# Patient Record
Sex: FEMALE | Race: OTHER | HISPANIC OR LATINO
[De-identification: names, ages, dates, MRNs, and addresses within clinical notes are randomized per-mention and may not be internally consistent; named-entity substitution may affect disease eponyms.]

---

## 2019-01-14 ENCOUNTER — APPOINTMENT (OUTPATIENT)
Dept: NEUROSURGERY | Facility: CLINIC | Age: 34
End: 2019-01-14
Payer: MEDICAID

## 2019-01-14 VITALS
WEIGHT: 148 LBS | DIASTOLIC BLOOD PRESSURE: 67 MMHG | RESPIRATION RATE: 16 BRPM | HEIGHT: 63 IN | HEART RATE: 55 BPM | SYSTOLIC BLOOD PRESSURE: 101 MMHG | TEMPERATURE: 99.3 F | OXYGEN SATURATION: 97 % | BODY MASS INDEX: 26.22 KG/M2

## 2019-01-14 DIAGNOSIS — Z78.9 OTHER SPECIFIED HEALTH STATUS: ICD-10-CM

## 2019-01-14 PROCEDURE — 99215 OFFICE O/P EST HI 40 MIN: CPT

## 2019-01-15 PROBLEM — Z78.9 NON-SMOKER: Status: ACTIVE | Noted: 2019-01-15

## 2019-01-21 NOTE — PHYSICAL EXAM
[General Appearance - Alert] : alert [General Appearance - In No Acute Distress] : in no acute distress [Oriented To Time, Place, And Person] : oriented to person, place, and time [Sclera] : the sclera and conjunctiva were normal [Outer Ear] : the ears and nose were normal in appearance [Neck Appearance] : the appearance of the neck was normal [] : no respiratory distress [Respiration, Rhythm And Depth] : normal respiratory rhythm and effort [Heart Rate And Rhythm] : heart rate was normal and rhythm regular [Skin Color & Pigmentation] : normal skin color and pigmentation [FreeTextEntry6] : RIGHT lower extremity weakness 3/5

## 2019-01-21 NOTE — PLAN
[FreeTextEntry1] : Discussed risks, benefits and alternatives to surgery at length with patient and patient's family. \par Compared MRI lumbar/thoracic spine done in April 2017 to new MRI lumbar/thoracic spine in October 2018 - residual tumor stable and remains unchanged.\par Recommend close follow up with serial imaging to assess tumor stability and assess for tethered spinal cord - 3 month MRI thoracic and lumbar spine with and without contrast.\par After MRI complete, schedule appointment with Dr. Kraft for nerve conduction studies.\par MRI to be obtained here at [facility].\par \par Plan:\par \par 1. MRI thoracic and lumbar spine with and without contrast\par 2. Refer to neurology for nerve conduction studies - Dr. Yasmany Kraft\par 3. Return for follow up after MRI complete\par \par Patient verbalizes agreement and understanding with plan.

## 2019-01-21 NOTE — ADDENDUM
[FreeTextEntry1] : We had a long discussion today regarding the risks, benefits,alternatives to surgery for her residual conus region dermoid/epidermoid tumor. The tumor has actually demonstrated no growth on serial imaging last performed in Oct 2018. Patient seems very eager to have surgery but I explained that surgery is unlikely to improve her back pain and furthermore there is no guarantee that any of her neurological symptoms will improve. Given the association of residual tumor with her conus it is in fact probable that she will have more deficits with aggressive surgery. The possibility that a secondary tethered cord is contributing to her symptoms has not been ruled out. Will need a new MRI T/L spine as well as nerve conduction studies to make a definitive plan. Followup after these studies have been completed.\par \par Patrick Bernabe M.D.

## 2019-01-21 NOTE — HISTORY OF PRESENT ILLNESS
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 Bernabe in neurosurgical consultation for spinal mass at [facility]. She comes to the appointment today with her mother.\par \par Since her initial surgery, the patient reports severe back pain radiating to RIGHT leg with associated numbness/tingling. She reports difficulty walking and uses cane to ambulate for assistance. Reports imbalance. She states that these symptoms have become progressively worse over the years. Also reports bowel/bladder incontinence and chronic constipation since initial surgery 6 years ago. \par \par She has been doing physical therapy with minimal relief of symptoms. \par \par \par \par

## 2019-02-23 ENCOUNTER — APPOINTMENT (OUTPATIENT)
Dept: MRI IMAGING | Facility: HOSPITAL | Age: 34
End: 2019-02-23
Payer: MEDICAID

## 2019-02-23 ENCOUNTER — OUTPATIENT (OUTPATIENT)
Dept: OUTPATIENT SERVICES | Facility: HOSPITAL | Age: 34
LOS: 1 days | End: 2019-02-23
Payer: MEDICAID

## 2019-02-23 PROCEDURE — 72158 MRI LUMBAR SPINE W/O & W/DYE: CPT | Mod: 26

## 2019-02-23 PROCEDURE — 72157 MRI CHEST SPINE W/O & W/DYE: CPT | Mod: 26

## 2019-02-23 PROCEDURE — 72157 MRI CHEST SPINE W/O & W/DYE: CPT

## 2019-02-23 PROCEDURE — A9585: CPT

## 2019-02-23 PROCEDURE — 72158 MRI LUMBAR SPINE W/O & W/DYE: CPT

## 2019-04-19 ENCOUNTER — APPOINTMENT (OUTPATIENT)
Dept: NEUROLOGY | Facility: CLINIC | Age: 34
End: 2019-04-19

## 2019-05-28 ENCOUNTER — OUTPATIENT (OUTPATIENT)
Dept: OUTPATIENT SERVICES | Facility: HOSPITAL | Age: 34
LOS: 1 days | End: 2019-05-28
Payer: MEDICAID

## 2019-05-28 ENCOUNTER — APPOINTMENT (OUTPATIENT)
Dept: NEUROSURGERY | Facility: CLINIC | Age: 34
End: 2019-05-28
Payer: MEDICAID

## 2019-05-28 VITALS
BODY MASS INDEX: 26.22 KG/M2 | OXYGEN SATURATION: 98 % | HEIGHT: 63 IN | HEART RATE: 96 BPM | WEIGHT: 148 LBS | SYSTOLIC BLOOD PRESSURE: 94 MMHG | DIASTOLIC BLOOD PRESSURE: 61 MMHG | RESPIRATION RATE: 18 BRPM

## 2019-05-28 PROCEDURE — 72110 X-RAY EXAM L-2 SPINE 4/>VWS: CPT

## 2019-05-28 PROCEDURE — 72080 X-RAY EXAM THORACOLMB 2/> VW: CPT

## 2019-05-28 PROCEDURE — 99215 OFFICE O/P EST HI 40 MIN: CPT

## 2019-05-28 PROCEDURE — 72084 X-RAY EXAM ENTIRE SPI 6/> VW: CPT | Mod: 26

## 2019-06-01 NOTE — REASON FOR VISIT
[Follow-Up: _____] : a [unfilled] follow-up visit [FreeTextEntry1] : Follows up after MRI lumbar and thoracic spine done on 2/23/19.\par \par She reports worsening low back pain radiating to bilateral lower extremities with associated lower extremity weakness (RIGHT greater than LEFT). She states these symptoms are exacerbated with ambulation. \par \par She was recently brought to ED for severe low back and leg pain and subsequently discharged. She is currently taking oxycodone and gabapentin for pain with minimal relief of symptoms.\par She reports continued bladder incontinence. She has difficulty walking and imbalance. She uses a walker to ambulate. She is currently seeking a HHA to assist her with ADLs as she is unable to perform ADLs independently.

## 2019-06-01 NOTE — ADDENDUM
[FreeTextEntry1] : 	May 28, 2019 \par  \par  \par  \par Re:	Radha Rowan  \par :	83 \par MRN:  [id number] \par  \par Ms. Rowan is a 33-year-old woman who presents for followup consultation regarding her recurrent conus medullaris intramedullary tumor.  Ms. Rowan was last evaluated in January of this year, and her followup MRI at this point indicated a stable size of her residual conus dermoid tumor.  At that point, given that her symptoms were relatively stable and the radiographic images showed no significant change, we decided to follow conservatively.  This conus dermoid tumor was initially diagnosed in  at which point she underwent subtotal resection at [facility] by a surgeon unknown to me. This initial surgery performed in  was very complex and left her with severe neurological deficits.  She in fact developed a wound infection, CSF leak, pseudo-meningocele requiring spinal drain, and multiple flap closures. Since that surgery, she has had a number of neurological issues which have been progressive including chronic low back pain, difficulty with ambulation, weakness, chronic bowel and bladder incontinence.   \par  \par Ms. Rowan indicates that her neurological symptoms have significantly worsened over the last 3-4 months.  She recounts several episodes of gait imbalance and falls which have left her with intermittent transient paralysis.  She also has worsening low back pain and worsening weakness in the lower extremities.  She has been ambulating only with a rolling walker.  She notes that she cannot walk more than a block without needing to rest.  Her incontinence is also somewhat worsened.   \par  \par She come in with a new thoracic and lumbar MRI which indicates a 2.4 x 1.9 cm L1 and L2 intramedullary conus tumor.  This tumor as compared to her last MRI shows some interval progression in size.  The intramedullary tumor has patchy contract enhancement superficially and heterogeneous signal intensity throughout.  There is clearly bulky tumor disease which is compressing the conus as well as the cauda equina nerve roots significantly.  There additionally is evidence of significant degenerative disease including disc desiccation at T12-L1 and L1-2 with a moderately broad disc bulge at L1-2.  Her x-rays show previous laminectomy at L1 and T12 as well as kyphosis at the thoracolumbar junction.   \par  \par Her neurological exam includes an antalgic gait with a walker.  Her lower extremity strength is 4- to 4 out of 5 in all lower extremity muscle groups.  She is unable to heel or toe walk.  She has decreased sensation and numbness bilaterally.  Reflexes in the patella are diminished.  The patient has paresthesias in the lower extremities when she attempts to ambulate.   \par  \par Given her progressive neurological findings as well as indication of progression of her tumor on radiographic imaging, I have indicated to her that it is certainly reasonable to re-explore her recurrent tumor and attempt a radical decompression. Given the pathology being dermoid I indicated that a complte resection is unlikely. I have indicated that she would certainly be at high risk for complication with this surgery, given that she has a fair amount of scar tissue in the region and she has has 3 or 4 surgeries in the region previously.  I have indicated to her that she is at high risk for progressive weakness.  Certainly, I do not expect any reversal of the advanced neurological symptoms that she has.  However, surgery would be done in an attempt to stabilizer her function.   \par  \par The risks of surgery including, but not limited to paralysis, infection, spinal destabilization, the need for reoperation, non-improvement in symptoms, and death were all discussed.  Ms. Rwoan understands these risks and would like to proceed.  I have indicated to her that I would like to obtain a CT of the thoracic and lumbar spine in order for me to have a better appreciation of the bony structure of her spine.  Certainly, if there is any indication of instability, I indicated that a fusion may be necessary. \par  \par We will plan for reoperation of T12 and L1 laminectomies; additional laminectomies may be necessary and certainly I would be willing to extend both rostrally and caudally should it be necessary to expose the extent of her conus tumor.   \par  \par  \par  \par Patrick Bernabe M.D. \par  of Neurosurgery \par [facility] \[facility] Department of Neurosurgery \par [facility] \par [address] \par [facility], Third Floor \[place] [address] \[facility] Tel: [phone] \[facility] Email:  [email or web address] \par  \par  \par KATRIN/jason [facility] #[id number] \[facility]

## 2019-06-01 NOTE — HISTORY OF PRESENT ILLNESS
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 Bernabe in neurosurgical consultation for spinal mass at [facility] [place]. She comes to the appointment today with her mother.\par \par Since her initial surgery, the patient reports severe back pain radiating to RIGHT leg with associated numbness/tingling. She reports difficulty walking and uses cane to ambulate for assistance. Reports imbalance. She states that these symptoms have become progressively worse over the years. Also reports bowel/bladder incontinence and chronic constipation since initial surgery 6 years ago. \par \par She has been doing physical therapy with minimal relief of symptoms. \par \par \par \par

## 2019-06-01 NOTE — DATA REVIEWED
[de-identified] : \par EXAM: MR SPINE LUMBAR WAW IC \par \par EXAM: MR SPINE THORACIC WAW IC \par \par PROCEDURE DATE: 02/23/2019 \par \par \par \par INTERPRETATION: THORACIC SPINE MR WITH CONTRAST \par LUMBAR SPINE WITH CONTRAST \par \par INDICATION: \par History of prior epidermoid resection at thoracolumbar junction. \par \par TECHNIQUE: \par \par THORACIC SPINE MR: \par Sagittal T1, sagittal T2, sagittal inversion recovery, axial T1, axial T2, \par and post-contrast sagittal T1 with fat suppression and axial T1 imaging \par without fat suppression was obtained of the thoracic spine. There are no \par prior studies. If outside studies are brought to the radiology department at [facility], a comparison will be made to those studies and an \par addendum will be added to this report. 7.5 cc of Gadavist was given, and \par none was discarded. \par \par LUMBAR SPINE MR: \par Sagittal T1, sagittal T2, sagittal STIR, axial T1, axial T2, and \par postcontrast sagittal T1 with fat suppression and axial T1 without fat \par suppression imaging was obtained of the lumbar spine.There are no prior \par studies. If outside studies are brought to the radiology department at [facility], a comparison will be made to those studies and an addendum \par will be added to this report. 7.5 cc of Gadavist was given, and none was \par discarded. \par \par FINDINGS: \par \par THORACIC SPINE MR: \par \par There is a laminectomy defect noted at the T12 and L1 levels. There is a \par mass identified at the thoracolumbar junction within the cauda equina mainly \par at the L1 level, which demonstrates a rounded configuration and \par heterogeneity of signal with areas of T1 shortening which may represent \par calcification and/or subacute methemoglobin (hemorrhage) and areas of marked \par T2 shortening and T2 prolongation. There is linear, peripheral enhancement \par noted and a more focus of nodular enhancement along the anterosuperior \par margin. Given history of epidermoid, diffusion-weighted imaging of the \par thoracic and lumbar spine is recommended as this is a more sensitive study \par for the detection of recurrent epidermoid. Lesion is approximately 4.3 cm in \par craniocaudal dimension x 2.0 cm AP dimension (series 11, image #8). \par \par There is kyphosis noted in the lower thoracic spine. There is no abnormal T2 \par prolongation within the intramedullary intradural thoracic spinal cord to \par the T12 level. There is loss of normal disc space signal on long TR scans \par consistent with desiccation and degenerative disc disease. Minor endplate \par marginal osteophytes are noted in the mid and lower thoracic spine. Marrow \par signal intensity is unremarkable. \par \par On axial imaging, there is a tiny central disc protrusion T12-L1 level with \par no significant central canal stenosis or foraminal stenosis. \par \par After contrast administration, \par \par LUMBAR SPINE MR: \par \par For description of the mass lesion at the L1 level, reference should be made \par to the thoracic spine MR report above. Patient demonstrates laminectomy at \par T12 and L1, which relates to prior resection of epidermoid by clinical \par history. \par \par Alignment is anatomic in the lumbar region. No spondylolisthesis is seen. \par Marrow signal intensity is normal.. No acute edema pattern is seen within \par lumbar vertebral bodies. \par \par There is loss of normal disc space signal on the long TR scans consistent \par with desiccation multilevel degenerative disc disease. No significant \par degenerative changes are seen. Conus medullaris is not well delineated due \par to the presence of the mass lesion centered at the L1 level, and this mass \par displays portions of the proximal cauda equina. \par \par At the L1-L2 level, small right paracentral disc protrusion L1-L2 level. No \par significant central canal stenosis is present or foraminal stenosis. \par \par At the L2-L3 level, no disc protrusion, lateral recess stenosis, foraminal \par narrowing, or central canal stenosis is present. \par \par At the L3-L4 level, no disc protrusion, lateral recess stenosis, foraminal \par narrowing, or central canal stenosis is present. \par \par At the L4-L5 level, diffuse disc bulge. There is no lateral recess stenosis, \par foraminal narrowing, or central canal stenosis. \par \par At the L5-S1 level, diffuse disc bulge. There is no lateral recess stenosis, \par foraminal narrowing, or central canal stenosis. \par \par IMPRESSION: \par \par THORACIC SPINE MR: \par 1. Prior laminectomy T12 and L1 for epidermoid resection. Concern for \par recurrent epidermoid with mass lesion identified at the thoracolumbar \par junction within the cauda equina mainly at L1 level demonstrating \par heterogeneity of signal characteristics with areas of T1 shortening and \par marked T2 shortening which may represent calcification and/or subacute \par methemoglobin (hemorrhage). Measurements above. Diffusion-weighted imaging \par of the thoracic and lumbar spine is recommended as this is a more sensitive \par MR sequence for the detection of recurrent and/or residual epidermoid. \par 2. Tiny central disc protrusion at T12-L1 level. \par 3. No central canal stenosis or foraminal stenosis. \par 4, Kyphosis in lower thoracic region. \par \par LUMBAR SPINE MR: \par 1. Prior laminectomy at T12 and L1 with recurrent epidermoid suspected. \par Refer to thoracic spine MR impression. \par 2. Small right paracentral disc protrusion L1-L2 level with no significant \par foraminal stenosis or central canal stenosis. \par 3. Minor diffuse disc bulges L4-L5 and L5-S1 levels. \par \par \par \par \par \par "Thank you for the opportunity to participate in the care of this patient." \par \par \par \par HOLGER CROWLEY M.D., ATTENDING RADIOLOGIST \par This document has been electronically signed. Feb 24 2019 10:26AM \par

## 2019-06-01 NOTE — PHYSICAL EXAM
[General Appearance - In No Acute Distress] : in no acute distress [General Appearance - Alert] : alert [Sclera] : the sclera and conjunctiva were normal [Antalgic] : antalgic [Neck Appearance] : the appearance of the neck was normal [Outer Ear] : the ears and nose were normal in appearance [] : no respiratory distress [Respiration, Rhythm And Depth] : normal respiratory rhythm and effort [Heart Rate And Rhythm] : heart rate was normal and rhythm regular [Abnormal Walk] : normal gait [Skin Color & Pigmentation] : normal skin color and pigmentation [FreeTextEntry8] : uses walker to ambulate [FreeTextEntry6] : RLE strength 4/5

## 2019-06-01 NOTE — ASSESSMENT
[FreeTextEntry1] : Imaging reviewed in detail with patient and patient’s family, which demonstrates slight increase in recurrent epidermoid tumor  at T12-L1.\par Surgery recommended, T12-L 1 laminectomy, epidermoid tumor resection.\par Discussed goal of surgery is to HALT progression of symptoms.\par Risks, benefits and alternatives to surgery discussed. \par Multiple questions answered to patient’s satisfaction. \par Risks include but is not limited to infection, no resolution of symptoms, bleeding, paralysis, CSF leak. Her risk of these complications may be slightly higher than average due to previous spinal surgery. She understands this.\par Education provided regarding pre-operative clearance requirements\par PST packet reviewed with and given to patient\par Education provided regarding use of chlorhexidine wipes pre-op\par Nasal Swab completed for MRSA\par She will need CT thoracic and lumbar spine preoperatively as well as flex/ext x-rays.\par \par Patient and patient’s family understand and wish to proceed with planned surgery. \par \par

## 2019-06-03 ENCOUNTER — OUTPATIENT (OUTPATIENT)
Dept: OUTPATIENT SERVICES | Facility: HOSPITAL | Age: 34
LOS: 1 days | End: 2019-06-03
Payer: COMMERCIAL

## 2019-06-03 DIAGNOSIS — M54.5 LOW BACK PAIN: ICD-10-CM

## 2019-06-03 DIAGNOSIS — Z22.321 CARRIER OR SUSPECTED CARRIER OF METHICILLIN SUSCEPTIBLE STAPHYLOCOCCUS AUREUS: ICD-10-CM

## 2019-06-03 PROCEDURE — 87641 MR-STAPH DNA AMP PROBE: CPT

## 2019-06-04 ENCOUNTER — APPOINTMENT (OUTPATIENT)
Dept: VASCULAR SURGERY | Facility: CLINIC | Age: 34
End: 2019-06-04
Payer: MEDICAID

## 2019-06-04 LAB
MRSA PCR RESULT.: NEGATIVE — SIGNIFICANT CHANGE UP
S AUREUS DNA NOSE QL NAA+PROBE: NEGATIVE — SIGNIFICANT CHANGE UP

## 2019-06-04 PROCEDURE — 93970 EXTREMITY STUDY: CPT

## 2019-06-08 ENCOUNTER — APPOINTMENT (OUTPATIENT)
Dept: CT IMAGING | Facility: HOSPITAL | Age: 34
End: 2019-06-08
Payer: MEDICAID

## 2019-06-08 ENCOUNTER — INPATIENT (INPATIENT)
Facility: HOSPITAL | Age: 34
LOS: 1 days | Discharge: HOME | DRG: 55 | End: 2019-06-10
Attending: NEUROLOGICAL SURGERY | Admitting: NEUROLOGICAL SURGERY
Payer: MEDICAID

## 2019-06-08 ENCOUNTER — OUTPATIENT (OUTPATIENT)
Dept: OUTPATIENT SERVICES | Facility: HOSPITAL | Age: 34
LOS: 1 days | End: 2019-06-08
Payer: MEDICAID

## 2019-06-08 VITALS
WEIGHT: 130.07 LBS | OXYGEN SATURATION: 98 % | HEART RATE: 100 BPM | SYSTOLIC BLOOD PRESSURE: 123 MMHG | HEIGHT: 63 IN | DIASTOLIC BLOOD PRESSURE: 76 MMHG | TEMPERATURE: 98 F | RESPIRATION RATE: 18 BRPM

## 2019-06-08 DIAGNOSIS — D49.7 NEOPLASM OF UNSPECIFIED BEHAVIOR OF ENDOCRINE GLANDS AND OTHER PARTS OF NERVOUS SYSTEM: Chronic | ICD-10-CM

## 2019-06-08 DIAGNOSIS — M79.673 PAIN IN UNSPECIFIED FOOT: ICD-10-CM

## 2019-06-08 DIAGNOSIS — D49.9 NEOPLASM OF UNSPECIFIED BEHAVIOR OF UNSPECIFIED SITE: ICD-10-CM

## 2019-06-08 LAB
ALBUMIN SERPL ELPH-MCNC: 4.3 G/DL — SIGNIFICANT CHANGE UP (ref 3.3–5)
ALP SERPL-CCNC: 62 U/L — SIGNIFICANT CHANGE UP (ref 40–120)
ALT FLD-CCNC: 25 U/L — SIGNIFICANT CHANGE UP (ref 10–45)
ANION GAP SERPL CALC-SCNC: 15 MMOL/L — SIGNIFICANT CHANGE UP (ref 5–17)
APPEARANCE UR: CLEAR — SIGNIFICANT CHANGE UP
APTT BLD: 28.2 SEC — SIGNIFICANT CHANGE UP (ref 27.5–36.3)
AST SERPL-CCNC: 32 U/L — SIGNIFICANT CHANGE UP (ref 10–40)
BACTERIA # UR AUTO: PRESENT /HPF
BASOPHILS # BLD AUTO: 0.02 K/UL — SIGNIFICANT CHANGE UP (ref 0–0.2)
BASOPHILS NFR BLD AUTO: 0.2 % — SIGNIFICANT CHANGE UP (ref 0–2)
BILIRUB SERPL-MCNC: 0.6 MG/DL — SIGNIFICANT CHANGE UP (ref 0.2–1.2)
BILIRUB UR-MCNC: NEGATIVE — SIGNIFICANT CHANGE UP
BLD GP AB SCN SERPL QL: NEGATIVE — SIGNIFICANT CHANGE UP
BUN SERPL-MCNC: 6 MG/DL — LOW (ref 7–23)
CALCIUM SERPL-MCNC: 9.6 MG/DL — SIGNIFICANT CHANGE UP (ref 8.4–10.5)
CHLORIDE SERPL-SCNC: 111 MMOL/L — HIGH (ref 96–108)
CO2 SERPL-SCNC: 19 MMOL/L — LOW (ref 22–31)
COLOR SPEC: YELLOW — SIGNIFICANT CHANGE UP
COMMENT - URINE: SIGNIFICANT CHANGE UP
CREAT SERPL-MCNC: 0.7 MG/DL — SIGNIFICANT CHANGE UP (ref 0.5–1.3)
CRP SERPL-MCNC: 0.2 MG/DL — SIGNIFICANT CHANGE UP (ref 0–0.4)
DIFF PNL FLD: ABNORMAL
EOSINOPHIL # BLD AUTO: 0.06 K/UL — SIGNIFICANT CHANGE UP (ref 0–0.5)
EOSINOPHIL NFR BLD AUTO: 0.5 % — SIGNIFICANT CHANGE UP (ref 0–6)
EPI CELLS # UR: SIGNIFICANT CHANGE UP /HPF (ref 0–5)
ERYTHROCYTE [SEDIMENTATION RATE] IN BLOOD: 39 MM/HR — HIGH
GLUCOSE SERPL-MCNC: 109 MG/DL — HIGH (ref 70–99)
GLUCOSE UR QL: NEGATIVE — SIGNIFICANT CHANGE UP
HBA1C BLD-MCNC: 4.8 % — SIGNIFICANT CHANGE UP (ref 4–5.6)
HCT VFR BLD CALC: 40.3 % — SIGNIFICANT CHANGE UP (ref 34.5–45)
HGB BLD-MCNC: 13.1 G/DL — SIGNIFICANT CHANGE UP (ref 11.5–15.5)
HYALINE CASTS # UR AUTO: ABNORMAL /LPF (ref 0–2)
IMM GRANULOCYTES NFR BLD AUTO: 0.2 % — SIGNIFICANT CHANGE UP (ref 0–1.5)
INR BLD: 0.99 — SIGNIFICANT CHANGE UP (ref 0.88–1.16)
KETONES UR-MCNC: 15 MG/DL
LEUKOCYTE ESTERASE UR-ACNC: NEGATIVE — SIGNIFICANT CHANGE UP
LYMPHOCYTES # BLD AUTO: 1.65 K/UL — SIGNIFICANT CHANGE UP (ref 1–3.3)
LYMPHOCYTES # BLD AUTO: 13.4 % — SIGNIFICANT CHANGE UP (ref 13–44)
MCHC RBC-ENTMCNC: 32.5 GM/DL — SIGNIFICANT CHANGE UP (ref 32–36)
MCHC RBC-ENTMCNC: 33.8 PG — SIGNIFICANT CHANGE UP (ref 27–34)
MCV RBC AUTO: 103.9 FL — HIGH (ref 80–100)
MONOCYTES # BLD AUTO: 0.59 K/UL — SIGNIFICANT CHANGE UP (ref 0–0.9)
MONOCYTES NFR BLD AUTO: 4.8 % — SIGNIFICANT CHANGE UP (ref 2–14)
NEUTROPHILS # BLD AUTO: 9.98 K/UL — HIGH (ref 1.8–7.4)
NEUTROPHILS NFR BLD AUTO: 80.9 % — HIGH (ref 43–77)
NITRITE UR-MCNC: NEGATIVE — SIGNIFICANT CHANGE UP
NRBC # BLD: 0 /100 WBCS — SIGNIFICANT CHANGE UP (ref 0–0)
PH UR: 6 — SIGNIFICANT CHANGE UP (ref 5–8)
PLATELET # BLD AUTO: 366 K/UL — SIGNIFICANT CHANGE UP (ref 150–400)
POTASSIUM SERPL-MCNC: 4.7 MMOL/L — SIGNIFICANT CHANGE UP (ref 3.5–5.3)
POTASSIUM SERPL-MCNC: 5.7 MMOL/L — HIGH (ref 3.5–5.3)
POTASSIUM SERPL-SCNC: 4.7 MMOL/L — SIGNIFICANT CHANGE UP (ref 3.5–5.3)
POTASSIUM SERPL-SCNC: 5.7 MMOL/L — HIGH (ref 3.5–5.3)
PROT SERPL-MCNC: 7.6 G/DL — SIGNIFICANT CHANGE UP (ref 6–8.3)
PROT UR-MCNC: NEGATIVE MG/DL — SIGNIFICANT CHANGE UP
PROTHROM AB SERPL-ACNC: 11.2 SEC — SIGNIFICANT CHANGE UP (ref 10–12.9)
RBC # BLD: 3.88 M/UL — SIGNIFICANT CHANGE UP (ref 3.8–5.2)
RBC # FLD: 14.7 % — HIGH (ref 10.3–14.5)
RBC CASTS # UR COMP ASSIST: < 5 /HPF — SIGNIFICANT CHANGE UP
RH IG SCN BLD-IMP: POSITIVE — SIGNIFICANT CHANGE UP
SODIUM SERPL-SCNC: 145 MMOL/L — SIGNIFICANT CHANGE UP (ref 135–145)
SP GR SPEC: 1.02 — SIGNIFICANT CHANGE UP (ref 1–1.03)
URATE SERPL-MCNC: 4 MG/DL — SIGNIFICANT CHANGE UP (ref 2.5–7)
UROBILINOGEN FLD QL: 0.2 E.U./DL — SIGNIFICANT CHANGE UP
WBC # BLD: 12.33 K/UL — HIGH (ref 3.8–10.5)
WBC # FLD AUTO: 12.33 K/UL — HIGH (ref 3.8–10.5)
WBC UR QL: ABNORMAL /HPF

## 2019-06-08 PROCEDURE — 99223 1ST HOSP IP/OBS HIGH 75: CPT | Mod: GC

## 2019-06-08 PROCEDURE — 93010 ELECTROCARDIOGRAM REPORT: CPT

## 2019-06-08 PROCEDURE — 99222 1ST HOSP IP/OBS MODERATE 55: CPT

## 2019-06-08 PROCEDURE — 73630 X-RAY EXAM OF FOOT: CPT | Mod: 26,50

## 2019-06-08 PROCEDURE — 72130 CT CHEST SPINE W/O & W/DYE: CPT | Mod: 26

## 2019-06-08 PROCEDURE — 93971 EXTREMITY STUDY: CPT | Mod: 26,RT

## 2019-06-08 PROCEDURE — 72133 CT LUMBAR SPINE W/O & W/DYE: CPT | Mod: 26

## 2019-06-08 PROCEDURE — 99285 EMERGENCY DEPT VISIT HI MDM: CPT | Mod: 25

## 2019-06-08 PROCEDURE — 72133 CT LUMBAR SPINE W/O & W/DYE: CPT

## 2019-06-08 PROCEDURE — 73610 X-RAY EXAM OF ANKLE: CPT | Mod: 26,50

## 2019-06-08 PROCEDURE — 72130 CT CHEST SPINE W/O & W/DYE: CPT

## 2019-06-08 RX ORDER — SODIUM CHLORIDE 9 MG/ML
1000 INJECTION INTRAMUSCULAR; INTRAVENOUS; SUBCUTANEOUS ONCE
Refills: 0 | Status: COMPLETED | OUTPATIENT
Start: 2019-06-08 | End: 2019-06-08

## 2019-06-08 RX ORDER — ACETAMINOPHEN 500 MG
650 TABLET ORAL EVERY 6 HOURS
Refills: 0 | Status: DISCONTINUED | OUTPATIENT
Start: 2019-06-08 | End: 2019-06-10

## 2019-06-08 RX ORDER — SENNA PLUS 8.6 MG/1
2 TABLET ORAL AT BEDTIME
Refills: 0 | Status: DISCONTINUED | OUTPATIENT
Start: 2019-06-08 | End: 2019-06-10

## 2019-06-08 RX ORDER — OXYCODONE AND ACETAMINOPHEN 5; 325 MG/1; MG/1
2 TABLET ORAL EVERY 6 HOURS
Refills: 0 | Status: DISCONTINUED | OUTPATIENT
Start: 2019-06-08 | End: 2019-06-10

## 2019-06-08 RX ORDER — ONDANSETRON 8 MG/1
4 TABLET, FILM COATED ORAL EVERY 8 HOURS
Refills: 0 | Status: DISCONTINUED | OUTPATIENT
Start: 2019-06-08 | End: 2019-06-10

## 2019-06-08 RX ORDER — OXYCODONE AND ACETAMINOPHEN 5; 325 MG/1; MG/1
1 TABLET ORAL EVERY 4 HOURS
Refills: 0 | Status: DISCONTINUED | OUTPATIENT
Start: 2019-06-08 | End: 2019-06-10

## 2019-06-08 RX ORDER — KETOROLAC TROMETHAMINE 30 MG/ML
30 SYRINGE (ML) INJECTION ONCE
Refills: 0 | Status: DISCONTINUED | OUTPATIENT
Start: 2019-06-08 | End: 2019-06-08

## 2019-06-08 RX ORDER — DOCUSATE SODIUM 100 MG
100 CAPSULE ORAL THREE TIMES A DAY
Refills: 0 | Status: DISCONTINUED | OUTPATIENT
Start: 2019-06-08 | End: 2019-06-10

## 2019-06-08 RX ORDER — SODIUM CHLORIDE 9 MG/ML
1000 INJECTION INTRAMUSCULAR; INTRAVENOUS; SUBCUTANEOUS
Refills: 0 | Status: DISCONTINUED | OUTPATIENT
Start: 2019-06-08 | End: 2019-06-09

## 2019-06-08 RX ORDER — DIAZEPAM 5 MG
5 TABLET ORAL ONCE
Refills: 0 | Status: DISCONTINUED | OUTPATIENT
Start: 2019-06-08 | End: 2019-06-08

## 2019-06-08 RX ORDER — ENOXAPARIN SODIUM 100 MG/ML
40 INJECTION SUBCUTANEOUS AT BEDTIME
Refills: 0 | Status: DISCONTINUED | OUTPATIENT
Start: 2019-06-08 | End: 2019-06-10

## 2019-06-08 RX ADMIN — Medication 5 MILLIGRAM(S): at 16:57

## 2019-06-08 RX ADMIN — Medication 100 MILLIGRAM(S): at 22:11

## 2019-06-08 RX ADMIN — Medication 30 MILLIGRAM(S): at 14:04

## 2019-06-08 RX ADMIN — SODIUM CHLORIDE 50 MILLILITER(S): 9 INJECTION INTRAMUSCULAR; INTRAVENOUS; SUBCUTANEOUS at 19:19

## 2019-06-08 RX ADMIN — ENOXAPARIN SODIUM 40 MILLIGRAM(S): 100 INJECTION SUBCUTANEOUS at 22:11

## 2019-06-08 RX ADMIN — OXYCODONE AND ACETAMINOPHEN 1 TABLET(S): 5; 325 TABLET ORAL at 22:11

## 2019-06-08 RX ADMIN — SODIUM CHLORIDE 2000 MILLILITER(S): 9 INJECTION INTRAMUSCULAR; INTRAVENOUS; SUBCUTANEOUS at 14:33

## 2019-06-08 RX ADMIN — Medication 30 MILLIGRAM(S): at 18:38

## 2019-06-08 RX ADMIN — OXYCODONE AND ACETAMINOPHEN 1 TABLET(S): 5; 325 TABLET ORAL at 23:00

## 2019-06-08 NOTE — ED PROVIDER NOTE - ATTENDING CONTRIBUTION TO CARE
34 yo f w hx of epidermoid cyst of L spine presents to ED with concern for right foot pain and swelling x 1 week.  She notes intermittent swelling over the past few months, however states this is worse.  No trauma, no fever or chills.  + Difficulty ambulating 2/2 pain.  On my face to face ED eval, patient is non toxic.  HRRR, lungs clear.  Abd soft.  + swelling to right foot and ankle, no skin breakdown.  Extremity is warm with mild hyperemia.  Will check x ray, US and labs.  Will consult w podiatry and dispo accordingly.

## 2019-06-08 NOTE — ED ADULT NURSE NOTE - INTERVENTIONS DEFINITIONS
Physically safe environment: no spills, clutter or unnecessary equipment/Stretcher in lowest position, wheels locked, appropriate side rails in place/Provide visual cue, wrist band, yellow gown, etc./Instruct patient to call for assistance/Monitor gait and stability

## 2019-06-08 NOTE — ED PROVIDER NOTE - MUSCULOSKELETAL, MLM
Spine appears normal, no bruising or redness. no midline spinal tenderness. + swelling and mild redness to entire right foot. pulses intact. sensation intact. diffuse tenderness. toes contracted. cap refill < 2secs. calf and tib/fib non tender.

## 2019-06-08 NOTE — ED ADULT NURSE NOTE - OBJECTIVE STATEMENT
Patient is a 32yo female reporting right foot pain and swelling x1 week. Patient has hx of spinal tumor and was supposed to have a CT spine today but was unable to ambulate to appointment. Right foot swollen and tender to palpation. Pedal pulse 1+ on right foot. Denies fevers, injury. Unable to bear weight on right foot.

## 2019-06-08 NOTE — H&P ADULT - ATTENDING COMMENTS
Patient seen and examined by me. She presents with a swollen and spastic right foot as well as hyperkalemia. Lower extremity dopplers negative for DVT. Right foot swelling now resolving and potassium level normal. 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 Right leg spasticity likely from recurrent conus tumor. CT T-L-spine obtained. Plan for tumor resection next week. Patient medically cleared for surgery. Will discharge home and she will return for surgery next week.    Patrick Bernabe M.D.

## 2019-06-08 NOTE — H&P ADULT - NSHPREVIEWOFSYSTEMS_GEN_ALL_CORE
34 yo f w hx of epidermoid cyst of L spine presents to ED with concern for right foot pain and swelling x 1 week.  She notes intermittent swelling over the past few months, however states this is worse.  No trauma, no fever or chills.  + Difficulty ambulating 2/2 pain.  On my face to face ED eval, patient is non toxic.    Patient is being followed by Dr. Bernabe, she came today for outpatient CT scan. Due to R.foot pain and edema, she was sent to ED.

## 2019-06-08 NOTE — H&P ADULT - NSHPLABSRESULTS_GEN_ALL_CORE
13.1   12.33 )-----------( 366      ( 08 Jun 2019 14:01 )             40.3   06-08    x   |  x   |  x   ----------------------------<  x   4.7   |  x   |  x     Ca    9.6      08 Jun 2019 14:01    TPro  7.6  /  Alb  4.3  /  TBili  0.6  /  DBili  x   /  AST  32  /  ALT  25  /  AlkPhos  62  06-08  PT/INR - ( 08 Jun 2019 14:01 )   PT: 11.2 sec;   INR: 0.99          PTT - ( 08 Jun 2019 14:01 )  PTT:28.2 sec  LLE doppler:  Thigh veins: The right common femoral, femoral, popliteal, proximal   greater saphenous, and proximal deep femoral veins are patent and free of   thrombus. The veins are normally compressible and have normal phasic flow   and augmentation response.    Calf veins: Limited evaluation of the paired posterior tibial calf veins   demonstrate compressibility with no visible thrombus. Peroneal veins are   not clearly demonstrated. There is right lower extremity soft tissue edema    Contralateral CFV: The contralateral (left) common femoral vein is patent   and free of thrombus.      IMPRESSION:  No deep vein thrombosis seen within the visualized vessels in the right   lower extremity.. Right lower extremity soft tissue edema is noted.

## 2019-06-08 NOTE — ED PROVIDER NOTE - OBJECTIVE STATEMENT
32 y/o female with hx of epidermoid cyst lumbar spine c/o right foot pain x 1wk. pt  states foot swollen and painful for the past one week. pt reports difficulty walking due to pain. pt notes swelling occurred in past but never lasts this long. no numbness or tingling. pt reports low back pain unchanged. + catheter use for yrs. no fever or chills. no cp or sob. pt notes negative doppler as outpt a couple of weeks ago. no further complaints.

## 2019-06-08 NOTE — CONSULT NOTE ADULT - ASSESSMENT
A/P 34yo F h/o epidermoid cyst of L spine presents to the emergency room with chief complaint of right foot pain and swelling    - Low suspicion for cellulitis. Likely neurological etiology as source of deformity and symptoms  - Recommended neurosurgery consult  - C/w pain management on outpatient basis  - Stable from a podiatry standpoint. Bracing would be challenging due to rigid deformity  Pls page podiatry with any questions or concerns

## 2019-06-08 NOTE — H&P ADULT - HISTORY OF PRESENT ILLNESS
32 yo f w hx of epidermoid cyst of L spine presents to ED with concern for right foot pain and swelling x 1 week.  She notes intermittent swelling over the past few months, however states this is worse.  No trauma, no fever or chills.  + Difficulty ambulating 2/2 pain.  On my face to face ED eval, patient is non toxic.    Patient is being followed by Dr. Bernabe, she came today for outpatient CT scan. Due to R.foot pain and edema, she was sent to ED.  Of note, patient at home self-catheterizes for intermittent urinary retention

## 2019-06-08 NOTE — H&P ADULT - NSHPPHYSICALEXAM_GEN_ALL_CORE
Gen: well developed, well groomed in moderate distress.  A&) x 3, PERRL, EOMI  CN II to XII are grossly intact.  Lung: clear lung sound  Heart: S1S2 regular  Abd: Soft non tender, +BS  Ext: RLE: R. foot severe edema and tenderness to palpation, severe contracture of digits. limited ROM due to pain.              decreased ROM of R. Knee due pain but has good strength.               5/5 proximal       LLE: 5/5, no pain no edema  2+ distal pulses bilateral.  No sensory deficit

## 2019-06-08 NOTE — ED PROVIDER NOTE - CLINICAL SUMMARY MEDICAL DECISION MAKING FREE TEXT BOX
right foot pain  with hx of epidermoid lumbar mass. foot swollen and contracted toes.  labs noted and x-rays done. podiatry consulted and suspect related to lumbar mass. doppler negative for dvt. pt evaluated in ED by neurosurgey and recommend admission. pt reports improvement in pain with toradol and valium right foot pain  with hx of epidermoid lumbar mass. foot swollen and contracted toes.  labs noted and x-rays done. podiatry consulted and suspect related to lumbar mass. doppler negative for dvt. pt evaluated in ED by neurosurgery, ct thoracic and lumbar ordered and recommend admission. pt reports improvement in pain with toradol and valium

## 2019-06-09 PROCEDURE — 71045 X-RAY EXAM CHEST 1 VIEW: CPT | Mod: 26

## 2019-06-09 RX ORDER — METOCLOPRAMIDE HCL 10 MG
10 TABLET ORAL ONCE
Refills: 0 | Status: COMPLETED | OUTPATIENT
Start: 2019-06-09 | End: 2019-06-09

## 2019-06-09 RX ADMIN — ENOXAPARIN SODIUM 40 MILLIGRAM(S): 100 INJECTION SUBCUTANEOUS at 21:04

## 2019-06-09 RX ADMIN — Medication 100 MILLIGRAM(S): at 13:36

## 2019-06-09 RX ADMIN — Medication 100 MILLIGRAM(S): at 21:04

## 2019-06-09 RX ADMIN — Medication 100 MILLIGRAM(S): at 05:07

## 2019-06-09 RX ADMIN — OXYCODONE AND ACETAMINOPHEN 2 TABLET(S): 5; 325 TABLET ORAL at 06:59

## 2019-06-09 RX ADMIN — ONDANSETRON 4 MILLIGRAM(S): 8 TABLET, FILM COATED ORAL at 06:00

## 2019-06-09 RX ADMIN — OXYCODONE AND ACETAMINOPHEN 2 TABLET(S): 5; 325 TABLET ORAL at 07:42

## 2019-06-09 RX ADMIN — Medication 10 MILLIGRAM(S): at 10:39

## 2019-06-09 NOTE — PROGRESS NOTE ADULT - SUBJECTIVE AND OBJECTIVE BOX
HPI:  34 yo f w hx of epidermoid cyst of L spine presents to ED with concern for right foot pain and swelling x 1 week.  She notes intermittent swelling over the past few months, however states this is worse.  No trauma, no fever or chills.  + Difficulty ambulating 2/2 pain.  On my face to face ED eval, patient is non toxic.    Patient is being followed by Dr. Bernabe, she came today for outpatient CT scan. Due to R.foot pain and edema, she was sent to ED.  Of note, patient at home self-catheterizes for intermittent urinary retention (2019 18:22)      Hospital course:   JO overnight. Plan for discharge home today. Plan to return for elective surgery .      Vital Signs Last 24 Hrs  T(C): 36.3 (2019 09:30), Max: 36.8 (2019 16:01)  T(F): 97.4 (2019 09:30), Max: 98.3 (2019 16:01)  HR: 70 (2019 09:30) (70 - 100)  BP: 118/81 (2019 09:30) (88/47 - 123/76)  BP(mean): --  RR: 16 (2019 09:30) (15 - 18)  SpO2: 98% (2019 09:30) (97% - 100%)    I&O's Summary    2019 07:01  -  2019 10:59  --------------------------------------------------------  IN: 0 mL / OUT: 60 mL / NET: -60 mL        PHYSICAL EXAM:  Neurological: AOx3, NAD, FC, speech coherent   CN II-XII grossly intact   Motor: MAEx4, RLE distal movement limited due to pain, +right leg/foot swelling, o/w 5/5 strength throughout  SILT throughout       TUBES/LINES:  [] Michel  [] A-line  [] Lumbar Drain  [] Wound Drains  [] NGT   [] EVD   [] CVC  [] Other      DIET:  [] NPO  [x] Mechanical  [] Tube feeds    LABS:                        13.1   12.33 )-----------( 366      ( 2019 14:01 )             40.3     06-08    x   |  x   |  x   ----------------------------<  x   4.7   |  x   |  x     Ca    9.6      2019 14:01    TPro  7.6  /  Alb  4.3  /  TBili  0.6  /  DBili  x   /  AST  32  /  ALT  25  /  AlkPhos  62  06-08    PT/INR - ( 2019 14:01 )   PT: 11.2 sec;   INR: 0.99          PTT - ( 2019 14:01 )  PTT:28.2 sec  Urinalysis Basic - ( 2019 15:32 )    Color: Yellow / Appearance: Clear / S.025 / pH: x  Gluc: x / Ketone: 15 mg/dL  / Bili: Negative / Urobili: 0.2 E.U./dL   Blood: x / Protein: NEGATIVE mg/dL / Nitrite: NEGATIVE   Leuk Esterase: NEGATIVE / RBC: < 5 /HPF / WBC 5-10 /HPF   Sq Epi: x / Non Sq Epi: 0-5 /HPF / Bacteria: Present /HPF          CAPILLARY BLOOD GLUCOSE          Drug Levels: [] N/A    CSF Analysis: [] N/A      Allergies    No Known Drug Allergies  shellfish (Rash)    Intolerances        Home Medications:  oxyCODONE 5 mg oral tablet: 1 tab(s) orally once a day, As Needed (2019 18:34)      MEDICATIONS:  MEDICATIONS  (STANDING):  docusate sodium 100 milliGRAM(s) Oral three times a day  enoxaparin Injectable 40 milliGRAM(s) SubCutaneous at bedtime    MEDICATIONS  (PRN):  acetaminophen   Tablet .. 650 milliGRAM(s) Oral every 6 hours PRN Temp greater or equal to 38C (100.4F), Mild Pain (1 - 3)  ondansetron Injectable 4 milliGRAM(s) IV Push every 8 hours PRN Nausea and/or Vomiting  oxyCODONE    5 mG/acetaminophen 325 mG 1 Tablet(s) Oral every 4 hours PRN Moderate Pain (4 - 6)  oxyCODONE    5 mG/acetaminophen 325 mG 2 Tablet(s) Oral every 6 hours PRN Severe Pain (7 - 10)  senna 2 Tablet(s) Oral at bedtime PRN Constipation      CULTURES:  Culture Results:   No growth at 12 hours (06-08 @ 17:22)  Culture Results:   No growth at 12 hours (06-08 @ 17:21)      RADIOLOGY & ADDITIONAL TESTS:      ASSESSMENT:  33y with RLE pain/swelling, admitted for r/o DVT. Patient has history of T12-T3 laminectomy and resection of dermoid cyst. CT of spine performed as outpatient.     PLAN:  -neuro/spine checks  -pain control  -LE dopplers negative  -podiatry consulted for foot swelling- likely neuro in etiology   -pending medical clearance  -plan to discharge home today  -patient to return for elective surgery on   -above discussed with Dr. Bernabe       Assessment: present when checked     [] GCS   E   V   M     Heart Failure: [] Acute, [] acute on chronic, [] chronic   Heart Failure: [] Diastolic (HFpEF), [] Systolic (HRrEF), [] Combined (HFpEF and HFrEF), [] RHF, [] Pulm HTN, [] Other     [] LEIGH, [] ATN, [] AIN, [] other   [] CKD1, [] CKD2, [] CKD3, [] CKD4, [] CKD5, [] ESRD     Encephalopathy: [] Metabolic, [] Hepatic, [] Toxic, [] Neurological, [] Other     Abnormal Nutritional Status: [] malnutrition (see nutrition note), []underweight: BMI <19, [] morbid obesity: BMI >40, [] Cachexia     [] Sepsis   [] Hypovolemic shock, [] Cardiogenic shock, [] Hemorrhagic shock, [] Neurogenic shock   [] Acute respiratory failure   [] Cerebral edema, [] Brain compression / herniation   [] Functional quadriplegia   [] Acute blood loss anemia

## 2019-06-10 ENCOUNTER — TRANSCRIPTION ENCOUNTER (OUTPATIENT)
Age: 34
End: 2019-06-10

## 2019-06-10 VITALS
TEMPERATURE: 98 F | SYSTOLIC BLOOD PRESSURE: 97 MMHG | OXYGEN SATURATION: 97 % | RESPIRATION RATE: 16 BRPM | DIASTOLIC BLOOD PRESSURE: 67 MMHG | HEART RATE: 67 BPM

## 2019-06-10 DIAGNOSIS — K21.9 GASTRO-ESOPHAGEAL REFLUX DISEASE WITHOUT ESOPHAGITIS: ICD-10-CM

## 2019-06-10 DIAGNOSIS — Z01.818 ENCOUNTER FOR OTHER PREPROCEDURAL EXAMINATION: ICD-10-CM

## 2019-06-10 DIAGNOSIS — D49.2 NEOPLASM OF UNSPECIFIED BEHAVIOR OF BONE, SOFT TISSUE, AND SKIN: ICD-10-CM

## 2019-06-10 DIAGNOSIS — G83.4 CAUDA EQUINA SYNDROME: ICD-10-CM

## 2019-06-10 PROCEDURE — 99222 1ST HOSP IP/OBS MODERATE 55: CPT

## 2019-06-10 PROCEDURE — 99238 HOSP IP/OBS DSCHRG MGMT 30/<: CPT

## 2019-06-10 PROCEDURE — 93306 TTE W/DOPPLER COMPLETE: CPT | Mod: 26

## 2019-06-10 RX ORDER — DOCUSATE SODIUM 100 MG
1 CAPSULE ORAL
Qty: 0 | Refills: 0 | DISCHARGE
Start: 2019-06-10

## 2019-06-10 RX ORDER — OXYCODONE HYDROCHLORIDE 5 MG/1
1 TABLET ORAL
Qty: 0 | Refills: 0 | DISCHARGE

## 2019-06-10 RX ORDER — SENNA PLUS 8.6 MG/1
2 TABLET ORAL
Qty: 0 | Refills: 0 | DISCHARGE
Start: 2019-06-10

## 2019-06-10 RX ORDER — ACETAMINOPHEN 500 MG
2 TABLET ORAL
Qty: 0 | Refills: 0 | DISCHARGE
Start: 2019-06-10

## 2019-06-10 RX ADMIN — OXYCODONE AND ACETAMINOPHEN 2 TABLET(S): 5; 325 TABLET ORAL at 02:01

## 2019-06-10 RX ADMIN — OXYCODONE AND ACETAMINOPHEN 2 TABLET(S): 5; 325 TABLET ORAL at 10:27

## 2019-06-10 RX ADMIN — OXYCODONE AND ACETAMINOPHEN 2 TABLET(S): 5; 325 TABLET ORAL at 09:28

## 2019-06-10 RX ADMIN — OXYCODONE AND ACETAMINOPHEN 2 TABLET(S): 5; 325 TABLET ORAL at 03:00

## 2019-06-10 NOTE — CONSULT NOTE ADULT - PROBLEM SELECTOR RECOMMENDATION 3
she is followed by neurosurgery and she is scheduled for resection.  She use to self catheterize herself for urination since age 1

## 2019-06-10 NOTE — CONSULT NOTE ADULT - SUBJECTIVE AND OBJECTIVE BOX
Attending: Bhargav    Patient is a 33y old  Female who presents with a chief complaint of right foot pain    HPI: 32yo F h/o epidermoid cyst of L spine presents to the emergency room with chief complaint of right foot pain and swelling since Thursday 6/6/19. The swelling and pain comes and goes. She has experienced similar episodes about 1-2 months ago, however this is the longest the symptoms lasted. Patient had spine surgery twice ~6 years ago with surgical complication of infection, requiring hospitalization in the ICU. Pt reports that when she places weight on her lower extremity it may change color to a bluish- purplish hue, which happened today. The pain is localized to the top of her digits and right forefoot. She has difficulty ambulating, using a cane and wheelchair as needed. Pt gets chills and shakes profusely due to the pain. Denies nausea, emesis, fever, SOB, dysuria. Pt goes to pain management and is scheduled for another spine surgery on 6/20/19. Pt denies trauma. Denies any open lesions. Medications include Oxycodone 5mg, Buproprion 300mg, Docusate 100mg, gabapentin 300mg, Trazodone 150mg.       Review of systems negative except per HPI    PAST MEDICAL & SURGICAL HISTORY:  Tumor: spinal    Allergies  No Known Drug Allergies  shellfish (Rash)    LABS                        13.1   12.33 )-----------( 366      ( 08 Jun 2019 14:01 )             40.3     06-08    145  |  111<H>  |  6<L>  ----------------------------<  109<H>  5.7<H>   |  19<L>  |  0.70    Ca    9.6      08 Jun 2019 14:01    TPro  7.6  /  Alb  4.3  /  TBili  0.6  /  DBili  x   /  AST  32  /  ALT  25  /  AlkPhos  62  06-08  PT/INR - ( 08 Jun 2019 14:01 )   PT: 11.2 sec;   INR: 0.99     PTT - ( 08 Jun 2019 14:01 )  PTT:28.2 sec  ESR: 39  CRP: --  06-08 @ 14:01    Vital Signs Last 24 Hrs  T(C): 36.8 (08 Jun 2019 16:01), Max: 36.8 (08 Jun 2019 16:01)  T(F): 98.3 (08 Jun 2019 16:01), Max: 98.3 (08 Jun 2019 16:01)  HR: 78 (08 Jun 2019 16:01) (78 - 100)  BP: 111/71 (08 Jun 2019 16:01) (111/71 - 123/76)  BP(mean): --  RR: 18 (08 Jun 2019 16:01) (18 - 18)  SpO2: 97% (08 Jun 2019 16:01) (97% - 98%)    PHYSICAL EXAM  General: NAD, AA0x3, Nontoxic appearing young female     Lower Extremity Focused:  Vasc: Palpable 2/4 DP/PT pulses B/L, CFT brisk x 10, TG wnl and equal B/L  Derm: Pitting edema of right dorsal midfoot and ankle with blanching erythema. Normal hue and temperature. No open lesions. No clinical signs of infection.   Neuro: Tinel's sign negative at tarsal canal and fibular head. Sharp/dull and protective sensation intact.   MSK: 4/5 MMT anterior, posterior, medial and lateral compartments     RADIOLOGY  Right foot severe cavus deformity with adductovarus. R foot hammered hallux and lesser digit deformities. No soft tissue gas. No acute pathology.
Patient is a 33y old  Female who presents with a chief complaint of neurogenic R. foot pain and contraction. (10 Irwin 2019 06:13)      HPI:  32 yo f w hx of epidermoid cyst of L spine presents to ED with concern for right foot pain and swelling x 1 week.  She notes intermittent swelling over the past few months, however states this is worse.  No trauma, no fever or chills.  + Difficulty ambulating 2/2 pain.  On my face to face ED eval, patient is non toxic.    Patient is being followed by Dr. Bernabe, she came today for outpatient CT scan. Due to R.foot pain and edema, she was sent to ED.  Of note, patient at home self-catheterizes for intermittent urinary retention (2019 18:22)      PAST MEDICAL & SURGICAL HISTORY:  Tumor: spinal  Spinal cord tumor      FAMILY HISTORY:      SOCIAL HISTORY:  Smoking Status: [ ] Current, [ ] Former, [ x] Never  Pack Years:    MEDICATIONS:  Pulmonary:    Antimicrobials:    Anticoagulants:  enoxaparin Injectable 40 milliGRAM(s) SubCutaneous at bedtime    Onc:    GI/:  docusate sodium 100 milliGRAM(s) Oral three times a day  senna 2 Tablet(s) Oral at bedtime PRN    Endocrine:    Cardiac:    Other Medications:  acetaminophen   Tablet .. 650 milliGRAM(s) Oral every 6 hours PRN  ondansetron Injectable 4 milliGRAM(s) IV Push every 8 hours PRN  oxyCODONE    5 mG/acetaminophen 325 mG 1 Tablet(s) Oral every 4 hours PRN  oxyCODONE    5 mG/acetaminophen 325 mG 2 Tablet(s) Oral every 6 hours PRN      Allergies    No Known Drug Allergies  shellfish (Rash)    Intolerances        Vital Signs Last 24 Hrs  T(C): 36.8 (10 Irwin 2019 05:30), Max: 36.8 (2019 16:56)  T(F): 98.2 (10 Irwin 2019 05:30), Max: 98.3 (2019 20:57)  HR: 59 (10 Irwin 2019 05:30) (59 - 74)  BP: 110/75 (10 Irwin 2019 05:30) (92/62 - 118/81)  BP(mean): --  RR: 17 (10 Irwin 2019 05:30) (14 - 17)  SpO2: 99% (10 Irwin 2019 05:30) (96% - 99%)    06-09 @ 07:01  -  06-10 @ 07:00  --------------------------------------------------------  IN: 1300 mL / OUT: 1310 mL / NET: -10 mL          LABS:      CBC Full  -  ( 2019 14:01 )  WBC Count : 12.33 K/uL  RBC Count : 3.88 M/uL  Hemoglobin : 13.1 g/dL  Hematocrit : 40.3 %  Platelet Count - Automated : 366 K/uL  Mean Cell Volume : 103.9 fl  Mean Cell Hemoglobin : 33.8 pg  Mean Cell Hemoglobin Concentration : 32.5 gm/dL  Auto Neutrophil # : 9.98 K/uL  Auto Lymphocyte # : 1.65 K/uL  Auto Monocyte # : 0.59 K/uL  Auto Eosinophil # : 0.06 K/uL  Auto Basophil # : 0.02 K/uL  Auto Neutrophil % : 80.9 %  Auto Lymphocyte % : 13.4 %  Auto Monocyte % : 4.8 %  Auto Eosinophil % : 0.5 %  Auto Basophil % : 0.2 %    08    x   |  x   |  x   ----------------------------<  x   4.7   |  x   |  x     Ca    9.6      2019 14:01    TPro  7.6  /  Alb  4.3  /  TBili  0.6  /  DBili  x   /  AST  32  /  ALT  25  /  AlkPhos  62  06-08    PT/INR - ( 2019 14:01 )   PT: 11.2 sec;   INR: 0.99          PTT - ( 2019 14:01 )  PTT:28.2 sec      Urinalysis Basic - ( 2019 15:32 )    Color: Yellow / Appearance: Clear / S.025 / pH: x  Gluc: x / Ketone: 15 mg/dL  / Bili: Negative / Urobili: 0.2 E.U./dL   Blood: x / Protein: NEGATIVE mg/dL / Nitrite: NEGATIVE   Leuk Esterase: NEGATIVE / RBC: < 5 /HPF / WBC 5-10 /HPF   Sq Epi: x / Non Sq Epi: 0-5 /HPF / Bacteria: Present /HPF      < from: US Duplex Venous Lower Ext Ltd, Right (19 @ 17:22) >    EXAM:  US DPLX LWR EXT VEINS LTD RT                          PROCEDURE DATE:  2019          INTERPRETATION:      VENOUS DUPLEX DOPPLER OF THE RIGHT LOWER EXTREMITY dated 2019 5:22 PM    INDICATION: RLE pain and swelling, rule out deep venous thrombosis    TECHNIQUE: Duplex Doppler evaluation including gray-scale ultrasound   imaging, color flow Doppler imaging, and Doppler spectral analysis of the   veins of the right lower extremity was performed.     COMPARISON: None    FINDINGS:    Thigh veins: The right common femoral, femoral, popliteal, proximal   greater saphenous, and proximal deep femoral veins are patent and free of   thrombus. The veins are normally compressible and have normal phasic flow   and augmentation response.    Calf veins: Limited evaluation of the paired posterior tibial calf veins   demonstrate compressibility with no visible thrombus. Peroneal veins are   not clearly demonstrated. There is right lower extremity soft tissue edema    Contralateral CFV: The contralateral (left) common femoral vein is patent   and free of thrombus.      IMPRESSION:  No deep vein thrombosis seen within the visualized vessels in the right   lower extremity.. Right lower extremity soft tissue edema is noted.    < end of copied text >CXR clear  EKG RSRnormal            < from: MR Thoracic Spine w/wo IV Cont (19 @ 14:33) >    EXAM:  MR SPINE LUMBAR WAW IC                          EXAM:  MR SPINE THORACIC WAW IC                          PROCEDURE DATE:  2019          INTERPRETATION:  THORACIC SPINE MR WITH CONTRAST  LUMBAR SPINE WITH CONTRAST    INDICATION:  History of prior epidermoid resection at thoracolumbar junction.    TECHNIQUE:    THORACIC SPINE MR:  Sagittal T1, sagittal T2, sagittal inversion recovery, axial T1, axial   T2, and post-contrast sagittal T1 with fat suppression and axial T1   imaging without fat suppression was obtained of the thoracic spine. There   are no prior studies. If outside studies are brought to the radiology   department at [facility], a comparison will be made to those   studies and an addendum will be added to this report. 7.5 cc of Gadavist   was given, and none was discarded.    LUMBAR SPINE MR:  Sagittal T1, sagittal T2, sagittal STIR, axial T1, axial T2, and   postcontrast sagittal T1 with fat suppression and axial T1 without fat   suppression imaging wasobtained of the lumbar spine.There are no prior   studies. If outside studies are brought to the radiology department at   [facility], a comparison will be made to those studies and an   addendum will be added to this report. 7.5 cc of Gadavist was given, and   none was discarded.    FINDINGS:     THORACIC SPINE MR:    There is a laminectomy defect noted at the T12 and L1 levels. There is a   mass identified at the thoracolumbar junction within the cauda equina   mainly at the L1 level, which demonstrates a rounded configuration and   heterogeneity of signal with areas of T1 shortening which may represent   calcification and/or subacute methemoglobin (hemorrhage) and areas of   marked T2 shortening and T2 prolongation. There is linear, peripheral   enhancement noted and a more focus of nodular enhancement along the   anterosuperior margin. Given history of epidermoid, diffusion-weighted    imaging of the thoracic and lumbar spine is recommended as this is a more   sensitive studyfor the detection of recurrent epidermoid. Lesion is   approximately 4.3 cm in craniocaudal dimension x 2.0 cm AP dimension   (series 11, image #8).     There is kyphosis noted in the lower thoracic spine. There is no abnormal   T2 prolongation within the intramedullary intradural thoracic spinal cord   to the T12 level. There is loss of normal disc space signal on long TR   scans consistent with desiccation and degenerative disc disease. Minor   endplate marginal osteophytes are noted in the midand lower thoracic   spine. Marrow signal intensity is unremarkable.    On axial imaging, there is a tiny central disc protrusion T12-L1 level   with no significant central canal stenosis or foraminal stenosis.    After contrast administration,    LUMBAR SPINE MR:    For description of the mass lesion at the L1 level, reference should be   made to the thoracic spine MR report above. Patient demonstrates   laminectomy at T12 and L1, which relates to prior resection of epidermoid   by clinical history.    Alignment is anatomic in the lumbar region. No spondylolisthesis is seen.   Marrow signal intensity is normal.. No acute edema pattern is seen within   lumbar vertebral bodies.    There is loss of normal disc space signal on the long TR scans consistent   with desiccation multilevel degenerative disc disease. No significant   degenerative changes are seen. Conus medullaris is not well delineated   due to the presence of the mass lesion centered at the L1 level, and this   mass displays portions of the proximal cauda equina.    At the L1-L2 level, small right paracentral disc protrusion L1-L2 level.   No significant central canal stenosis is present or foraminal stenosis.    At the L2-L3 level, no disc protrusion, lateral recess stenosis,   foraminal narrowing, or central canal stenosis is present.    At the L3-L4 level, no disc protrusion, lateral recess stenosis,   foraminal narrowing, or central canal stenosis is present.    At the L4-L5 level, diffuse disc bulge. There is no lateral recess   stenosis, foraminal narrowing, or central canal stenosis.    At the L5-S1 level, diffuse disc bulge. There is no lateral recess   stenosis, foraminal narrowing, or central canal stenosis.    IMPRESSION:    THORACIC SPINE MR:  1. Prior laminectomy T12 and L1 for epidermoid resection. Concern for   recurrent epidermoid with mass lesion identified at the thoracolumbar   junction within the cauda equina mainly at L1 level demonstrating   heterogeneity of signal characteristics with areasof T1 shortening and   marked T2 shortening which may represent calcification and/or subacute   methemoglobin (hemorrhage). Measurements above. Diffusion-weighted   imaging of the thoracic and lumbar spine is recommended as this is a more   sensitiveMR sequence for the detection of recurrent and/or residual   epidermoid.  2. Tiny central disc protrusion at T12-L1 level.  3. No central canal stenosis or foraminal stenosis.  4, Kyphosis in lower thoracic region.    LUMBAR SPINE MR:  1. Prior laminectomy at T12 and L1 with recurrent epidermoid suspected.   Refer to thoracic spine MR impression.  2. Small right paracentral disc protrusion L1-L2 level with no   significant foraminal stenosis or central canal stenosis.  3. Minor diffuse disc bulges L4-L5 and L5-S1 levels.    < end of copied text >    RADIOLOGY & ADDITIONAL STUDIES (The following images were personally reviewed):

## 2019-06-10 NOTE — PROGRESS NOTE ADULT - SUBJECTIVE AND OBJECTIVE BOX
HPI:  34 yo f w hx of epidermoid cyst of L spine presents to ED with concern for right foot pain and swelling x 1 week.  She notes intermittent swelling over the past few months, however states this is worse.  No trauma, no fever or chills.  + Difficulty ambulating 2/2 pain.  On my face to face ED eval, patient is non toxic.    Patient is being followed by Dr. Bernabe, she came today for outpatient CT scan. Due to R.foot pain and edema, she was sent to ED.  Of note, patient at home self-catheterizes for intermittent urinary retention (2019 18:22)    OVERNIGHT EVENTS: JO overnight.     JO overnight. Plan for discharge home today after medical clearance. Plan to return for elective surgery .       Vital Signs Last 24 Hrs  T(C): 36.8 (10 Irwin 2019 05:30), Max: 36.8 (2019 16:56)  T(F): 98.2 (10 Irwin 2019 05:30), Max: 98.3 (2019 20:57)  HR: 59 (10 Irwin 2019 05:30) (59 - 74)  BP: 110/75 (10 Irwin 2019 05:30) (92/62 - 118/81)  BP(mean): --  RR: 17 (10 Irwin 2019 05:30) (14 - 17)  SpO2: 99% (10 Irwin 2019 05:30) (96% - 99%)    I&O's Summary    2019 07:01  -  10 Irwin 2019 06:13  --------------------------------------------------------  IN: 1300 mL / OUT: 1310 mL / NET: -10 mL        PHYSICAL EXAM:  Neurological: AOx3, NAD, FC, speech coherent   CN II-XII grossly intact   Motor: MAEx4, RLE distal movement limited due to pain, +right leg/foot swelling, o/w 5/5 strength throughout  SILT throughout     TUBES/LINES:  [] Michel  [] Lumbar Drain  [] Wound Drains  [] Others      DIET:  [] NPO  [] Mechanical  [] Tube feeds    LABS:                        13.1   12.33 )-----------( 366      ( 2019 14:01 )             40.3     -    x   |  x   |  x   ----------------------------<  x   4.7   |  x   |  x     Ca    9.6      2019 14:01    TPro  7.6  /  Alb  4.3  /  TBili  0.6  /  DBili  x   /  AST  32  /  ALT  25  /  AlkPhos  62  08    PT/INR - ( 2019 14:01 )   PT: 11.2 sec;   INR: 0.99          PTT - ( 2019 14:01 )  PTT:28.2 sec  Urinalysis Basic - ( 2019 15:32 )    Color: Yellow / Appearance: Clear / S.025 / pH: x  Gluc: x / Ketone: 15 mg/dL  / Bili: Negative / Urobili: 0.2 E.U./dL   Blood: x / Protein: NEGATIVE mg/dL / Nitrite: NEGATIVE   Leuk Esterase: NEGATIVE / RBC: < 5 /HPF / WBC 5-10 /HPF   Sq Epi: x / Non Sq Epi: 0-5 /HPF / Bacteria: Present /HPF          CAPILLARY BLOOD GLUCOSE          Drug Levels: [] N/A    CSF Analysis: [] N/A      Allergies    No Known Drug Allergies  shellfish (Rash)    Intolerances      MEDICATIONS:  Antibiotics:    Neuro:  acetaminophen   Tablet .. 650 milliGRAM(s) Oral every 6 hours PRN  ondansetron Injectable 4 milliGRAM(s) IV Push every 8 hours PRN  oxyCODONE    5 mG/acetaminophen 325 mG 1 Tablet(s) Oral every 4 hours PRN  oxyCODONE    5 mG/acetaminophen 325 mG 2 Tablet(s) Oral every 6 hours PRN    Anticoagulation:  enoxaparin Injectable 40 milliGRAM(s) SubCutaneous at bedtime    OTHER:  docusate sodium 100 milliGRAM(s) Oral three times a day  senna 2 Tablet(s) Oral at bedtime PRN    IVF:    CULTURES:  Culture Results:   No growth at 1 day. ( @ 17:22)  Culture Results:   No growth at 1 day. ( @ 17:21)    RADIOLOGY & ADDITIONAL TESTS:      ASSESSMENT:  33y with RLE pain/swelling, admitted for r/o DVT. Patient has history of T12-T3 laminectomy and resection of dermoid cyst. CT of spine performed as outpatient.     PLAN:  -neuro/spine checks  -pain control  -LE dopplers negative  -podiatry consulted for foot swelling- likely neuro in etiology   -pending medical clearance  -plan to discharge home today  -patient to return for elective surgery on   -above discussed with Dr. Bernabe       Assessment: present when checked     [] GCS   E   V   M     Heart Failure: [] Acute, [] acute on chronic, [] chronic   Heart Failure: [] Diastolic (HFpEF), [] Systolic (HRrEF), [] Combined (HFpEF and HFrEF), [] RHF, [] Pulm HTN, [] Other     [] LEIGH, [] ATN, [] AIN, [] other   [] CKD1, [] CKD2, [] CKD3, [] CKD4, [] CKD5, [] ESRD     Encephalopathy: [] Metabolic, [] Hepatic, [] Toxic, [] Neurological, [] Other     Abnormal Nutritional Status: [] malnutrition (see nutrition note), []underweight: BMI <19, [] morbid obesity: BMI >40, [] Cachexia     [] Sepsis   [] Hypovolemic shock, [] Cardiogenic shock, [] Hemorrhagic shock, [] Neurogenic shock   [] Acute respiratory failure   [] Cerebral edema, [] Brain compression / herniation   [] Functional quadriplegia   [] Acute blood loss anemia HPI:  32 yo f w hx of epidermoid cyst of L spine presents to ED with concern for right foot pain and swelling x 1 week.  She notes intermittent swelling over the past few months, however states this is worse.  No trauma, no fever or chills.  + Difficulty ambulating 2/2 pain.  On my face to face ED eval, patient is non toxic.    Patient is being followed by Dr. Bernabe, she came today for outpatient CT scan. Due to R.foot pain and edema, she was sent to ED.  Of note, patient at home self-catheterizes for intermittent urinary retention (2019 18:22)    OVERNIGHT EVENTS: JO overnight.     JO overnight. Plan for discharge home today after medical clearance. Plan to return for elective surgery .       Vital Signs Last 24 Hrs  T(C): 36.8 (10 Irwin 2019 05:30), Max: 36.8 (2019 16:56)  T(F): 98.2 (10 Irwin 2019 05:30), Max: 98.3 (2019 20:57)  HR: 59 (10 Irwin 2019 05:30) (59 - 74)  BP: 110/75 (10 Irwin 2019 05:30) (92/62 - 118/81)  BP(mean): --  RR: 17 (10 Irwin 2019 05:30) (14 - 17)  SpO2: 99% (10 Irwin 2019 05:30) (96% - 99%)    I&O's Summary    2019 07:01  -  10 Irwin 2019 06:13  --------------------------------------------------------  IN: 1300 mL / OUT: 1310 mL / NET: -10 mL        PHYSICAL EXAM:  Neurological: AOx3, NAD, FC, speech coherent   CN II-XII grossly intact   Motor: MAEx4, RLE distal movement limited due to pain, +right leg/foot swelling, o/w 5/5 strength throughout  SILT throughout     TUBES/LINES:  [] Michel  [] Lumbar Drain  [] Wound Drains  [] Others      DIET:  [] NPO  [] Mechanical  [] Tube feeds    LABS:                        13.1   12.33 )-----------( 366      ( 2019 14:01 )             40.3     -    x   |  x   |  x   ----------------------------<  x   4.7   |  x   |  x     Ca    9.6      2019 14:01    TPro  7.6  /  Alb  4.3  /  TBili  0.6  /  DBili  x   /  AST  32  /  ALT  25  /  AlkPhos  62  08    PT/INR - ( 2019 14:01 )   PT: 11.2 sec;   INR: 0.99          PTT - ( 2019 14:01 )  PTT:28.2 sec  Urinalysis Basic - ( 2019 15:32 )    Color: Yellow / Appearance: Clear / S.025 / pH: x  Gluc: x / Ketone: 15 mg/dL  / Bili: Negative / Urobili: 0.2 E.U./dL   Blood: x / Protein: NEGATIVE mg/dL / Nitrite: NEGATIVE   Leuk Esterase: NEGATIVE / RBC: < 5 /HPF / WBC 5-10 /HPF   Sq Epi: x / Non Sq Epi: 0-5 /HPF / Bacteria: Present /HPF          CAPILLARY BLOOD GLUCOSE          Drug Levels: [] N/A    CSF Analysis: [] N/A      Allergies    No Known Drug Allergies  shellfish (Rash)    Intolerances      MEDICATIONS:  Antibiotics:    Neuro:  acetaminophen   Tablet .. 650 milliGRAM(s) Oral every 6 hours PRN  ondansetron Injectable 4 milliGRAM(s) IV Push every 8 hours PRN  oxyCODONE    5 mG/acetaminophen 325 mG 1 Tablet(s) Oral every 4 hours PRN  oxyCODONE    5 mG/acetaminophen 325 mG 2 Tablet(s) Oral every 6 hours PRN    Anticoagulation:  enoxaparin Injectable 40 milliGRAM(s) SubCutaneous at bedtime    OTHER:  docusate sodium 100 milliGRAM(s) Oral three times a day  senna 2 Tablet(s) Oral at bedtime PRN    IVF:    CULTURES:  Culture Results:   No growth at 1 day. ( @ 17:22)  Culture Results:   No growth at 1 day. ( @ 17:21)    RADIOLOGY & ADDITIONAL TESTS:      ASSESSMENT:  33y with RLE pain/swelling, admitted for r/o DVT. Patient has history of T12-T3 laminectomy and resection of dermoid cyst. CT of spine performed as outpatient.     PLAN:  -neuro/spine checks  -pain control  -LE dopplers negative  -podiatry consulted for foot swelling- likely neuro in etiology   -pending medical clearance, will obtain today   -plan to discharge home today  F? U echo results.  -patient to return for elective surgery on   -above discussed with Dr. Bernabe       Assessment: present when checked     [] GCS   E   V   M     Heart Failure: [] Acute, [] acute on chronic, [] chronic   Heart Failure: [] Diastolic (HFpEF), [] Systolic (HRrEF), [] Combined (HFpEF and HFrEF), [] RHF, [] Pulm HTN, [] Other     [] LEIGH, [] ATN, [] AIN, [] other   [] CKD1, [] CKD2, [] CKD3, [] CKD4, [] CKD5, [] ESRD     Encephalopathy: [] Metabolic, [] Hepatic, [] Toxic, [] Neurological, [] Other     Abnormal Nutritional Status: [] malnutrition (see nutrition note), []underweight: BMI <19, [] morbid obesity: BMI >40, [] Cachexia     [] Sepsis   [] Hypovolemic shock, [] Cardiogenic shock, [] Hemorrhagic shock, [] Neurogenic shock   [] Acute respiratory failure   [] Cerebral edema, [] Brain compression / herniation   [] Functional quadriplegia   [] Acute blood loss anemia

## 2019-06-10 NOTE — DISCHARGE NOTE PROVIDER - HOSPITAL COURSE
History of Present Illness:    Reason for Admission: neurogenic R. foot pain and contraction.    History of Present Illness:     32 yo f w hx of epidermoid cyst of L spine presents to ED with concern for right foot pain and swelling x 1 week.  She notes intermittent swelling over the past few months, however states this is worse.  No trauma, no fever or chills.  + Difficulty ambulating 2/2 pain.  On my face to face ED eval, patient is non toxic.      Patient is being followed by Dr. Bernabe, she came today for outpatient CT scan. Due to R.foot pain and edema, she was sent to ED.    Of note, patient at home self-catheterizes for intermittent urinary retention          pt discharge and return for surgical intervention a6/20/2019        Patient History:     Past Medical, Past Surgical, and Family History:    PAST MEDICAL HISTORY:    Tumor spinal.         PAST SURGICAL HISTORY:    Spinal cord tumor.

## 2019-06-10 NOTE — DISCHARGE NOTE PROVIDER - NSDCCPCAREPLAN_GEN_ALL_CORE_FT
PRINCIPAL DISCHARGE DIAGNOSIS  Diagnosis: Foot pain  Assessment and Plan of Treatment: Go home ad maintain youar independt activity  Surgicla procedure 6/20/2019  You will recieve  phone call 6/19/2019 in regard to procedure for 6/20/2019  Do not eat anyathing after Midnight on 6/19  if you need to take medications the morning of surgery you can do so with a sip of water

## 2019-06-10 NOTE — DISCHARGE NOTE PROVIDER - NSDCACTIVITY_GEN_ALL_CORE
Walking - Indoors allowed/No restrictions/Showering allowed/No heavy lifting/straining/Stairs allowed/Walking - Outdoors allowed

## 2019-06-10 NOTE — DISCHARGE NOTE NURSING/CASE MANAGEMENT/SOCIAL WORK - NSDCDPATPORTLINK_GEN_ALL_CORE
You can access the [facility] Portal, offered by [facility], by registering with the following website: [email or web address]

## 2019-06-10 NOTE — DISCHARGE NOTE PROVIDER - CARE PROVIDER_API CALL
Patrick Bernabe)  Neurosurgery  [address], [place] [facility]  Phone: [phone]  Fax: [phone]  Follow Up Time:

## 2019-06-10 NOTE — CONSULT NOTE ADULT - PROBLEM SELECTOR RECOMMENDATION 2
The patient's medical condition is optimized for surgery.  There is no contraindication for surgery.  There is no clinical evidence neither of angina, decompensated CHF, arrhthymias, nor valvular disease.   There is no limitation of exercise capacity.  MET is5 .  ASA class is2.  Mojica cardiac risk factor is low  .  DVT prophylaxis is indicated.  Pain control.  Early mobilization.  Avoid fluid overload.  follow on echo

## 2019-06-13 ENCOUNTER — APPOINTMENT (OUTPATIENT)
Dept: FAMILY MEDICINE | Facility: CLINIC | Age: 34
End: 2019-06-13

## 2019-06-13 LAB
CULTURE RESULTS: SIGNIFICANT CHANGE UP
CULTURE RESULTS: SIGNIFICANT CHANGE UP
SPECIMEN SOURCE: SIGNIFICANT CHANGE UP
SPECIMEN SOURCE: SIGNIFICANT CHANGE UP

## 2019-06-14 DIAGNOSIS — D32.1 BENIGN NEOPLASM OF SPINAL MENINGES: ICD-10-CM

## 2019-06-14 DIAGNOSIS — G83.4 CAUDA EQUINA SYNDROME: ICD-10-CM

## 2019-06-14 DIAGNOSIS — R60.9 EDEMA, UNSPECIFIED: ICD-10-CM

## 2019-06-14 DIAGNOSIS — E87.5 HYPERKALEMIA: ICD-10-CM

## 2019-06-14 DIAGNOSIS — M21.6X1 OTHER ACQUIRED DEFORMITIES OF RIGHT FOOT: ICD-10-CM

## 2019-06-14 DIAGNOSIS — M79.671 PAIN IN RIGHT FOOT: ICD-10-CM

## 2019-06-14 DIAGNOSIS — M51.26 OTHER INTERVERTEBRAL DISC DISPLACEMENT, LUMBAR REGION: ICD-10-CM

## 2019-06-14 DIAGNOSIS — K21.9 GASTRO-ESOPHAGEAL REFLUX DISEASE WITHOUT ESOPHAGITIS: ICD-10-CM

## 2019-06-14 DIAGNOSIS — Z91.013 ALLERGY TO SEAFOOD: ICD-10-CM

## 2019-06-19 ENCOUNTER — MOBILE ON CALL (OUTPATIENT)
Age: 34
End: 2019-06-19

## 2019-06-19 VITALS
WEIGHT: 136.47 LBS | DIASTOLIC BLOOD PRESSURE: 53 MMHG | SYSTOLIC BLOOD PRESSURE: 94 MMHG | RESPIRATION RATE: 18 BRPM | HEART RATE: 86 BPM | OXYGEN SATURATION: 97 % | HEIGHT: 63 IN | TEMPERATURE: 97 F

## 2019-06-19 NOTE — H&P ADULT - NSHPPHYSICALEXAM_GEN_ALL_CORE
AAOx3   FC  RLE 3/5 otherwise 5/5 strength AAOx3   FC  5/5 strength throughout   sensation intact to light touch throughout

## 2019-06-19 NOTE — PRE-OP CHECKLIST - SELECT TESTS ORDERED
CMP/PT/PTT/INR/Urinalysis/Venous Doppler; Throacic Spine MRI/CBC CBC/INR/CMP/Urinalysis/EKG/Venous Doppler; Throacic Spine MRI/PT/PTT PT/PTT/INR/EKG/Urinalysis/HCG/Venous Doppler; Throacic Spine MRI, Urine HCG NEgative/CBC/CMP CMP/EKG/INR/Urinalysis/HCG/BMP/Venous Doppler; Throacic Spine MRI, Urine HCG NEgative/CBC/PT/PTT

## 2019-06-19 NOTE — H&P ADULT - HISTORY OF PRESENT ILLNESS
This patient is a 33-year-old woman with past medical history depression, spina bifida, lumbar surgery s/p subtotal resection of epidermoid tumor in 2013 who was seen by Dr. Bernabe in neurosurgical consultation for spinal mass at [facility].     Since her initial surgery, the patient reports severe back pain radiating to RIGHT leg with associated numbness/tingling. She reports difficulty walking and uses cane to ambulate for assistance. Reports imbalance. She states that these symptoms have become progressively worse over the years. She also has bowel/bladder incontinence and chronic constipation since initial surgery 6 years ago.     She has been doing physical therapy with minimal relief of symptoms. This patient is a 33-year-old woman with past medical history depression, spina bifida, lumbar surgery s/p subtotal resection of epidermoid tumor in 2013 who was seen by Dr. Bernabe in neurosurgical consultation for spinal mass at [facility].     Since her initial surgery, the patient reports severe back pain radiating to RIGHT leg with associated numbness/tingling. She reports difficulty walking and uses cane to ambulate for assistance. Her right leg becomes weak, numb, and gives out. Reports imbalance. She states that these symptoms have become progressively worse over the years. She also has bowel/bladder incontinence and chronic constipation since initial surgery 6 years ago.     She has been doing physical therapy with minimal relief of symptoms.

## 2019-06-20 ENCOUNTER — RESULT REVIEW (OUTPATIENT)
Age: 34
End: 2019-06-20

## 2019-06-20 ENCOUNTER — APPOINTMENT (OUTPATIENT)
Dept: NEUROSURGERY | Facility: HOSPITAL | Age: 34
End: 2019-06-20

## 2019-06-20 ENCOUNTER — INPATIENT (INPATIENT)
Facility: HOSPITAL | Age: 34
LOS: 7 days | Discharge: NURSING, REHAB OR LONG-TERM CARE | DRG: 29 | End: 2019-06-28
Attending: NEUROLOGICAL SURGERY | Admitting: NEUROLOGICAL SURGERY
Payer: MEDICAID

## 2019-06-20 DIAGNOSIS — D49.7 NEOPLASM OF UNSPECIFIED BEHAVIOR OF ENDOCRINE GLANDS AND OTHER PARTS OF NERVOUS SYSTEM: Chronic | ICD-10-CM

## 2019-06-20 DIAGNOSIS — Z90.49 ACQUIRED ABSENCE OF OTHER SPECIFIED PARTS OF DIGESTIVE TRACT: Chronic | ICD-10-CM

## 2019-06-20 LAB
ANION GAP SERPL CALC-SCNC: 10 MMOL/L — SIGNIFICANT CHANGE UP (ref 5–17)
BASOPHILS # BLD AUTO: 0 K/UL — SIGNIFICANT CHANGE UP (ref 0–0.2)
BASOPHILS NFR BLD AUTO: 0 % — SIGNIFICANT CHANGE UP (ref 0–2)
BUN SERPL-MCNC: 7 MG/DL — SIGNIFICANT CHANGE UP (ref 7–23)
CALCIUM SERPL-MCNC: 9.2 MG/DL — SIGNIFICANT CHANGE UP (ref 8.4–10.5)
CHLORIDE SERPL-SCNC: 112 MMOL/L — HIGH (ref 96–108)
CO2 SERPL-SCNC: 21 MMOL/L — LOW (ref 22–31)
CREAT SERPL-MCNC: 0.6 MG/DL — SIGNIFICANT CHANGE UP (ref 0.5–1.3)
EOSINOPHIL # BLD AUTO: 0 K/UL — SIGNIFICANT CHANGE UP (ref 0–0.5)
EOSINOPHIL NFR BLD AUTO: 0 % — SIGNIFICANT CHANGE UP (ref 0–6)
GLUCOSE BLDC GLUCOMTR-MCNC: 156 MG/DL — HIGH (ref 70–99)
GLUCOSE SERPL-MCNC: 156 MG/DL — HIGH (ref 70–99)
HCT VFR BLD CALC: 37.5 % — SIGNIFICANT CHANGE UP (ref 34.5–45)
HGB BLD-MCNC: 12.2 G/DL — SIGNIFICANT CHANGE UP (ref 11.5–15.5)
LYMPHOCYTES # BLD AUTO: 0.13 K/UL — LOW (ref 1–3.3)
LYMPHOCYTES # BLD AUTO: 1.7 % — LOW (ref 13–44)
MCHC RBC-ENTMCNC: 32.5 GM/DL — SIGNIFICANT CHANGE UP (ref 32–36)
MCHC RBC-ENTMCNC: 33.7 PG — SIGNIFICANT CHANGE UP (ref 27–34)
MCV RBC AUTO: 103.6 FL — HIGH (ref 80–100)
MONOCYTES # BLD AUTO: 0.07 K/UL — SIGNIFICANT CHANGE UP (ref 0–0.9)
MONOCYTES NFR BLD AUTO: 0.9 % — LOW (ref 2–14)
NEUTROPHILS # BLD AUTO: 7.28 K/UL — SIGNIFICANT CHANGE UP (ref 1.8–7.4)
NEUTROPHILS NFR BLD AUTO: 93 % — HIGH (ref 43–77)
PLATELET # BLD AUTO: 238 K/UL — SIGNIFICANT CHANGE UP (ref 150–400)
POTASSIUM SERPL-MCNC: 4.5 MMOL/L — SIGNIFICANT CHANGE UP (ref 3.5–5.3)
POTASSIUM SERPL-SCNC: 4.5 MMOL/L — SIGNIFICANT CHANGE UP (ref 3.5–5.3)
RBC # BLD: 3.62 M/UL — LOW (ref 3.8–5.2)
RBC # FLD: 14.2 % — SIGNIFICANT CHANGE UP (ref 10.3–14.5)
SODIUM SERPL-SCNC: 143 MMOL/L — SIGNIFICANT CHANGE UP (ref 135–145)
WBC # BLD: 7.47 K/UL — SIGNIFICANT CHANGE UP (ref 3.8–10.5)
WBC # FLD AUTO: 7.47 K/UL — SIGNIFICANT CHANGE UP (ref 3.8–10.5)

## 2019-06-20 PROCEDURE — 69990 MICROSURGERY ADD-ON: CPT

## 2019-06-20 PROCEDURE — 99291 CRITICAL CARE FIRST HOUR: CPT | Mod: 24

## 2019-06-20 PROCEDURE — 63287 BX/EXC IDRL IMED LESN THRLMB: CPT

## 2019-06-20 RX ORDER — DEXTROSE 50 % IN WATER 50 %
12.5 SYRINGE (ML) INTRAVENOUS ONCE
Refills: 0 | Status: DISCONTINUED | OUTPATIENT
Start: 2019-06-20 | End: 2019-06-28

## 2019-06-20 RX ORDER — HYDROMORPHONE HYDROCHLORIDE 2 MG/ML
30 INJECTION INTRAMUSCULAR; INTRAVENOUS; SUBCUTANEOUS
Refills: 0 | Status: DISCONTINUED | OUTPATIENT
Start: 2019-06-20 | End: 2019-06-22

## 2019-06-20 RX ORDER — FAMOTIDINE 10 MG/ML
20 INJECTION INTRAVENOUS EVERY 12 HOURS
Refills: 0 | Status: DISCONTINUED | OUTPATIENT
Start: 2019-06-20 | End: 2019-06-28

## 2019-06-20 RX ORDER — NALOXONE HYDROCHLORIDE 4 MG/.1ML
0.1 SPRAY NASAL
Refills: 0 | Status: DISCONTINUED | OUTPATIENT
Start: 2019-06-20 | End: 2019-06-28

## 2019-06-20 RX ORDER — GLUCAGON INJECTION, SOLUTION 0.5 MG/.1ML
1 INJECTION, SOLUTION SUBCUTANEOUS ONCE
Refills: 0 | Status: DISCONTINUED | OUTPATIENT
Start: 2019-06-20 | End: 2019-06-28

## 2019-06-20 RX ORDER — HYDROMORPHONE HYDROCHLORIDE 2 MG/ML
0.5 INJECTION INTRAMUSCULAR; INTRAVENOUS; SUBCUTANEOUS ONCE
Refills: 0 | Status: DISCONTINUED | OUTPATIENT
Start: 2019-06-20 | End: 2019-06-20

## 2019-06-20 RX ORDER — DEXTROSE 50 % IN WATER 50 %
25 SYRINGE (ML) INTRAVENOUS ONCE
Refills: 0 | Status: DISCONTINUED | OUTPATIENT
Start: 2019-06-20 | End: 2019-06-28

## 2019-06-20 RX ORDER — CEFEPIME 1 G/1
1000 INJECTION, POWDER, FOR SOLUTION INTRAMUSCULAR; INTRAVENOUS EVERY 12 HOURS
Refills: 0 | Status: COMPLETED | OUTPATIENT
Start: 2019-06-20 | End: 2019-06-21

## 2019-06-20 RX ORDER — SODIUM CHLORIDE 9 MG/ML
1000 INJECTION, SOLUTION INTRAVENOUS
Refills: 0 | Status: DISCONTINUED | OUTPATIENT
Start: 2019-06-20 | End: 2019-06-21

## 2019-06-20 RX ORDER — SENNA PLUS 8.6 MG/1
2 TABLET ORAL AT BEDTIME
Refills: 0 | Status: DISCONTINUED | OUTPATIENT
Start: 2019-06-20 | End: 2019-06-28

## 2019-06-20 RX ORDER — DOCUSATE SODIUM 100 MG
100 CAPSULE ORAL THREE TIMES A DAY
Refills: 0 | Status: DISCONTINUED | OUTPATIENT
Start: 2019-06-20 | End: 2019-06-20

## 2019-06-20 RX ORDER — DIAZEPAM 5 MG
5 TABLET ORAL EVERY 8 HOURS
Refills: 0 | Status: DISCONTINUED | OUTPATIENT
Start: 2019-06-20 | End: 2019-06-21

## 2019-06-20 RX ORDER — BUPROPION HYDROCHLORIDE 150 MG/1
300 TABLET, EXTENDED RELEASE ORAL DAILY
Refills: 0 | Status: DISCONTINUED | OUTPATIENT
Start: 2019-06-20 | End: 2019-06-28

## 2019-06-20 RX ORDER — TRAZODONE HCL 50 MG
150 TABLET ORAL AT BEDTIME
Refills: 0 | Status: DISCONTINUED | OUTPATIENT
Start: 2019-06-20 | End: 2019-06-28

## 2019-06-20 RX ORDER — SODIUM CHLORIDE 9 MG/ML
1000 INJECTION, SOLUTION INTRAVENOUS
Refills: 0 | Status: DISCONTINUED | OUTPATIENT
Start: 2019-06-20 | End: 2019-06-28

## 2019-06-20 RX ORDER — GABAPENTIN 400 MG/1
300 CAPSULE ORAL THREE TIMES A DAY
Refills: 0 | Status: DISCONTINUED | OUTPATIENT
Start: 2019-06-20 | End: 2019-06-28

## 2019-06-20 RX ORDER — ONDANSETRON 8 MG/1
4 TABLET, FILM COATED ORAL EVERY 4 HOURS
Refills: 0 | Status: DISCONTINUED | OUTPATIENT
Start: 2019-06-20 | End: 2019-06-20

## 2019-06-20 RX ORDER — ONDANSETRON 8 MG/1
4 TABLET, FILM COATED ORAL EVERY 6 HOURS
Refills: 0 | Status: DISCONTINUED | OUTPATIENT
Start: 2019-06-20 | End: 2019-06-28

## 2019-06-20 RX ORDER — DEXTROSE 50 % IN WATER 50 %
15 SYRINGE (ML) INTRAVENOUS ONCE
Refills: 0 | Status: DISCONTINUED | OUTPATIENT
Start: 2019-06-20 | End: 2019-06-28

## 2019-06-20 RX ORDER — INSULIN LISPRO 100/ML
VIAL (ML) SUBCUTANEOUS
Refills: 0 | Status: DISCONTINUED | OUTPATIENT
Start: 2019-06-20 | End: 2019-06-26

## 2019-06-20 RX ORDER — DEXAMETHASONE 0.5 MG/5ML
6 ELIXIR ORAL EVERY 6 HOURS
Refills: 0 | Status: DISCONTINUED | OUTPATIENT
Start: 2019-06-20 | End: 2019-06-21

## 2019-06-20 RX ORDER — ACETAMINOPHEN 500 MG
1000 TABLET ORAL ONCE
Refills: 0 | Status: COMPLETED | OUTPATIENT
Start: 2019-06-20 | End: 2019-06-20

## 2019-06-20 RX ORDER — SENNA PLUS 8.6 MG/1
2 TABLET ORAL AT BEDTIME
Refills: 0 | Status: DISCONTINUED | OUTPATIENT
Start: 2019-06-20 | End: 2019-06-20

## 2019-06-20 RX ORDER — CEFAZOLIN SODIUM 1 G
2000 VIAL (EA) INJECTION EVERY 8 HOURS
Refills: 0 | Status: DISCONTINUED | OUTPATIENT
Start: 2019-06-20 | End: 2019-06-20

## 2019-06-20 RX ORDER — DOCUSATE SODIUM 100 MG
100 CAPSULE ORAL THREE TIMES A DAY
Refills: 0 | Status: DISCONTINUED | OUTPATIENT
Start: 2019-06-20 | End: 2019-06-28

## 2019-06-20 RX ORDER — INSULIN LISPRO 100/ML
VIAL (ML) SUBCUTANEOUS AT BEDTIME
Refills: 0 | Status: DISCONTINUED | OUTPATIENT
Start: 2019-06-20 | End: 2019-06-26

## 2019-06-20 RX ADMIN — CEFEPIME 100 MILLIGRAM(S): 1 INJECTION, POWDER, FOR SOLUTION INTRAMUSCULAR; INTRAVENOUS at 23:55

## 2019-06-20 RX ADMIN — Medication 6 MILLIGRAM(S): at 23:49

## 2019-06-20 RX ADMIN — Medication 100 MILLIGRAM(S): at 23:49

## 2019-06-20 RX ADMIN — Medication 150 MILLIGRAM(S): at 23:56

## 2019-06-20 RX ADMIN — Medication: at 23:55

## 2019-06-20 RX ADMIN — HYDROMORPHONE HYDROCHLORIDE 30 MILLILITER(S): 2 INJECTION INTRAMUSCULAR; INTRAVENOUS; SUBCUTANEOUS at 18:53

## 2019-06-20 RX ADMIN — GABAPENTIN 300 MILLIGRAM(S): 400 CAPSULE ORAL at 23:49

## 2019-06-20 NOTE — PROGRESS NOTE ADULT - ASSESSMENT
ASSESSMENT:  33y with RLE pain/swelling, admitted for r/o DVT. Patient has history of T12-T3 laminectomy and resection of dermoid cyst. CT of spine performed as outpatient.     PLAN:  -neuro/spine checks  -pain control  -LE dopplers negative  -podiatry consulted for foot swelling- likely neuro in etiology   -pending medical clearance, will obtain today   -plan to discharge home today  -patient to return for elective surgery on 6/20      I spent 45 minutes of critical care time examining patient, reviewing vitals, labs, medications, imaging and discussing with the team goals of care to prevent life-threatening in this patient who is at high risk for neurological deterioration or death due to:  resp failure, paralysis

## 2019-06-20 NOTE — CONSULT NOTE ADULT - SUBJECTIVE AND OBJECTIVE BOX
Patient is a 33y old  Female who presents with a chief complaint of Preop for resection of spinal cord tumor (19 Jun 2019 16:01)        HPI:  This patient is a 33-year-old woman with past medical history depression, spina bifida, lumbar surgery s/p subtotal resection of epidermoid tumor in 2013 who was seen by Dr. Bernabe in neurosurgical consultation for spinal mass at [facility].     Since her initial surgery, the patient reports severe back pain radiating to RIGHT leg with associated numbness/tingling. She reports difficulty walking and uses cane to ambulate for assistance. Reports imbalance. She states that these symptoms have become progressively worse over the years. She also has bowel/bladder incontinence and chronic constipation since initial surgery 6 years ago.     She has been doing physical therapy with minimal relief of symptoms. (19 Jun 2019 16:01)  Pt with back pain and radicular symptoms in the LE- incontinence     Allergies  No Known Drug Allergies  shellfish (Rash)      Health Issues  D43.4  Bladder disorder  Depression  Insomnia  Tumor  History of cholecystectomy  Spinal cord tumor        FAMILY HISTORY:      MEDICATIONS  (STANDING):    MEDICATIONS  (PRN):      PAST MEDICAL & SURGICAL HISTORY:  Bladder disorder: pt self catheterizes  Depression  Insomnia  Tumor: spinal  History of cholecystectomy  Spinal cord tumor      Labs              Radiology:    Physical Exam    MENTAL STATUS  -Level of Consciousness- awake    Orientation- person, place time  Language- aphasia/ dysarthria- awake and alert  Memory- recent and remote      Cranial Nerve 1- 12  Pupils- equal and reactive  Eye movements- full  Facial - no asymmetry   Lower CN-nl    Gait and Station- unsteady    MOTOR  Upper-nl  Lower- no foot drop    Reflexes- decreased    Sensation- no sensory level    Cerebellar- no tremors     vascular - no bruits    Assessment- Spinal cord tumor    Plan surgery as per Dr Bernabe

## 2019-06-21 LAB
ANION GAP SERPL CALC-SCNC: 12 MMOL/L — SIGNIFICANT CHANGE UP (ref 5–17)
BUN SERPL-MCNC: 5 MG/DL — LOW (ref 7–23)
CALCIUM SERPL-MCNC: 9.2 MG/DL — SIGNIFICANT CHANGE UP (ref 8.4–10.5)
CHLORIDE SERPL-SCNC: 108 MMOL/L — SIGNIFICANT CHANGE UP (ref 96–108)
CO2 SERPL-SCNC: 20 MMOL/L — LOW (ref 22–31)
CREAT SERPL-MCNC: 0.41 MG/DL — LOW (ref 0.5–1.3)
GLUCOSE BLDC GLUCOMTR-MCNC: 112 MG/DL — HIGH (ref 70–99)
GLUCOSE BLDC GLUCOMTR-MCNC: 120 MG/DL — HIGH (ref 70–99)
GLUCOSE BLDC GLUCOMTR-MCNC: 123 MG/DL — HIGH (ref 70–99)
GLUCOSE BLDC GLUCOMTR-MCNC: 144 MG/DL — HIGH (ref 70–99)
GLUCOSE SERPL-MCNC: 127 MG/DL — HIGH (ref 70–99)
HCT VFR BLD CALC: 36.8 % — SIGNIFICANT CHANGE UP (ref 34.5–45)
HGB BLD-MCNC: 12 G/DL — SIGNIFICANT CHANGE UP (ref 11.5–15.5)
MAGNESIUM SERPL-MCNC: 2 MG/DL — SIGNIFICANT CHANGE UP (ref 1.6–2.6)
MCHC RBC-ENTMCNC: 32.6 GM/DL — SIGNIFICANT CHANGE UP (ref 32–36)
MCHC RBC-ENTMCNC: 33.6 PG — SIGNIFICANT CHANGE UP (ref 27–34)
MCV RBC AUTO: 103.1 FL — HIGH (ref 80–100)
NRBC # BLD: 0 /100 WBCS — SIGNIFICANT CHANGE UP (ref 0–0)
PHOSPHATE SERPL-MCNC: 3.5 MG/DL — SIGNIFICANT CHANGE UP (ref 2.5–4.5)
PLATELET # BLD AUTO: 242 K/UL — SIGNIFICANT CHANGE UP (ref 150–400)
POTASSIUM SERPL-MCNC: 4.5 MMOL/L — SIGNIFICANT CHANGE UP (ref 3.5–5.3)
POTASSIUM SERPL-SCNC: 4.5 MMOL/L — SIGNIFICANT CHANGE UP (ref 3.5–5.3)
RBC # BLD: 3.57 M/UL — LOW (ref 3.8–5.2)
RBC # FLD: 14 % — SIGNIFICANT CHANGE UP (ref 10.3–14.5)
SODIUM SERPL-SCNC: 140 MMOL/L — SIGNIFICANT CHANGE UP (ref 135–145)
WBC # BLD: 18.24 K/UL — HIGH (ref 3.8–10.5)
WBC # FLD AUTO: 18.24 K/UL — HIGH (ref 3.8–10.5)

## 2019-06-21 RX ORDER — SODIUM CHLORIDE 9 MG/ML
1000 INJECTION INTRAMUSCULAR; INTRAVENOUS; SUBCUTANEOUS ONCE
Refills: 0 | Status: COMPLETED | OUTPATIENT
Start: 2019-06-21 | End: 2019-06-21

## 2019-06-21 RX ORDER — DEXAMETHASONE 0.5 MG/5ML
2 ELIXIR ORAL EVERY 8 HOURS
Refills: 0 | Status: DISCONTINUED | OUTPATIENT
Start: 2019-06-25 | End: 2019-06-26

## 2019-06-21 RX ORDER — DEXAMETHASONE 0.5 MG/5ML
4 ELIXIR ORAL EVERY 8 HOURS
Refills: 0 | Status: COMPLETED | OUTPATIENT
Start: 2019-06-23 | End: 2019-06-25

## 2019-06-21 RX ORDER — SODIUM CHLORIDE 9 MG/ML
500 INJECTION INTRAMUSCULAR; INTRAVENOUS; SUBCUTANEOUS ONCE
Refills: 0 | Status: COMPLETED | OUTPATIENT
Start: 2019-06-21 | End: 2019-06-21

## 2019-06-21 RX ORDER — DEXAMETHASONE 0.5 MG/5ML
ELIXIR ORAL
Refills: 0 | Status: DISCONTINUED | OUTPATIENT
Start: 2019-06-21 | End: 2019-06-26

## 2019-06-21 RX ORDER — DEXAMETHASONE 0.5 MG/5ML
4 ELIXIR ORAL EVERY 6 HOURS
Refills: 0 | Status: COMPLETED | OUTPATIENT
Start: 2019-06-21 | End: 2019-06-23

## 2019-06-21 RX ORDER — DIAZEPAM 5 MG
5 TABLET ORAL EVERY 8 HOURS
Refills: 0 | Status: DISCONTINUED | OUTPATIENT
Start: 2019-06-21 | End: 2019-06-21

## 2019-06-21 RX ADMIN — Medication 4 MILLIGRAM(S): at 13:27

## 2019-06-21 RX ADMIN — SODIUM CHLORIDE 2000 MILLILITER(S): 9 INJECTION INTRAMUSCULAR; INTRAVENOUS; SUBCUTANEOUS at 12:10

## 2019-06-21 RX ADMIN — Medication 100 MILLIGRAM(S): at 06:08

## 2019-06-21 RX ADMIN — CEFEPIME 100 MILLIGRAM(S): 1 INJECTION, POWDER, FOR SOLUTION INTRAMUSCULAR; INTRAVENOUS at 10:36

## 2019-06-21 RX ADMIN — BUPROPION HYDROCHLORIDE 300 MILLIGRAM(S): 150 TABLET, EXTENDED RELEASE ORAL at 13:14

## 2019-06-21 RX ADMIN — Medication 100 MILLIGRAM(S): at 13:14

## 2019-06-21 RX ADMIN — Medication 6 MILLIGRAM(S): at 06:08

## 2019-06-21 RX ADMIN — Medication 4 MILLIGRAM(S): at 20:37

## 2019-06-21 RX ADMIN — GABAPENTIN 300 MILLIGRAM(S): 400 CAPSULE ORAL at 21:43

## 2019-06-21 RX ADMIN — GABAPENTIN 300 MILLIGRAM(S): 400 CAPSULE ORAL at 06:08

## 2019-06-21 RX ADMIN — SODIUM CHLORIDE 1000 MILLILITER(S): 9 INJECTION INTRAMUSCULAR; INTRAVENOUS; SUBCUTANEOUS at 17:46

## 2019-06-21 RX ADMIN — Medication 150 MILLIGRAM(S): at 21:43

## 2019-06-21 RX ADMIN — Medication 5 MILLIGRAM(S): at 06:08

## 2019-06-21 RX ADMIN — GABAPENTIN 300 MILLIGRAM(S): 400 CAPSULE ORAL at 13:14

## 2019-06-21 NOTE — PROGRESS NOTE ADULT - ASSESSMENT
ASSESSMENT:  33y with RLE pain/swelling, admitted for r/o DVT. Patient has history of T12-T3 laminectomy and resection of dermoid cyst. CT of spine performed as outpatient.     PLAN:  -neuro/spine checks  -pain control  -LE dopplers negative  -podiatry consulted for foot swelling- likely neuro in etiology   -pending medical clearance, will obtain today   -plan to discharge home today  -patient to return for elective surgery on 6/20      I spent 45 minutes of critical care time examining patient, reviewing vitals, labs, medications, imaging and discussing with the team goals of care to prevent life-threatening in this patient who is at high risk for neurological deterioration or death due to:  resp failure, paralysis ASSESSMENT:  33y Female s/p thoracolumbar laminectomy for resection of intramedullary spinal tumor POD #1    PLAN:  NEURO:  - neuro checks  - vitals checks  - pain control with PCA, Valium, Gabapentin   - HMV, to gravity   - HOB flat until Saturday, may raise HOB for meals  - Decadron 6 q6  - MRI post op    CARDIOVASCULAR:  - normotensive SBP goal    PULMONARY:  - On room air, no issues    RENAL:  - eli in place    GI:  - regular diet  - bowel regimen    HEME:  - monitor H/H    ID:  - afebrile, no issues  - Cefepime x2 doses     ENDO:  - ISS    DVT PROPHYLAXIS: [x] Venodynes  [] Heparin/Lovenox    DISPOSITION: ICU status, full code, dispo pending     I spent 45 minutes of critical care time examining patient, reviewing vitals, labs, medications, imaging and discussing with the team goals of care to prevent life-threatening in this patient who is at high risk for neurological deterioration or death due to: resp failure, paralysis

## 2019-06-21 NOTE — DIETITIAN INITIAL EVALUATION ADULT. - NS AS NUTRI INTERV ED CONTENT
Encouraged adequate PO intake with emphasis on lean protein when diet advanced- pt appeared receptive

## 2019-06-21 NOTE — DIETITIAN INITIAL EVALUATION ADULT. - OTHER INFO
33 year-old F with past medical history depression, spina bifida, lumbar surgery s/p subtotal resection of epidermoid tumor in 2013, now s/p thoracolumbar laminectomy for resection of intramedullary spinal tumor on 6/20. Pt NPO, plan to advance diet per team. Pt reports good appetite at home, follows regular diet, pt allergic to shellfish. Pt stable at UBW of 136lb PTA. GI: WDL, last BM Tuesday (pt reports constipation at home, takes stool softeners), pt endorses some nausea, no vomiting. Skin: Francisco Javier score 16 per flow sheet. Pain controlled at this time. Please see full nutritional recs below. RD to monitor and f/u per moderate risk protocol.

## 2019-06-21 NOTE — DIETITIAN INITIAL EVALUATION ADULT. - NS AS NUTRI INTERV MEALS SNACK
Recommend advance towards regular diet as appropriate, monitor BG levels and need for CSTCHO diet restriction as pt on steroid

## 2019-06-21 NOTE — DIETITIAN INITIAL EVALUATION ADULT. - ENERGY NEEDS
Ht (6/20): 160cm, Wt (6/20): 61.9kg, IBW: 52.3kg +/-10%, %IBW: 118%, BMI: 24.2   ABW used to calculate energy needs due to pt's current body weight within % IBW. Needs adjusted  s/p surgery. Aim for higher end of est kcal range.

## 2019-06-22 LAB
ANION GAP SERPL CALC-SCNC: 9 MMOL/L — SIGNIFICANT CHANGE UP (ref 5–17)
BUN SERPL-MCNC: 14 MG/DL — SIGNIFICANT CHANGE UP (ref 7–23)
CALCIUM SERPL-MCNC: 8.8 MG/DL — SIGNIFICANT CHANGE UP (ref 8.4–10.5)
CHLORIDE SERPL-SCNC: 104 MMOL/L — SIGNIFICANT CHANGE UP (ref 96–108)
CK MB CFR SERPL CALC: 1.8 NG/ML — SIGNIFICANT CHANGE UP (ref 0–6.7)
CK SERPL-CCNC: 119 U/L — SIGNIFICANT CHANGE UP (ref 25–170)
CO2 SERPL-SCNC: 26 MMOL/L — SIGNIFICANT CHANGE UP (ref 22–31)
CREAT SERPL-MCNC: 0.53 MG/DL — SIGNIFICANT CHANGE UP (ref 0.5–1.3)
GLUCOSE BLDC GLUCOMTR-MCNC: 112 MG/DL — HIGH (ref 70–99)
GLUCOSE BLDC GLUCOMTR-MCNC: 116 MG/DL — HIGH (ref 70–99)
GLUCOSE BLDC GLUCOMTR-MCNC: 118 MG/DL — HIGH (ref 70–99)
GLUCOSE BLDC GLUCOMTR-MCNC: 153 MG/DL — HIGH (ref 70–99)
GLUCOSE SERPL-MCNC: 125 MG/DL — HIGH (ref 70–99)
HCT VFR BLD CALC: 39.8 % — SIGNIFICANT CHANGE UP (ref 34.5–45)
HGB BLD-MCNC: 12.8 G/DL — SIGNIFICANT CHANGE UP (ref 11.5–15.5)
MAGNESIUM SERPL-MCNC: 2 MG/DL — SIGNIFICANT CHANGE UP (ref 1.6–2.6)
MCHC RBC-ENTMCNC: 32.2 GM/DL — SIGNIFICANT CHANGE UP (ref 32–36)
MCHC RBC-ENTMCNC: 33.7 PG — SIGNIFICANT CHANGE UP (ref 27–34)
MCV RBC AUTO: 104.7 FL — HIGH (ref 80–100)
NRBC # BLD: 0 /100 WBCS — SIGNIFICANT CHANGE UP (ref 0–0)
PHOSPHATE SERPL-MCNC: 2.3 MG/DL — LOW (ref 2.5–4.5)
PLATELET # BLD AUTO: 232 K/UL — SIGNIFICANT CHANGE UP (ref 150–400)
POTASSIUM SERPL-MCNC: 4.7 MMOL/L — SIGNIFICANT CHANGE UP (ref 3.5–5.3)
POTASSIUM SERPL-SCNC: 4.7 MMOL/L — SIGNIFICANT CHANGE UP (ref 3.5–5.3)
RBC # BLD: 3.8 M/UL — SIGNIFICANT CHANGE UP (ref 3.8–5.2)
RBC # FLD: 14.3 % — SIGNIFICANT CHANGE UP (ref 10.3–14.5)
SODIUM SERPL-SCNC: 139 MMOL/L — SIGNIFICANT CHANGE UP (ref 135–145)
TROPONIN T SERPL-MCNC: <0.01 NG/ML — SIGNIFICANT CHANGE UP (ref 0–0.01)
WBC # BLD: 17.8 K/UL — HIGH (ref 3.8–10.5)
WBC # FLD AUTO: 17.8 K/UL — HIGH (ref 3.8–10.5)

## 2019-06-22 PROCEDURE — 72147 MRI CHEST SPINE W/DYE: CPT | Mod: 26

## 2019-06-22 PROCEDURE — 93010 ELECTROCARDIOGRAM REPORT: CPT

## 2019-06-22 PROCEDURE — 72158 MRI LUMBAR SPINE W/O & W/DYE: CPT | Mod: 26

## 2019-06-22 RX ORDER — OXYCODONE AND ACETAMINOPHEN 5; 325 MG/1; MG/1
2 TABLET ORAL EVERY 6 HOURS
Refills: 0 | Status: DISCONTINUED | OUTPATIENT
Start: 2019-06-22 | End: 2019-06-28

## 2019-06-22 RX ORDER — OXYCODONE AND ACETAMINOPHEN 5; 325 MG/1; MG/1
1 TABLET ORAL EVERY 4 HOURS
Refills: 0 | Status: DISCONTINUED | OUTPATIENT
Start: 2019-06-22 | End: 2019-06-28

## 2019-06-22 RX ADMIN — Medication 4 MILLIGRAM(S): at 14:04

## 2019-06-22 RX ADMIN — GABAPENTIN 300 MILLIGRAM(S): 400 CAPSULE ORAL at 21:49

## 2019-06-22 RX ADMIN — GABAPENTIN 300 MILLIGRAM(S): 400 CAPSULE ORAL at 05:55

## 2019-06-22 RX ADMIN — GABAPENTIN 300 MILLIGRAM(S): 400 CAPSULE ORAL at 14:03

## 2019-06-22 RX ADMIN — Medication 75 MILLIGRAM(S): at 18:19

## 2019-06-22 RX ADMIN — OXYCODONE AND ACETAMINOPHEN 1 TABLET(S): 5; 325 TABLET ORAL at 14:04

## 2019-06-22 RX ADMIN — Medication 4 MILLIGRAM(S): at 08:26

## 2019-06-22 RX ADMIN — HYDROMORPHONE HYDROCHLORIDE 30 MILLILITER(S): 2 INJECTION INTRAMUSCULAR; INTRAVENOUS; SUBCUTANEOUS at 03:08

## 2019-06-22 RX ADMIN — Medication 150 MILLIGRAM(S): at 21:48

## 2019-06-22 RX ADMIN — Medication 100 MILLIGRAM(S): at 14:04

## 2019-06-22 RX ADMIN — Medication 4 MILLIGRAM(S): at 03:03

## 2019-06-22 RX ADMIN — BUPROPION HYDROCHLORIDE 300 MILLIGRAM(S): 150 TABLET, EXTENDED RELEASE ORAL at 11:36

## 2019-06-22 RX ADMIN — OXYCODONE AND ACETAMINOPHEN 1 TABLET(S): 5; 325 TABLET ORAL at 15:00

## 2019-06-22 NOTE — PHYSICAL THERAPY INITIAL EVALUATION ADULT - ADDITIONAL COMMENTS
Pt lives in a 2nd floor walkup with family, has been ambulating with a rollator. Owns a cane as well. 12-Feb-2018 20:43

## 2019-06-23 LAB
ALBUMIN SERPL ELPH-MCNC: 3.6 G/DL — SIGNIFICANT CHANGE UP (ref 3.3–5)
ALP SERPL-CCNC: 63 U/L — SIGNIFICANT CHANGE UP (ref 40–120)
ALT FLD-CCNC: 13 U/L — SIGNIFICANT CHANGE UP (ref 10–45)
ANION GAP SERPL CALC-SCNC: 8 MMOL/L — SIGNIFICANT CHANGE UP (ref 5–17)
AST SERPL-CCNC: 11 U/L — SIGNIFICANT CHANGE UP (ref 10–40)
BILIRUB SERPL-MCNC: 0.3 MG/DL — SIGNIFICANT CHANGE UP (ref 0.2–1.2)
BUN SERPL-MCNC: 14 MG/DL — SIGNIFICANT CHANGE UP (ref 7–23)
CALCIUM SERPL-MCNC: 8.8 MG/DL — SIGNIFICANT CHANGE UP (ref 8.4–10.5)
CHLORIDE SERPL-SCNC: 105 MMOL/L — SIGNIFICANT CHANGE UP (ref 96–108)
CO2 SERPL-SCNC: 26 MMOL/L — SIGNIFICANT CHANGE UP (ref 22–31)
CREAT SERPL-MCNC: 0.53 MG/DL — SIGNIFICANT CHANGE UP (ref 0.5–1.3)
GLUCOSE BLDC GLUCOMTR-MCNC: 105 MG/DL — HIGH (ref 70–99)
GLUCOSE BLDC GLUCOMTR-MCNC: 106 MG/DL — HIGH (ref 70–99)
GLUCOSE BLDC GLUCOMTR-MCNC: 110 MG/DL — HIGH (ref 70–99)
GLUCOSE BLDC GLUCOMTR-MCNC: 111 MG/DL — HIGH (ref 70–99)
GLUCOSE SERPL-MCNC: 113 MG/DL — HIGH (ref 70–99)
HCT VFR BLD CALC: 39.6 % — SIGNIFICANT CHANGE UP (ref 34.5–45)
HGB BLD-MCNC: 12.9 G/DL — SIGNIFICANT CHANGE UP (ref 11.5–15.5)
MCHC RBC-ENTMCNC: 32.6 GM/DL — SIGNIFICANT CHANGE UP (ref 32–36)
MCHC RBC-ENTMCNC: 33.4 PG — SIGNIFICANT CHANGE UP (ref 27–34)
MCV RBC AUTO: 102.6 FL — HIGH (ref 80–100)
NRBC # BLD: 0 /100 WBCS — SIGNIFICANT CHANGE UP (ref 0–0)
PLATELET # BLD AUTO: 234 K/UL — SIGNIFICANT CHANGE UP (ref 150–400)
POTASSIUM SERPL-MCNC: 4 MMOL/L — SIGNIFICANT CHANGE UP (ref 3.5–5.3)
POTASSIUM SERPL-SCNC: 4 MMOL/L — SIGNIFICANT CHANGE UP (ref 3.5–5.3)
PROT SERPL-MCNC: 6.5 G/DL — SIGNIFICANT CHANGE UP (ref 6–8.3)
RBC # BLD: 3.86 M/UL — SIGNIFICANT CHANGE UP (ref 3.8–5.2)
RBC # FLD: 14.3 % — SIGNIFICANT CHANGE UP (ref 10.3–14.5)
SODIUM SERPL-SCNC: 139 MMOL/L — SIGNIFICANT CHANGE UP (ref 135–145)
WBC # BLD: 11.87 K/UL — HIGH (ref 3.8–10.5)
WBC # FLD AUTO: 11.87 K/UL — HIGH (ref 3.8–10.5)

## 2019-06-23 RX ORDER — ENOXAPARIN SODIUM 100 MG/ML
40 INJECTION SUBCUTANEOUS AT BEDTIME
Refills: 0 | Status: DISCONTINUED | OUTPATIENT
Start: 2019-06-23 | End: 2019-06-28

## 2019-06-23 RX ADMIN — Medication 4 MILLIGRAM(S): at 21:38

## 2019-06-23 RX ADMIN — BUPROPION HYDROCHLORIDE 300 MILLIGRAM(S): 150 TABLET, EXTENDED RELEASE ORAL at 11:19

## 2019-06-23 RX ADMIN — Medication 4 MILLIGRAM(S): at 06:18

## 2019-06-23 RX ADMIN — GABAPENTIN 300 MILLIGRAM(S): 400 CAPSULE ORAL at 21:38

## 2019-06-23 RX ADMIN — ONDANSETRON 4 MILLIGRAM(S): 8 TABLET, FILM COATED ORAL at 06:23

## 2019-06-23 RX ADMIN — OXYCODONE AND ACETAMINOPHEN 2 TABLET(S): 5; 325 TABLET ORAL at 18:07

## 2019-06-23 RX ADMIN — Medication 100 MILLIGRAM(S): at 06:18

## 2019-06-23 RX ADMIN — OXYCODONE AND ACETAMINOPHEN 1 TABLET(S): 5; 325 TABLET ORAL at 11:22

## 2019-06-23 RX ADMIN — OXYCODONE AND ACETAMINOPHEN 2 TABLET(S): 5; 325 TABLET ORAL at 19:07

## 2019-06-23 RX ADMIN — GABAPENTIN 300 MILLIGRAM(S): 400 CAPSULE ORAL at 06:18

## 2019-06-23 RX ADMIN — GABAPENTIN 300 MILLIGRAM(S): 400 CAPSULE ORAL at 14:52

## 2019-06-23 RX ADMIN — Medication 100 MILLIGRAM(S): at 14:52

## 2019-06-23 RX ADMIN — Medication 150 MILLIGRAM(S): at 21:38

## 2019-06-23 RX ADMIN — OXYCODONE AND ACETAMINOPHEN 1 TABLET(S): 5; 325 TABLET ORAL at 12:22

## 2019-06-23 RX ADMIN — ENOXAPARIN SODIUM 40 MILLIGRAM(S): 100 INJECTION SUBCUTANEOUS at 21:38

## 2019-06-23 RX ADMIN — Medication 4 MILLIGRAM(S): at 00:03

## 2019-06-23 RX ADMIN — Medication 4 MILLIGRAM(S): at 11:19

## 2019-06-23 NOTE — PROCEDURE NOTE - GENERAL PROCEDURE DETAILS
Site prepped and cleaned with chlorhexidine. HMV drain confirmed to be off suction. Anchoring sutures removed. Drain removed without resistance; tip of catheter visualized. Manual compression held. One 4-0 nylon stitch placed over drain site. Patient tolerated procedure well, no complications.

## 2019-06-24 LAB
ANION GAP SERPL CALC-SCNC: 12 MMOL/L — SIGNIFICANT CHANGE UP (ref 5–17)
BUN SERPL-MCNC: 13 MG/DL — SIGNIFICANT CHANGE UP (ref 7–23)
CALCIUM SERPL-MCNC: 9.1 MG/DL — SIGNIFICANT CHANGE UP (ref 8.4–10.5)
CHLORIDE SERPL-SCNC: 100 MMOL/L — SIGNIFICANT CHANGE UP (ref 96–108)
CO2 SERPL-SCNC: 25 MMOL/L — SIGNIFICANT CHANGE UP (ref 22–31)
CREAT SERPL-MCNC: 0.51 MG/DL — SIGNIFICANT CHANGE UP (ref 0.5–1.3)
GLUCOSE BLDC GLUCOMTR-MCNC: 111 MG/DL — HIGH (ref 70–99)
GLUCOSE BLDC GLUCOMTR-MCNC: 95 MG/DL — SIGNIFICANT CHANGE UP (ref 70–99)
GLUCOSE BLDC GLUCOMTR-MCNC: 97 MG/DL — SIGNIFICANT CHANGE UP (ref 70–99)
GLUCOSE BLDC GLUCOMTR-MCNC: 99 MG/DL — SIGNIFICANT CHANGE UP (ref 70–99)
GLUCOSE SERPL-MCNC: 102 MG/DL — HIGH (ref 70–99)
HCT VFR BLD CALC: 39.9 % — SIGNIFICANT CHANGE UP (ref 34.5–45)
HGB BLD-MCNC: 13.2 G/DL — SIGNIFICANT CHANGE UP (ref 11.5–15.5)
MAGNESIUM SERPL-MCNC: 2 MG/DL — SIGNIFICANT CHANGE UP (ref 1.6–2.6)
MCHC RBC-ENTMCNC: 33.1 GM/DL — SIGNIFICANT CHANGE UP (ref 32–36)
MCHC RBC-ENTMCNC: 33.7 PG — SIGNIFICANT CHANGE UP (ref 27–34)
MCV RBC AUTO: 101.8 FL — HIGH (ref 80–100)
NRBC # BLD: 0 /100 WBCS — SIGNIFICANT CHANGE UP (ref 0–0)
PHOSPHATE SERPL-MCNC: 3.7 MG/DL — SIGNIFICANT CHANGE UP (ref 2.5–4.5)
PLATELET # BLD AUTO: 248 K/UL — SIGNIFICANT CHANGE UP (ref 150–400)
POTASSIUM SERPL-MCNC: 4.8 MMOL/L — SIGNIFICANT CHANGE UP (ref 3.5–5.3)
POTASSIUM SERPL-SCNC: 4.8 MMOL/L — SIGNIFICANT CHANGE UP (ref 3.5–5.3)
RBC # BLD: 3.92 M/UL — SIGNIFICANT CHANGE UP (ref 3.8–5.2)
RBC # FLD: 14.2 % — SIGNIFICANT CHANGE UP (ref 10.3–14.5)
SODIUM SERPL-SCNC: 137 MMOL/L — SIGNIFICANT CHANGE UP (ref 135–145)
WBC # BLD: 11.45 K/UL — HIGH (ref 3.8–10.5)
WBC # FLD AUTO: 11.45 K/UL — HIGH (ref 3.8–10.5)

## 2019-06-24 RX ORDER — ACETAMINOPHEN 500 MG
1000 TABLET ORAL ONCE
Refills: 0 | Status: COMPLETED | OUTPATIENT
Start: 2019-06-24 | End: 2019-06-24

## 2019-06-24 RX ADMIN — Medication 100 MILLIGRAM(S): at 21:40

## 2019-06-24 RX ADMIN — Medication 100 MILLIGRAM(S): at 13:16

## 2019-06-24 RX ADMIN — OXYCODONE AND ACETAMINOPHEN 1 TABLET(S): 5; 325 TABLET ORAL at 10:00

## 2019-06-24 RX ADMIN — BUPROPION HYDROCHLORIDE 300 MILLIGRAM(S): 150 TABLET, EXTENDED RELEASE ORAL at 17:40

## 2019-06-24 RX ADMIN — ENOXAPARIN SODIUM 40 MILLIGRAM(S): 100 INJECTION SUBCUTANEOUS at 21:41

## 2019-06-24 RX ADMIN — OXYCODONE AND ACETAMINOPHEN 2 TABLET(S): 5; 325 TABLET ORAL at 20:29

## 2019-06-24 RX ADMIN — Medication 150 MILLIGRAM(S): at 21:40

## 2019-06-24 RX ADMIN — Medication 400 MILLIGRAM(S): at 05:56

## 2019-06-24 RX ADMIN — OXYCODONE AND ACETAMINOPHEN 1 TABLET(S): 5; 325 TABLET ORAL at 09:06

## 2019-06-24 RX ADMIN — Medication 4 MILLIGRAM(S): at 13:16

## 2019-06-24 RX ADMIN — Medication 4 MILLIGRAM(S): at 05:30

## 2019-06-24 RX ADMIN — Medication 1000 MILLIGRAM(S): at 06:30

## 2019-06-24 RX ADMIN — Medication 4 MILLIGRAM(S): at 21:40

## 2019-06-24 RX ADMIN — GABAPENTIN 300 MILLIGRAM(S): 400 CAPSULE ORAL at 05:30

## 2019-06-24 RX ADMIN — GABAPENTIN 300 MILLIGRAM(S): 400 CAPSULE ORAL at 13:16

## 2019-06-24 RX ADMIN — GABAPENTIN 300 MILLIGRAM(S): 400 CAPSULE ORAL at 21:40

## 2019-06-24 RX ADMIN — OXYCODONE AND ACETAMINOPHEN 2 TABLET(S): 5; 325 TABLET ORAL at 21:15

## 2019-06-24 RX ADMIN — Medication 100 MILLIGRAM(S): at 05:29

## 2019-06-25 LAB
GLUCOSE BLDC GLUCOMTR-MCNC: 101 MG/DL — HIGH (ref 70–99)
GLUCOSE BLDC GLUCOMTR-MCNC: 106 MG/DL — HIGH (ref 70–99)
GLUCOSE BLDC GLUCOMTR-MCNC: 115 MG/DL — HIGH (ref 70–99)
GLUCOSE BLDC GLUCOMTR-MCNC: 99 MG/DL — SIGNIFICANT CHANGE UP (ref 70–99)

## 2019-06-25 RX ADMIN — Medication 100 MILLIGRAM(S): at 06:14

## 2019-06-25 RX ADMIN — Medication 2 MILLIGRAM(S): at 21:41

## 2019-06-25 RX ADMIN — OXYCODONE AND ACETAMINOPHEN 2 TABLET(S): 5; 325 TABLET ORAL at 10:20

## 2019-06-25 RX ADMIN — Medication 100 MILLIGRAM(S): at 13:31

## 2019-06-25 RX ADMIN — Medication 4 MILLIGRAM(S): at 06:14

## 2019-06-25 RX ADMIN — SENNA PLUS 2 TABLET(S): 8.6 TABLET ORAL at 13:31

## 2019-06-25 RX ADMIN — GABAPENTIN 300 MILLIGRAM(S): 400 CAPSULE ORAL at 13:31

## 2019-06-25 RX ADMIN — OXYCODONE AND ACETAMINOPHEN 2 TABLET(S): 5; 325 TABLET ORAL at 09:50

## 2019-06-25 RX ADMIN — Medication 100 MILLIGRAM(S): at 21:40

## 2019-06-25 RX ADMIN — BUPROPION HYDROCHLORIDE 300 MILLIGRAM(S): 150 TABLET, EXTENDED RELEASE ORAL at 13:31

## 2019-06-25 RX ADMIN — OXYCODONE AND ACETAMINOPHEN 2 TABLET(S): 5; 325 TABLET ORAL at 18:38

## 2019-06-25 RX ADMIN — OXYCODONE AND ACETAMINOPHEN 2 TABLET(S): 5; 325 TABLET ORAL at 19:20

## 2019-06-25 RX ADMIN — GABAPENTIN 300 MILLIGRAM(S): 400 CAPSULE ORAL at 21:40

## 2019-06-25 RX ADMIN — Medication 4 MILLIGRAM(S): at 13:31

## 2019-06-25 RX ADMIN — Medication 150 MILLIGRAM(S): at 21:41

## 2019-06-25 RX ADMIN — ENOXAPARIN SODIUM 40 MILLIGRAM(S): 100 INJECTION SUBCUTANEOUS at 23:23

## 2019-06-25 RX ADMIN — GABAPENTIN 300 MILLIGRAM(S): 400 CAPSULE ORAL at 06:14

## 2019-06-26 ENCOUNTER — TRANSCRIPTION ENCOUNTER (OUTPATIENT)
Age: 34
End: 2019-06-26

## 2019-06-26 LAB
ANION GAP SERPL CALC-SCNC: 11 MMOL/L — SIGNIFICANT CHANGE UP (ref 5–17)
BUN SERPL-MCNC: 18 MG/DL — SIGNIFICANT CHANGE UP (ref 7–23)
CALCIUM SERPL-MCNC: 9.2 MG/DL — SIGNIFICANT CHANGE UP (ref 8.4–10.5)
CHLORIDE SERPL-SCNC: 98 MMOL/L — SIGNIFICANT CHANGE UP (ref 96–108)
CO2 SERPL-SCNC: 27 MMOL/L — SIGNIFICANT CHANGE UP (ref 22–31)
CREAT SERPL-MCNC: 0.59 MG/DL — SIGNIFICANT CHANGE UP (ref 0.5–1.3)
GLUCOSE BLDC GLUCOMTR-MCNC: 101 MG/DL — HIGH (ref 70–99)
GLUCOSE BLDC GLUCOMTR-MCNC: 101 MG/DL — HIGH (ref 70–99)
GLUCOSE BLDC GLUCOMTR-MCNC: 88 MG/DL — SIGNIFICANT CHANGE UP (ref 70–99)
GLUCOSE SERPL-MCNC: 92 MG/DL — SIGNIFICANT CHANGE UP (ref 70–99)
HCT VFR BLD CALC: 40.9 % — SIGNIFICANT CHANGE UP (ref 34.5–45)
HGB BLD-MCNC: 13.8 G/DL — SIGNIFICANT CHANGE UP (ref 11.5–15.5)
MCHC RBC-ENTMCNC: 33.7 GM/DL — SIGNIFICANT CHANGE UP (ref 32–36)
MCHC RBC-ENTMCNC: 33.8 PG — SIGNIFICANT CHANGE UP (ref 27–34)
MCV RBC AUTO: 100.2 FL — HIGH (ref 80–100)
NRBC # BLD: 0 /100 WBCS — SIGNIFICANT CHANGE UP (ref 0–0)
PLATELET # BLD AUTO: 285 K/UL — SIGNIFICANT CHANGE UP (ref 150–400)
POTASSIUM SERPL-MCNC: 4.2 MMOL/L — SIGNIFICANT CHANGE UP (ref 3.5–5.3)
POTASSIUM SERPL-SCNC: 4.2 MMOL/L — SIGNIFICANT CHANGE UP (ref 3.5–5.3)
RBC # BLD: 4.08 M/UL — SIGNIFICANT CHANGE UP (ref 3.8–5.2)
RBC # FLD: 13.8 % — SIGNIFICANT CHANGE UP (ref 10.3–14.5)
SODIUM SERPL-SCNC: 136 MMOL/L — SIGNIFICANT CHANGE UP (ref 135–145)
SURGICAL PATHOLOGY STUDY: SIGNIFICANT CHANGE UP
WBC # BLD: 12.55 K/UL — HIGH (ref 3.8–10.5)
WBC # FLD AUTO: 12.55 K/UL — HIGH (ref 3.8–10.5)

## 2019-06-26 RX ORDER — MULTIVIT WITH MIN/MFOLATE/K2 340-15/3 G
1 POWDER (GRAM) ORAL ONCE
Refills: 0 | Status: COMPLETED | OUTPATIENT
Start: 2019-06-26 | End: 2019-06-26

## 2019-06-26 RX ORDER — METOCLOPRAMIDE HCL 10 MG
10 TABLET ORAL EVERY 6 HOURS
Refills: 0 | Status: DISCONTINUED | OUTPATIENT
Start: 2019-06-26 | End: 2019-06-28

## 2019-06-26 RX ORDER — TRAMADOL HYDROCHLORIDE 50 MG/1
50 TABLET ORAL EVERY 6 HOURS
Refills: 0 | Status: DISCONTINUED | OUTPATIENT
Start: 2019-06-26 | End: 2019-06-28

## 2019-06-26 RX ORDER — DEXAMETHASONE 0.5 MG/5ML
2 ELIXIR ORAL EVERY 8 HOURS
Refills: 0 | Status: COMPLETED | OUTPATIENT
Start: 2019-06-26 | End: 2019-06-28

## 2019-06-26 RX ORDER — DEXAMETHASONE 0.5 MG/5ML
ELIXIR ORAL
Refills: 0 | Status: DISCONTINUED | OUTPATIENT
Start: 2019-06-26 | End: 2019-06-28

## 2019-06-26 RX ORDER — DEXAMETHASONE 0.5 MG/5ML
2 ELIXIR ORAL EVERY 12 HOURS
Refills: 0 | Status: DISCONTINUED | OUTPATIENT
Start: 2019-06-28 | End: 2019-06-28

## 2019-06-26 RX ADMIN — GABAPENTIN 300 MILLIGRAM(S): 400 CAPSULE ORAL at 22:16

## 2019-06-26 RX ADMIN — Medication 2 MILLIGRAM(S): at 13:47

## 2019-06-26 RX ADMIN — GABAPENTIN 300 MILLIGRAM(S): 400 CAPSULE ORAL at 06:23

## 2019-06-26 RX ADMIN — GABAPENTIN 300 MILLIGRAM(S): 400 CAPSULE ORAL at 13:45

## 2019-06-26 RX ADMIN — OXYCODONE AND ACETAMINOPHEN 2 TABLET(S): 5; 325 TABLET ORAL at 18:00

## 2019-06-26 RX ADMIN — Medication 1 BOTTLE: at 13:45

## 2019-06-26 RX ADMIN — Medication 100 MILLIGRAM(S): at 13:45

## 2019-06-26 RX ADMIN — OXYCODONE AND ACETAMINOPHEN 2 TABLET(S): 5; 325 TABLET ORAL at 10:59

## 2019-06-26 RX ADMIN — Medication 100 MILLIGRAM(S): at 06:23

## 2019-06-26 RX ADMIN — ONDANSETRON 4 MILLIGRAM(S): 8 TABLET, FILM COATED ORAL at 11:57

## 2019-06-26 RX ADMIN — Medication 100 MILLIGRAM(S): at 22:16

## 2019-06-26 RX ADMIN — Medication 10 MILLIGRAM(S): at 22:15

## 2019-06-26 RX ADMIN — ENOXAPARIN SODIUM 40 MILLIGRAM(S): 100 INJECTION SUBCUTANEOUS at 22:56

## 2019-06-26 RX ADMIN — Medication 5 MILLIGRAM(S): at 06:23

## 2019-06-26 RX ADMIN — TRAMADOL HYDROCHLORIDE 50 MILLIGRAM(S): 50 TABLET ORAL at 22:56

## 2019-06-26 RX ADMIN — TRAMADOL HYDROCHLORIDE 50 MILLIGRAM(S): 50 TABLET ORAL at 23:46

## 2019-06-26 RX ADMIN — OXYCODONE AND ACETAMINOPHEN 2 TABLET(S): 5; 325 TABLET ORAL at 11:27

## 2019-06-26 RX ADMIN — Medication 2 MILLIGRAM(S): at 06:23

## 2019-06-26 RX ADMIN — Medication 2 MILLIGRAM(S): at 22:16

## 2019-06-26 RX ADMIN — ONDANSETRON 4 MILLIGRAM(S): 8 TABLET, FILM COATED ORAL at 17:38

## 2019-06-26 RX ADMIN — Medication 150 MILLIGRAM(S): at 22:16

## 2019-06-26 RX ADMIN — BUPROPION HYDROCHLORIDE 300 MILLIGRAM(S): 150 TABLET, EXTENDED RELEASE ORAL at 10:59

## 2019-06-26 RX ADMIN — OXYCODONE AND ACETAMINOPHEN 2 TABLET(S): 5; 325 TABLET ORAL at 17:38

## 2019-06-26 NOTE — OCCUPATIONAL THERAPY INITIAL EVALUATION ADULT - PLANNED THERAPY INTERVENTIONS, OT EVAL
bed mobility training/strengthening/transfer training/balance training/neuromuscular re-education/motor coordination training/ROM/ADL retraining

## 2019-06-26 NOTE — OCCUPATIONAL THERAPY INITIAL EVALUATION ADULT - STANDING BALANCE: DYNAMIC, REHAB EVAL
Patient transferred from bed to chair approximately 2 ft, further functional mobility deferred 2/2 patient c/o nausea/fair balance

## 2019-06-26 NOTE — DISCHARGE NOTE PROVIDER - NSDCCPCAREPLAN_GEN_ALL_CORE_FT
PRINCIPAL DISCHARGE DIAGNOSIS  Diagnosis: Spinal cord tumor  Assessment and Plan of Treatment: Once home call the office to make a follow up appt  Able to shower and wash your incision   Pat it dry with a clean towel  Any drainage from the wound or temperature greater than 101.5 degrees F call the office  Sutures can be removed 7/8/2019  Follow th instructions of rehab facility

## 2019-06-26 NOTE — OCCUPATIONAL THERAPY INITIAL EVALUATION ADULT - MD ORDER
Per chart, Per chart, 34 yo PMH depression, spina bifida, lumbar surgery s/p subtotal resection of epidermoid tumor in 2013 who was seen by Dr. Bernabe in neurosurgical consultation for spinal mass. Since her initial surgery, the patient reports severe back pain radiating to RIGHT leg with associated numbness/tingling. She reports difficulty walking and uses cane to ambulate for assistance. Her right leg becomes weak, numb, and gives out.

## 2019-06-26 NOTE — DISCHARGE NOTE PROVIDER - NSDCACTIVITY_GEN_ALL_CORE
Stairs allowed/Walking - Indoors allowed/No heavy lifting/straining/Walking - Outdoors allowed/Do not drive or operate machinery/Showering allowed

## 2019-06-26 NOTE — DISCHARGE NOTE PROVIDER - HOSPITAL COURSE
History of Present Illness:    Reason for Admission: Preop for resection of spinal cord tumor    History of Present Illness:     This patient is a 33-year-old woman with past medical history depression, spina bifida, lumbar surgery s/p subtotal resection of epidermoid tumor in 2013 who was seen by Dr. Bernabe in neurosurgical consultation for spinal mass at [facility].         Since her initial surgery, the patient reports severe back pain radiating to RIGHT leg with associated numbness/tingling. She reports difficulty walking and uses cane to ambulate for assistance. Her right leg becomes weak, numb, and gives out. Reports imbalance. She states that these symptoms have become progressively worse over the years. She also has bowel/bladder incontinence and chronic constipation since initial surgery 6 years ago.         She has been doing physical therapy with minimal relief of symptoms.         Patient History:     Past Medical, Past Surgical, and Family History:    PAST MEDICAL HISTORY:    Depression         Insomnia         Tumor spinal.         PAST SURGICAL HISTORY:    History of cholecystectomy         Spinal cord tumor.        Pt admitted and underwentOperative Findings:    · Operative Findings    thoracolumbar laminectomy, resection of intramedullary spinal tumor        Pt doing well and discharge to rehab

## 2019-06-26 NOTE — OCCUPATIONAL THERAPY INITIAL EVALUATION ADULT - GROSSLY INTACT, SENSORY
Left UE/Right UE/Left LE/Right LE/Patient currently reports no changes to sensation, denies numbness or tingling at this time./Grossly Intact

## 2019-06-26 NOTE — OCCUPATIONAL THERAPY INITIAL EVALUATION ADULT - GENERAL OBSERVATIONS, REHAB EVAL
R hand dominant, patient cleared for OT by BRYANT Calvin. Patient premedicated, c/o 10/10 pain. Patient received semi-supine, +heplock, +SCDs.

## 2019-06-26 NOTE — DISCHARGE NOTE PROVIDER - CARE PROVIDER_API CALL
Patrick Bernabe)  Neurosurgery  [address], [place] [facility]  Phone: [phone]  Fax: [phone]  Follow Up Time:

## 2019-06-26 NOTE — OCCUPATIONAL THERAPY INITIAL EVALUATION ADULT - ADDITIONAL COMMENTS
Patient reports PTA severely limited 2/2 pain for ADLs and functional mobility. Patient reports at times feeling great and being able to ambulate w/ SC or RW up to 4 blocks to [facility] apartment, however at other times patient is in severe pain and requires assistance for ADLs and unable to climb stairs to reach apartment. Patient reports needing assistance w/ all ADLs and functional mobility w/ increased pain.

## 2019-06-26 NOTE — CHART NOTE - NSCHARTNOTEFT_GEN_A_CORE
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 (26 Jun 2019 15:17)      PAST MEDICAL & SURGICAL HISTORY:  Bladder disorder: pt self catheterizes  Depression  Insomnia  Tumor: spinal  History of cholecystectomy  Spinal cord tumor      Current Nutrition Order:  Regular     PO Intake: Good (%) [ X  ]  Fair (50-75%) [   ] Poor (<25%) [   ]    GI Issues: No complaints of N/V; no BM since surgery, refusing stronger bowel regimen    Pain: Pain controlled w/current medication regimen     Skin Integrity: Francisco Javier 20, surgical wound     Labs:   06-26    136  |  98  |  18  ----------------------------<  92  4.2   |  27  |  0.59    Ca    9.2      26 Jun 2019 06:40      CAPILLARY BLOOD GLUCOSE      POCT Blood Glucose.: 101 mg/dL (26 Jun 2019 11:43)  POCT Blood Glucose.: 88 mg/dL (26 Jun 2019 06:34)  POCT Blood Glucose.: 99 mg/dL (25 Jun 2019 21:49)  POCT Blood Glucose.: 106 mg/dL (25 Jun 2019 17:33)      Medications:  MEDICATIONS  (STANDING):  buPROPion XL . 300 milliGRAM(s) Oral daily  dexamethasone     Tablet   Oral   dexamethasone     Tablet 2 milliGRAM(s) Oral every 8 hours  dextrose 5%. 1000 milliLiter(s) (50 mL/Hr) IV Continuous <Continuous>  dextrose 50% Injectable 12.5 Gram(s) IV Push once  dextrose 50% Injectable 25 Gram(s) IV Push once  dextrose 50% Injectable 25 Gram(s) IV Push once  docusate sodium 100 milliGRAM(s) Oral three times a day  enoxaparin Injectable 40 milliGRAM(s) SubCutaneous at bedtime  gabapentin 300 milliGRAM(s) Oral three times a day  traZODone 150 milliGRAM(s) Oral at bedtime    MEDICATIONS  (PRN):  aluminum hydroxide/magnesium hydroxide/simethicone Suspension 30 milliLiter(s) Oral every 12 hours PRN Indigestion  bisacodyl Suppository 10 milliGRAM(s) Rectal daily PRN Constipation  dextrose 40% Gel 15 Gram(s) Oral once PRN Blood Glucose LESS THAN 70 milliGRAM(s)/deciliter  famotidine    Tablet 20 milliGRAM(s) Oral every 12 hours PRN Dyspepsia  glucagon  Injectable 1 milliGRAM(s) IntraMuscular once PRN Glucose LESS THAN 70 milligrams/deciliter  naloxone Injectable 0.1 milliGRAM(s) IV Push every 3 minutes PRN For ANY of the following changes in patient status:  A. RR LESS THAN 10 breaths per minute, B. Oxygen saturation LESS THAN 90%, C. Sedation score of 6  ondansetron Injectable 4 milliGRAM(s) IV Push every 6 hours PRN Nausea  oxyCODONE    5 mG/acetaminophen 325 mG 1 Tablet(s) Oral every 4 hours PRN Moderate Pain (4 - 6)  oxyCODONE    5 mG/acetaminophen 325 mG 2 Tablet(s) Oral every 6 hours PRN Severe Pain (7 - 10)  senna 2 Tablet(s) Oral at bedtime PRN Constipation      Weight: 61.9kg   Daily     Daily     Weight Change: No new weights recorded since admit     Nutrition Focused Physical Exam: Completed [   ]  Not Pertinent [ X  ]    Estimated energy needs: ABW used to calculate energy needs due to pt's current body weight within % IBW. Needs adjusted  s/p surgery.  Calories: 25-30 kcal/kg = 3209-9824 kcal/day  Protein: 1.0-1.2 g/kg = 62-74g protein/day  Fluids: 30-35 mL/kg = 1857-2167mL/day    Subjective: 33 year-old F with past medical history depression, spina bifida, lumbar surgery s/p subtotal resection of epidermoid tumor in 2013, now s/p thoracolumbar laminectomy for resection of intramedullary spinal tumor on 6/20. Drain removed on 6/23. Pt seen in room, awake, alert. She endorses good appetite but does not like a lot of the foods offered. Family and friends bringing in foods from outside that she enjoys more. No complaints of N/V but no BM post-op; team aware, but pt refusing stronger regimen. Encouraged fluids, fiber, and ambulation as feasible. Pain controlled on current regimen. Working w/PT today. Encouraged PO intake.     Previous Nutrition Diagnosis:  Inadequate energy intake RT NPO status AEB pt meeting 0% EER at this time    Active [   ]  Resolved [ X  ]    If resolved, new PES: Increased protein needs RT increased demand for protein intake AEB post-op/wound healing     Goal: Pt will meet % of EER per day     Recommendations:  1. Encourage PO intake   2. Monitor lytes and replete prn.   3. Pain and bowel regimens per team   4. Reinforce diet ed    Education: general, healthy nutrition; increased protein and fluid needs    Risk Level: High [   ] Moderate [ X  ] Low [   ]

## 2019-06-26 NOTE — OCCUPATIONAL THERAPY INITIAL EVALUATION ADULT - MANUAL MUSCLE TESTING RESULTS, REHAB EVAL
MMT deferred 2/2 pain during AROM assessment, however based on functional observation patient BUE at least 3+/5 throughout.

## 2019-06-27 LAB
ANION GAP SERPL CALC-SCNC: 10 MMOL/L — SIGNIFICANT CHANGE UP (ref 5–17)
BUN SERPL-MCNC: 18 MG/DL — SIGNIFICANT CHANGE UP (ref 7–23)
CALCIUM SERPL-MCNC: 9 MG/DL — SIGNIFICANT CHANGE UP (ref 8.4–10.5)
CHLORIDE SERPL-SCNC: 96 MMOL/L — SIGNIFICANT CHANGE UP (ref 96–108)
CO2 SERPL-SCNC: 27 MMOL/L — SIGNIFICANT CHANGE UP (ref 22–31)
CREAT SERPL-MCNC: 0.55 MG/DL — SIGNIFICANT CHANGE UP (ref 0.5–1.3)
GLUCOSE SERPL-MCNC: 98 MG/DL — SIGNIFICANT CHANGE UP (ref 70–99)
HCT VFR BLD CALC: 39.3 % — SIGNIFICANT CHANGE UP (ref 34.5–45)
HGB BLD-MCNC: 13 G/DL — SIGNIFICANT CHANGE UP (ref 11.5–15.5)
MCHC RBC-ENTMCNC: 33.1 GM/DL — SIGNIFICANT CHANGE UP (ref 32–36)
MCHC RBC-ENTMCNC: 33.4 PG — SIGNIFICANT CHANGE UP (ref 27–34)
MCV RBC AUTO: 101 FL — HIGH (ref 80–100)
NRBC # BLD: 0 /100 WBCS — SIGNIFICANT CHANGE UP (ref 0–0)
PLATELET # BLD AUTO: 283 K/UL — SIGNIFICANT CHANGE UP (ref 150–400)
POTASSIUM SERPL-MCNC: 4.1 MMOL/L — SIGNIFICANT CHANGE UP (ref 3.5–5.3)
POTASSIUM SERPL-SCNC: 4.1 MMOL/L — SIGNIFICANT CHANGE UP (ref 3.5–5.3)
RBC # BLD: 3.89 M/UL — SIGNIFICANT CHANGE UP (ref 3.8–5.2)
RBC # FLD: 13.8 % — SIGNIFICANT CHANGE UP (ref 10.3–14.5)
SODIUM SERPL-SCNC: 133 MMOL/L — LOW (ref 135–145)
WBC # BLD: 13.34 K/UL — HIGH (ref 3.8–10.5)
WBC # FLD AUTO: 13.34 K/UL — HIGH (ref 3.8–10.5)

## 2019-06-27 RX ADMIN — Medication 150 MILLIGRAM(S): at 21:19

## 2019-06-27 RX ADMIN — GABAPENTIN 300 MILLIGRAM(S): 400 CAPSULE ORAL at 06:10

## 2019-06-27 RX ADMIN — Medication 2 MILLIGRAM(S): at 13:29

## 2019-06-27 RX ADMIN — ENOXAPARIN SODIUM 40 MILLIGRAM(S): 100 INJECTION SUBCUTANEOUS at 21:23

## 2019-06-27 RX ADMIN — OXYCODONE AND ACETAMINOPHEN 1 TABLET(S): 5; 325 TABLET ORAL at 22:00

## 2019-06-27 RX ADMIN — OXYCODONE AND ACETAMINOPHEN 2 TABLET(S): 5; 325 TABLET ORAL at 13:32

## 2019-06-27 RX ADMIN — Medication 2 MILLIGRAM(S): at 06:10

## 2019-06-27 RX ADMIN — Medication 100 MILLIGRAM(S): at 21:20

## 2019-06-27 RX ADMIN — BUPROPION HYDROCHLORIDE 300 MILLIGRAM(S): 150 TABLET, EXTENDED RELEASE ORAL at 13:29

## 2019-06-27 RX ADMIN — Medication 2 MILLIGRAM(S): at 21:20

## 2019-06-27 RX ADMIN — OXYCODONE AND ACETAMINOPHEN 2 TABLET(S): 5; 325 TABLET ORAL at 14:00

## 2019-06-27 RX ADMIN — Medication 100 MILLIGRAM(S): at 06:10

## 2019-06-27 RX ADMIN — ONDANSETRON 4 MILLIGRAM(S): 8 TABLET, FILM COATED ORAL at 13:32

## 2019-06-27 RX ADMIN — Medication 100 MILLIGRAM(S): at 13:29

## 2019-06-27 RX ADMIN — GABAPENTIN 300 MILLIGRAM(S): 400 CAPSULE ORAL at 13:29

## 2019-06-27 RX ADMIN — OXYCODONE AND ACETAMINOPHEN 1 TABLET(S): 5; 325 TABLET ORAL at 21:20

## 2019-06-27 RX ADMIN — GABAPENTIN 300 MILLIGRAM(S): 400 CAPSULE ORAL at 21:20

## 2019-06-28 ENCOUNTER — TRANSCRIPTION ENCOUNTER (OUTPATIENT)
Age: 34
End: 2019-06-28

## 2019-06-28 VITALS
DIASTOLIC BLOOD PRESSURE: 62 MMHG | HEART RATE: 71 BPM | RESPIRATION RATE: 15 BRPM | OXYGEN SATURATION: 96 % | TEMPERATURE: 98 F | SYSTOLIC BLOOD PRESSURE: 104 MMHG

## 2019-06-28 LAB
ANION GAP SERPL CALC-SCNC: 9 MMOL/L — SIGNIFICANT CHANGE UP (ref 5–17)
BUN SERPL-MCNC: 21 MG/DL — SIGNIFICANT CHANGE UP (ref 7–23)
CALCIUM SERPL-MCNC: 9.1 MG/DL — SIGNIFICANT CHANGE UP (ref 8.4–10.5)
CHLORIDE SERPL-SCNC: 99 MMOL/L — SIGNIFICANT CHANGE UP (ref 96–108)
CO2 SERPL-SCNC: 29 MMOL/L — SIGNIFICANT CHANGE UP (ref 22–31)
CREAT SERPL-MCNC: 0.58 MG/DL — SIGNIFICANT CHANGE UP (ref 0.5–1.3)
GLUCOSE SERPL-MCNC: 92 MG/DL — SIGNIFICANT CHANGE UP (ref 70–99)
HCT VFR BLD CALC: 38 % — SIGNIFICANT CHANGE UP (ref 34.5–45)
HGB BLD-MCNC: 12.4 G/DL — SIGNIFICANT CHANGE UP (ref 11.5–15.5)
MCHC RBC-ENTMCNC: 32.6 GM/DL — SIGNIFICANT CHANGE UP (ref 32–36)
MCHC RBC-ENTMCNC: 33.7 PG — SIGNIFICANT CHANGE UP (ref 27–34)
MCV RBC AUTO: 103.3 FL — HIGH (ref 80–100)
NRBC # BLD: 0 /100 WBCS — SIGNIFICANT CHANGE UP (ref 0–0)
PLATELET # BLD AUTO: 254 K/UL — SIGNIFICANT CHANGE UP (ref 150–400)
POTASSIUM SERPL-MCNC: 4.1 MMOL/L — SIGNIFICANT CHANGE UP (ref 3.5–5.3)
POTASSIUM SERPL-SCNC: 4.1 MMOL/L — SIGNIFICANT CHANGE UP (ref 3.5–5.3)
RBC # BLD: 3.68 M/UL — LOW (ref 3.8–5.2)
RBC # FLD: 13.6 % — SIGNIFICANT CHANGE UP (ref 10.3–14.5)
SODIUM SERPL-SCNC: 137 MMOL/L — SIGNIFICANT CHANGE UP (ref 135–145)
WBC # BLD: 12.91 K/UL — HIGH (ref 3.8–10.5)
WBC # FLD AUTO: 12.91 K/UL — HIGH (ref 3.8–10.5)

## 2019-06-28 RX ORDER — DEXAMETHASONE 0.5 MG/5ML
1 ELIXIR ORAL
Qty: 0 | Refills: 0 | DISCHARGE
Start: 2019-06-28

## 2019-06-28 RX ORDER — ENOXAPARIN SODIUM 100 MG/ML
40 INJECTION SUBCUTANEOUS
Qty: 0 | Refills: 0 | DISCHARGE
Start: 2019-06-28

## 2019-06-28 RX ORDER — GABAPENTIN 400 MG/1
1 CAPSULE ORAL
Qty: 0 | Refills: 0 | DISCHARGE

## 2019-06-28 RX ORDER — BUPROPION HYDROCHLORIDE 150 MG/1
1 TABLET, EXTENDED RELEASE ORAL
Qty: 0 | Refills: 0 | DISCHARGE
Start: 2019-06-28

## 2019-06-28 RX ORDER — GABAPENTIN 400 MG/1
1 CAPSULE ORAL
Qty: 0 | Refills: 0 | DISCHARGE
Start: 2019-06-28

## 2019-06-28 RX ORDER — TRAMADOL HYDROCHLORIDE 50 MG/1
1 TABLET ORAL
Qty: 0 | Refills: 0 | DISCHARGE
Start: 2019-06-28

## 2019-06-28 RX ORDER — DEXAMETHASONE 0.5 MG/5ML
2 ELIXIR ORAL
Refills: 0 | Status: DISCONTINUED | OUTPATIENT
Start: 2019-06-28 | End: 2019-06-28

## 2019-06-28 RX ORDER — FAMOTIDINE 10 MG/ML
1 INJECTION INTRAVENOUS
Qty: 0 | Refills: 0 | DISCHARGE
Start: 2019-06-28

## 2019-06-28 RX ORDER — SODIUM CHLORIDE 9 MG/ML
250 INJECTION INTRAMUSCULAR; INTRAVENOUS; SUBCUTANEOUS ONCE
Refills: 0 | Status: COMPLETED | OUTPATIENT
Start: 2019-06-28 | End: 2019-06-28

## 2019-06-28 RX ORDER — BUPROPION HYDROCHLORIDE 150 MG/1
1 TABLET, EXTENDED RELEASE ORAL
Qty: 0 | Refills: 0 | DISCHARGE

## 2019-06-28 RX ADMIN — GABAPENTIN 300 MILLIGRAM(S): 400 CAPSULE ORAL at 05:58

## 2019-06-28 RX ADMIN — BUPROPION HYDROCHLORIDE 300 MILLIGRAM(S): 150 TABLET, EXTENDED RELEASE ORAL at 12:09

## 2019-06-28 RX ADMIN — Medication 100 MILLIGRAM(S): at 05:58

## 2019-06-28 RX ADMIN — GABAPENTIN 300 MILLIGRAM(S): 400 CAPSULE ORAL at 12:10

## 2019-06-28 RX ADMIN — Medication 100 MILLIGRAM(S): at 12:10

## 2019-06-28 RX ADMIN — SODIUM CHLORIDE 1000 MILLILITER(S): 9 INJECTION INTRAMUSCULAR; INTRAVENOUS; SUBCUTANEOUS at 06:19

## 2019-06-28 RX ADMIN — OXYCODONE AND ACETAMINOPHEN 1 TABLET(S): 5; 325 TABLET ORAL at 13:00

## 2019-06-28 RX ADMIN — Medication 2 MILLIGRAM(S): at 05:58

## 2019-06-28 RX ADMIN — OXYCODONE AND ACETAMINOPHEN 1 TABLET(S): 5; 325 TABLET ORAL at 12:10

## 2019-06-28 NOTE — PROGRESS NOTE ADULT - REASON FOR ADMISSION
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

## 2019-06-28 NOTE — DISCHARGE NOTE NURSING/CASE MANAGEMENT/SOCIAL WORK - NSDCDPATPORTLINK_GEN_ALL_CORE
You can access the [facility] Portal, offered by [facility], by registering with the following website: [email or web address]

## 2019-06-28 NOTE — PROGRESS NOTE ADULT - PROVIDER SPECIALTY LIST ADULT
NSICU
NSICU
Neurology
Neurosurgery

## 2019-06-28 NOTE — PROGRESS NOTE ADULT - SUBJECTIVE AND OBJECTIVE BOX
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 Bernabe in neurosurgical consultation for spinal mass at [facility].     Since her initial surgery, the patient reports severe back pain radiating to RIGHT leg with associated numbness/tingling. She reports difficulty walking and uses cane to ambulate for assistance. Her right leg becomes weak, numb, and gives out. Reports imbalance. She states that these symptoms have become progressively worse over the years. She also has bowel/bladder incontinence and chronic constipation since initial surgery 6 years ago.     She has been doing physical therapy with minimal relief of symptoms. (19 Jun 2019 16:01)    OVERNIGHT EVENTS:  Vital Signs Last 24 Hrs  T(C): 36.8 (27 Jun 2019 05:42), Max: 37.2 (26 Jun 2019 09:32)  T(F): 98.3 (27 Jun 2019 05:42), Max: 98.9 (26 Jun 2019 09:32)  HR: 69 (27 Jun 2019 05:42) (67 - 78)  BP: 92/59 (27 Jun 2019 05:42) (92/59 - 110/76)  BP(mean): --  RR: 17 (27 Jun 2019 05:42) (16 - 18)  SpO2: 98% (27 Jun 2019 05:42) (96% - 98%)    I&O's Summary    25 Jun 2019 07:01  -  26 Jun 2019 07:00  --------------------------------------------------------  IN: 1080 mL / OUT: 1550 mL / NET: -470 mL    26 Jun 2019 07:01  -  27 Jun 2019 06:52  --------------------------------------------------------  IN: 0 mL / OUT: 600 mL / NET: -600 mL  Hospital Course:   6/21 POD #0 s/p thoracolumbar laminectomy for resection of intramedullary spinal tumor. JO overnight, neuro stable. Pain well controlled. Flat until Saturday. PCA for pain.   6/22: POD#1: JO overnight. Transferred to telemetry bed. HOB flat until today- able to get OOB and work with PT/OT. HMV in place- f/u output  6/23: POD#2: JO overnight. Pain controlled. Post op MRI T/L spine performed. OOB with PT/OT yesterday. HMV drain remains in place  6/24 POD#3 JO overnight, PT- no needs, MRI T-L spine completed, drain removed 6/23, SQL, Neuro exam improving  6/25: uneventfu night  6/26: JO overnight, neuro stable. patient refusing stronger bowel regimen for constipation.   6/27 uneventful night  Dispo pending      PHYSICAL EXAM:  Neurological:      A&OX3 Cranial nerves intact  CORREIA antigraviy  Lami incison CDI       Cardiovascular:RRR  Respiratory:Lungs  Gastrointestinal:+BS  Genitourinary: Voiding without difficulty  Extremities: warm and dry  Incision/Wound:  CDI        DIET: Regular      LABS:                        13.8   12.55 )-----------( 285      ( 26 Jun 2019 06:40 )             40.9     06-26    136  |  98  |  18  ----------------------------<  92  4.2   |  27  |  0.59    Ca    9.2      26 Jun 2019 06:40            CAPILLARY BLOOD GLUCOSE      POCT Blood Glucose.: 101 mg/dL (26 Jun 2019 17:22)  POCT Blood Glucose.: 101 mg/dL (26 Jun 2019 11:43)      Drug Levels: [] N/A    CSF Analysis: [] N/A      Allergies    No Known Drug Allergies  shellfish (Rash)    Intolerances      MEDICATIONS:  Antibiotics:    Neuro:  buPROPion XL . 300 milliGRAM(s) Oral daily  gabapentin 300 milliGRAM(s) Oral three times a day  metoclopramide Injectable 10 milliGRAM(s) IV Push every 6 hours PRN  ondansetron Injectable 4 milliGRAM(s) IV Push every 6 hours PRN  oxyCODONE    5 mG/acetaminophen 325 mG 1 Tablet(s) Oral every 4 hours PRN  oxyCODONE    5 mG/acetaminophen 325 mG 2 Tablet(s) Oral every 6 hours PRN  traMADol 50 milliGRAM(s) Oral every 6 hours PRN  traZODone 150 milliGRAM(s) Oral at bedtime    Anticoagulation:  enoxaparin Injectable 40 milliGRAM(s) SubCutaneous at bedtime    OTHER:  aluminum hydroxide/magnesium hydroxide/simethicone Suspension 30 milliLiter(s) Oral every 12 hours PRN  bisacodyl Suppository 10 milliGRAM(s) Rectal daily PRN  dexamethasone     Tablet   Oral   dexamethasone     Tablet 2 milliGRAM(s) Oral every 8 hours  dextrose 40% Gel 15 Gram(s) Oral once PRN  dextrose 50% Injectable 12.5 Gram(s) IV Push once  dextrose 50% Injectable 25 Gram(s) IV Push once  dextrose 50% Injectable 25 Gram(s) IV Push once  docusate sodium 100 milliGRAM(s) Oral three times a day  famotidine    Tablet 20 milliGRAM(s) Oral every 12 hours PRN  glucagon  Injectable 1 milliGRAM(s) IntraMuscular once PRN  naloxone Injectable 0.1 milliGRAM(s) IV Push every 3 minutes PRN  senna 2 Tablet(s) Oral at bedtime PRN    IVF:  dextrose 5%. 1000 milliLiter(s) IV Continuous <Continuous>    CULTURES:    RADIOLOGY & ADDITIONAL TESTS:      ASSESSMENT:    33y Female s/p thoracolumbar laminectomy for resection of intramedullar spinal tunmor    PLAN:    NEURO:    Monitor neuro status  OT/PT continue Pain Management  Bowel regime  Continue current medical regime    Dispo: Discussed with attending      Heart Failure: []Acute, [] acute on chronic , []chronic  Heart Failure:  [] Diastolic (HFpEF), [] Systolic (HFrEF), []Combined (HFpEF and HFrEF), [] RHF, [] Pulm HTN, [] Other    [] LEIGH, [] ATN, [] AIN, [] other  [] CKD1, [] CKD2, [] CKD 3, [] CKD 4, [] CKD 5, []ESRD    Encephalopathy: [] Metabolic, [] Hepatic, [] toxic, [] Neurological, [] Other    Abnormal Nurtitional Status: [] malnurtition (see nutrition note), [ ]underweight: BMI < 19, [] morbid obesity: BMI >40, [] Cachexia    [] Sepsis  [] hypovolemic shock,[] cardiogenic shock, [] hemorrhagic shock, [] neuogenic shock  [] Acute Respiratory Failure  []Cerebral edema, [] Brain compression/ herniation,   [] Functional quadriplegia  [] Acute blood loss anemia
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 Bernabe in neurosurgical consultation for spinal mass at [facility].     Since her initial surgery, the patient reports severe back pain radiating to RIGHT leg with associated numbness/tingling. She reports difficulty walking and uses cane to ambulate for assistance. Her right leg becomes weak, numb, and gives out. Reports imbalance. She states that these symptoms have become progressively worse over the years. She also has bowel/bladder incontinence and chronic constipation since initial surgery 6 years ago.     She has been doing physical therapy with minimal relief of symptoms. (19 Jun 2019 16:01)      Overnight Events:  Denies HA/N/V/Dizziness.      PHYSICAL EXAMINATION  T(C): 36.9 (06-21 @ 06:00), Max: 36.9 (06-21 @ 01:59)  HR: 60 (06-21 @ 07:00) (54 - 98)  BP: 112/63 (06-20 @ 17:30) (112/63 - 112/63)  RR: 20 (06-21 @ 07:00) (8 - 24)  SpO2: 97% (06-21 @ 07:00) (97% - 100%)  CVP(mm Hg): --    LABS:  CAPILLARY BLOOD GLUCOSE      POCT Blood Glucose.: 123 mg/dL (21 Jun 2019 06:13)  POCT Blood Glucose.: 156 mg/dL (20 Jun 2019 21:57)                          12.0   18.24 )-----------( 242      ( 21 Jun 2019 04:53 )             36.8     06-21    140  |  108  |  5<L>  ----------------------------<  127<H>  4.5   |  20<L>  |  0.41<L>    Ca    9.2      21 Jun 2019 04:53  Phos  3.5     06-21  Mg     2.0     06-21 06-20 @ 07:01  -  06-21 @ 07:00  --------------------------------------------------------  IN: 300 mL / OUT: 1460 mL / NET: -1160 mL        MEDICATIONS:  MEDICATIONS  (STANDING):  acetaminophen  IVPB .. 1000 milliGRAM(s) IV Intermittent once  buPROPion XL . 300 milliGRAM(s) Oral daily  cefepime   IVPB 1000 milliGRAM(s) IV Intermittent every 12 hours  dexamethasone  Injectable 6 milliGRAM(s) IV Push every 6 hours  dextrose 5%. 1000 milliLiter(s) (50 mL/Hr) IV Continuous <Continuous>  dextrose 50% Injectable 12.5 Gram(s) IV Push once  dextrose 50% Injectable 25 Gram(s) IV Push once  dextrose 50% Injectable 25 Gram(s) IV Push once  diazepam    Tablet 5 milliGRAM(s) Oral every 8 hours  docusate sodium 100 milliGRAM(s) Oral three times a day  gabapentin 300 milliGRAM(s) Oral three times a day  HYDROmorphone  Injectable 0.5 milliGRAM(s) IV Push once  HYDROmorphone PCA (1 mG/mL) 30 milliLiter(s) PCA Continuous PCA Continuous  insulin lispro (HumaLOG) corrective regimen sliding scale   SubCutaneous three times a day before meals  insulin lispro (HumaLOG) corrective regimen sliding scale   SubCutaneous at bedtime  lactated ringers. 1000 milliLiter(s) (75 mL/Hr) IV Continuous <Continuous>  traZODone 150 milliGRAM(s) Oral at bedtime    MEDICATIONS  (PRN):  aluminum hydroxide/magnesium hydroxide/simethicone Suspension 30 milliLiter(s) Oral every 12 hours PRN Indigestion  dextrose 40% Gel 15 Gram(s) Oral once PRN Blood Glucose LESS THAN 70 milliGRAM(s)/deciliter  famotidine    Tablet 20 milliGRAM(s) Oral every 12 hours PRN Dyspepsia  glucagon  Injectable 1 milliGRAM(s) IntraMuscular once PRN Glucose LESS THAN 70 milligrams/deciliter  naloxone Injectable 0.1 milliGRAM(s) IV Push every 3 minutes PRN For ANY of the following changes in patient status:  A. RR LESS THAN 10 breaths per minute, B. Oxygen saturation LESS THAN 90%, C. Sedation score of 6  ondansetron Injectable 4 milliGRAM(s) IV Push every 6 hours PRN Nausea  senna 2 Tablet(s) Oral at bedtime PRN Constipation
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  She notes intermittent swelling over the past few months, however states this is worse.  No trauma, no fever or chills.  + Difficulty ambulating 2/2 pain.  On my face to face ED eval, patient is non toxic.    Patient is being followed by Dr. Bernabe, she came today for outpatient CT scan. Due to R.foot pain and edema, she was sent to ED.  Of note, patient at home self-catheterizes for intermittent urinary retention (2019 18:22)    OVERNIGHT EVENTS: JO overnight.     JO overnight. Plan for discharge home today after medical clearance. Plan to return for elective surgery .       Vital Signs Last 24 Hrs  T(C): 36.8 (10 Irwin 2019 05:30), Max: 36.8 (2019 16:56)  T(F): 98.2 (10 Irwin 2019 05:30), Max: 98.3 (2019 20:57)  HR: 59 (10 Irwin 2019 05:30) (59 - 74)  BP: 110/75 (10 Irwin 2019 05:30) (92/62 - 118/81)  BP(mean): --  RR: 17 (10 Irwin 2019 05:30) (14 - 17)  SpO2: 99% (10 Irwin 2019 05:30) (96% - 99%)    I&O's Summary    2019 07:01  -  10 Irwin 2019 06:13  --------------------------------------------------------  IN: 1300 mL / OUT: 1310 mL / NET: -10 mL        PHYSICAL EXAM:  Neurological: AOx3, NAD, FC, speech coherent   CN II-XII grossly intact   Motor: MAEx4, RLE distal movement limited due to pain, +right leg/foot swelling, o/w 5/5 strength throughout  SILT throughout     TUBES/LINES:  [] Michel  [] Lumbar Drain  [] Wound Drains  [] Others      DIET:  [] NPO  [] Mechanical  [] Tube feeds    LABS:                        13.1   12.33 )-----------( 366      ( 2019 14:01 )             40.3     -08    x   |  x   |  x   ----------------------------<  x   4.7   |  x   |  x     Ca    9.6      2019 14:01    TPro  7.6  /  Alb  4.3  /  TBili  0.6  /  DBili  x   /  AST  32  /  ALT  25  /  AlkPhos  62  06-08    PT/INR - ( 2019 14:01 )   PT: 11.2 sec;   INR: 0.99          PTT - ( 2019 14:01 )  PTT:28.2 sec  Urinalysis Basic - ( 2019 15:32 )    Color: Yellow / Appearance: Clear / S.025 / pH: x  Gluc: x / Ketone: 15 mg/dL  / Bili: Negative / Urobili: 0.2 E.U./dL   Blood: x / Protein: NEGATIVE mg/dL / Nitrite: NEGATIVE   Leuk Esterase: NEGATIVE / RBC: < 5 /HPF / WBC 5-10 /HPF   Sq Epi: x / Non Sq Epi: 0-5 /HPF / Bacteria: Present /HPF          CAPILLARY BLOOD GLUCOSE          Drug Levels: [] N/A    CSF Analysis: [] N/A      Allergies    No Known Drug Allergies  shellfish (Rash)    Intolerances      MEDICATIONS:  Antibiotics:    Neuro:  acetaminophen   Tablet .. 650 milliGRAM(s) Oral every 6 hours PRN  ondansetron Injectable 4 milliGRAM(s) IV Push every 8 hours PRN  oxyCODONE    5 mG/acetaminophen 325 mG 1 Tablet(s) Oral every 4 hours PRN  oxyCODONE    5 mG/acetaminophen 325 mG 2 Tablet(s) Oral every 6 hours PRN    Anticoagulation:  enoxaparin Injectable 40 milliGRAM(s) SubCutaneous at bedtime    OTHER:  docusate sodium 100 milliGRAM(s) Oral three times a day  senna 2 Tablet(s) Oral at bedtime PRN    IVF:    CULTURES:  Culture Results:   No growth at 1 day. ( @ 17:22)  Culture Results:   No growth at 1 day. ( @ 17:21)    RADIOLOGY & ADDITIONAL TESTS:
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 Bernabe in neurosurgical consultation for spinal mass at [facility].     Since her initial surgery, the patient reports severe back pain radiating to RIGHT leg with associated numbness/tingling. She reports difficulty walking and uses cane to ambulate for assistance. Her right leg becomes weak, numb, and gives out. Reports imbalance. She states that these symptoms have become progressively worse over the years. She also has bowel/bladder incontinence and chronic constipation since initial surgery 6 years ago.     She has been doing physical therapy with minimal relief of symptoms. (19 Jun 2019 16:01)      Hospital Course:   6/21 POD #0 s/p thoracolumbar laminectomy for resection of intramedullary spinal tumor. JO overnight, neuro stable. Pain well controlled. Flat until Saturday. PCA for pain.   6/22: POD#1: JO overnight. Transferred to telemetry bed. HOB flat until today- able to get OOB and work with PT/OT. HMV in place- f/u output        Vital Signs Last 24 Hrs  T(C): 36.6 (22 Jun 2019 00:19), Max: 37.1 (21 Jun 2019 20:39)  T(F): 97.8 (22 Jun 2019 00:19), Max: 98.7 (21 Jun 2019 20:39)  HR: 85 (22 Jun 2019 00:19) (54 - 85)  BP: 94/52 (22 Jun 2019 00:19) (80/47 - 106/63)  BP(mean): 70 (21 Jun 2019 13:00) (59 - 74)  RR: 18 (22 Jun 2019 00:19) (14 - 22)  SpO2: 97% (22 Jun 2019 00:19) (96% - 99%)    I&O's Summary    20 Jun 2019 07:01  -  21 Jun 2019 07:00  --------------------------------------------------------  IN: 300 mL / OUT: 1460 mL / NET: -1160 mL    21 Jun 2019 07:01  -  22 Jun 2019 00:27  --------------------------------------------------------  IN: 1805 mL / OUT: 1890 mL / NET: -85 mL        PHYSICAL EXAM:  Neurological: AOx3, NAD, FC, speech coherent   CN II-XII grossly intact   Motor: MAEx4, R EHL 4/5, o/w 5/5 UE and LE B/L, no drift  SILT throughout  Incision/wound: back incision clean, dry and intact   +HMV drain in place      TUBES/LINES:  [] Michel  [] A-line  [] Lumbar Drain  [x] Wound Drains  [] NGT   [] EVD   [] CVC  [] Other      DIET:  [] NPO  [x] Mechanical  [] Tube feeds    LABS:                        12.0   18.24 )-----------( 242      ( 21 Jun 2019 04:53 )             36.8     06-21    140  |  108  |  5<L>  ----------------------------<  127<H>  4.5   |  20<L>  |  0.41<L>    Ca    9.2      21 Jun 2019 04:53  Phos  3.5     06-21  Mg     2.0     06-21              CAPILLARY BLOOD GLUCOSE      POCT Blood Glucose.: 112 mg/dL (21 Jun 2019 21:48)  POCT Blood Glucose.: 120 mg/dL (21 Jun 2019 17:21)  POCT Blood Glucose.: 144 mg/dL (21 Jun 2019 12:12)  POCT Blood Glucose.: 123 mg/dL (21 Jun 2019 06:13)      Drug Levels: [] N/A    CSF Analysis: [] N/A      Allergies    No Known Drug Allergies  shellfish (Rash)    Intolerances        Home Medications:  buPROPion 300 mg/24 hours (XL) oral tablet, extended release: 1 tab(s) orally every 24 hours (20 Jun 2019 06:58)  docusate sodium 100 mg oral capsule: 1 cap(s) orally 3 times a day (20 Jun 2019 06:58)  gabapentin 300 mg oral capsule: 1 cap(s) orally 3 times a day (20 Jun 2019 06:58)  oxycodone-acetaminophen 5 mg-325 mg oral tablet: 2 tab(s) orally every 6 hours, As needed, Severe Pain (7 - 10) (20 Jun 2019 06:58)  senna oral tablet: 2 tab(s) orally once a day (at bedtime), As needed, Constipation (20 Jun 2019 06:58)  traZODone 150 mg oral tablet: 1 tab(s) orally once a day (at bedtime) (20 Jun 2019 06:58)      MEDICATIONS:  MEDICATIONS  (STANDING):  acetaminophen  IVPB .. 1000 milliGRAM(s) IV Intermittent once  buPROPion XL . 300 milliGRAM(s) Oral daily  dexamethasone  Injectable   IV Push   dexamethasone  Injectable 4 milliGRAM(s) IV Push every 6 hours  dextrose 5%. 1000 milliLiter(s) (50 mL/Hr) IV Continuous <Continuous>  dextrose 50% Injectable 12.5 Gram(s) IV Push once  dextrose 50% Injectable 25 Gram(s) IV Push once  dextrose 50% Injectable 25 Gram(s) IV Push once  docusate sodium 100 milliGRAM(s) Oral three times a day  gabapentin 300 milliGRAM(s) Oral three times a day  HYDROmorphone  Injectable 0.5 milliGRAM(s) IV Push once  HYDROmorphone PCA (1 mG/mL) 30 milliLiter(s) PCA Continuous PCA Continuous  insulin lispro (HumaLOG) corrective regimen sliding scale   SubCutaneous three times a day before meals  insulin lispro (HumaLOG) corrective regimen sliding scale   SubCutaneous at bedtime  traZODone 150 milliGRAM(s) Oral at bedtime    MEDICATIONS  (PRN):  aluminum hydroxide/magnesium hydroxide/simethicone Suspension 30 milliLiter(s) Oral every 12 hours PRN Indigestion  dextrose 40% Gel 15 Gram(s) Oral once PRN Blood Glucose LESS THAN 70 milliGRAM(s)/deciliter  famotidine    Tablet 20 milliGRAM(s) Oral every 12 hours PRN Dyspepsia  glucagon  Injectable 1 milliGRAM(s) IntraMuscular once PRN Glucose LESS THAN 70 milligrams/deciliter  naloxone Injectable 0.1 milliGRAM(s) IV Push every 3 minutes PRN For ANY of the following changes in patient status:  A. RR LESS THAN 10 breaths per minute, B. Oxygen saturation LESS THAN 90%, C. Sedation score of 6  ondansetron Injectable 4 milliGRAM(s) IV Push every 6 hours PRN Nausea  senna 2 Tablet(s) Oral at bedtime PRN Constipation      CULTURES:  Culture Results:   No growth at 5 days. (06-08 @ 17:22)  Culture Results:   No growth at 5 days. (06-08 @ 17:21)      RADIOLOGY & ADDITIONAL TESTS:      ASSESSMENT:  33y Female s/p thoracolumbar laminectomy for resection of intramedullary spinal tumor POD#2      PLAN:  -neuro/spinal checks  -pain control prn  -HOB to be elevated today  -continue decadron taper  -HMV - monitor output  -MRI prior to discharge  -SBP normotensive  -room air, satting well  -regular diet  -bowel regimen  -GI/DVT ppx  -OOB/PT/OT  -d/w Dr. Bernabe       Assessment: present when checked     [] GCS   E   V   M     Heart Failure: [] Acute, [] acute on chronic, [] chronic   Heart Failure: [] Diastolic (HFpEF), [] Systolic (HRrEF), [] Combined (HFpEF and HFrEF), [] RHF, [] Pulm HTN, [] Other     [] LEIGH, [] ATN, [] AIN, [] other   [] CKD1, [] CKD2, [] CKD3, [] CKD4, [] CKD5, [] ESRD     Encephalopathy: [] Metabolic, [] Hepatic, [] Toxic, [] Neurological, [] Other     Abnormal Nutritional Status: [] malnutrition (see nutrition note), []underweight: BMI <19, [] morbid obesity: BMI >40, [] Cachexia     [] Sepsis   [] Hypovolemic shock, [] Cardiogenic shock, [] Hemorrhagic shock, [] Neurogenic shock   [] Acute respiratory failure   [] Cerebral edema, [] Brain compression / herniation   [] Functional quadriplegia   [] Acute blood loss anemia
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 Bernabe in neurosurgical consultation for spinal mass at [facility].     Since her initial surgery, the patient reports severe back pain radiating to RIGHT leg with associated numbness/tingling. She reports difficulty walking and uses cane to ambulate for assistance. Her right leg becomes weak, numb, and gives out. Reports imbalance. She states that these symptoms have become progressively worse over the years. She also has bowel/bladder incontinence and chronic constipation since initial surgery 6 years ago.     She has been doing physical therapy with minimal relief of symptoms. (19 Jun 2019 16:01)    OVERNIGHT EVENTS: JO overnight, neuro stable    Hospital Course:   6/21 POD #0 s/p thoracolumbar laminectomy for resection of intramedullary spinal tumor. JO overnight, neuro stable. Pain well controlled. Flat until Saturday. PCA for pain.   6/22: POD#1: JO overnight. Transferred to telemetry bed. HOB flat until today- able to get OOB and work with PT/OT. HMV in place- f/u output  6/23: POD#2: JO overnight. Pain controlled. Post op MRI T/L spine performed. OOB with PT/OT yesterday. HMV drain remains in place  6/24 POD#3 JO overnight, PT- no needs, MRI T-L spine completed, drain removed 6/23, SQL, Neuro exam improving  6/25: uneventfu night  6/26: JO overnight, neuro stable. patient refusing stronger bowel regimen for constipation.     Vital Signs Last 24 Hrs  T(C): 37.2 (26 Jun 2019 09:32), Max: 37.2 (26 Jun 2019 09:32)  T(F): 98.9 (26 Jun 2019 09:32), Max: 98.9 (26 Jun 2019 09:32)  HR: 71 (26 Jun 2019 09:32) (66 - 91)  BP: 99/64 (26 Jun 2019 09:32) (87/55 - 119/77)  BP(mean): --  RR: 18 (26 Jun 2019 09:32) (16 - 18)  SpO2: 97% (26 Jun 2019 09:32) (95% - 98%)    I&O's Summary    25 Jun 2019 07:01  -  26 Jun 2019 07:00  --------------------------------------------------------  IN: 1080 mL / OUT: 1550 mL / NET: -470 mL        PHYSICAL EXAM:  Gen: laying in hospital bed, NAD  Neurological: AA+Ox3, OE spont, FC  CN II-XII: PERRL, EOMI  Motor exam: MAEx4, 5/5 strength throughout  SILT in UE and LE b/l  Cardiovascular: regular rate and rhythm  Respiratory: clear to auscultation  Incision/Wound: back incision C/D/I    TUBES/LINES:  [] Michel  [] Lumbar Drain  [] Wound Drains  [] Others      DIET:  [] NPO  [x] Mechanical  [] Tube feeds    LABS:                        13.8   12.55 )-----------( 285      ( 26 Jun 2019 06:40 )             40.9     06-26    136  |  98  |  18  ----------------------------<  92  4.2   |  27  |  0.59    Ca    9.2      26 Jun 2019 06:40              CAPILLARY BLOOD GLUCOSE      POCT Blood Glucose.: 101 mg/dL (26 Jun 2019 11:43)  POCT Blood Glucose.: 88 mg/dL (26 Jun 2019 06:34)  POCT Blood Glucose.: 99 mg/dL (25 Jun 2019 21:49)  POCT Blood Glucose.: 106 mg/dL (25 Jun 2019 17:33)      Drug Levels: [] N/A    CSF Analysis: [] N/A      Allergies    No Known Drug Allergies  shellfish (Rash)    Intolerances      MEDICATIONS:  Antibiotics:    Neuro:  buPROPion XL . 300 milliGRAM(s) Oral daily  gabapentin 300 milliGRAM(s) Oral three times a day  ondansetron Injectable 4 milliGRAM(s) IV Push every 6 hours PRN  oxyCODONE    5 mG/acetaminophen 325 mG 1 Tablet(s) Oral every 4 hours PRN  oxyCODONE    5 mG/acetaminophen 325 mG 2 Tablet(s) Oral every 6 hours PRN  traZODone 150 milliGRAM(s) Oral at bedtime    Anticoagulation:  enoxaparin Injectable 40 milliGRAM(s) SubCutaneous at bedtime    OTHER:  aluminum hydroxide/magnesium hydroxide/simethicone Suspension 30 milliLiter(s) Oral every 12 hours PRN  bisacodyl 5 milliGRAM(s) Oral every 12 hours PRN  dexamethasone  Injectable   IV Push   dexamethasone  Injectable 2 milliGRAM(s) IV Push every 8 hours  dextrose 40% Gel 15 Gram(s) Oral once PRN  dextrose 50% Injectable 12.5 Gram(s) IV Push once  dextrose 50% Injectable 25 Gram(s) IV Push once  dextrose 50% Injectable 25 Gram(s) IV Push once  docusate sodium 100 milliGRAM(s) Oral three times a day  famotidine    Tablet 20 milliGRAM(s) Oral every 12 hours PRN  glucagon  Injectable 1 milliGRAM(s) IntraMuscular once PRN  insulin lispro (HumaLOG) corrective regimen sliding scale   SubCutaneous three times a day before meals  insulin lispro (HumaLOG) corrective regimen sliding scale   SubCutaneous at bedtime  naloxone Injectable 0.1 milliGRAM(s) IV Push every 3 minutes PRN  senna 2 Tablet(s) Oral at bedtime PRN    IVF:  dextrose 5%. 1000 milliLiter(s) IV Continuous <Continuous>    CULTURES:    RADIOLOGY & ADDITIONAL TESTS:      ASSESSMENT:  33y Female s/p thoracolumbar laminectomy for resection of intramedullar spinal tunmor    PLAN:  - neuro checks  - vitals checks   - pain control  - Decadron taper   - regular diet  - bowel regimen  - DVT PROPHYLAXIS: [x] Venodynes   [x] Heparin/Lovenox  - PT/OT/OOB    DISPOSITION: regional, full code, dispo pending    Assessment:  Present when checked    []  GCS  E   V  M     Heart Failure: []Acute, [] acute on chronic , []chronic  Heart Failure:  [] Diastolic (HFpEF), [] Systolic (HFrEF), []Combined (HFpEF and HFrEF), [] RHF, [] Pulm HTN, [] Other    [] LEIGH, [] ATN, [] AIN, [] other  [] CKD1, [] CKD2, [] CKD 3, [] CKD 4, [] CKD 5, []ESRD    Encephalopathy: [] Metabolic, [] Hepatic, [] toxic, [] Neurological, [] Other    Abnormal Nurtitional Status: [] malnurtition (see nutrition note), [ ]underweight: BMI < 19, [] morbid obesity: BMI >40, [] Cachexia    [] Sepsis  [] hypovolemic shock,[] cardiogenic shock, [] hemorrhagic shock, [] neuogenic shock  [] Acute Respiratory Failure  []Cerebral edema, [] Brain compression/ herniation,   [] Functional quadriplegia  [] Acute blood loss anemia
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 Bernabe in neurosurgical consultation for spinal mass at [facility].     Since her initial surgery, the patient reports severe back pain radiating to RIGHT leg with associated numbness/tingling. She reports difficulty walking and uses cane to ambulate for assistance. Her right leg becomes weak, numb, and gives out. Reports imbalance. She states that these symptoms have become progressively worse over the years. She also has bowel/bladder incontinence and chronic constipation since initial surgery 6 years ago.     She has been doing physical therapy with minimal relief of symptoms. (19 Jun 2019 16:01)    OVERNIGHT EVENTS:  Vital Signs Last 24 Hrs  T(C): 36.7 (28 Jun 2019 05:20), Max: 37.1 (27 Jun 2019 14:04)  T(F): 98 (28 Jun 2019 05:20), Max: 98.7 (27 Jun 2019 14:04)  HR: 69 (28 Jun 2019 05:20) (69 - 84)  BP: 74/52 (28 Jun 2019 07:04) (74/52 - 98/66)  BP(mean): --  RR: 15 (28 Jun 2019 05:20) (15 - 17)  SpO2: 99% (28 Jun 2019 05:20) (93% - 99%)    I&O's Summary    27 Jun 2019 07:01  -  28 Jun 2019 07:00  --------------------------------------------------------  IN: 480 mL / OUT: 450 mL / NET: 30 mL      Hospital Course:   6/21 POD #0 s/p thoracolumbar laminectomy for resection of intramedullary spinal tumor. JO overnight, neuro stable. Pain well controlled. Flat until Saturday. PCA for pain.   6/22: POD#1: JO overnight. Transferred to telemetry bed. HOB flat until today- able to get OOB and work with PT/OT. HMV in place- f/u output  6/23: POD#2: JO overnight. Pain controlled. Post op MRI T/L spine performed. OOB with PT/OT yesterday. HMV drain remains in place  6/24 POD#3 JO overnight, PT- no needs, MRI T-L spine completed, drain removed 6/23, SQL, Neuro exam improving  6/25: uneventfu night  6/26: JO overnight, neuro stable. patient refusing stronger bowel regimen for constipation.   6/27 uneventful night  Dispo pending  Hospital Course:   6/21 POD #0 s/p thoracolumbar laminectomy for resection of intramedullary spinal tumor. JO overnight, neuro stable. Pain well controlled. Flat until Saturday. PCA for pain.   6/22: POD#1: JO overnight. Transferred to telemetry bed. HOB flat until today- able to get OOB and work with PT/OT. HMV in place- f/u output  6/23: POD#2: JO overnight. Pain controlled. Post op MRI T/L spine performed. OOB with PT/OT yesterday. HMV drain remains in place  6/24 POD#3 JO overnight, PT- no needs, MRI T-L spine completed, drain removed 6/23, SQL, Neuro exam improving  6/25: uneventfu night  6/26: JO overnight, neuro stable. patient refusing stronger bowel regimen for constipation.   6/27 uneventful night  Dispo pending  6/28 uneventful night   One episode early this morning of hypotension  asymptomatic  IV Bolus given X1       PHYSICAL EXAM:  Neurological:  A&OX3 Cranial nerves intact  CORREIA  Lami incision CDI no collection no drainage       Cardiovascular: RRR  Respiratory: Lungs CTAB  Gastrointestinal: +BS  Genitourinary: Voiding without difficulty  Extremities: warm and dry  Incision/Wound:  CDI        DIET: Regular    LABS:                        12.4   12.91 )-----------( 254      ( 28 Jun 2019 05:49 )             38.0     06-28    137  |  99  |  21  ----------------------------<  92  4.1   |  29  |  0.58    Ca    9.1      28 Jun 2019 05:49      CAPILLARY BLOOD GLUCOSE      Drug Levels: [] N/A    CSF Analysis: [] N/A      Allergies    No Known Drug Allergies  shellfish (Rash)    Intolerances      MEDICATIONS:  Antibiotics:    Neuro:  buPROPion XL . 300 milliGRAM(s) Oral daily  gabapentin 300 milliGRAM(s) Oral three times a day  metoclopramide Injectable 10 milliGRAM(s) IV Push every 6 hours PRN  ondansetron Injectable 4 milliGRAM(s) IV Push every 6 hours PRN  oxyCODONE    5 mG/acetaminophen 325 mG 1 Tablet(s) Oral every 4 hours PRN  oxyCODONE    5 mG/acetaminophen 325 mG 2 Tablet(s) Oral every 6 hours PRN  traMADol 50 milliGRAM(s) Oral every 6 hours PRN  traZODone 150 milliGRAM(s) Oral at bedtime    Anticoagulation:  enoxaparin Injectable 40 milliGRAM(s) SubCutaneous at bedtime    OTHER:  aluminum hydroxide/magnesium hydroxide/simethicone Suspension 30 milliLiter(s) Oral every 12 hours PRN  bisacodyl Suppository 10 milliGRAM(s) Rectal daily PRN  dexamethasone     Tablet   Oral   dexamethasone     Tablet 2 milliGRAM(s) Oral every 12 hours  dextrose 40% Gel 15 Gram(s) Oral once PRN  dextrose 50% Injectable 12.5 Gram(s) IV Push once  dextrose 50% Injectable 25 Gram(s) IV Push once  dextrose 50% Injectable 25 Gram(s) IV Push once  docusate sodium 100 milliGRAM(s) Oral three times a day  famotidine    Tablet 20 milliGRAM(s) Oral every 12 hours PRN  glucagon  Injectable 1 milliGRAM(s) IntraMuscular once PRN  naloxone Injectable 0.1 milliGRAM(s) IV Push every 3 minutes PRN  senna 2 Tablet(s) Oral at bedtime PRN    IVF:  dextrose 5%. 1000 milliLiter(s) IV Continuous <Continuous>    CULTURES:    RADIOLOGY & ADDITIONAL TESTS:      ASSESSMENT:    33y Female s/p thoracolumbar laminectomy for resection of intramedullar spinal tunmor    PLAN:    NEURO:    Monitor neuro status  OT/PT continue Pain Management  Bowel regime  Continue current medical regime    Dispo: Discussed with attending        DVT PROPHYLAXIS:  [x] Venodynes                                [] Heparin/Lovenox    FALL RISK:  [] Low Risk                                    [] Impulsive  Assessment:  Present when checked    []  GCS  E   V  M     Heart Failure: []Acute, [] acute on chronic , []chronic  Heart Failure:  [] Diastolic (HFpEF), [] Systolic (HFrEF), []Combined (HFpEF and HFrEF), [] RHF, [] Pulm HTN, [] Other    [] LEIGH, [] ATN, [] AIN, [] other  [] CKD1, [] CKD2, [] CKD 3, [] CKD 4, [] CKD 5, []ESRD    Encephalopathy: [] Metabolic, [] Hepatic, [] toxic, [] Neurological, [] Other    Abnormal Nurtitional Status: [] malnurtition (see nutrition note), [ ]underweight: BMI < 19, [] morbid obesity: BMI >40, [] Cachexia    [] Sepsis  [] hypovolemic shock,[] cardiogenic shock, [] hemorrhagic shock, [] neuogenic shock  [] Acute Respiratory Failure  []Cerebral edema, [] Brain compression/ herniation,   [] Functional quadriplegia  [] Acute blood loss anemia
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 Bernabe in neurosurgical consultation for spinal mass at [facility].     Since her initial surgery, the patient reports severe back pain radiating to RIGHT leg with associated numbness/tingling. She reports difficulty walking and uses cane to ambulate for assistance. Her right leg becomes weak, numb, and gives out. Reports imbalance. She states that these symptoms have become progressively worse over the years. She also has bowel/bladder incontinence and chronic constipation since initial surgery 6 years ago.     She has been doing physical therapy with minimal relief of symptoms. (19 Jun 2019 16:01)      Hospital Course:   6/21 POD #0 s/p thoracolumbar laminectomy for resection of intramedullary spinal tumor. JO overnight, neuro stable. Pain well controlled. Flat until Saturday. PCA for pain.   6/22: POD#1: JO overnight. Transferred to telemetry bed. HOB flat until today- able to get OOB and work with PT/OT. HMV in place- f/u output  6/23: POD#2: JO overnight. Pain controlled. Post op MRI T/L spine performed. OOB with PT/OT yesterday. HMV drain remains in place  6/24 POD#3 JO overnight, PT- no needs, MRI T-L spine completed, drain removed 6/23, SQL, Neuro exam improving    ICU Vital Signs Last 24 Hrs  T(C): 36.8 (24 Jun 2019 08:28), Max: 36.8 (23 Jun 2019 16:03)  T(F): 98.3 (24 Jun 2019 08:28), Max: 98.3 (23 Jun 2019 16:03)  HR: 69 (24 Jun 2019 08:28) (60 - 107)  BP: 99/63 (24 Jun 2019 08:28) (91/95 - 100/56)  BP(mean): --  ABP: --  ABP(mean): --  RR: 17 (24 Jun 2019 08:28) (15 - 17)  SpO2: 97% (24 Jun 2019 08:28) (97% - 98%)        PHYSICAL EXAM:  Neurological: AOx3, NAD, FC, speech coherent   CN II-XII grossly intact   Motor: MAEx4, strength 5/5 UE and LE B/L, no drift  SILT throughout  Incision/wound: back incision clean, dry and intact   +HMV drain in place      TUBES/LINES:  [] Michel  [] A-line  [] Lumbar Drain  [x] Wound Drains  [] NGT   [] EVD   [] CVC  [] Other      DIET:  [] NPO  [x] Mechanical  [] Tube feeds    LABS:                            13.2   11.45 )-----------( 248      ( 24 Jun 2019 07:46 )             39.9   06-24    137  |  100  |  13  ----------------------------<  102<H>  4.8   |  25  |  0.51    Ca    9.1      24 Jun 2019 07:46  Phos  3.7     06-24  Mg     2.0     06-24    TPro  6.5  /  Alb  3.6  /  TBili  0.3  /  DBili  x   /  AST  11  /  ALT  13  /  AlkPhos  63  06-23      CARDIAC MARKERS ( 22 Jun 2019 10:07 )  x     / <0.01 ng/mL / 119 U/L / x     / 1.8 ng/mL      CAPILLARY BLOOD GLUCOSE      POCT Blood Glucose.: 111 mg/dL (23 Jun 2019 07:05)  POCT Blood Glucose.: 153 mg/dL (22 Jun 2019 21:44)  POCT Blood Glucose.: 118 mg/dL (22 Jun 2019 17:34)  POCT Blood Glucose.: 116 mg/dL (22 Jun 2019 11:53)      Drug Levels: [] N/A    CSF Analysis: [] N/A      Allergies    No Known Drug Allergies  shellfish (Rash)    Intolerances        Home Medications:  buPROPion 300 mg/24 hours (XL) oral tablet, extended release: 1 tab(s) orally every 24 hours (20 Jun 2019 06:58)  docusate sodium 100 mg oral capsule: 1 cap(s) orally 3 times a day (20 Jun 2019 06:58)  gabapentin 300 mg oral capsule: 1 cap(s) orally 3 times a day (20 Jun 2019 06:58)  oxycodone-acetaminophen 5 mg-325 mg oral tablet: 2 tab(s) orally every 6 hours, As needed, Severe Pain (7 - 10) (20 Jun 2019 06:58)  senna oral tablet: 2 tab(s) orally once a day (at bedtime), As needed, Constipation (20 Jun 2019 06:58)  traZODone 150 mg oral tablet: 1 tab(s) orally once a day (at bedtime) (20 Jun 2019 06:58)      MEDICATIONS:  MEDICATIONS  (STANDING):  buPROPion XL . 300 milliGRAM(s) Oral daily  dexamethasone  Injectable   IV Push   dexamethasone  Injectable 4 milliGRAM(s) IV Push every 6 hours  dexamethasone  Injectable 4 milliGRAM(s) IV Push every 8 hours  dextrose 5%. 1000 milliLiter(s) (50 mL/Hr) IV Continuous <Continuous>  dextrose 50% Injectable 12.5 Gram(s) IV Push once  dextrose 50% Injectable 25 Gram(s) IV Push once  dextrose 50% Injectable 25 Gram(s) IV Push once  docusate sodium 100 milliGRAM(s) Oral three times a day  gabapentin 300 milliGRAM(s) Oral three times a day  insulin lispro (HumaLOG) corrective regimen sliding scale   SubCutaneous three times a day before meals  insulin lispro (HumaLOG) corrective regimen sliding scale   SubCutaneous at bedtime  traZODone 150 milliGRAM(s) Oral at bedtime    MEDICATIONS  (PRN):  aluminum hydroxide/magnesium hydroxide/simethicone Suspension 30 milliLiter(s) Oral every 12 hours PRN Indigestion  dextrose 40% Gel 15 Gram(s) Oral once PRN Blood Glucose LESS THAN 70 milliGRAM(s)/deciliter  famotidine    Tablet 20 milliGRAM(s) Oral every 12 hours PRN Dyspepsia  glucagon  Injectable 1 milliGRAM(s) IntraMuscular once PRN Glucose LESS THAN 70 milligrams/deciliter  naloxone Injectable 0.1 milliGRAM(s) IV Push every 3 minutes PRN For ANY of the following changes in patient status:  A. RR LESS THAN 10 breaths per minute, B. Oxygen saturation LESS THAN 90%, C. Sedation score of 6  ondansetron Injectable 4 milliGRAM(s) IV Push every 6 hours PRN Nausea  oxyCODONE    5 mG/acetaminophen 325 mG 1 Tablet(s) Oral every 4 hours PRN Moderate Pain (4 - 6)  oxyCODONE    5 mG/acetaminophen 325 mG 2 Tablet(s) Oral every 6 hours PRN Severe Pain (7 - 10)  senna 2 Tablet(s) Oral at bedtime PRN Constipation      CULTURES:      RADIOLOGY & ADDITIONAL TESTS:      ASSESSMENT:  33y Female s/p thoracolumbar laminectomy for resection of intramedullary spinal tumor POD#5      PLAN:  -neuro/spinal checks  -pain control prn  -continue decadron taper  -HMV removed 6/23  -MRI T/L spine - performed  -SBP normotensive  -room air, satting well  -regular diet  -bowel regimen  -GI/DVT ppx  -PT eval = no needs  -d/w Dr. Bernabe       Assessment: present when checked     [] GCS   E   V   M     Heart Failure: [] Acute, [] acute on chronic, [] chronic   Heart Failure: [] Diastolic (HFpEF), [] Systolic (HRrEF), [] Combined (HFpEF and HFrEF), [] RHF, [] Pulm HTN, [] Other     [] LEIGH, [] ATN, [] AIN, [] other   [] CKD1, [] CKD2, [] CKD3, [] CKD4, [] CKD5, [] ESRD     Encephalopathy: [] Metabolic, [] Hepatic, [] Toxic, [x] Neurological, [] Other     Abnormal Nutritional Status: [] malnutrition (see nutrition note), []underweight: BMI <19, [] morbid obesity: BMI >40, [] Cachexia     [] Sepsis   [] Hypovolemic shock, [] Cardiogenic shock, [] Hemorrhagic shock, [] Neurogenic shock   [] Acute respiratory failure   [] Cerebral edema, [] Brain compression / herniation   [] Functional quadriplegia   [] Acute blood loss anemia
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 Bernabe in neurosurgical consultation for spinal mass at [facility].     Since her initial surgery, the patient reports severe back pain radiating to RIGHT leg with associated numbness/tingling. She reports difficulty walking and uses cane to ambulate for assistance. Her right leg becomes weak, numb, and gives out. Reports imbalance. She states that these symptoms have become progressively worse over the years. She also has bowel/bladder incontinence and chronic constipation since initial surgery 6 years ago.     She has been doing physical therapy with minimal relief of symptoms. (19 Jun 2019 16:01)    OVERNIGHT EVENTS:  Vital Signs Last 24 Hrs  T(C): 36.9 (25 Jun 2019 09:24), Max: 36.9 (25 Jun 2019 09:24)  T(F): 98.5 (25 Jun 2019 09:24), Max: 98.5 (25 Jun 2019 09:24)  HR: 71 (25 Jun 2019 09:24) (60 - 82)  BP: 100/68 (25 Jun 2019 09:24) (95/61 - 106/69)  BP(mean): --  RR: 18 (25 Jun 2019 09:24) (16 - 18)  SpO2: 96% (25 Jun 2019 09:24) (96% - 98%)    I&O's Summary    24 Jun 2019 07:01  -  25 Jun 2019 07:00  --------------------------------------------------------  IN: 360 mL / OUT: 1250 mL / NET: -890 mL    Hospital Course:   6/21 POD #0 s/p thoracolumbar laminectomy for resection of intramedullary spinal tumor. JO overnight, neuro stable. Pain well controlled. Flat until Saturday. PCA for pain.   6/22: POD#1: JO overnight. Transferred to telemetry bed. HOB flat until today- able to get OOB and work with PT/OT. HMV in place- f/u output  6/23: POD#2: JO overnight. Pain controlled. Post op MRI T/L spine performed. OOB with PT/OT yesterday. HMV drain remains in place  6/24 POD#3 JO overnight, PT- no needs, MRI T-L spine completed, drain removed 6/23, SQL, Neuro exam improving  6/25 uneventfu night        PHYSICAL EXAM:      Neurological: AOx3, NAD, FC, speech coherent   CN II-XII grossly intact   Motor: MAEx4, strength 5/5 UE and LE B/L, no drift  SILT throughout  Incision/wound: back incision clean, dry and intact           Cardiovascular:RRR  Respiratory: Lungs CTAB  Gastrointestinal:  +BS abdomen round soft  Genitourinary: Voiding without difficulty  Extremities: warm and dry  Incision/Wound:  CDI      DIET: Regular  [  LABS:                        13.2   11.45 )-----------( 248      ( 24 Jun 2019 07:46 )             39.9     06-24    137  |  100  |  13  ----------------------------<  102<H>  4.8   |  25  |  0.51    Ca    9.1      24 Jun 2019 07:46  Phos  3.7     06-24  Mg     2.0     06-24        CAPILLARY BLOOD GLUCOSE      POCT Blood Glucose.: 101 mg/dL (25 Jun 2019 08:04)  POCT Blood Glucose.: 95 mg/dL (24 Jun 2019 21:36)  POCT Blood Glucose.: 111 mg/dL (24 Jun 2019 17:07)  POCT Blood Glucose.: 99 mg/dL (24 Jun 2019 12:03)      Drug Levels: [] N/A    CSF Analysis: [] N/A      Allergies    No Known Drug Allergies  shellfish (Rash)    Intolerances      MEDICATIONS:  Antibiotics:    Neuro:  buPROPion XL . 300 milliGRAM(s) Oral daily  gabapentin 300 milliGRAM(s) Oral three times a day  ondansetron Injectable 4 milliGRAM(s) IV Push every 6 hours PRN  oxyCODONE    5 mG/acetaminophen 325 mG 1 Tablet(s) Oral every 4 hours PRN  oxyCODONE    5 mG/acetaminophen 325 mG 2 Tablet(s) Oral every 6 hours PRN  traZODone 150 milliGRAM(s) Oral at bedtime    Anticoagulation:  enoxaparin Injectable 40 milliGRAM(s) SubCutaneous at bedtime    OTHER:  aluminum hydroxide/magnesium hydroxide/simethicone Suspension 30 milliLiter(s) Oral every 12 hours PRN  dexamethasone  Injectable   IV Push   dexamethasone  Injectable 4 milliGRAM(s) IV Push every 8 hours  dexamethasone  Injectable 2 milliGRAM(s) IV Push every 8 hours  dextrose 40% Gel 15 Gram(s) Oral once PRN  dextrose 50% Injectable 12.5 Gram(s) IV Push once  dextrose 50% Injectable 25 Gram(s) IV Push once  dextrose 50% Injectable 25 Gram(s) IV Push once  docusate sodium 100 milliGRAM(s) Oral three times a day  famotidine    Tablet 20 milliGRAM(s) Oral every 12 hours PRN  glucagon  Injectable 1 milliGRAM(s) IntraMuscular once PRN  insulin lispro (HumaLOG) corrective regimen sliding scale   SubCutaneous three times a day before meals  insulin lispro (HumaLOG) corrective regimen sliding scale   SubCutaneous at bedtime  naloxone Injectable 0.1 milliGRAM(s) IV Push every 3 minutes PRN  senna 2 Tablet(s) Oral at bedtime PRN    IVF:  dextrose 5%. 1000 milliLiter(s) IV Continuous <Continuous>    CULTURES:    RADIOLOGY & ADDITIONAL TESTS:      ASSESSMENT:  33y Female s/p   · Operative Findings	thoracolumbar laminectomy, resection of intramedullary spinal tumor	      PLAN:  NEURO:    Monitor neuro status  OT/PT  Pain Mangemnt  Bowel regime  Continue current medical regime    Dispo: Discussed with attending      DVT PROPHYLAXIS:  [] Venodynes                                [] Heparin/Lovenox    FALL RISK:  [] Low Risk                                    [] Impulsive  Assessment:  Present when checked    []  GCS  E   V  M     Heart Failure: []Acute, [] acute on chronic , []chronic  Heart Failure:  [] Diastolic (HFpEF), [] Systolic (HFrEF), []Combined (HFpEF and HFrEF), [] RHF, [] Pulm HTN, [] Other    [] LEIGH, [] ATN, [] AIN, [] other  [] CKD1, [] CKD2, [] CKD 3, [] CKD 4, [] CKD 5, []ESRD    Encephalopathy: [] Metabolic, [] Hepatic, [] toxic, [] Neurological, [] Other    Abnormal Nurtitional Status: [] malnurtition (see nutrition note), [ ]underweight: BMI < 19, [] morbid obesity: BMI >40, [] Cachexia    [] Sepsis  [] hypovolemic shock,[] cardiogenic shock, [] hemorrhagic shock, [] neuogenic shock  [] Acute Respiratory Failure  []Cerebral edema, [] Brain compression/ herniation,   [] Functional quadriplegia  [] Acute blood loss anemia
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 Bernabe in neurosurgical consultation for spinal mass at [facility].     Since her initial surgery, the patient reports severe back pain radiating to RIGHT leg with associated numbness/tingling. She reports difficulty walking and uses cane to ambulate for assistance. Her right leg becomes weak, numb, and gives out. Reports imbalance. She states that these symptoms have become progressively worse over the years. She also has bowel/bladder incontinence and chronic constipation since initial surgery 6 years ago.     She has been doing physical therapy with minimal relief of symptoms. (19 Jun 2019 16:01)      Hospital Course:   6/21 POD #0 s/p thoracolumbar laminectomy for resection of intramedullary spinal tumor. JO overnight, neuro stable. Pain well controlled. Flat until Saturday. PCA for pain.   6/22: POD#1: JO overnight. Transferred to telemetry bed. HOB flat until today- able to get OOB and work with PT/OT. HMV in place- f/u output  6/23: POD#2: JO overnight. Pain controlled. Post op MRI T/L spine performed. OOB with PT/OT yesterday. HMV drain remains in place      Vital Signs Last 24 Hrs  T(C): 36.7 (23 Jun 2019 05:42), Max: 37.6 (22 Jun 2019 20:35)  T(F): 98 (23 Jun 2019 05:42), Max: 99.6 (22 Jun 2019 20:35)  HR: 55 (23 Jun 2019 05:42) (55 - 78)  BP: 115/68 (23 Jun 2019 05:42) (100/55 - 115/68)  BP(mean): --  RR: 16 (23 Jun 2019 05:42) (15 - 17)  SpO2: 96% (23 Jun 2019 05:42) (96% - 97%)    I&O's Summary    22 Jun 2019 07:01  -  23 Jun 2019 07:00  --------------------------------------------------------  IN: 950 mL / OUT: 1870 mL / NET: -920 mL        PHYSICAL EXAM:  Neurological: AOx3, NAD, FC, speech coherent   CN II-XII grossly intact   Motor: MAEx4, strength 5/5 UE and LE B/L, no drift  SILT throughout  Incision/wound: back incision clean, dry and intact   +HMV drain in place      TUBES/LINES:  [] Michel  [] A-line  [] Lumbar Drain  [x] Wound Drains  [] NGT   [] EVD   [] CVC  [] Other      DIET:  [] NPO  [x] Mechanical  [] Tube feeds    LABS:                        12.9   11.87 )-----------( 234      ( 23 Jun 2019 07:00 )             39.6     06-23    139  |  105  |  14  ----------------------------<  113<H>  4.0   |  26  |  0.53    Ca    8.8      23 Jun 2019 07:00  Phos  2.3     06-22  Mg     2.0     06-22    TPro  6.5  /  Alb  3.6  /  TBili  0.3  /  DBili  x   /  AST  11  /  ALT  13  /  AlkPhos  63  06-23        CARDIAC MARKERS ( 22 Jun 2019 10:07 )  x     / <0.01 ng/mL / 119 U/L / x     / 1.8 ng/mL      CAPILLARY BLOOD GLUCOSE      POCT Blood Glucose.: 111 mg/dL (23 Jun 2019 07:05)  POCT Blood Glucose.: 153 mg/dL (22 Jun 2019 21:44)  POCT Blood Glucose.: 118 mg/dL (22 Jun 2019 17:34)  POCT Blood Glucose.: 116 mg/dL (22 Jun 2019 11:53)      Drug Levels: [] N/A    CSF Analysis: [] N/A      Allergies    No Known Drug Allergies  shellfish (Rash)    Intolerances        Home Medications:  buPROPion 300 mg/24 hours (XL) oral tablet, extended release: 1 tab(s) orally every 24 hours (20 Jun 2019 06:58)  docusate sodium 100 mg oral capsule: 1 cap(s) orally 3 times a day (20 Jun 2019 06:58)  gabapentin 300 mg oral capsule: 1 cap(s) orally 3 times a day (20 Jun 2019 06:58)  oxycodone-acetaminophen 5 mg-325 mg oral tablet: 2 tab(s) orally every 6 hours, As needed, Severe Pain (7 - 10) (20 Jun 2019 06:58)  senna oral tablet: 2 tab(s) orally once a day (at bedtime), As needed, Constipation (20 Jun 2019 06:58)  traZODone 150 mg oral tablet: 1 tab(s) orally once a day (at bedtime) (20 Jun 2019 06:58)      MEDICATIONS:  MEDICATIONS  (STANDING):  buPROPion XL . 300 milliGRAM(s) Oral daily  dexamethasone  Injectable   IV Push   dexamethasone  Injectable 4 milliGRAM(s) IV Push every 6 hours  dexamethasone  Injectable 4 milliGRAM(s) IV Push every 8 hours  dextrose 5%. 1000 milliLiter(s) (50 mL/Hr) IV Continuous <Continuous>  dextrose 50% Injectable 12.5 Gram(s) IV Push once  dextrose 50% Injectable 25 Gram(s) IV Push once  dextrose 50% Injectable 25 Gram(s) IV Push once  docusate sodium 100 milliGRAM(s) Oral three times a day  gabapentin 300 milliGRAM(s) Oral three times a day  insulin lispro (HumaLOG) corrective regimen sliding scale   SubCutaneous three times a day before meals  insulin lispro (HumaLOG) corrective regimen sliding scale   SubCutaneous at bedtime  traZODone 150 milliGRAM(s) Oral at bedtime    MEDICATIONS  (PRN):  aluminum hydroxide/magnesium hydroxide/simethicone Suspension 30 milliLiter(s) Oral every 12 hours PRN Indigestion  dextrose 40% Gel 15 Gram(s) Oral once PRN Blood Glucose LESS THAN 70 milliGRAM(s)/deciliter  famotidine    Tablet 20 milliGRAM(s) Oral every 12 hours PRN Dyspepsia  glucagon  Injectable 1 milliGRAM(s) IntraMuscular once PRN Glucose LESS THAN 70 milligrams/deciliter  naloxone Injectable 0.1 milliGRAM(s) IV Push every 3 minutes PRN For ANY of the following changes in patient status:  A. RR LESS THAN 10 breaths per minute, B. Oxygen saturation LESS THAN 90%, C. Sedation score of 6  ondansetron Injectable 4 milliGRAM(s) IV Push every 6 hours PRN Nausea  oxyCODONE    5 mG/acetaminophen 325 mG 1 Tablet(s) Oral every 4 hours PRN Moderate Pain (4 - 6)  oxyCODONE    5 mG/acetaminophen 325 mG 2 Tablet(s) Oral every 6 hours PRN Severe Pain (7 - 10)  senna 2 Tablet(s) Oral at bedtime PRN Constipation      CULTURES:      RADIOLOGY & ADDITIONAL TESTS:      ASSESSMENT:  33y Female s/p thoracolumbar laminectomy for resection of intramedullary spinal tumor POD#3      PLAN:  -neuro/spinal checks  -pain control prn  -continue decadron taper  -HMV - monitor output  -MRI T/L spine performed  -SBP normotensive  -room air, satting well  -regular diet  -bowel regimen  -GI/DVT ppx  -OOB/PT/OT  -d/w Dr. Bernabe       Assessment: present when checked     [] GCS   E   V   M     Heart Failure: [] Acute, [] acute on chronic, [] chronic   Heart Failure: [] Diastolic (HFpEF), [] Systolic (HRrEF), [] Combined (HFpEF and HFrEF), [] RHF, [] Pulm HTN, [] Other     [] LEIGH, [] ATN, [] AIN, [] other   [] CKD1, [] CKD2, [] CKD3, [] CKD4, [] CKD5, [] ESRD     Encephalopathy: [] Metabolic, [] Hepatic, [] Toxic, [] Neurological, [] Other     Abnormal Nutritional Status: [] malnutrition (see nutrition note), []underweight: BMI <19, [] morbid obesity: BMI >40, [] Cachexia     [] Sepsis   [] Hypovolemic shock, [] Cardiogenic shock, [] Hemorrhagic shock, [] Neurogenic shock   [] Acute respiratory failure   [] Cerebral edema, [] Brain compression / herniation   [] Functional quadriplegia   [] Acute blood loss anemia
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 Bernabe in neurosurgical consultation for spinal mass at [facility].     Since her initial surgery, the patient reports severe back pain radiating to RIGHT leg with associated numbness/tingling. She reports difficulty walking and uses cane to ambulate for assistance. Her right leg becomes weak, numb, and gives out. Reports imbalance. She states that these symptoms have become progressively worse over the years. She also has bowel/bladder incontinence and chronic constipation since initial surgery 6 years ago.     She has been doing physical therapy with minimal relief of symptoms. (19 Jun 2019 16:01)    OVERNIGHT EVENTS: No acute events overnight. Patient denies and weakness, numbness. Pain well controlled.     Hospital Course:   6/21 POD #0 s/p thoracolumbar laminectomy for resection of intramedullary spinal tumor. JO overnight, neuro stable. Pain well controlled. Flat until Saturday. PCA for pain.     Vital Signs Last 24 Hrs  T(C): 36.4 (20 Jun 2019 22:50), Max: 36.8 (20 Jun 2019 17:46)  T(F): 97.6 (20 Jun 2019 22:50), Max: 98.3 (20 Jun 2019 17:46)  HR: 76 (20 Jun 2019 21:00) (72 - 98)  BP: 112/63 (20 Jun 2019 17:30) (112/63 - 112/63)  BP(mean): 81 (20 Jun 2019 17:30) (81 - 81)  RR: 18 (20 Jun 2019 21:00) (8 - 18)  SpO2: 100% (20 Jun 2019 21:00) (100% - 100%)    I&O's Detail    20 Jun 2019 07:01  -  21 Jun 2019 00:10  --------------------------------------------------------  IN:    lactated ringers.: 300 mL  Total IN: 300 mL    OUT:    Indwelling Catheter - Urethral: 140 mL  Total OUT: 140 mL    Total NET: 160 mL        I&O's Summary    20 Jun 2019 07:01  -  21 Jun 2019 00:10  --------------------------------------------------------  IN: 300 mL / OUT: 140 mL / NET: 160 mL        PHYSICAL EXAM:  Gen: laying in hospital bed, NAD  Neurological: AA+Ox3, OE spont, FC  CN II-XII: PERRL, EOMI  Motor exam: MAEx4, RLE 4/5, R EHL 3/5. LLE, RUE, LUE 5/5  SILT in UE and LE b/l  Cardiovascular: regular rate and rhythm  Respiratory: clear to auscultation  Gastrointestinal: soft, nontender non distended  Incision/Wound: thoracolumbar site C/D/I    TUBES/LINES:  [] CVC  [] A-line  [] Lumbar Drain  [] Ventriculostomy  [x] Other: eli     DIET:  [] NPO  [x] Mechanical  [] Tube feeds    LABS:                        12.2   7.47  )-----------( 238      ( 20 Jun 2019 18:06 )             37.5     06-20    143  |  112<H>  |  7   ----------------------------<  156<H>  4.5   |  21<L>  |  0.60    Ca    9.2      20 Jun 2019 18:06              CAPILLARY BLOOD GLUCOSE      POCT Blood Glucose.: 156 mg/dL (20 Jun 2019 21:57)      Drug Levels: [] N/A    CSF Analysis: [] N/A      Allergies    No Known Drug Allergies  shellfish (Rash)    Intolerances      MEDICATIONS:  Antibiotics:  cefepime   IVPB 1000 milliGRAM(s) IV Intermittent every 12 hours    Neuro:  acetaminophen  IVPB .. 1000 milliGRAM(s) IV Intermittent once  buPROPion XL . 300 milliGRAM(s) Oral daily  diazepam    Tablet 5 milliGRAM(s) Oral every 8 hours PRN  gabapentin 300 milliGRAM(s) Oral three times a day  HYDROmorphone  Injectable 0.5 milliGRAM(s) IV Push once  HYDROmorphone PCA (1 mG/mL) 30 milliLiter(s) PCA Continuous PCA Continuous  ondansetron Injectable 4 milliGRAM(s) IV Push every 6 hours PRN  traZODone 150 milliGRAM(s) Oral at bedtime    Anticoagulation:    OTHER:  aluminum hydroxide/magnesium hydroxide/simethicone Suspension 30 milliLiter(s) Oral every 12 hours PRN  dexamethasone  Injectable 6 milliGRAM(s) IV Push every 6 hours  dextrose 40% Gel 15 Gram(s) Oral once PRN  dextrose 50% Injectable 12.5 Gram(s) IV Push once  dextrose 50% Injectable 25 Gram(s) IV Push once  dextrose 50% Injectable 25 Gram(s) IV Push once  docusate sodium 100 milliGRAM(s) Oral three times a day  famotidine    Tablet 20 milliGRAM(s) Oral every 12 hours PRN  glucagon  Injectable 1 milliGRAM(s) IntraMuscular once PRN  insulin lispro (HumaLOG) corrective regimen sliding scale   SubCutaneous three times a day before meals  insulin lispro (HumaLOG) corrective regimen sliding scale   SubCutaneous at bedtime  naloxone Injectable 0.1 milliGRAM(s) IV Push every 3 minutes PRN  senna 2 Tablet(s) Oral at bedtime PRN    IVF:  dextrose 5%. 1000 milliLiter(s) IV Continuous <Continuous>  lactated ringers. 1000 milliLiter(s) IV Continuous <Continuous>    CULTURES:  Culture Results:   No growth at 5 days. (06-08 @ 17:22)  Culture Results:   No growth at 5 days. (06-08 @ 17:21)    RADIOLOGY & ADDITIONAL TESTS:      ASSESSMENT:  33y Female s/p thoracolumbar laminectomy for resection of intramedullary spinal tumor POD #1    PLAN:  NEURO:  - neuro checks  - vitals checks  - pain control with PCA, Valium, Gabapentin   - HMV, to gravity   - HOB flat until Saturday, may raise HOB for meals  - Decadron 6 q6  - MRI post op    CARDIOVASCULAR:  - normotensive SBP goal    PULMONARY:  - On room air, no issues    RENAL:  - eli in place    GI:  - regular diet  - bowel regimen    HEME:  - monitor H/H    ID:  - afebrile, no issues  - Cefepime x2 doses     ENDO:  - ISS    DVT PROPHYLAXIS: [x] Venodynes  [] Heparin/Lovenox    DISPOSITION: ICU status, full code, dispo pending     d/w Dr. Bernabe, Dr. Sánchez
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 Bernabe in neurosurgical consultation for spinal mass at [facility].     Since her initial surgery, the patient reports severe back pain radiating to RIGHT leg with associated numbness/tingling. She reports difficulty walking and uses cane to ambulate for assistance. Her right leg becomes weak, numb, and gives out. Reports imbalance. She states that these symptoms have become progressively worse over the years. She also has bowel/bladder incontinence and chronic constipation since initial surgery 6 years ago.     She has been doing physical therapy with minimal relief of symptoms. (19 Jun 2019 16:01)      Interval History - moves both LE against gravity        REVIEW OF SYSTEMS    Vital Signs Last 24 Hrs  T(C): 36.9 (21 Jun 2019 06:00), Max: 36.9 (21 Jun 2019 01:59)  T(F): 98.4 (21 Jun 2019 06:00), Max: 98.4 (21 Jun 2019 01:59)  HR: 60 (21 Jun 2019 07:00) (54 - 98)  BP: 112/63 (20 Jun 2019 17:30) (112/63 - 112/63)  BP(mean): 81 (20 Jun 2019 17:30) (81 - 81)  RR: 20 (21 Jun 2019 07:00) (8 - 24)  SpO2: 97% (21 Jun 2019 07:00) (97% - 100%)    Physical Exam-     Mental Status- awake and alert    Cranial Nerves- full EOM    Gait and station- n/a    Motor- moves all $ extremities    Reflexes- decreased    Sensation- no sensory level    Coordination- no tremors    Vascular - no bruits    Medications  acetaminophen  IVPB .. 1000 milliGRAM(s) IV Intermittent once  aluminum hydroxide/magnesium hydroxide/simethicone Suspension 30 milliLiter(s) Oral every 12 hours PRN  buPROPion XL . 300 milliGRAM(s) Oral daily  cefepime   IVPB 1000 milliGRAM(s) IV Intermittent every 12 hours  dexamethasone  Injectable 6 milliGRAM(s) IV Push every 6 hours  dextrose 40% Gel 15 Gram(s) Oral once PRN  dextrose 5%. 1000 milliLiter(s) IV Continuous <Continuous>  dextrose 50% Injectable 12.5 Gram(s) IV Push once  dextrose 50% Injectable 25 Gram(s) IV Push once  dextrose 50% Injectable 25 Gram(s) IV Push once  diazepam    Tablet 5 milliGRAM(s) Oral every 8 hours  docusate sodium 100 milliGRAM(s) Oral three times a day  famotidine    Tablet 20 milliGRAM(s) Oral every 12 hours PRN  gabapentin 300 milliGRAM(s) Oral three times a day  glucagon  Injectable 1 milliGRAM(s) IntraMuscular once PRN  HYDROmorphone  Injectable 0.5 milliGRAM(s) IV Push once  HYDROmorphone PCA (1 mG/mL) 30 milliLiter(s) PCA Continuous PCA Continuous  insulin lispro (HumaLOG) corrective regimen sliding scale   SubCutaneous three times a day before meals  insulin lispro (HumaLOG) corrective regimen sliding scale   SubCutaneous at bedtime  lactated ringers. 1000 milliLiter(s) IV Continuous <Continuous>  naloxone Injectable 0.1 milliGRAM(s) IV Push every 3 minutes PRN  ondansetron Injectable 4 milliGRAM(s) IV Push every 6 hours PRN  senna 2 Tablet(s) Oral at bedtime PRN  traZODone 150 milliGRAM(s) Oral at bedtime      Lab      Radiology    Assessment- Spinal cord tumor    Plan as per NS

## 2019-07-02 DIAGNOSIS — K21.9 GASTRO-ESOPHAGEAL REFLUX DISEASE WITHOUT ESOPHAGITIS: ICD-10-CM

## 2019-07-02 DIAGNOSIS — G47.00 INSOMNIA, UNSPECIFIED: ICD-10-CM

## 2019-07-02 DIAGNOSIS — D33.4 BENIGN NEOPLASM OF SPINAL CORD: ICD-10-CM

## 2019-07-02 DIAGNOSIS — G95.9 DISEASE OF SPINAL CORD, UNSPECIFIED: ICD-10-CM

## 2019-07-02 DIAGNOSIS — K59.00 CONSTIPATION, UNSPECIFIED: ICD-10-CM

## 2019-07-02 DIAGNOSIS — F32.9 MAJOR DEPRESSIVE DISORDER, SINGLE EPISODE, UNSPECIFIED: ICD-10-CM

## 2019-07-02 DIAGNOSIS — R15.9 FULL INCONTINENCE OF FECES: ICD-10-CM

## 2019-07-02 DIAGNOSIS — R32 UNSPECIFIED URINARY INCONTINENCE: ICD-10-CM

## 2019-07-02 DIAGNOSIS — Z91.013 ALLERGY TO SEAFOOD: ICD-10-CM

## 2019-07-02 DIAGNOSIS — Q05.9 SPINA BIFIDA, UNSPECIFIED: ICD-10-CM

## 2019-07-02 PROBLEM — D49.9 NEOPLASM OF UNSPECIFIED BEHAVIOR OF UNSPECIFIED SITE: Chronic | Status: ACTIVE | Noted: 2019-06-08

## 2019-07-02 PROBLEM — N32.9 BLADDER DISORDER, UNSPECIFIED: Chronic | Status: ACTIVE | Noted: 2019-06-20

## 2019-07-08 ENCOUNTER — APPOINTMENT (OUTPATIENT)
Dept: NEUROSURGERY | Facility: CLINIC | Age: 34
End: 2019-07-08
Payer: MEDICAID

## 2019-07-08 VITALS
HEIGHT: 63 IN | DIASTOLIC BLOOD PRESSURE: 64 MMHG | BODY MASS INDEX: 26.22 KG/M2 | RESPIRATION RATE: 16 BRPM | OXYGEN SATURATION: 97 % | WEIGHT: 148 LBS | TEMPERATURE: 98.2 F | HEART RATE: 79 BPM | SYSTOLIC BLOOD PRESSURE: 90 MMHG

## 2019-07-08 PROCEDURE — 99024 POSTOP FOLLOW-UP VISIT: CPT

## 2019-07-10 PROCEDURE — 97162 PT EVAL MOD COMPLEX 30 MIN: CPT

## 2019-07-10 PROCEDURE — 86900 BLOOD TYPING SEROLOGIC ABO: CPT

## 2019-07-10 PROCEDURE — 96374 THER/PROPH/DIAG INJ IV PUSH: CPT

## 2019-07-10 PROCEDURE — 93971 EXTREMITY STUDY: CPT

## 2019-07-10 PROCEDURE — 97530 THERAPEUTIC ACTIVITIES: CPT

## 2019-07-10 PROCEDURE — 82553 CREATINE MB FRACTION: CPT

## 2019-07-10 PROCEDURE — 99285 EMERGENCY DEPT VISIT HI MDM: CPT | Mod: 25

## 2019-07-10 PROCEDURE — 82962 GLUCOSE BLOOD TEST: CPT

## 2019-07-10 PROCEDURE — 81001 URINALYSIS AUTO W/SCOPE: CPT

## 2019-07-10 PROCEDURE — 80053 COMPREHEN METABOLIC PANEL: CPT

## 2019-07-10 PROCEDURE — 76000 FLUOROSCOPY <1 HR PHYS/QHP: CPT

## 2019-07-10 PROCEDURE — 85025 COMPLETE CBC W/AUTO DIFF WBC: CPT

## 2019-07-10 PROCEDURE — 80048 BASIC METABOLIC PNL TOTAL CA: CPT

## 2019-07-10 PROCEDURE — 86850 RBC ANTIBODY SCREEN: CPT

## 2019-07-10 PROCEDURE — 93005 ELECTROCARDIOGRAM TRACING: CPT

## 2019-07-10 PROCEDURE — 87040 BLOOD CULTURE FOR BACTERIA: CPT

## 2019-07-10 PROCEDURE — 36415 COLL VENOUS BLD VENIPUNCTURE: CPT

## 2019-07-10 PROCEDURE — 88331 PATH CONSLTJ SURG 1 BLK 1SPC: CPT

## 2019-07-10 PROCEDURE — 83036 HEMOGLOBIN GLYCOSYLATED A1C: CPT

## 2019-07-10 PROCEDURE — 84132 ASSAY OF SERUM POTASSIUM: CPT

## 2019-07-10 PROCEDURE — 86901 BLOOD TYPING SEROLOGIC RH(D): CPT

## 2019-07-10 PROCEDURE — 71045 X-RAY EXAM CHEST 1 VIEW: CPT

## 2019-07-10 PROCEDURE — 88307 TISSUE EXAM BY PATHOLOGIST: CPT

## 2019-07-10 PROCEDURE — 72147 MRI CHEST SPINE W/DYE: CPT

## 2019-07-10 PROCEDURE — 85652 RBC SED RATE AUTOMATED: CPT

## 2019-07-10 PROCEDURE — 85610 PROTHROMBIN TIME: CPT

## 2019-07-10 PROCEDURE — 93306 TTE W/DOPPLER COMPLETE: CPT

## 2019-07-10 PROCEDURE — 73630 X-RAY EXAM OF FOOT: CPT

## 2019-07-10 PROCEDURE — 72158 MRI LUMBAR SPINE W/O & W/DYE: CPT

## 2019-07-10 PROCEDURE — 83735 ASSAY OF MAGNESIUM: CPT

## 2019-07-10 PROCEDURE — C9399: CPT

## 2019-07-10 PROCEDURE — C1889: CPT

## 2019-07-10 PROCEDURE — 86140 C-REACTIVE PROTEIN: CPT

## 2019-07-10 PROCEDURE — 84484 ASSAY OF TROPONIN QUANT: CPT

## 2019-07-10 PROCEDURE — 95940 IONM IN OPERATNG ROOM 15 MIN: CPT

## 2019-07-10 PROCEDURE — 84100 ASSAY OF PHOSPHORUS: CPT

## 2019-07-10 PROCEDURE — 97116 GAIT TRAINING THERAPY: CPT

## 2019-07-10 PROCEDURE — 85027 COMPLETE CBC AUTOMATED: CPT

## 2019-07-10 PROCEDURE — 82550 ASSAY OF CK (CPK): CPT

## 2019-07-10 PROCEDURE — A9585: CPT

## 2019-07-10 PROCEDURE — 85730 THROMBOPLASTIN TIME PARTIAL: CPT

## 2019-07-10 PROCEDURE — 73610 X-RAY EXAM OF ANKLE: CPT

## 2019-07-10 PROCEDURE — 84550 ASSAY OF BLOOD/URIC ACID: CPT

## 2019-07-12 ENCOUNTER — APPOINTMENT (OUTPATIENT)
Dept: NEUROLOGY | Facility: CLINIC | Age: 34
End: 2019-07-12

## 2019-07-13 NOTE — HISTORY OF PRESENT ILLNESS
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 Bernabe in neurosurgical consultation for spinal mass at [facility]. She comes to the appointment today with her mother.\par \par Since her initial surgery, the patient reports severe back pain radiating to RIGHT leg with associated numbness/tingling. She reports difficulty walking and uses cane to ambulate for assistance. Reports imbalance. She states that these symptoms have become progressively worse over the years. Also reports bowel/bladder incontinence and chronic constipation since initial surgery 6 years ago. \par \par She has been doing physical therapy with minimal relief of symptoms. \par \par \par \par

## 2019-07-13 NOTE — REASON FOR VISIT
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 Denies headaches, nausea, vomiting, dizziness, weakness. \par Continues to self catheterize for urinary retention, same as preoperatively.\par She feels her lower extremity strength is improving with physical therapy. \par \par Pathology indicates keratinaceous debris with no cyst lining present.\par Brain and spine tumor board recommendations for 3 month follow up MRI with diffusion.\par  [de-identified] : 6/20/19 [FreeTextEntry1] : \par \par PREVIOUS OFFICE VISIT 5/28/19:\par Follows up after MRI lumbar and thoracic spine done on 2/23/19.\par \par She reports worsening low back pain radiating to bilateral lower extremities with associated lower extremity weakness (RIGHT greater than LEFT). She states these symptoms are exacerbated with ambulation. \par \par She was recently brought to ED for severe low back and leg pain and subsequently discharged. She is currently taking oxycodone and gabapentin for pain with minimal relief of symptoms.\par She reports continued bladder incontinence. She has difficulty walking and imbalance. She uses a walker to ambulate. She is currently seeking a HHA to assist her with ADLs as she is unable to perform ADLs independently.

## 2019-07-13 NOTE — ADDENDUM
[FreeTextEntry1] : 	2019 \par  \par  \par  \par Re:	Radha Rowan  \par :	83 \par MRN:  [id number] \par  \par Ms. Rowan is a 33-year-old woman who presents for followup after undergoing thoracolumbar laminectomy for a recurrent intradural, intramedullary tumor on 2019.  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   \par  \par Ms. Rowan currently is in a rehab facility.  She indicates to me that she has been doing aggressive daily therapy.  She has been getting out of bed and ambulating independently. \par  \par Her nylon sutures were removed today.  The wound appears to be healing quite well.  She has no neurological issues.   \par  \par We will plan for her to follow up with me in one month. \par  \par  \par  \par Patrick Bernabe M.D. \par  of Neurosurgery \par [facility] \[facility] Department of Neurosurgery \par [facility] \par [address] \par [facility], Third Floor \[place] [address] \par Tel: [phone] \par Email:  [email or web address] \par  \par KATRIN/[facility] [employer] #[id number] \par

## 2019-07-13 NOTE — PHYSICAL EXAM
[General Appearance - Alert] : alert [General Appearance - In No Acute Distress] : in no acute distress [Clean] : clean [Dry] : dry [Healing Well] : healing well [Intact] : intact [Normal Skin] : normal [No Drainage] : without drainage [Oriented To Time, Place, And Person] : oriented to person, place, and time [Motor Tone] : muscle tone was normal in all four extremities [4] : L3 quadriceps 4/5 [5] : L4/5 ankle dorsiflexors 5/5 [No Visual Abnormalities] : no visible abnormailities [No Masses] : no masses [Sclera] : the sclera and conjunctiva were normal [Outer Ear] : the ears and nose were normal in appearance [Neck Appearance] : the appearance of the neck was normal [] : no respiratory distress [Respiration, Rhythm And Depth] : normal respiratory rhythm and effort [Heart Rate And Rhythm] : heart rate was normal and rhythm regular [Abdomen Soft] : soft [Skin Color & Pigmentation] : normal skin color and pigmentation [FreeTextEntry6] : sutures removed without difficulty [FreeTextEntry1] : uses walker for assistance with ambulation

## 2019-07-13 NOTE — ASSESSMENT
[FreeTextEntry1] : No Bend/Lift/Twist\par Do not lift anything more than 5 pounds for 2 weeks after surgery\par No strenuous activity for 2 weeks after surgery\par Do NOT smoke or use nicotine products as it can prevent bony fusion and delay healing\par Shower daily; use mild soap - pat incision line dry with dry separate towel\par Do not apply lotions or ointments over incision\par No tubs, pools for 6-8 weeks\par Avoid direct sun exposure to incision site for 3-6 months\par Gradually increase activities as tolerated\par Report all s/s infection including drainage, odor, redness, fever greater than 101.\par Continue physical therapy.\par \par Follow up in one month in the office.\par Discussed findings of RIGHT ovarian mass on CT thoracic/lumbar - referred to GYN.\par MRI thoracic/lumbar spine with and without contrast in 3 months.\par \par Plan:\par \par 1. Follow up in one month\par 2. MRI thoracic/lumbar spine with and without contrast in 3 months\par 3. Refer to GYN for findings of RIGHT ovarian mass\par \par Patient verbalizes agreement and understanding with plan. \par

## 2019-08-01 ENCOUNTER — APPOINTMENT (OUTPATIENT)
Dept: OBGYN | Facility: CLINIC | Age: 34
End: 2019-08-01

## 2019-08-05 ENCOUNTER — APPOINTMENT (OUTPATIENT)
Dept: NEUROSURGERY | Facility: CLINIC | Age: 34
End: 2019-08-05
Payer: MEDICAID

## 2019-08-05 VITALS
RESPIRATION RATE: 16 BRPM | OXYGEN SATURATION: 97 % | HEIGHT: 63 IN | WEIGHT: 148 LBS | HEART RATE: 67 BPM | DIASTOLIC BLOOD PRESSURE: 65 MMHG | TEMPERATURE: 98 F | SYSTOLIC BLOOD PRESSURE: 100 MMHG | BODY MASS INDEX: 26.22 KG/M2

## 2019-08-05 PROCEDURE — 99024 POSTOP FOLLOW-UP VISIT: CPT

## 2019-08-08 NOTE — ADDENDUM
[FreeTextEntry1] : 	2019        Re:	Radha Rowan   :	[birth year]  MRN:  [id number]    Ms. Rowan is a 33-year-old woman who underwent re-op thoracolumbar laminectomy for resection of a recurrent, intradural, conus level dermoid/epidermoid tumor on 2019. Her initial surgery with partial tumor resection was several years ago at an outside institution. Ms. Rowan has been doing quite well. She feels that her walking and balance are much improved. She was recently discharged from rehab and is now home. She is getting back to all of her usual activities.      On motor examination, she is essentially full strength bilaterally in the lower extremities. She has good sensation. She is ambulating at this point with a cane. Her posterior thoracolumbar incision has healed very well.     Overall I am very pleased with her progress. I would like for her to follow up with me in 3 months. During that period I would like her to start a physical therapy regimen 2-3 times per week. We will plan for an MRI of the thoracolumbar spine 6 months postoperatively to ensure that there are no concerning changes.          Patrick Bernabe M.D.   of Neurosurgery  [facility] at [facility]  Department of Neurosurgery  [address], [place] [address]  Tel: [phone]  Email:  [email or web address]    KATRIN/nya [employer] #[id number]

## 2019-08-08 NOTE — PHYSICAL EXAM
[General Appearance - Alert] : alert [General Appearance - In No Acute Distress] : in no acute distress [Well-Healed] : well-healed [Motor Tone] : muscle tone was normal in all four extremities [Motor Strength] : muscle strength was normal in all four extremities [Sclera] : the sclera and conjunctiva were normal [Outer Ear] : the ears and nose were normal in appearance [Neck Appearance] : the appearance of the neck was normal [] : no respiratory distress [Respiration, Rhythm And Depth] : normal respiratory rhythm and effort [Heart Rate And Rhythm] : heart rate was normal and rhythm regular [Abnormal Walk] : normal gait [Skin Color & Pigmentation] : normal skin color and pigmentation [FreeTextEntry1] : uses cane for assistance with ambulation

## 2019-08-08 NOTE — ASSESSMENT
[FreeTextEntry1] : Patient doing well postoperatively. Gradually returning to daily activities.\par No Bend/Lift/Twist\par Gradually increase activities as tolerated\par Begin Physical therapy for gait training, muscular strengthening, balance and coordination.\par Notify MD or report to ED for worsening neurological s/s, including but not limited to weakness, incontinence gait disturbance.\par MRI lumbar and thoracic spine with and without contrast in 6 months (December 2019).\par \par Plan:\par \par 1. Physical therapy\par 2. MRI lumbar and thoracic spine in 6 months (December 2019)\par 3. Follow up with Dr. Bernabe in 3 months (October/November 2019)\par \par Patient verbalizes agreement and understanding with plan.\par \par

## 2019-08-08 NOTE — HISTORY OF PRESENT ILLNESS
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 Bernabe in neurosurgical consultation for spinal mass at [facility]. She comes to the appointment today with her mother.\par \par Since her initial surgery, the patient reports severe back pain radiating to RIGHT leg with associated numbness/tingling. She reports difficulty walking and uses cane to ambulate for assistance. Reports imbalance. She states that these symptoms have become progressively worse over the years. Also reports bowel/bladder incontinence and chronic constipation since initial surgery 6 years ago. \par \par She has been doing physical therapy with minimal relief of symptoms. \par \par \par \par

## 2019-08-08 NOTE — REASON FOR VISIT
[de-identified] : \par TODAY'S VISIT:\par Reports improvement in walking. She was discharged from [facility] on Tuesday.\par She reports intermittent muscle cramping in bilateral lower extremities.\par She is currently taking oxycodone and gabapentin with adequate pain relief.\par \par PREVIOUS OFFICE VISIT 7/8/19:\par Reports she is doing well.\par She is currently in rehab at [facility].\par Denies any signs of postop wound infection which could include but is not limited to redness/swelling/purulent drainage\par Denies CP/SOB/unilateral leg edema. Denies headaches, nausea, vomiting, dizziness, weakness. \par Continues to self catheterize for urinary retention, same as preoperatively.\par She feels her lower extremity strength is improving with physical therapy. 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 She states these symptoms are exacerbated with ambulation. \par \par She was recently brought to ED for severe low back and leg pain and subsequently discharged. She is currently taking oxycodone and gabapentin for pain with minimal relief of symptoms.\par She reports continued bladder incontinence. She has difficulty walking and imbalance. She uses a walker to ambulate. She is currently seeking a HHA to assist her with ADLs as she is unable to perform ADLs independently.

## 2019-10-28 ENCOUNTER — APPOINTMENT (OUTPATIENT)
Dept: NEUROSURGERY | Facility: CLINIC | Age: 34
End: 2019-10-28

## 2019-11-29 ENCOUNTER — OUTPATIENT (OUTPATIENT)
Dept: OUTPATIENT SERVICES | Facility: HOSPITAL | Age: 34
LOS: 1 days | End: 2019-11-29
Payer: MEDICAID

## 2019-11-29 ENCOUNTER — APPOINTMENT (OUTPATIENT)
Dept: MRI IMAGING | Facility: HOSPITAL | Age: 34
End: 2019-11-29
Payer: MEDICAID

## 2019-11-29 DIAGNOSIS — D49.7 NEOPLASM OF UNSPECIFIED BEHAVIOR OF ENDOCRINE GLANDS AND OTHER PARTS OF NERVOUS SYSTEM: Chronic | ICD-10-CM

## 2019-11-29 DIAGNOSIS — Z90.49 ACQUIRED ABSENCE OF OTHER SPECIFIED PARTS OF DIGESTIVE TRACT: Chronic | ICD-10-CM

## 2019-11-29 PROCEDURE — 72157 MRI CHEST SPINE W/O & W/DYE: CPT | Mod: 26

## 2019-11-29 PROCEDURE — 72158 MRI LUMBAR SPINE W/O & W/DYE: CPT

## 2019-11-29 PROCEDURE — 72157 MRI CHEST SPINE W/O & W/DYE: CPT

## 2019-11-29 PROCEDURE — 72158 MRI LUMBAR SPINE W/O & W/DYE: CPT | Mod: 26

## 2019-11-29 PROCEDURE — A9585: CPT

## 2019-12-02 ENCOUNTER — APPOINTMENT (OUTPATIENT)
Dept: NEUROSURGERY | Facility: CLINIC | Age: 34
End: 2019-12-02
Payer: MEDICAID

## 2019-12-02 VITALS
WEIGHT: 148 LBS | BODY MASS INDEX: 26.22 KG/M2 | DIASTOLIC BLOOD PRESSURE: 61 MMHG | HEART RATE: 69 BPM | RESPIRATION RATE: 18 BRPM | OXYGEN SATURATION: 98 % | TEMPERATURE: 98 F | HEIGHT: 63 IN | SYSTOLIC BLOOD PRESSURE: 88 MMHG

## 2019-12-02 PROCEDURE — 99215 OFFICE O/P EST HI 40 MIN: CPT

## 2019-12-02 NOTE — REVIEW OF SYSTEMS
[As Noted in HPI] : as noted in HPI [Hand Weakness] :  hand weakness [Difficulty Walking] : difficulty walking [Negative] : Endocrine

## 2019-12-07 NOTE — PHYSICAL EXAM
[General Appearance - Alert] : alert [General Appearance - In No Acute Distress] : in no acute distress [Healing Well] : healing well [No Drainage] : without drainage [Oriented To Time, Place, And Person] : oriented to person, place, and time [Motor Tone] : muscle tone was normal in all four extremities [Motor Strength] : muscle strength was normal in all four extremities [Sclera] : the sclera and conjunctiva were normal [Neck Appearance] : the appearance of the neck was normal [Outer Ear] : the ears and nose were normal in appearance [Abnormal Walk] : normal gait [] : no respiratory distress [Skin Color & Pigmentation] : normal skin color and pigmentation [FreeTextEntry1] : Mid Back

## 2019-12-07 NOTE — HISTORY OF PRESENT ILLNESS
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 Bernabe in neurosurgical consultation for spinal mass at [facility]. She comes to the appointment today with her mother.\par \par Since her initial surgery, the patient reports severe back pain radiating to RIGHT leg with associated numbness/tingling. She reports difficulty walking and uses cane to ambulate for assistance. Reports imbalance. She states that these symptoms have become progressively worse over the years. Also reports bowel/bladder incontinence and chronic constipation since initial surgery 6 years ago. \par \par She has been doing physical therapy with minimal relief of symptoms. \par \par \par \par

## 2019-12-07 NOTE — REASON FOR VISIT
[Parent] : parent [FreeTextEntry1] : s/p thoracolumbar laminectomy for resection of intramedullary spinal cord tumor on 6/20/19 [de-identified] : \par

## 2019-12-07 NOTE — DATA REVIEWED
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 \par \par TECHNIQUE: Multiplanar multi sequential MR images of the thoracic and lumbar \par spine was obtained performed after the administration of 7.5 mL IV Gadavist. \par \par COMPARISON: MRI of thoracic and lumbar spine 6/22/2019 and 2/23/2019. CT \par thoracic and lumbar spine 6/8/2019. \par \par FINDINGS: \par \par Patient is again demonstrated to be status post bilateral laminectomies \par T11-L2. There is been interval resolution of the fluid within the \par laminectomy bed. There is no new fluid collection. There are postsurgical \par changes to the dorsal subcutaneous tissues in the paraspinal muscles. \par \par There are 2 adjacent nodules within the spinal canal at the T12-L1 and L1-L2 \par level which demonstrate heterogeneous T2 hyperintensity and faint T1 \par hyperintensity as well as restricted diffusion most likely representing \par epidermoid. The more superior nodule at the T12-L1 level measures 0.9 cm \par transverse x 0.7 cm AP x 1.4 cm craniocaudal and is stable from prior exam. \par The more inferior nodule at the L1-L2 level measures 0.7 cm transverse x 0.8 \par cm AP x 1.2 cm craniocaudal and is overall stable from prior exam. There is \par peripheral enhancement as well as more inferior and anterior T2 \par hypointensity which likely represents calcifications on seen on recent CT. \par There is no additional sites of restricted diffusion, or intramedullary \par signal abnormality to suggest a new epidermoid. The remainder of the spinal \par cord demonstrates no cord signal abnormality or cord compression. There are \par no additional sites of abnormal enhancement \par \par There is exaggerated kyphosis of the lower thoracic spine and the \par thoracolumbar juncture. The vertebral body heights are maintained. The bone \par marrow signal is appropriate for patient's age. There is disc space \par narrowing at T12-L1 and L1-L2. \par \par The nerve roots are adherent to the posterior lateral aspects of the thecal \par sac from the L3-4 level inferiorly (empty thecal sac) most consistent with \par arachnoiditis, stable from prior exam. \par \par Imaging through the thoracic spine demonstrates no significant neural \par foraminal narrowing or spinal canal stenosis. \par \par At T12-L1, there is a central disc protrusion without significant spinal \par stenosis or neuroforaminal narrowing. \par \par At the L1/2 level, there is a right paracentral disc protrusion without \par significant spinal canal stenosis or neuroforaminal narrowing. \par \par At the L2/3 level, there is no disc herniation. There is no spinal canal \par stenosis or neural foramen narrowing. \par \par At the L3/4 level, there is no disc herniation. There is no spinal canal \par stenosis or neural foramen narrowing. \par \par At the L4/5 level, there is mild disc bulge without significant spinal canal \par stenosis or neural foramen narrowing. \par \par At the L5/S1 level, there is a mild disc bulge without significant spinal \par canal stenosis or neural foramen narrowing. \par \par IMPRESSION: \par \par Status post thoracolumbar laminectomy for resection of an intramedullary \par spinal cord epidural. Interval resolution of fluid at the laminectomy beds. \par \par 2 adjacent nodules at the T12-L1 and L1-L2 levels which demonstrate \par restricted diffusion most consistent with epidermoid, stable in size from \par prior MRI thoracic and lumbar spine 6/22/2019. No new epidermoid. \par \par \par \par \par \par \par Thank you for the opportunity to participate in the care of this patient. \par \par \par \par CHARLES VALE M.D., ATTENDING RADIOLOGIST \par This document has been electronically signed. Dec 2 2019 11:10AM

## 2019-12-07 NOTE — ASSESSMENT
[FreeTextEntry1] : MRI thoracic and lumbar spine done on 11/29/19 reviewed by Dr. Bernabe is stable.\par BP low at today's visit. Denies dizziness, nausea/vomiting, weakness. She states her BP is low. She has PCP, Dr. Bryant, who she will follow up with.\par Recommend continuing physical therapy for balance and coordination, muscular strengthening, gait training -Rx provided.\par Follow up with Dr. Bernabe in 6 months (May 2020) to evaluate progress.\par Recommend MRI thoracic and lumbar spine with and without contrast in one year to evaluate for recurrence/residual dermoid tumor.\par Education provided regarding plan of care.\par \par Patient verbalizes agreement and understanding with plan of care.

## 2019-12-07 NOTE — ADDENDUM
[FreeTextEntry1] : 2019 \par  \par OFFICE FOLLOWUP NOTE \par  \par  \par Re:	Radha Rowan  \par :	83 \par MRN:  [id number] \par  \par Ms. 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 Her initial surgery was many years ago at another institution. The pathology report indicates keratinous debris consistent with epidermoid/dermoid tumor. Since surgery she has done quite well. She has been doing physical therapy. She is ambulatory with a cane. She occasionally gets spasms in the legs with shaking, however this is not as frequent as it was prior to surgery. She comes in today with a new MRI thoracic and lumbar spine which is stable as compared to her previous post-operative MRI. There is no evidence of progressive disease or recurrence of the epidermoid tumor. She does have small nodules which require followup.  \par  \par Her clinical exam is stable. She is grossly full strength in the lower extremities although somewhat spastic. She has good sensation.  \par  \par Overall, I am quite pleased with Ms. Rowan’s progress. I would like her to continue with physical therapy for gait training and lower extremity strengthening and balance training. I would like to see her again in 6 months, and will plan for continued surveillance of her thoracolumbar tumor with annual MRIs. Followup MRI will be in 2020, unless clinical symptoms require imaging sooner. \par  \par  \par  \par  \par  \par Patrick Bernabe M.D. \par  of Neurosurgery \par [facility] \[facility] Department of Neurosurgery \par [facility] \par [address] \[facility], Third Floor \[place] [address] \[facility] Tel: [phone] \[facility] Email:  [email or web address] \par  \par  \par [facility] #[id number] \par  \par  \[facility]

## 2020-06-12 ENCOUNTER — APPOINTMENT (OUTPATIENT)
Dept: NEUROSURGERY | Facility: CLINIC | Age: 35
End: 2020-06-12
Payer: MEDICAID

## 2020-06-15 ENCOUNTER — APPOINTMENT (OUTPATIENT)
Dept: NEUROSURGERY | Facility: CLINIC | Age: 35
End: 2020-06-15
Payer: MEDICAID

## 2020-06-15 VITALS
WEIGHT: 146 LBS | SYSTOLIC BLOOD PRESSURE: 88 MMHG | HEART RATE: 74 BPM | RESPIRATION RATE: 16 BRPM | BODY MASS INDEX: 25.87 KG/M2 | TEMPERATURE: 98.1 F | OXYGEN SATURATION: 98 % | HEIGHT: 63 IN | DIASTOLIC BLOOD PRESSURE: 53 MMHG

## 2020-06-15 PROCEDURE — 99215 OFFICE O/P EST HI 40 MIN: CPT

## 2020-06-16 NOTE — HISTORY OF PRESENT ILLNESS
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 Bernabe in neurosurgical consultation for spinal mass at [facility]. She comes to the appointment today with her mother.\par \par Since her initial surgery, the patient reports severe back pain radiating to RIGHT leg with associated numbness/tingling. She reports difficulty walking and uses cane to ambulate for assistance. Reports imbalance. She states that these symptoms have become progressively worse over the years. Also reports bowel/bladder incontinence and chronic constipation since initial surgery 6 years ago. \par \par She has been doing physical therapy with minimal relief of symptoms. \par \par \par \par

## 2020-06-16 NOTE — PHYSICAL EXAM
[General Appearance - Alert] : alert [General Appearance - In No Acute Distress] : in no acute distress [Oriented To Time, Place, And Person] : oriented to person, place, and time [Motor Tone] : muscle tone was normal in all four extremities [Motor Strength] : muscle strength was normal in all four extremities [Normal] : normal [Able to toe walk] : the patient was able to toe walk [Sclera] : the sclera and conjunctiva were normal [Neck Appearance] : the appearance of the neck was normal [] : no respiratory distress [Abnormal Walk] : normal gait [Skin Color & Pigmentation] : normal skin color and pigmentation

## 2020-06-16 NOTE — REASON FOR VISIT
[FreeTextEntry1] : s/p thoracolumbar laminectomy for resection of intramedullary spinal cord tumor on 6/20/19 [de-identified] : \par

## 2020-06-19 ENCOUNTER — APPOINTMENT (OUTPATIENT)
Dept: NEUROLOGY | Facility: CLINIC | Age: 35
End: 2020-06-19

## 2020-08-18 ENCOUNTER — APPOINTMENT (OUTPATIENT)
Dept: NEUROLOGY | Facility: CLINIC | Age: 35
End: 2020-08-18
Payer: MEDICAID

## 2020-08-18 VITALS
WEIGHT: 146 LBS | OXYGEN SATURATION: 98 % | BODY MASS INDEX: 25.87 KG/M2 | DIASTOLIC BLOOD PRESSURE: 61 MMHG | SYSTOLIC BLOOD PRESSURE: 95 MMHG | TEMPERATURE: 98.4 F | HEART RATE: 88 BPM | HEIGHT: 63 IN

## 2020-08-18 DIAGNOSIS — M79.673 PAIN IN UNSPECIFIED FOOT: ICD-10-CM

## 2020-08-18 PROCEDURE — 99204 OFFICE O/P NEW MOD 45 MIN: CPT

## 2020-08-18 RX ORDER — GABAPENTIN 300 MG/1
300 CAPSULE ORAL TWICE DAILY
Qty: 60 | Refills: 3 | Status: ACTIVE | COMMUNITY
Start: 2020-08-18 | End: 1900-01-01

## 2020-08-19 RX ORDER — GABAPENTIN 300 MG
300 TABLET ORAL DAILY
Refills: 0 | Status: ACTIVE | COMMUNITY

## 2020-08-19 RX ORDER — BUPROPION HYDROCHLORIDE 300 MG/1
300 TABLET, EXTENDED RELEASE ORAL
Qty: 30 | Refills: 0 | Status: ACTIVE | COMMUNITY
Start: 2020-04-17

## 2020-08-19 RX ORDER — OXYCODONE 5 MG/1
5 TABLET ORAL TWICE DAILY
Refills: 0 | Status: ACTIVE | COMMUNITY

## 2020-08-19 RX ORDER — LIDOCAINE AND PRILOCAINE 25; 25 MG/G; MG/G
2.5-2.5 CREAM TOPICAL
Qty: 30 | Refills: 0 | Status: ACTIVE | COMMUNITY
Start: 2020-04-13

## 2020-08-19 RX ORDER — TRAZODONE HYDROCHLORIDE 150 MG/1
150 TABLET ORAL
Refills: 0 | Status: ACTIVE | COMMUNITY

## 2020-08-19 RX ORDER — OMEPRAZOLE 10 MG/1
10 CAPSULE, DELAYED RELEASE ORAL
Qty: 30 | Refills: 0 | Status: ACTIVE | COMMUNITY
Start: 2019-05-14

## 2020-08-19 RX ORDER — OXYBUTYNIN CHLORIDE 5 MG/1
5 TABLET ORAL
Qty: 60 | Refills: 0 | Status: ACTIVE | COMMUNITY
Start: 2020-08-01

## 2020-08-19 RX ORDER — HYDROXYZINE HYDROCHLORIDE 25 MG/1
25 TABLET ORAL
Qty: 30 | Refills: 0 | Status: ACTIVE | COMMUNITY
Start: 2020-04-17

## 2020-08-19 NOTE — PHYSICAL EXAM
[FreeTextEntry1] : Physical Exam\par Constitutional: no apparent distress\par Psychiatric: normal affect, euthyhmic, alert and oriented x 3\par HEENT: moist mucous membranes, oropharynx clear\par Neck: supple, no lymphadenopathy\par Respiratory: clear to auscultation bilaterally, no crackles or wheezing\par Cardiovascular: regular rate and rhythm, normal S1/S2, no murmurs, no edema\par GI: soft, non-tender, non-distended, bowel sounds present; no hepatosplenomegaly on palpation\par Musculoskeletal: curling/cramping of toes on right foot\par Skin: no rashes, bruises, or lesions\par \par Neurologic Exam:\par Mental Status: awake and alert, speech fluent and prosodic with no paraphasic errors, repetition intact, follows simple and complex commands, normal fund of knowledge\par Cranial Nerves: I: deferred; II: pupils equal round and reactive, visual fields full to confrontation III, IV, VI: extraocular movements full with no nystagmus; V: facial sensation intact and symmetric; VII: facial power symmetric; VIII: hearing intact to finger rub; IX/X: palate elevates symmetrically, no dysarthria; XI: shoulder shrug symmetric; XII: tongue protrudes midline\par Motor: normal bulk and tone, no orbiting or pronator drift, power 5/5 to confrontation throughout including shoulder abduction, elbow flexion and extension, wrist flexion and extension, difficult to assess lower extremity strength 2/2 pain but moves both lower extremities antigravity.  +tremulousness of both lower legs.\par Sensation: intact to light touch in distal upper and lower extremities bilaterally\par Coordination: finger-nose-finger intact bilaterally\par Reflexes: 2+ biceps, triceps, brachioradialis, patella, ankle\par Gait: walks with walker

## 2020-08-19 NOTE — ASSESSMENT
[FreeTextEntry1] : 1) Foot pain -- 2/2 severe cramping of feet and toes.  Recommend increasing gabapentin to 300 mg TID and starting Baclofen 10 mg BID.  Agree with referral to Dr. Johnston -- may need Botox injection in the feet to reduce involuntary muscle cramping.\par \par 2) Shaking/tremulousness -- not consistent with neurologic pathology (tremor, seizures).  Most likely 2/2 pain, see plan above.

## 2020-08-19 NOTE — REASON FOR VISIT
[Initial Evaluation] : an initial evaluation [Family Member] : family member [FreeTextEntry1] : foot pain, leg shaking

## 2020-08-19 NOTE — HISTORY OF PRESENT ILLNESS
[FreeTextEntry1] : 35-year-old woman s/o thoracolumbar laminectomy for resection of intramedullary spinal cord tumor by Dr. Bernabe on 6/20/2019 who presents for evaluation of foot pain and leg shaking.\par \par She reports constant severe pain in her right > left foot, due to cramping of her toes and feet.  She does not have pain higher up in the legs or in the back, but does report that her legs shaking when the pain is particularly bad or when she is walking.  She walks with a walker.  Sees a pain management specialist and is currently taking gabapentin 300 mg daily and oxycodone which is helping somewhat but not enough.

## 2020-09-04 ENCOUNTER — APPOINTMENT (OUTPATIENT)
Dept: HEART AND VASCULAR | Facility: CLINIC | Age: 35
End: 2020-09-04
Payer: MEDICAID

## 2020-09-04 VITALS
OXYGEN SATURATION: 99 % | WEIGHT: 134.99 LBS | SYSTOLIC BLOOD PRESSURE: 90 MMHG | HEART RATE: 63 BPM | HEIGHT: 63 IN | BODY MASS INDEX: 23.92 KG/M2 | DIASTOLIC BLOOD PRESSURE: 70 MMHG | TEMPERATURE: 96.4 F

## 2020-09-04 DIAGNOSIS — M79.89 OTHER SPECIFIED SOFT TISSUE DISORDERS: ICD-10-CM

## 2020-09-04 DIAGNOSIS — Z00.00 ENCOUNTER FOR GENERAL ADULT MEDICAL EXAMINATION W/OUT ABNORMAL FINDINGS: ICD-10-CM

## 2020-09-04 PROCEDURE — 99204 OFFICE O/P NEW MOD 45 MIN: CPT

## 2020-09-04 PROCEDURE — 93000 ELECTROCARDIOGRAM COMPLETE: CPT

## 2020-09-04 NOTE — ASSESSMENT
[FreeTextEntry1] : 1. LE edema\par - check labs\par - TTE\par - pt showed video of significant LE edema, nl on exam today\par \par 2. Spina bifida repair\par - f/b Neuro\par \par 3. Spinal tumor s/p resection\par - f/b NeuroSx\par - stable per pt\par \par RTC after

## 2020-09-04 NOTE — REVIEW OF SYSTEMS
[Lower Ext Edema] : lower extremity edema [Limb Weakness (Paresis)] : limb weakness [Muscle Cramps] : muscle cramps [Negative] : Endocrine

## 2020-09-04 NOTE — PHYSICAL EXAM
[General Appearance - Well Developed] : well developed [Normal Appearance] : normal appearance [Well Groomed] : well groomed [General Appearance - Well Nourished] : well nourished [Normal Conjunctiva] : the conjunctiva exhibited no abnormalities [General Appearance - In No Acute Distress] : no acute distress [Normal Oral Mucosa] : normal oral mucosa [Eyelids - No Xanthelasma] : the eyelids demonstrated no xanthelasmas [No Oral Cyanosis] : no oral cyanosis [No Oral Pallor] : no oral pallor [Normal Jugular Venous A Waves Present] : normal jugular venous A waves present [No Jugular Venous Hunter A Waves] : no jugular venous hunter A waves [Normal Jugular Venous V Waves Present] : normal jugular venous V waves present [Murmurs] : no murmurs present [Heart Rate And Rhythm] : heart rate and rhythm were normal [Heart Sounds] : normal S1 and S2 [Exaggerated Use Of Accessory Muscles For Inspiration] : no accessory muscle use [Respiration, Rhythm And Depth] : normal respiratory rhythm and effort [Auscultation Breath Sounds / Voice Sounds] : lungs were clear to auscultation bilaterally [Abdomen Tenderness] : non-tender [Abdomen Soft] : soft [Abdomen Mass (___ Cm)] : no abdominal mass palpated [Nail Clubbing] : no clubbing of the fingernails [Cyanosis, Localized] : no localized cyanosis [FreeTextEntry1] : walks with wheelchair, spina bifida repair [Petechial Hemorrhages (___cm)] : no petechial hemorrhages [Skin Color & Pigmentation] : normal skin color and pigmentation [] : no rash [Skin Lesions] : no skin lesions [No Venous Stasis] : no venous stasis [No Xanthoma] : no  xanthoma was observed [No Skin Ulcers] : no skin ulcer [Oriented To Time, Place, And Person] : oriented to person, place, and time [No Anxiety] : not feeling anxious [Affect] : the affect was normal [Mood] : the mood was normal

## 2020-09-04 NOTE — HISTORY OF PRESENT ILLNESS
[FreeTextEntry1] : 35F w/ h/o spina bifida, spinal CA s/p resection presents today for c/o intermittent LE edema x2 years. + pitting, no excessive Na intake. No recent change in medications. ET 1 block due to both spinal pain as well as the intermittent LE edema. No c/o MENJIVAR, orthopnea. No chest pain , palpitations or syncope. No new herbal supplements/teas. No recent travel, fever, cough or chills. \par \par \par ECG: sinus bradycardia at 58 bpm, nl axis

## 2020-09-09 LAB
ALBUMIN SERPL ELPH-MCNC: 4.7 G/DL
ALP BLD-CCNC: 53 U/L
ALT SERPL-CCNC: 12 U/L
ANION GAP SERPL CALC-SCNC: 14 MMOL/L
AST SERPL-CCNC: 16 U/L
BASOPHILS # BLD AUTO: 0.04 K/UL
BASOPHILS NFR BLD AUTO: 0.5 %
BILIRUB SERPL-MCNC: 1 MG/DL
BUN SERPL-MCNC: 8 MG/DL
CALCIUM SERPL-MCNC: 9.9 MG/DL
CHLORIDE SERPL-SCNC: 104 MMOL/L
CHOLEST SERPL-MCNC: 170 MG/DL
CHOLEST/HDLC SERPL: 3.9 RATIO
CO2 SERPL-SCNC: 24 MMOL/L
CREAT SERPL-MCNC: 0.65 MG/DL
EOSINOPHIL # BLD AUTO: 0.03 K/UL
EOSINOPHIL NFR BLD AUTO: 0.4 %
ESTIMATED AVERAGE GLUCOSE: 97 MG/DL
GLUCOSE SERPL-MCNC: 85 MG/DL
HBA1C MFR BLD HPLC: 5 %
HCT VFR BLD CALC: 41.6 %
HDLC SERPL-MCNC: 44 MG/DL
HGB BLD-MCNC: 13.3 G/DL
IMM GRANULOCYTES NFR BLD AUTO: 0.3 %
LDLC SERPL CALC-MCNC: 109 MG/DL
LYMPHOCYTES # BLD AUTO: 1.72 K/UL
LYMPHOCYTES NFR BLD AUTO: 23.5 %
MAN DIFF?: NORMAL
MCHC RBC-ENTMCNC: 32 GM/DL
MCHC RBC-ENTMCNC: 33.2 PG
MCV RBC AUTO: 103.7 FL
MONOCYTES # BLD AUTO: 0.5 K/UL
MONOCYTES NFR BLD AUTO: 6.8 %
NEUTROPHILS # BLD AUTO: 5.02 K/UL
NEUTROPHILS NFR BLD AUTO: 68.5 %
PLATELET # BLD AUTO: 293 K/UL
POTASSIUM SERPL-SCNC: 4.1 MMOL/L
PROT SERPL-MCNC: 6.7 G/DL
RBC # BLD: 4.01 M/UL
RBC # FLD: 14.7 %
SODIUM SERPL-SCNC: 142 MMOL/L
TRIGL SERPL-MCNC: 88 MG/DL
TSH SERPL-ACNC: 1.28 UIU/ML
WBC # FLD AUTO: 7.33 K/UL

## 2020-09-14 ENCOUNTER — APPOINTMENT (OUTPATIENT)
Dept: HEART AND VASCULAR | Facility: CLINIC | Age: 35
End: 2020-09-14
Payer: MEDICAID

## 2020-09-14 PROCEDURE — 93306 TTE W/DOPPLER COMPLETE: CPT

## 2020-12-07 NOTE — PATIENT PROFILE ADULT - LIVES WITH
Patient seen in anticoagulation clinic. Reviewed past INR and dose with patient.  Patient denies any missed doses of warfarin or medication changes.  Diet and activity consistent.  Reviewed INR goal.  INR at goal.  Will continue current dose of warfarin and recheck in 4 weeks.  Reminded patient to call office with any changes.  INR and dose per Dr KELTON Johnson's protocol.  Onsite provider Dr Jalen Morales.     parent(s)

## 2021-01-03 ENCOUNTER — OUTPATIENT (OUTPATIENT)
Dept: OUTPATIENT SERVICES | Facility: HOSPITAL | Age: 36
LOS: 1 days | End: 2021-01-03
Payer: COMMERCIAL

## 2021-01-03 ENCOUNTER — APPOINTMENT (OUTPATIENT)
Dept: MRI IMAGING | Facility: HOSPITAL | Age: 36
End: 2021-01-03

## 2021-01-03 DIAGNOSIS — D49.7 NEOPLASM OF UNSPECIFIED BEHAVIOR OF ENDOCRINE GLANDS AND OTHER PARTS OF NERVOUS SYSTEM: Chronic | ICD-10-CM

## 2021-01-03 DIAGNOSIS — Z90.49 ACQUIRED ABSENCE OF OTHER SPECIFIED PARTS OF DIGESTIVE TRACT: Chronic | ICD-10-CM

## 2021-01-03 PROCEDURE — 72158 MRI LUMBAR SPINE W/O & W/DYE: CPT | Mod: 26

## 2021-01-03 PROCEDURE — 72157 MRI CHEST SPINE W/O & W/DYE: CPT

## 2021-01-03 PROCEDURE — 72158 MRI LUMBAR SPINE W/O & W/DYE: CPT

## 2021-01-03 PROCEDURE — A9585: CPT

## 2021-01-03 PROCEDURE — 72157 MRI CHEST SPINE W/O & W/DYE: CPT | Mod: 26

## 2021-01-25 ENCOUNTER — APPOINTMENT (OUTPATIENT)
Dept: NEUROSURGERY | Facility: CLINIC | Age: 36
End: 2021-01-25
Payer: MEDICAID

## 2021-01-25 VITALS
SYSTOLIC BLOOD PRESSURE: 103 MMHG | DIASTOLIC BLOOD PRESSURE: 66 MMHG | RESPIRATION RATE: 18 BRPM | BODY MASS INDEX: 23.74 KG/M2 | TEMPERATURE: 99.4 F | OXYGEN SATURATION: 99 % | WEIGHT: 134 LBS | HEART RATE: 80 BPM | HEIGHT: 63 IN

## 2021-01-25 PROCEDURE — 99215 OFFICE O/P EST HI 40 MIN: CPT

## 2021-01-25 PROCEDURE — 99072 ADDL SUPL MATRL&STAF TM PHE: CPT

## 2021-01-25 RX ORDER — BACLOFEN 10 MG/1
10 TABLET ORAL TWICE DAILY
Qty: 60 | Refills: 3 | Status: DISCONTINUED | COMMUNITY
Start: 2020-08-18 | End: 2021-01-25

## 2021-01-25 RX ORDER — DICLOFENAC SODIUM 10 MG/G
1 GEL TOPICAL
Qty: 200 | Refills: 0 | Status: DISCONTINUED | COMMUNITY
Start: 2019-10-03 | End: 2021-01-25

## 2021-01-25 RX ORDER — CYCLOBENZAPRINE HYDROCHLORIDE 7.5 MG/1
TABLET, FILM COATED ORAL
Refills: 0 | Status: ACTIVE | COMMUNITY

## 2021-01-25 NOTE — DATA REVIEWED
[de-identified] : \par  MR Thoracic Spine w/wo IV Cont             Final\par \par No Documents Attached\par \par \par \par \par   EXAM:  MR SPINE LUMBAR WAW IC\par \par EXAM:  MR SPINE THORACIC WAW IC\par \par PROCEDURE DATE:  01/03/2021\par \par \par \par INTERPRETATION:  PROCEDURES:\par MRI thoracic spine with and without contrast\par MRI lumbar spine with and without contrast\par \par INDICATION: Thoracolumbar intradural epidermoid status post prior resection. Surveillance scan.\par \par TECHNIQUE: Sagittal T1, T2, STIR and axial T1 and T2 weighted images of the thoracic were obtained. Following the intravenous administration of 7.5 cc Gadavist, sagittal and axial T1 weighted images of the thoracic spine were obtained.\par \par COMPARISON: 11/29/2019\par \par FINDINGS:\par \par Appearance of the postoperative region is best illustrated on lumbar spine imaging, with smaller field of view and more optimal resolution. Image and series numbers referenced below are from the lumbar spine images.\par \par Compared to 11/29/2019, there is interval progression in size of heterogeneous signal intensity nodule within the dorsal intradural compartment surrounding the cauda equina nerve roots at the L1-2 disc level. For reference, this currently measures 2.0 x 1.2 cm (CC by AP) on sagittal series 11, image 8, when it previously measured 1.9 x 0.7 cm on prior sagittal imaging. On axial images, this measures 1.5 x 1.2 cm (series 9, image 51), previously measuring 1.0 x 0.8 cm in similar planes of measurement. Similar to before, the lesion shows some intrinsic T1 signal hyperintensity without gross contrast enhancement upon comparing sagittal images.\par \par Additional nodule just superior to the growing lesion, at the mid L1 vertebral body and conus medullaris shows more T2 hypointense and T1 hyperintense appearance and shows no significant change since 11/29/2019, measuring up to 10 mm in craniocaudad dimension on sagittal imaging series 11, image 8, and 10 x 9 mm on axial imaging (series 15, image 6). This shows less enhancement than before compatible with involuting surgical change. Similarly, a smaller T2 hyperintense and more T1 intermediate signal and nonenhancing nodule situated at the dorsal epidural compartment at the level of the L2 body, inferior to the enlarging nodule, is stable in size measuring up to 13 mm on the sagittal midline images (series 11, image 9) and spanning 6 mm in width  nerve roots of the proximal cauda equina on axial series 15, image 13. Minimal enhancement may be present along the periphery of these lesions as previously described which is most compatible be evolving postsurgical granulation. This is decreased from before and currently is not readily seen on axial images.\par \par There is no new site of suspicious nodularity in signal or contrast enhancement along the intradural lumbar or thoracic spine. Again, the patient is status post T11-L2 laminectomy with expected involution of postoperative change. There is no extraspinal fluid collection.\par \par Alignment of the thoracic and lumbar spines is unchanged. There is mild levocurvature of the lower thoracic spine as seen on localizer imaging. There is unchanged minimal anterior height loss of the T11 vertebral body with slight kyphotic angulation. No listhesis. There is no interval or recent appearing compression deformity. There is no suspicious site of bone marrow replacement, bony edema signal seen on STIR, nor pathologic osseous enhancement on postcontrast images.\par \par The upper thoracic spinal canal remains widely patent. There is unchanged small left paracentral disc herniation at the T12-L1 level, and additional larger right paracentral disc herniation at L1-2 with superiorly extruded component. The spinal canal is well decompressed at these levels. Aside from T12-L1 and L1-L2, the intervertebral discs of the thoracic lumbar spine appear preserved in height and signal without additional disc herniation. The lumbar and thoracic neural foramina are patent. There is no new thoracic or lumbar spine disc herniation, nor new site of thoracic spinal canal stenosis or neural foraminal narrowing.\par \par The conus medullaris terminates at the L1 level, at site of the most superior T1 hyperintense and T2 hypointense nodule referenced in the first line of the 3rd paragraph above. The postcontrast images show no pathologic intradural spinal canal enhancement. Aside from the conus, the thoracic spinal cord appears normal in size, contour and signal intensity. There is no pathologic enhancement of the thoracic spinal canal. There is unchanged reactive ventral dural enhancement at the T12-L2 levels, perhaps inflammatory in the setting of disc herniations.\par \par \par IMPRESSION:\par \par Compared to November 2019, there is growth of a dorsal intradural focus of heterogeneous signal of mostly T2 hyperintense and T1 mixed signal centered at the L1-2 disc level without masslike enhancement, most consistent with progression of residual epidermoid cyst.\par \par Smaller sites of heterogenous cystic signal abnormality, both above and below the enlarging focus, at the distal conus (mid L1 level) and mid L2 level at the midline splaying the cauda equina, respectively, are unchanged. Otherwise, there is continued involution of postlaminectomy change.\par \par No change in disc degeneration and herniations at T12-L1 and L1-2. No new compression deformity or marrow edema.\par \par \par \par \par Thank you for the opportunity to participate in the care of this patient.\par \par \par ARIANNE SY MD; Attending Radiologist\par This document has been electronically signed. Jan 4 2021  2:21PM\par \par  \par \par  Ordered by: WHITNEY ESCOBAR       Collected/Examined: 03Jan2021 12:02PM       \par Verification Required       Stage: Final       \par  Performed at: [facility]       Resulted: 04Jan2021 02:24PM       Last Updated: 04Jan2021 02:24PM       Accession: [id number]

## 2021-01-25 NOTE — HISTORY OF PRESENT ILLNESS
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 Bernabe in neurosurgical consultation for spinal mass at [facility]. She comes to the appointment today with her mother.\par \par Since her initial surgery, the patient reports severe back pain radiating to RIGHT leg with associated numbness/tingling. She reports difficulty walking and uses cane to ambulate for assistance. Reports imbalance. She states that these symptoms have become progressively worse over the years. Also reports bowel/bladder incontinence and chronic constipation since initial surgery 6 years ago. \par \par MRI thoracic and lumbar spine done on 2/23/19 demonstrates recurrent tumor. 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 Pathology indicated keratinous debris with no cyst lining present.\par \par Postoperatively, she did well. She continues to report bilateral leg weakness (RIGHT greater than LEFT). Follow up MRI of her spine have been negative for recurrence.\par \par She returns today to review MRI thoracic and lumbar spine with and without contrast, done on 12/16/2020, which demonstrates possible residual epidermoid cyst. \par \par At today's visit, Ms. Rowan reports persistent bilateral lower extremity weakness (RIGHT greater than LEFT) as well as muscle spasms in both lower extremities. She states her legs have given out causing her to fall. She uses a walker for assistance with ambulation. Currently reports low back pain radiating to bilateral lower extremities  - she is followed by pain management at [facility] and is currently on oxycodone prn, gabapentin and cyclobenzaprine. \par She reports urinary incontinence, which she had preoperatively.\par \par

## 2021-01-25 NOTE — PHYSICAL EXAM
[General Appearance - Alert] : alert [General Appearance - In No Acute Distress] : in no acute distress [Oriented To Time, Place, And Person] : oriented to person, place, and time [Normal] : normal [Able to toe walk] : the patient was able to toe walk [Sclera] : the sclera and conjunctiva were normal [Neck Appearance] : the appearance of the neck was normal [] : no respiratory distress [Abnormal Walk] : normal gait [Skin Color & Pigmentation] : normal skin color and pigmentation [FreeTextEntry6] : RLE 4/5 strength LLE 5/5

## 2021-01-25 NOTE — REASON FOR VISIT
[Parent] : parent [FreeTextEntry1] : s/p thoracolumbar laminectomy for resection of intramedullary spinal cord tumor on 6/20/19

## 2021-01-25 NOTE — ASSESSMENT
[FreeTextEntry1] : MRI thoracic and lumbar spine done on 1/3/21 reviewed by Dr. 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 Instructed patient to call the office in December 2021 to schedule.\par After imaging complete, return to the office to review imaging.\par Continue physical therapy for muscular strengthening, balance and coordination.\par \par Patient and patient's family verbalize agreement and understanding with plan of care.\par

## 2021-06-07 ENCOUNTER — APPOINTMENT (OUTPATIENT)
Dept: NEUROSURGERY | Facility: CLINIC | Age: 36
End: 2021-06-07
Payer: MEDICAID

## 2021-06-07 VITALS
BODY MASS INDEX: 23.74 KG/M2 | HEART RATE: 96 BPM | DIASTOLIC BLOOD PRESSURE: 69 MMHG | TEMPERATURE: 97.8 F | RESPIRATION RATE: 18 BRPM | WEIGHT: 134 LBS | SYSTOLIC BLOOD PRESSURE: 100 MMHG | OXYGEN SATURATION: 98 % | HEIGHT: 63 IN

## 2021-06-07 PROCEDURE — 99214 OFFICE O/P EST MOD 30 MIN: CPT

## 2021-06-07 RX ORDER — OXYCODONE HYDROCHLORIDE 30 MG/1
TABLET ORAL
Refills: 0 | Status: ACTIVE | COMMUNITY

## 2021-06-07 NOTE — HISTORY OF PRESENT ILLNESS
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 Bernabe in neurosurgical consultation for spinal mass at [facility]. She comes to the appointment today with her mother.\par \par Since her initial surgery, the patient reports severe back pain radiating to RIGHT leg with associated numbness/tingling. She reports difficulty walking and uses cane to ambulate for assistance. Reports imbalance. She states that these symptoms have become progressively worse over the years. Also reports bowel/bladder incontinence and chronic constipation since initial surgery 6 years ago. \par \par MRI thoracic and lumbar spine done on 2/23/19 demonstrates recurrent tumor. 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 Pathology indicated keratinous debris with no cyst lining present.\par \par Postoperatively, she did well. She continues to report bilateral leg weakness (RIGHT greater than LEFT). Follow up MRI of her spine have been negative for recurrence.\par \par She returns today to review MRI thoracic and lumbar spine with and without contrast, done on 12/16/2020, which demonstrates possible residual epidermoid cyst. \par \par She returned for follow up on 1/25/21 and Ms. Rowan reported persistent bilateral lower extremity weakness (RIGHT greater than LEFT) as well as muscle spasms in both lower extremities. She states her legs have given out causing her to fall. She uses a walker for assistance with ambulation. Currently reports low back pain radiating to bilateral lower extremities  - she is followed by pain management at [place] and is currently on oxycodone prn, gabapentin and cyclobenzaprine. \par She reports urinary incontinence, which she had preoperatively.\par \par Returns today for routine follow up after recent hospitalization at [facility] due to worsening lower extremity weakness as well as vomiting bloody emesis. She states vomiting has subsided. She did have repeat MRI thoracic and lumbar spine done at [place], which demonstrates progression of epidermoid tumor.\par \par She reports worsening numbness in bilateral lower extremities as well as weakness of bilateral lower extremities. Uses a walker for assistance with walking. Reports occasional falls. \par \par

## 2021-06-07 NOTE — PHYSICAL EXAM
[General Appearance - Alert] : alert [General Appearance - In No Acute Distress] : in no acute distress [Well-Healed] : well-healed [Oriented To Time, Place, And Person] : oriented to person, place, and time [No Visual Abnormalities] : no visible abnormailities [Sclera] : the sclera and conjunctiva were normal [Outer Ear] : the ears and nose were normal in appearance [Neck Appearance] : the appearance of the neck was normal [] : no respiratory distress [Skin Color & Pigmentation] : normal skin color and pigmentation

## 2021-06-07 NOTE — ASSESSMENT
[FreeTextEntry1] : MRI thoracic and lumbar spine done in May 2021 reviewed by Dr. Bernabe, demonstrates slight increase in residual epidermoid tumor.\par Discussed that patient will likely need repeat surgical resection. Due to high risk of complications with repeat surgery as well as minimal increase in residual tumor, do not recommend surgical resection at this point.\par Recommend repeat MRI thoracic and lumbar spine with and without contrast in January 2022.\par After imaging complete, return to the office to review imaging with Dr. Bernabe.\par \par Patient verbalizes agreement and understanding with plan of care.\par \par I, Dr. Bernabe, personally performed the evaluation and management (E/M) services for this established patient who presents today with (a) new problem(s)/exacerbation of (an) existing condition(s).  That E/M includes conducting the examination, assessing all new/exacerbated conditions, and establishing a new plan of care.  Today, my ACP, Zonia Brizuela, was here to observe my evaluation and management services for this new problem/exacerbated condition to be followed going forward.\par \par -------------------------------------------------\par Number and Complexity of Problems: Moderate - worsening lower extremity weakness, unsteady gait r/t recurrent epidermoid tumor\par \par Amount/Complexity of Data Reviewed/Analyzed: moderate - Dr. Bernabe independently reviewed and interpreted MRI thoracic and lumbar spine from 5/2021\par \par Risk of Complications and/or Morbidity or Mortality of Patient Management: Moderate - Discussed likely need for surgery for resection at length including risks, benefits and alternatives to surgery; will hold off on surgery at this point and repeat imaging in 6 months prior to finalizing surgical plan\par

## 2022-01-28 ENCOUNTER — APPOINTMENT (OUTPATIENT)
Dept: NEUROSURGERY | Facility: CLINIC | Age: 37
End: 2022-01-28
Payer: MEDICAID

## 2022-01-28 VITALS
RESPIRATION RATE: 18 BRPM | DIASTOLIC BLOOD PRESSURE: 68 MMHG | HEIGHT: 63 IN | SYSTOLIC BLOOD PRESSURE: 104 MMHG | OXYGEN SATURATION: 98 % | BODY MASS INDEX: 26.58 KG/M2 | HEART RATE: 80 BPM | WEIGHT: 150 LBS | TEMPERATURE: 97 F

## 2022-01-28 PROCEDURE — 99215 OFFICE O/P EST HI 40 MIN: CPT

## 2022-01-28 RX ORDER — OXYCODONE 5 MG/1
5 TABLET ORAL
Qty: 14 | Refills: 0 | Status: ACTIVE | COMMUNITY
Start: 2022-01-28 | End: 1900-01-01

## 2022-01-29 NOTE — HISTORY OF PRESENT ILLNESS
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 Bernabe in neurosurgical consultation for spinal mass at [facility]. \par \par Since her initial surgery, the patient reports severe back pain radiating to RIGHT leg with associated numbness/tingling. She reports difficulty walking and uses cane to ambulate for assistance. Reports imbalance. She states that these symptoms have become progressively worse over the years. Also reports bowel/bladder incontinence and chronic constipation since initial surgery 6 years ago. \par \par MRI thoracic and lumbar spine done on 2/23/19 demonstrates recurrent tumor. 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 Pathology indicated keratinous debris with no cyst lining present.\par \par Postoperatively, she did well. She continues to report bilateral leg weakness (RIGHT greater than LEFT). Follow up MRI of her spine have been negative for recurrence.\par \par MRI thoracic and lumbar spine with and without contrast, done on 12/16/2020, which demonstrates possible residual epidermoid cyst. \par \par She returned for follow up on 1/25/21 and Ms. Rowan reported persistent bilateral lower extremity weakness (RIGHT greater than LEFT) as well as muscle spasms in both lower extremities. She states her legs have given out causing her to fall. She uses a walker for assistance with ambulation. Currently reports low back pain radiating to bilateral lower extremities - she is followed by pain management at [place] and is currently on oxycodone prn, gabapentin and cyclobenzaprine. \par She reports urinary incontinence, which she had preoperatively.\par \par She came back for routine follow up after recent hospitalization at [facility] due to worsening lower extremity weakness as well as vomiting bloody emesis. She states vomiting has subsided. She did have repeat MRI thoracic and lumbar spine done at [place], which demonstrates progression of epidermoid tumor.\par \par She reported worsening numbness in bilateral lower extremities as well as weakness of bilateral lower extremities. Uses a walker for assistance with walking. Reports occasional falls. \par \par MRI thoracic and lumbar spine done in May 2021 reviewed by Dr. Bernabe, demonstrates slight increase in residual epidermoid tumor. On her last visit in June 2021, discussed that patient will likely need repeat surgical resection. Due to high risk of complications with repeat surgery as well as minimal increase in residual tumor, do not recommend surgical resection at this point.\par Recommend repeat MRI thoracic and lumbar spine with and without contrast in January 2022.\par After imaging complete, return to the office to review imaging with Dr. Bernabe.\par \par She went to [facility] ED yesterday, 1/27/22 for worsening back pain and bilateral leg "shaking".  Happens almost everyday, per patient and sister. She currently denies leg numbness but reports numbness when legs are "shaking".  She is not able to ambulate far, uses rolling walker. She was given Morphine for pain which helped with pain and aid with ambulation. She is currently on Gabapentin, Flexeril, Oxycontin with moderate pain control. She is wearing diapers for bowel incontinence, which is baseline.\par \par She did not get Jan 2022 MRI thoracic.

## 2022-01-29 NOTE — PHYSICAL EXAM
[General Appearance - Alert] : alert [General Appearance - In No Acute Distress] : in no acute distress [Oriented To Time, Place, And Person] : oriented to person, place, and time [Impaired Insight] : insight and judgment were intact [Cranial Nerves Optic (II)] : visual acuity intact bilaterally,  pupils equal round and reactive to light [Cranial Nerves Oculomotor (III)] : extraocular motion intact [Cranial Nerves Facial (VII)] : face symmetrical [Cranial Nerves Vestibulocochlear (VIII)] : hearing was intact bilaterally [Cranial Nerves Glossopharyngeal (IX)] : tongue and palate midline [Cranial Nerves Accessory (XI - Cranial And Spinal)] : head turning and shoulder shrug symmetric [Cranial Nerves Hypoglossal (XII)] : there was no tongue deviation with protrusion [Abnormal Walk] : normal gait [Neck Appearance] : the appearance of the neck was normal [] : no respiratory distress [Respiration, Rhythm And Depth] : normal respiratory rhythm and effort [Motor Strength] : muscle strength was normal in all four extremities [Balance] : balance was intact [No Visual Abnormalities] : no visible abnormailities [Non- Antalgic] : non-antalgic [FreeTextEntry6] : 5/5 on all four extremities [FreeTextEntry8] : Walks with walker

## 2022-01-29 NOTE — REVIEW OF SYSTEMS
[Negative] : Respiratory [Diarrhea] : diarrhea [As Noted in HPI] : as noted in HPI [FreeTextEntry7] : incontinence

## 2022-01-29 NOTE — ADDENDUM
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 Small progression of tumor noted on MRI T-/L-spine last year at conus level. 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 Suspect continued tumor progression. I discussed with patient that she also has spinal deformity associated multilevel thoraco-lumbar laminectoomies. Will discuss possible repeat resection +/- spinal fusion depending on MRI T-/L- spine+/-C findings. Return after MRI complete.\par \par Patrick Bernabe M.D.

## 2022-01-29 NOTE — ASSESSMENT
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 Small progression of tumor noted on MRI T-/L-spine last year at conus level. 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 Suspect continued tumor progression. Dr. Bernabe discussed with patient that she also has spinal deformity associated multilevel thoraco-lumbar laminectoomies. Will discuss possible repeat resection +/- spinal fusion depending on MRI T-/L- spine+/-C findings. Return after MRI complete.\par \par \par Plan:\par 1. MRI lumbar/thoracic w/wo con and RTO to review it.\par 2. Go to [facility] ED for worsening pain.\par \par \par \par I, Dr. Bernabe, personally performed the evaluation and management (E/M) services for this established patient who presents today with (a) new problem(s)/exacerbation of (an) existing condition(s). That E/M includes conducting the examination, assessing all new/exacerbated conditions, and establishing a new plan of care. Today, my ACP, Tamra Mayorga, was here to observe my evaluation and management services for this new problem/exacerbated condition to be followed going forward.\par \par

## 2022-02-12 ENCOUNTER — RESULT REVIEW (OUTPATIENT)
Age: 37
End: 2022-02-12

## 2022-02-12 ENCOUNTER — APPOINTMENT (OUTPATIENT)
Dept: MRI IMAGING | Facility: HOSPITAL | Age: 37
End: 2022-02-12

## 2022-02-12 ENCOUNTER — OUTPATIENT (OUTPATIENT)
Dept: OUTPATIENT SERVICES | Facility: HOSPITAL | Age: 37
LOS: 1 days | End: 2022-02-12
Payer: COMMERCIAL

## 2022-02-12 DIAGNOSIS — D49.7 NEOPLASM OF UNSPECIFIED BEHAVIOR OF ENDOCRINE GLANDS AND OTHER PARTS OF NERVOUS SYSTEM: Chronic | ICD-10-CM

## 2022-02-12 DIAGNOSIS — Z90.49 ACQUIRED ABSENCE OF OTHER SPECIFIED PARTS OF DIGESTIVE TRACT: Chronic | ICD-10-CM

## 2022-02-12 PROCEDURE — 72157 MRI CHEST SPINE W/O & W/DYE: CPT

## 2022-02-12 PROCEDURE — 72158 MRI LUMBAR SPINE W/O & W/DYE: CPT

## 2022-02-12 PROCEDURE — A9585: CPT

## 2022-02-12 PROCEDURE — 72158 MRI LUMBAR SPINE W/O & W/DYE: CPT | Mod: 26

## 2022-02-12 PROCEDURE — 72157 MRI CHEST SPINE W/O & W/DYE: CPT | Mod: 26

## 2022-02-14 ENCOUNTER — APPOINTMENT (OUTPATIENT)
Dept: NEUROSURGERY | Facility: CLINIC | Age: 37
End: 2022-02-14
Payer: MEDICAID

## 2022-02-14 ENCOUNTER — NON-APPOINTMENT (OUTPATIENT)
Age: 37
End: 2022-02-14

## 2022-02-14 VITALS
OXYGEN SATURATION: 97 % | DIASTOLIC BLOOD PRESSURE: 78 MMHG | WEIGHT: 150 LBS | RESPIRATION RATE: 18 BRPM | SYSTOLIC BLOOD PRESSURE: 113 MMHG | TEMPERATURE: 97 F | HEIGHT: 63 IN | BODY MASS INDEX: 26.58 KG/M2 | HEART RATE: 93 BPM

## 2022-02-14 DIAGNOSIS — Z87.891 PERSONAL HISTORY OF NICOTINE DEPENDENCE: ICD-10-CM

## 2022-02-14 PROCEDURE — 99215 OFFICE O/P EST HI 40 MIN: CPT

## 2022-02-17 ENCOUNTER — APPOINTMENT (OUTPATIENT)
Dept: MRI IMAGING | Facility: HOSPITAL | Age: 37
End: 2022-02-17

## 2022-02-20 NOTE — REVIEW OF SYSTEMS
[Leg Weakness] : leg weakness [Numbness] : numbness [Tingling] : tingling [Difficulty Walking] : difficulty walking [Incontinence] : incontinence [As Noted in HPI] : as noted in HPI [Fever] : no fever [Chills] : no chills [Chest Pain] : no chest pain [Palpitations] : no palpitations [Shortness Of Breath] : no shortness of breath [Cough] : no cough [SOB on Exertion] : no shortness of breath during exertion

## 2022-02-20 NOTE — DATA REVIEWED
[de-identified] : \par ACC: [id number] EXAM: MR SPINE LUMBAR WAW IC\par ACC: [id number] EXAM: MR SPINE THORACIC WAW IC\par \par PROCEDURE DATE: 02/12/2022\par \par \par \par INTERPRETATION: PROCEDURE: MRI thoracic and spine with and without contrast\par \par INDICATION: Epidermoid tumor status post surgery for recurrence x 2\par \par TECHNIQUE: Sagittal T1, T2, STIR and axial T1 and T2 weighted images of the thoracic and lumbar spine were obtained. Following the intravenous administration of 7 ml of Gadavist, sagittal and axial T1 weighted images of the thoracic and spine were obtained.\par \par COMPARISON: MRI's thoracic and lumbar spine 1/4/2021 and 12/2/2019\par \par FINDINGS: The MRI examination demonstrates a kyphotic deformity of the thoracolumbar spine centered at the T12/L1 level. There is loss of intervertebral disc space height especially anteriorly at the T11/12, T12/L1 and L1/2 levels. There is anterior bridging osteophytes at T12/L1 and L1/2. The rest of the vertebral body heights and intervertebral disc spaces are maintained.\par \par The patient is status post laminectomy at the T11 through the L2 levels for previous resection of spinal canal dermoid. There are postsurgical changes within the paraspinal soft tissues. There is a heterogeneous mass extending from the conus to the L2 level. The lesion demonstrates predominantly hyperintense signal on the T1-weighted images with curvilinear areas of hypointense T2 signal. Evaluation for enhancement is limited given the intrinsic hyperintense signal on the precontrast T1-weighted images. There is slight peripheral enhancement along its margins especially inferiorly. Overall, craniocaudad dimension of the mass measures approximately 5 cm (series 6 image 8) and has not significantly changed. However, there has been increase in the width and AP dimension which now at the L1/2 level measures approximately 2.0 x 2.0 cm whereas it previously measured approximately 1.8 x 1.1 cm (series 7 image 10 versus series 15 image 9). There also is interval development of hyperintense T2 signal along its superior margin at the conus (series 4 image 8 and series 7 image 5). The remaining nerve roots are adherent to the thecal sac resulting in an empty thecal sac and and is suggestive of arachnoiditis.\par \par At the T12/L1 level, there is a large central disc herniation with extruded disc material extending superiorly and inferiorly behind the T12 and L1 endplates and indenting on the thecal sac. There is no neural foramen narrowing.\par \par At the L1/2 level, there is large right right paracentral disc herniation with extruded disc material extending superiorly and inferiorly behind the L1 and L2 endplates and indenting on the thecal sac. There is no neural foramen narrowing.\par \par The remaining levels demonstrate no disc herniation, spinal canal stenosis or neural foramen narrowing.\par \par Evaluation of the kidneys demonstrates a approximately 8mm cyst within the right kidney.\par \par IMPRESSION: Interval progression of tumoral disease extending from the conus to the L2 level.\par \par --- End of Report ---\par

## 2022-02-20 NOTE — ASSESSMENT
[FreeTextEntry1] : MRI thoracic and lumbar spine w/wo contrast 2/12/22 reviewed by Dr. Bernabe with patient, demonstrates tumor progression.\par \par After detailed imaging review and patient examination, Dr. Bernabe discussed surgical intervention with patient, T12- L3 laminectomy for resection of intradural extramedullary tumor. \par Potential surgical risks vs benefits and alternatives discussed with time allotted for questions and answers given. \par \par Patient verbalizes understanding and wishes to proceed with surgical intervention. \par \par Date of surgery:3/16/22\par Pre-op surgical packet and medical clearance packets reviewed and given to patient.\par Patient in understanding that covid nasal pcr test must be completed 3 days prior to surgery at [facility]. Contact phone number for testing centers provided.\par \par Patient verbalizes agreement and understanding with plan of care. \par \par I, Dr. Bernabe, personally performed the evaluation and management (E/M) services for this established patient who presents today with (a) new problem(s)/exacerbation of (an) existing condition(s). That E/M includes conducting the examination, assessing all new/exacerbated conditions, and establishing a new plan of care. Today, my ACP, Lynette Bragg, was here to observe my evaluation and management services for this new problem/exacerbated condition to be followed going forward.\par \par

## 2022-02-20 NOTE — HISTORY OF PRESENT ILLNESS
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 She reports difficulty walking and uses cane to ambulate for assistance. Reports imbalance. She states that these symptoms have become progressively worse over the years. Also reports bowel/bladder incontinence and chronic constipation since initial surgery 6 years ago. \par \par MRI thoracic and lumbar spine done on 2/23/19 demonstrates recurrent tumor. 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 Pathology indicated keratinous debris with no cyst lining present.\par \par Postoperatively, she did well. She continued to report bilateral leg weakness (RIGHT greater than LEFT). Follow up MRI of her spine negative for recurrence.\par \par MRI thoracic and lumbar spine with and without contrast, done on 12/16/2020, which demonstrated possible residual epidermoid cyst. \par \par She returned for follow up on 1/25/21 and Ms. Rowan reported persistent bilateral lower extremity weakness (RIGHT greater than LEFT) as well as muscle spasms in both lower extremities. She states her legs have given out causing her to fall. She uses a walker for assistance with ambulation. Currently reports low back pain radiating to bilateral lower extremities - she is followed by pain management at [place] and is currently on oxycodone prn, gabapentin and cyclobenzaprine. \par She reports urinary incontinence, which she had preoperatively.\par MRI thoracic and lumbar spine done on 1/3/21 reviewed [address] Dr. 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 The patient's neurological symptoms are stable.\par Recommended repeat MRI thoracic and lumbar spine in one year (January 2022)\par \par 6/7/21 She came back for routine follow up after recent hospitalization at [facility] due to worsening lower extremity weakness as well as vomiting bloody emesis. She states vomiting has subsided. She did have repeat MRI thoracic and lumbar spine done at [place], which demonstrates progression of epidermoid tumor.\par She reported worsening numbness in bilateral lower extremities as well as weakness of bilateral lower extremities. Uses a walker for assistance with walking. Reports occasional falls. \par \par MRI thoracic and lumbar spine done in May 2021 demonstrated slight increase in residual epidermoid tumor. Due to high risk of complications with repeat surgery as well as minimal increase in residual tumor, surgical resection not recommend. Plan made to repeat MRI thoracic and lumbar spine with and without contrast in one year.\par \par Patient presented to office 1/28/22:\par She went to [facility] ED 1/27/22 for worsening back pain and bilateral leg "shaking". Happens almost everyday, per patient and sister. She currently denies leg numbness but reports numbness when legs are "shaking". She is not able to ambulate far, uses rolling walker. She was given Morphine for pain which helped with pain and aid with ambulation. She is currently on Gabapentin, Flexeril, Oxycontin with moderate pain control. She is wearing diapers for bowel incontinence, which is baseline.\par She did not get Jan 2022 MRI thoracic. \par Recommended MRI lumbar and thoracic spine w/wo contrast; RTC after completion to review. \par \par TODAY:\par Patient presents after completing MRI thoracic and lumbar spine w/wo contrast 2/12/22. She endorses continued worsening back pain that radiates down bilateral legs, now with numbness/ tingling to feet and toes bilaterally. Denies falls. \par Continues incontinence which is not worse than previously. \par

## 2022-02-20 NOTE — ADDENDUM
[FreeTextEntry1] : Patient presents with worsening myelopathy, spastic gait, curling of toes, back pain. Baseline urinary/fecal incontinence. 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 Discussed need for resection of recurrent tumor. Risks including but not limited to CSF leak, infection, need for re-operation, paralysis, spinal instability discussed. Patient understands that only a subtotal resection is likely due to insinuation of the tumor in the conus and in the cauda equina. She is aware that she is high risk for CSF leak and other complications given that she has had 4 surgeries in this area previously. Plan for T12-L3 laminectomy and intradural tumor resection.\par \par Patrick Bernabe M.D.

## 2022-02-20 NOTE — PHYSICAL EXAM
[General Appearance - Alert] : alert [General Appearance - Well-Appearing] : healthy appearing [Oriented To Time, Place, And Person] : oriented to person, place, and time [Impaired Insight] : insight and judgment were intact [Affect] : the affect was normal [Memory Recent] : recent memory was not impaired [Place] : oriented to place [Person] : oriented to person [Time] : oriented to time [Short Term Intact] : short term memory intact [Motor Tone] : muscle tone was normal in all four extremities [Sensation Tactile Decrease] : light touch was intact [Neck Appearance] : the appearance of the neck was normal [] : no respiratory distress [Heart Rate And Rhythm] : heart rate was normal and rhythm regular [Skin Color & Pigmentation] : normal skin color and pigmentation [FreeTextEntry1] : normal gait with rollator assistance

## 2022-03-15 ENCOUNTER — TRANSCRIPTION ENCOUNTER (OUTPATIENT)
Age: 37
End: 2022-03-15

## 2022-03-15 VITALS
DIASTOLIC BLOOD PRESSURE: 68 MMHG | TEMPERATURE: 98 F | RESPIRATION RATE: 18 BRPM | OXYGEN SATURATION: 96 % | WEIGHT: 158.07 LBS | SYSTOLIC BLOOD PRESSURE: 102 MMHG | HEIGHT: 63 IN | HEART RATE: 105 BPM

## 2022-03-15 DIAGNOSIS — Z01.818 ENCOUNTER FOR OTHER PREPROCEDURAL EXAMINATION: ICD-10-CM

## 2022-03-15 RX ORDER — INFLUENZA VIRUS VACCINE 15; 15; 15; 15 UG/.5ML; UG/.5ML; UG/.5ML; UG/.5ML
0.5 SUSPENSION INTRAMUSCULAR ONCE
Refills: 0 | Status: DISCONTINUED | OUTPATIENT
Start: 2022-03-16 | End: 2022-03-22

## 2022-03-15 RX ORDER — TRAZODONE HCL 50 MG
1 TABLET ORAL
Qty: 0 | Refills: 0 | DISCHARGE

## 2022-03-15 NOTE — PATIENT PROFILE ADULT - FALL HARM RISK - HARM RISK INTERVENTIONS
Assistance with ambulation/Assistance OOB with selected safe patient handling equipment/Communicate Risk of Fall with Harm to all staff/Discuss with provider need for PT consult/Monitor gait and stability/Provide patient with walking aids - walker, cane, crutches/Reinforce activity limits and safety measures with patient and family/Sit up slowly, dangle for a short time, stand at bedside before walking/Tailored Fall Risk Interventions/Use of alarms - bed, chair and/or voice tab/Visual Cue: Yellow wristband and red socks/Bed in lowest position, wheels locked, appropriate side rails in place/Call bell, personal items and telephone in reach/Instruct patient to call for assistance before getting out of bed or chair/Non-slip footwear when patient is out of bed/[place] to call system/Physically safe environment - no spills, clutter or unnecessary equipment/Purposeful Proactive Rounding/Room/bathroom lighting operational, light cord in reach

## 2022-03-16 ENCOUNTER — RESULT REVIEW (OUTPATIENT)
Age: 37
End: 2022-03-16

## 2022-03-16 ENCOUNTER — INPATIENT (INPATIENT)
Facility: HOSPITAL | Age: 37
LOS: 5 days | Discharge: NURSING, REHAB OR LONG-TERM CARE | DRG: 29 | End: 2022-03-22
Attending: NEUROLOGICAL SURGERY | Admitting: NEUROLOGICAL SURGERY
Payer: COMMERCIAL

## 2022-03-16 ENCOUNTER — APPOINTMENT (OUTPATIENT)
Dept: NEUROSURGERY | Facility: HOSPITAL | Age: 37
End: 2022-03-16

## 2022-03-16 DIAGNOSIS — Z98.890 OTHER SPECIFIED POSTPROCEDURAL STATES: Chronic | ICD-10-CM

## 2022-03-16 DIAGNOSIS — Z90.49 ACQUIRED ABSENCE OF OTHER SPECIFIED PARTS OF DIGESTIVE TRACT: Chronic | ICD-10-CM

## 2022-03-16 DIAGNOSIS — D49.7 NEOPLASM OF UNSPECIFIED BEHAVIOR OF ENDOCRINE GLANDS AND OTHER PARTS OF NERVOUS SYSTEM: Chronic | ICD-10-CM

## 2022-03-16 LAB
BASE EXCESS BLDA CALC-SCNC: -7 MMOL/L — LOW (ref -2–3)
BLD GP AB SCN SERPL QL: NEGATIVE — SIGNIFICANT CHANGE UP
CA-I BLDA-SCNC: 1.33 MMOL/L — SIGNIFICANT CHANGE UP (ref 1.15–1.33)
CO2 BLDA-SCNC: 18 MMOL/L — LOW (ref 19–24)
COHGB MFR BLDA: 1.3 % — SIGNIFICANT CHANGE UP
GLUCOSE BLDA-MCNC: 100 MG/DL — HIGH (ref 70–99)
HCO3 BLDA-SCNC: 18 MMOL/L — LOW (ref 21–28)
HGB BLDA-MCNC: 10.5 G/DL — LOW (ref 11.7–16.1)
METHGB MFR BLDA: 1.3 % — SIGNIFICANT CHANGE UP
OXYHGB MFR BLDA: 97 % — HIGH (ref 90–95)
PCO2 BLDA: 31 MMHG — LOW (ref 32–35)
PH BLDA: 7.36 — SIGNIFICANT CHANGE UP (ref 7.35–7.45)
PO2 BLDA: 243 MMHG — HIGH (ref 83–108)
POTASSIUM BLDA-SCNC: 4.3 MMOL/L — SIGNIFICANT CHANGE UP (ref 3.5–5.1)
RH IG SCN BLD-IMP: POSITIVE — SIGNIFICANT CHANGE UP
SAO2 % BLDA: 99.6 % — HIGH (ref 94–98)
SODIUM BLDA-SCNC: 141 MMOL/L — SIGNIFICANT CHANGE UP (ref 136–145)

## 2022-03-16 PROCEDURE — 63287 BX/EXC IDRL IMED LESN THRLMB: CPT

## 2022-03-16 PROCEDURE — 69990 MICROSURGERY ADD-ON: CPT

## 2022-03-16 PROCEDURE — 88307 TISSUE EXAM BY PATHOLOGIST: CPT | Mod: 26

## 2022-03-16 DEVICE — DURASEAL: Type: IMPLANTABLE DEVICE | Status: FUNCTIONAL

## 2022-03-16 DEVICE — SURGIFLO HEMOSTATIC MATRIX KIT: Type: IMPLANTABLE DEVICE | Status: FUNCTIONAL

## 2022-03-16 DEVICE — SURGIFOAM PAD 8CM X 12.5CM X 10MM (100): Type: IMPLANTABLE DEVICE | Status: FUNCTIONAL

## 2022-03-16 DEVICE — COLLAGEN DURAMATRIX ONLAY 4X5IN: Type: IMPLANTABLE DEVICE | Status: FUNCTIONAL

## 2022-03-16 RX ORDER — APREPITANT 80 MG/1
40 CAPSULE ORAL ONCE
Refills: 0 | Status: COMPLETED | OUTPATIENT
Start: 2022-03-16 | End: 2022-03-16

## 2022-03-16 RX ORDER — POVIDONE-IODINE 5 %
1 AEROSOL (ML) TOPICAL ONCE
Refills: 0 | Status: DISCONTINUED | OUTPATIENT
Start: 2022-03-16 | End: 2022-03-17

## 2022-03-16 RX ORDER — CHLORHEXIDINE GLUCONATE 213 G/1000ML
1 SOLUTION TOPICAL
Refills: 0 | Status: DISCONTINUED | OUTPATIENT
Start: 2022-03-16 | End: 2022-03-16

## 2022-03-16 RX ORDER — CELECOXIB 200 MG/1
200 CAPSULE ORAL ONCE
Refills: 0 | Status: COMPLETED | OUTPATIENT
Start: 2022-03-16 | End: 2022-03-16

## 2022-03-16 RX ORDER — ACETAMINOPHEN 500 MG
1000 TABLET ORAL ONCE
Refills: 0 | Status: COMPLETED | OUTPATIENT
Start: 2022-03-16 | End: 2022-03-16

## 2022-03-16 RX ORDER — GABAPENTIN 400 MG/1
300 CAPSULE ORAL ONCE
Refills: 0 | Status: COMPLETED | OUTPATIENT
Start: 2022-03-16 | End: 2022-03-16

## 2022-03-16 RX ADMIN — Medication 1000 MILLIGRAM(S): at 11:29

## 2022-03-16 RX ADMIN — GABAPENTIN 300 MILLIGRAM(S): 400 CAPSULE ORAL at 11:29

## 2022-03-16 RX ADMIN — CELECOXIB 200 MILLIGRAM(S): 200 CAPSULE ORAL at 11:28

## 2022-03-16 RX ADMIN — CHLORHEXIDINE GLUCONATE 1 APPLICATION(S): 213 SOLUTION TOPICAL at 11:23

## 2022-03-16 RX ADMIN — APREPITANT 40 MILLIGRAM(S): 80 CAPSULE ORAL at 11:28

## 2022-03-16 NOTE — H&P ADULT - NSHPPHYSICALEXAM_GEN_ALL_CORE
NEUROLOGIC: CN II-XII intact; PERRL, EOMI. Facial sensation intact b/l, face symmetric. Hearing intact bilaterally, tongue protrudes midline. Shoulder shrug strong bilaterally. 5/5 strength in all major muscle groups in bilateral upper and lower extremities, sensation intact to light touch throughout. 2+ reflexes throughout; Babinski down b/l; No clonus; No Hoffmans    CONSTITUTIONAL: No apparent distress, appears stated age  EYES: No conjunctival or scleral injection, non-icteric  ENMT: Oral mucosa with moist membranes  NECK: Supple, symmetric   RESPIRATORY: No respiratory distress, satting well on RA  CARDIOVASCULAR: Vitals WNL, appears well perfused  GASTROINTESTINAL: Soft, non tender, non distended  PSYCHIATRIC: Appropriate insight/judgment

## 2022-03-16 NOTE — H&P ADULT - ASSESSMENT
36F w/hx of spina bifida, depression, and spinal epidermoid tumor s/p multiple resections, most recently in 2019. She has had progressively worsening back pain that radiates into her legs (R>L) as well as numbness in her feet. She has had urinary incontinence as well for the majority of her life, for which she self-catheterizes. MRI demonstrates recurrence of intradural lesion for which she has consented for revision resection today.    Plan:  - OR today for revision laminectomies for resection of recurrent lesion  - Michel to be placed intra-op and will remain in after surgery  - ICU post-op for which the patient will be kept flat in bed  - Pain management post-op

## 2022-03-16 NOTE — H&P ADULT - NSICDXPASTSURGICALHX_GEN_ALL_CORE_FT
PAST SURGICAL HISTORY:  History of cholecystectomy     History of eye surgery muscle repair    Spinal cord tumor

## 2022-03-16 NOTE — BRIEF OPERATIVE NOTE - NSICDXBRIEFPROCEDURE_GEN_ALL_CORE_FT
PROCEDURES:  Laminectomy, spine, thoracolumbar, 4 levels, posterior approach 16-Mar-2022 23:47:37  Mckinley Poole

## 2022-03-16 NOTE — H&P ADULT - HISTORY OF PRESENT ILLNESS
36F w/hx of spina bifida, depression, and spinal epidermoid tumor s/p multiple resections, most recently in 2019. She has had progressively worsening back pain that radiates into her legs (R>L) as well as numbness in her feet. She has had urinary incontinence as well for the majority of her life, for which she self-catheterizes. MRI demonstrates recurrence of intradural lesion for which she has consented for revision resection today.

## 2022-03-16 NOTE — H&P ADULT - NSICDXPASTMEDICALHX_GEN_ALL_CORE_FT
PAST MEDICAL HISTORY:  Bladder disorder pt self catheterizes    Depression     Insomnia     Tumor spinal

## 2022-03-17 LAB
A1C WITH ESTIMATED AVERAGE GLUCOSE RESULT: 5.1 % — SIGNIFICANT CHANGE UP (ref 4–5.6)
ANION GAP SERPL CALC-SCNC: 8 MMOL/L — SIGNIFICANT CHANGE UP (ref 5–17)
ANION GAP SERPL CALC-SCNC: 9 MMOL/L — SIGNIFICANT CHANGE UP (ref 5–17)
BASOPHILS # BLD AUTO: 0 K/UL — SIGNIFICANT CHANGE UP (ref 0–0.2)
BASOPHILS NFR BLD AUTO: 0 % — SIGNIFICANT CHANGE UP (ref 0–2)
BUN SERPL-MCNC: 7 MG/DL — SIGNIFICANT CHANGE UP (ref 7–23)
BUN SERPL-MCNC: 7 MG/DL — SIGNIFICANT CHANGE UP (ref 7–23)
CALCIUM SERPL-MCNC: 7.7 MG/DL — LOW (ref 8.4–10.5)
CALCIUM SERPL-MCNC: 8.5 MG/DL — SIGNIFICANT CHANGE UP (ref 8.4–10.5)
CHLORIDE SERPL-SCNC: 110 MMOL/L — HIGH (ref 96–108)
CHLORIDE SERPL-SCNC: 115 MMOL/L — HIGH (ref 96–108)
CO2 SERPL-SCNC: 19 MMOL/L — LOW (ref 22–31)
CO2 SERPL-SCNC: 20 MMOL/L — LOW (ref 22–31)
CREAT SERPL-MCNC: 0.54 MG/DL — SIGNIFICANT CHANGE UP (ref 0.5–1.3)
CREAT SERPL-MCNC: 0.63 MG/DL — SIGNIFICANT CHANGE UP (ref 0.5–1.3)
EGFR: 117 ML/MIN/1.73M2 — SIGNIFICANT CHANGE UP
EGFR: 122 ML/MIN/1.73M2 — SIGNIFICANT CHANGE UP
EOSINOPHIL # BLD AUTO: 0 K/UL — SIGNIFICANT CHANGE UP (ref 0–0.5)
EOSINOPHIL NFR BLD AUTO: 0 % — SIGNIFICANT CHANGE UP (ref 0–6)
ESTIMATED AVERAGE GLUCOSE: 100 MG/DL — SIGNIFICANT CHANGE UP (ref 68–114)
GIANT PLATELETS BLD QL SMEAR: PRESENT — SIGNIFICANT CHANGE UP
GLUCOSE SERPL-MCNC: 118 MG/DL — HIGH (ref 70–99)
GLUCOSE SERPL-MCNC: 127 MG/DL — HIGH (ref 70–99)
HCT VFR BLD CALC: 34.4 % — LOW (ref 34.5–45)
HCT VFR BLD CALC: 34.5 % — SIGNIFICANT CHANGE UP (ref 34.5–45)
HGB BLD-MCNC: 11.2 G/DL — LOW (ref 11.5–15.5)
HGB BLD-MCNC: 11.7 G/DL — SIGNIFICANT CHANGE UP (ref 11.5–15.5)
LYMPHOCYTES # BLD AUTO: 0.22 K/UL — LOW (ref 1–3.3)
LYMPHOCYTES # BLD AUTO: 2.6 % — LOW (ref 13–44)
MAGNESIUM SERPL-MCNC: 1.5 MG/DL — LOW (ref 1.6–2.6)
MANUAL SMEAR VERIFICATION: SIGNIFICANT CHANGE UP
MCHC RBC-ENTMCNC: 31.5 PG — SIGNIFICANT CHANGE UP (ref 27–34)
MCHC RBC-ENTMCNC: 32.6 GM/DL — SIGNIFICANT CHANGE UP (ref 32–36)
MCHC RBC-ENTMCNC: 32.9 PG — SIGNIFICANT CHANGE UP (ref 27–34)
MCHC RBC-ENTMCNC: 33.9 GM/DL — SIGNIFICANT CHANGE UP (ref 32–36)
MCV RBC AUTO: 96.9 FL — SIGNIFICANT CHANGE UP (ref 80–100)
MCV RBC AUTO: 96.9 FL — SIGNIFICANT CHANGE UP (ref 80–100)
MONOCYTES # BLD AUTO: 0 K/UL — SIGNIFICANT CHANGE UP (ref 0–0.9)
MONOCYTES NFR BLD AUTO: 0 % — LOW (ref 2–14)
NEUTROPHILS # BLD AUTO: 8.41 K/UL — HIGH (ref 1.8–7.4)
NEUTROPHILS NFR BLD AUTO: 97.4 % — HIGH (ref 43–77)
NRBC # BLD: 0 /100 WBCS — SIGNIFICANT CHANGE UP (ref 0–0)
PHOSPHATE SERPL-MCNC: 2.2 MG/DL — LOW (ref 2.5–4.5)
PLAT MORPH BLD: NORMAL — SIGNIFICANT CHANGE UP
PLATELET # BLD AUTO: 244 K/UL — SIGNIFICANT CHANGE UP (ref 150–400)
PLATELET # BLD AUTO: 255 K/UL — SIGNIFICANT CHANGE UP (ref 150–400)
POIKILOCYTOSIS BLD QL AUTO: SLIGHT — SIGNIFICANT CHANGE UP
POLYCHROMASIA BLD QL SMEAR: SLIGHT — SIGNIFICANT CHANGE UP
POTASSIUM SERPL-MCNC: 3.7 MMOL/L — SIGNIFICANT CHANGE UP (ref 3.5–5.3)
POTASSIUM SERPL-MCNC: 3.8 MMOL/L — SIGNIFICANT CHANGE UP (ref 3.5–5.3)
POTASSIUM SERPL-SCNC: 3.7 MMOL/L — SIGNIFICANT CHANGE UP (ref 3.5–5.3)
POTASSIUM SERPL-SCNC: 3.8 MMOL/L — SIGNIFICANT CHANGE UP (ref 3.5–5.3)
RBC # BLD: 3.55 M/UL — LOW (ref 3.8–5.2)
RBC # BLD: 3.56 M/UL — LOW (ref 3.8–5.2)
RBC # FLD: 14.6 % — HIGH (ref 10.3–14.5)
RBC # FLD: 14.8 % — HIGH (ref 10.3–14.5)
RBC BLD AUTO: ABNORMAL
SODIUM SERPL-SCNC: 138 MMOL/L — SIGNIFICANT CHANGE UP (ref 135–145)
SODIUM SERPL-SCNC: 143 MMOL/L — SIGNIFICANT CHANGE UP (ref 135–145)
SPHEROCYTES BLD QL SMEAR: SLIGHT — SIGNIFICANT CHANGE UP
WBC # BLD: 16.09 K/UL — HIGH (ref 3.8–10.5)
WBC # BLD: 8.63 K/UL — SIGNIFICANT CHANGE UP (ref 3.8–10.5)
WBC # FLD AUTO: 16.09 K/UL — HIGH (ref 3.8–10.5)
WBC # FLD AUTO: 8.63 K/UL — SIGNIFICANT CHANGE UP (ref 3.8–10.5)

## 2022-03-17 PROCEDURE — 99232 SBSQ HOSP IP/OBS MODERATE 35: CPT

## 2022-03-17 RX ORDER — OXYBUTYNIN CHLORIDE 5 MG
5 TABLET ORAL
Refills: 0 | Status: DISCONTINUED | OUTPATIENT
Start: 2022-03-17 | End: 2022-03-22

## 2022-03-17 RX ORDER — GLUCAGON INJECTION, SOLUTION 0.5 MG/.1ML
1 INJECTION, SOLUTION SUBCUTANEOUS ONCE
Refills: 0 | Status: DISCONTINUED | OUTPATIENT
Start: 2022-03-17 | End: 2022-03-17

## 2022-03-17 RX ORDER — SODIUM CHLORIDE 9 MG/ML
1000 INJECTION, SOLUTION INTRAVENOUS
Refills: 0 | Status: DISCONTINUED | OUTPATIENT
Start: 2022-03-17 | End: 2022-03-17

## 2022-03-17 RX ORDER — DEXAMETHASONE 0.5 MG/5ML
4 ELIXIR ORAL EVERY 6 HOURS
Refills: 0 | Status: COMPLETED | OUTPATIENT
Start: 2022-03-17 | End: 2022-03-18

## 2022-03-17 RX ORDER — DIPHENHYDRAMINE HCL 50 MG
50 CAPSULE ORAL EVERY 6 HOURS
Refills: 0 | Status: DISCONTINUED | OUTPATIENT
Start: 2022-03-17 | End: 2022-03-22

## 2022-03-17 RX ORDER — OXYCODONE HYDROCHLORIDE 5 MG/1
5 TABLET ORAL EVERY 4 HOURS
Refills: 0 | Status: DISCONTINUED | OUTPATIENT
Start: 2022-03-17 | End: 2022-03-18

## 2022-03-17 RX ORDER — DEXTROSE 50 % IN WATER 50 %
15 SYRINGE (ML) INTRAVENOUS ONCE
Refills: 0 | Status: DISCONTINUED | OUTPATIENT
Start: 2022-03-17 | End: 2022-03-17

## 2022-03-17 RX ORDER — TRAZODONE HCL 50 MG
100 TABLET ORAL ONCE
Refills: 0 | Status: COMPLETED | OUTPATIENT
Start: 2022-03-17 | End: 2022-03-17

## 2022-03-17 RX ORDER — CYCLOBENZAPRINE HYDROCHLORIDE 10 MG/1
10 TABLET, FILM COATED ORAL DAILY
Refills: 0 | Status: DISCONTINUED | OUTPATIENT
Start: 2022-03-17 | End: 2022-03-18

## 2022-03-17 RX ORDER — CEFAZOLIN SODIUM 1 G
2000 VIAL (EA) INJECTION EVERY 8 HOURS
Refills: 0 | Status: COMPLETED | OUTPATIENT
Start: 2022-03-17 | End: 2022-03-17

## 2022-03-17 RX ORDER — POLYETHYLENE GLYCOL 3350 17 G/17G
17 POWDER, FOR SOLUTION ORAL DAILY
Refills: 0 | Status: DISCONTINUED | OUTPATIENT
Start: 2022-03-17 | End: 2022-03-22

## 2022-03-17 RX ORDER — DEXAMETHASONE 0.5 MG/5ML
2 ELIXIR ORAL EVERY 12 HOURS
Refills: 0 | Status: DISCONTINUED | OUTPATIENT
Start: 2022-03-23 | End: 2022-03-22

## 2022-03-17 RX ORDER — DEXTROSE 50 % IN WATER 50 %
12.5 SYRINGE (ML) INTRAVENOUS ONCE
Refills: 0 | Status: DISCONTINUED | OUTPATIENT
Start: 2022-03-17 | End: 2022-03-17

## 2022-03-17 RX ORDER — MAGNESIUM SULFATE 500 MG/ML
2 VIAL (ML) INJECTION ONCE
Refills: 0 | Status: COMPLETED | OUTPATIENT
Start: 2022-03-17 | End: 2022-03-17

## 2022-03-17 RX ORDER — GABAPENTIN 400 MG/1
300 CAPSULE ORAL EVERY 8 HOURS
Refills: 0 | Status: DISCONTINUED | OUTPATIENT
Start: 2022-03-17 | End: 2022-03-18

## 2022-03-17 RX ORDER — INSULIN LISPRO 100/ML
VIAL (ML) SUBCUTANEOUS
Refills: 0 | Status: DISCONTINUED | OUTPATIENT
Start: 2022-03-17 | End: 2022-03-17

## 2022-03-17 RX ORDER — BUPROPION HYDROCHLORIDE 150 MG/1
300 TABLET, EXTENDED RELEASE ORAL DAILY
Refills: 0 | Status: DISCONTINUED | OUTPATIENT
Start: 2022-03-17 | End: 2022-03-22

## 2022-03-17 RX ORDER — SENNA PLUS 8.6 MG/1
2 TABLET ORAL AT BEDTIME
Refills: 0 | Status: DISCONTINUED | OUTPATIENT
Start: 2022-03-17 | End: 2022-03-22

## 2022-03-17 RX ORDER — DEXAMETHASONE 0.5 MG/5ML
2 ELIXIR ORAL EVERY 8 HOURS
Refills: 0 | Status: DISCONTINUED | OUTPATIENT
Start: 2022-03-21 | End: 2022-03-22

## 2022-03-17 RX ORDER — DEXAMETHASONE 0.5 MG/5ML
ELIXIR ORAL
Refills: 0 | Status: DISCONTINUED | OUTPATIENT
Start: 2022-03-17 | End: 2022-03-22

## 2022-03-17 RX ORDER — DEXAMETHASONE 0.5 MG/5ML
4 ELIXIR ORAL EVERY 8 HOURS
Refills: 0 | Status: COMPLETED | OUTPATIENT
Start: 2022-03-19 | End: 2022-03-21

## 2022-03-17 RX ORDER — DEXTROSE 50 % IN WATER 50 %
25 SYRINGE (ML) INTRAVENOUS ONCE
Refills: 0 | Status: DISCONTINUED | OUTPATIENT
Start: 2022-03-17 | End: 2022-03-17

## 2022-03-17 RX ORDER — TRAZODONE HCL 50 MG
100 TABLET ORAL AT BEDTIME
Refills: 0 | Status: DISCONTINUED | OUTPATIENT
Start: 2022-03-17 | End: 2022-03-22

## 2022-03-17 RX ORDER — SODIUM,POTASSIUM PHOSPHATES 278-250MG
1 POWDER IN PACKET (EA) ORAL
Refills: 0 | Status: COMPLETED | OUTPATIENT
Start: 2022-03-17 | End: 2022-03-17

## 2022-03-17 RX ORDER — SODIUM CHLORIDE 9 MG/ML
1000 INJECTION INTRAMUSCULAR; INTRAVENOUS; SUBCUTANEOUS
Refills: 0 | Status: DISCONTINUED | OUTPATIENT
Start: 2022-03-17 | End: 2022-03-17

## 2022-03-17 RX ORDER — CHLORHEXIDINE GLUCONATE 213 G/1000ML
1 SOLUTION TOPICAL
Refills: 0 | Status: DISCONTINUED | OUTPATIENT
Start: 2022-03-17 | End: 2022-03-18

## 2022-03-17 RX ORDER — ACETAMINOPHEN 500 MG
1000 TABLET ORAL EVERY 8 HOURS
Refills: 0 | Status: DISCONTINUED | OUTPATIENT
Start: 2022-03-17 | End: 2022-03-22

## 2022-03-17 RX ORDER — PANTOPRAZOLE SODIUM 20 MG/1
40 TABLET, DELAYED RELEASE ORAL
Refills: 0 | Status: DISCONTINUED | OUTPATIENT
Start: 2022-03-17 | End: 2022-03-22

## 2022-03-17 RX ORDER — MAGNESIUM HYDROXIDE 400 MG/1
30 TABLET, CHEWABLE ORAL EVERY 12 HOURS
Refills: 0 | Status: DISCONTINUED | OUTPATIENT
Start: 2022-03-17 | End: 2022-03-22

## 2022-03-17 RX ADMIN — GABAPENTIN 300 MILLIGRAM(S): 400 CAPSULE ORAL at 14:00

## 2022-03-17 RX ADMIN — OXYCODONE HYDROCHLORIDE 5 MILLIGRAM(S): 5 TABLET ORAL at 05:00

## 2022-03-17 RX ADMIN — Medication 5 MILLIGRAM(S): at 05:03

## 2022-03-17 RX ADMIN — Medication 1000 MILLIGRAM(S): at 22:17

## 2022-03-17 RX ADMIN — Medication 100 MILLIGRAM(S): at 13:59

## 2022-03-17 RX ADMIN — Medication 1000 MILLIGRAM(S): at 21:17

## 2022-03-17 RX ADMIN — Medication 25 GRAM(S): at 07:45

## 2022-03-17 RX ADMIN — Medication 100 MILLIGRAM(S): at 21:18

## 2022-03-17 RX ADMIN — Medication 1 TABLET(S): at 10:37

## 2022-03-17 RX ADMIN — OXYCODONE HYDROCHLORIDE 5 MILLIGRAM(S): 5 TABLET ORAL at 01:48

## 2022-03-17 RX ADMIN — Medication 4 MILLIGRAM(S): at 05:04

## 2022-03-17 RX ADMIN — Medication 1000 MILLIGRAM(S): at 06:00

## 2022-03-17 RX ADMIN — GABAPENTIN 300 MILLIGRAM(S): 400 CAPSULE ORAL at 05:04

## 2022-03-17 RX ADMIN — Medication 1000 MILLIGRAM(S): at 14:00

## 2022-03-17 RX ADMIN — Medication 4 MILLIGRAM(S): at 11:27

## 2022-03-17 RX ADMIN — Medication 100 MILLIGRAM(S): at 05:04

## 2022-03-17 RX ADMIN — OXYCODONE HYDROCHLORIDE 5 MILLIGRAM(S): 5 TABLET ORAL at 00:03

## 2022-03-17 RX ADMIN — GABAPENTIN 300 MILLIGRAM(S): 400 CAPSULE ORAL at 21:17

## 2022-03-17 RX ADMIN — SENNA PLUS 2 TABLET(S): 8.6 TABLET ORAL at 21:17

## 2022-03-17 RX ADMIN — PANTOPRAZOLE SODIUM 40 MILLIGRAM(S): 20 TABLET, DELAYED RELEASE ORAL at 06:18

## 2022-03-17 RX ADMIN — BUPROPION HYDROCHLORIDE 300 MILLIGRAM(S): 150 TABLET, EXTENDED RELEASE ORAL at 11:27

## 2022-03-17 RX ADMIN — Medication 100 MILLIGRAM(S): at 05:03

## 2022-03-17 RX ADMIN — Medication 25 GRAM(S): at 11:27

## 2022-03-17 RX ADMIN — Medication 5 MILLIGRAM(S): at 18:53

## 2022-03-17 RX ADMIN — Medication 4 MILLIGRAM(S): at 18:53

## 2022-03-17 RX ADMIN — Medication 1 TABLET(S): at 13:11

## 2022-03-17 RX ADMIN — OXYCODONE HYDROCHLORIDE 5 MILLIGRAM(S): 5 TABLET ORAL at 04:02

## 2022-03-17 RX ADMIN — Medication 1000 MILLIGRAM(S): at 05:04

## 2022-03-17 RX ADMIN — POLYETHYLENE GLYCOL 3350 17 GRAM(S): 17 POWDER, FOR SOLUTION ORAL at 11:28

## 2022-03-17 RX ADMIN — Medication 1000 MILLIGRAM(S): at 15:45

## 2022-03-17 NOTE — PROGRESS NOTE ADULT - ASSESSMENT
Assessment: 37 y/o F w/hx of spina bifida, depression, and spinal epidermoid tumor s/p multiple resections, most recently in 2019, presented w/ b/l LE radiculopathy R>L, now s/p T12-L3 laminectomy, subtotal intradural extramedullary tumor resection (3/16/22)      Plan:  NEURO:   Neuro checks q1h  Analgesia, ERAS  1 HMV to gravity, NO SUCTION, monitor output  Activity order: bedrest/HOB flat until 3/18  Cont home wellbutrin, trazodone    CARDIOVASCULAR:   SBP<140    PULMONARY:   Incentive Spirometry    GI:   ADAT  Bowel regimen  GI ppx protonix while on steroids    RENAL:   IVF until adequate PO intake   +eli (pt self-catheterizes at home)  Continue home oxybutynin     HEME:   Monitor h/h   VTE ppx:  SCDs    ID:   Post op ancef    ENDO:   ISS    DISPO: ICU status, full code     Assessment: 37 y/o F w/hx of spina bifida, depression, and spinal epidermoid tumor s/p multiple resections, most recently in 2019, Presented w/ b/l LE radiculopathy R>L, now s/p T12-L3 laminectomy, subtotal intradural extramedullary tumor resection (3/16/22)    Plan:  NEURO:   Neurochecks Q4h  Analgesia, ERAS  1 HMV to gravity, monitor output  Cont home wellbutrin, trazodone  Decadron  Activity order: bedrest/HOB flat until 3/18    CARDIOVASCULAR:   SBP<140    PULMONARY:   Room air   Incentive Spirometry    GI:   Diet as tolerated  Bowel regimen- standing  GI ppx protonix while on steroids  LBM: Prior to arrival    RENAL:   IVL until adequate PO intake   DC Michel  Replete magnesium  Continue home oxybutynin     HEME: EBL intraop 500cc  Transfused 1 unit  Monitor H/H. Stable.   VTE ppx:  SCDs. Hold chemoppx for     ID:   Post op Ancef    ENDO:   DC ISS    DISPO: Patient is no longer critically ill.   Stepdown.    Assessment: 35 y/o F w/hx of spina bifida, depression, and spinal epidermoid tumor s/p multiple resections, most recently in 2019, Presented w/ b/l LE radiculopathy R>L, now s/p T12-L3 laminectomy, subtotal intradural extramedullary tumor resection (3/16/22)    Plan:  NEURO:   Neurochecks Q4h  Analgesia, ERAS  1 HMV to gravity, monitor output  Cont home wellbutrin, trazodone  Decadron  Activity order: bedrest/HOB flat until 3/18    CARDIOVASCULAR:   SBP<140    PULMONARY:   Room air   Incentive Spirometry    GI:   Diet as tolerated  Bowel regimen- standing  GI ppx protonix while on steroids  LBM: Prior to arrival    RENAL:   IVL until adequate PO intake   DC Michel. Intermittent cath  Replete magnesium  Continue home oxybutynin     HEME: EBL intraop 500cc  Transfused 1 unit  Monitor H/H. Stable.   VTE ppx:  SCDs. Lovenox this pm    ID:   Post op Ancef    ENDO:   DC ISS    DISPO: Patient is no longer critically ill.   Stepdown.     ICU stepdown Checklist:    Completed: 03-17 @ 12:41    [X] hemodynamically stable – VS WNL and stable x 24hours, UO adequate  [n/a ] if  previously on HDA - off pressors x 24h with stable neuro exam    [X] no new symptoms x 24h (i.e. new fever, new-onset nausea/vomiting)  [X] stable labs: (i.e. WBC not rising, sodium not dropping)  [X] patient not at high risk for aspiration, if high risk then:                  [ ] should have definitive plans for trach/PEG (alternative option is to discharge from ICU to facilty)                  [ ] stepdown to bed close to nurse’s station  [n/a] low suctioning requirements (i.e. q4h or less)  [X] sign-off from primary RN*  [X] drains do not require ICU level of care  [X] if patient previously agitated or with behavioral issues – controlled   [X] pain controlled     Assessment: 37 y/o F w/hx of spina bifida, depression, and spinal epidermoid tumor s/p multiple resections, most recently in 2019, Presented w/ b/l LE radiculopathy R>L, now s/p T12-L3 laminectomy, subtotal intradural extramedullary tumor resection (3/16/22)    Plan:  NEURO:   Neurochecks Q4h  Analgesia, ERAS  1 HMV to gravity, monitor output  Cont home wellbutrin, trazodone  Decadron  Activity order: bedrest/HOB flat until 3/18    CARDIOVASCULAR:   SBP<140    PULMONARY:   Room air   Incentive Spirometry    GI:   Diet as tolerated  Bowel regimen- standing  GI ppx protonix while on steroids  LBM: Prior to arrival    RENAL:   IVL until adequate PO intake   DC Michel. Intermittent cath  Replete magnesium  Continue home oxybutynin     HEME: EBL intraop 500cc  Transfused 1 unit PRBCs intraop  Monitor H/H. Stable.   VTE ppx:  SCDs. Hold chemoppx for now.    ID:   Post op Ancef    ENDO:   DC ISS    DISPO: Patient is no longer critically ill.   Stepdown.     ICU stepdown Checklist:    Completed: 03-17 @ 12:41    [X] hemodynamically stable – VS WNL and stable x 24hours, UO adequate  [n/a ] if  previously on HDA - off pressors x 24h with stable neuro exam    [X] no new symptoms x 24h (i.e. new fever, new-onset nausea/vomiting)  [X] stable labs: (i.e. WBC not rising, sodium not dropping)  [X] patient not at high risk for aspiration, if high risk then:                  [ ] should have definitive plans for trach/PEG (alternative option is to discharge from ICU to facilty)                  [ ] stepdown to bed close to nurse’s station  [n/a] low suctioning requirements (i.e. q4h or less)  [X] sign-off from primary RN*  [X] drains do not require ICU level of care  [X] if patient previously agitated or with behavioral issues – controlled   [X] pain controlled

## 2022-03-17 NOTE — PROGRESS NOTE ADULT - SUBJECTIVE AND OBJECTIVE BOX
NEUROSURGERY POST OP NOTE:    POD# 0 S/P T12-L3 laminectomies for partial resection intradural extramedullary tumor    S: pt reports moderate back pain otherwise doing well. Denies HA, vision changes, SOB, CP, N/V, numbness, parethesias      T(C): 36.4 (03-17-22 @ 00:50), Max: 36.7 (03-17-22 @ 00:00)  HR: 82 (03-17-22 @ 01:30) (82 - 113)  BP: 114/69 (03-17-22 @ 01:30) (110/70 - 114/69)  RR: 18 (03-17-22 @ 01:30) (17 - 19)  SpO2: 97% (03-17-22 @ 01:30) (96% - 97%)        acetaminophen     Tablet .. 1000 milliGRAM(s) Oral every 8 hours  buPROPion XL (24-Hour) . 300 milliGRAM(s) Oral daily  ceFAZolin   IVPB 2000 milliGRAM(s) IV Intermittent every 8 hours  cyclobenzaprine 10 milliGRAM(s) Oral daily PRN  dexAMETHasone     Tablet 4 milliGRAM(s) Oral every 6 hours  dexAMETHasone     Tablet   Oral   dextrose 40% Gel 15 Gram(s) Oral once  dextrose 5%. 1000 milliLiter(s) IV Continuous <Continuous>  dextrose 50% Injectable 25 Gram(s) IV Push once  dextrose 50% Injectable 12.5 Gram(s) IV Push once  dextrose 50% Injectable 25 Gram(s) IV Push once  gabapentin 300 milliGRAM(s) Oral every 8 hours  glucagon  Injectable 1 milliGRAM(s) IntraMuscular once  influenza   Vaccine 0.5 milliLiter(s) IntraMuscular once  insulin lispro (ADMELOG) corrective regimen sliding scale   SubCutaneous Before meals and at bedtime  magnesium hydroxide Suspension 30 milliLiter(s) Oral every 12 hours PRN  oxybutynin 5 milliGRAM(s) Oral two times a day  oxyCODONE    IR 5 milliGRAM(s) Oral every 4 hours PRN  pantoprazole    Tablet 40 milliGRAM(s) Oral before breakfast  povidone iodine 5% Nasal Swab 1 Application(s) Both Nostrils once  senna 2 Tablet(s) Oral at bedtime  sodium chloride 0.9%. 1000 milliLiter(s) IV Continuous <Continuous>  traZODone 100 milliGRAM(s) Oral at bedtime      RADIOLOGY:     Exam:  GEN: laying in bed, appears well, NAD  NEURO: AOx3. FC, OE spont, speech intact, face symmetric. CNII-XII intact. PERRL, EOMI. No pronator drift. MAEx4. b/l UE 5/5. b/l LEs 4/5 throughout. SILT  CV: RRR +S1/S2  PULM: CTAB  GI: Abd soft, NT/ND  EXT: ext warm, dry, nontender  WOUND: thoracolumbar incision c/d/i    WOUND/DRAINS: HMVx1    DEVICES:       Assessment: 36F w/hx of spina bifida, depression, and spinal epidermoid tumor s/p multiple resections, most recently in 2019, presented w/ b/l LE radiculopathy R>L, now s/p T12-L3 laminectomy, subtotal intradural extramedullary tumor resection (3/16/22)      Plan:  NEURO:   - neuro checks q1h  - pain control, ERAS  - 1 HMV to gravity, NO SUCTION, monitor output  - bedrest/HOB flat until 3/18  - cont home wellbutrin, trazodone    CARDIOVASCULAR:   - SBP<140    PULMONARY:   - IS    GI:   - ADAT  - bowel regimen  - gi ppx protonix while on steroids    RENAL:   - IVF until adequate PO intake   - +eli (pt self-catheterizes at home)  - cont home oxybutynin     HEME:   - 1u pRBC intraop  - trend h/h   - SCDs    ID:   - post op ancef    ENDO:   - ISS    DISPO: ICU status, full code, dispo pending    D/w Dr Bernabe      Assessment:  Present when checked    []  GCS  E   V  M     Heart Failure: []Acute, [] acute on chronic , []chronic  Heart Failure:  [] Diastolic (HFpEF), [] Systolic (HFrEF), []Combined (HFpEF and HFrEF), [] RHF, [] Pulm HTN, [] Other    [] LEIGH, [] ATN, [] AIN, [] other  [] CKD1, [] CKD2, [] CKD 3, [] CKD 4, [] CKD 5, []ESRD    Encephalopathy: [] Metabolic, [] Hepatic, [] toxic, [] Neurological, [] Other    Abnormal Nurtitional Status: [] malnurtition (see nutrition note), [ ]underweight: BMI < 19, [] morbid obesity: BMI >40, [] Cachexia    [] Sepsis  [] hypovolemic shock,[] cardiogenic shock, [] hemorrhagic shock, [] neuogenic shock  [] Acute Respiratory Failure  []Cerebral edema, [] Brain compression/ herniation,   [] Functional quadriplegia  [] Acute blood loss anemia

## 2022-03-17 NOTE — PROGRESS NOTE ADULT - SUBJECTIVE AND OBJECTIVE BOX
HPI:  36F w/hx of spina bifida, depression, and spinal epidermoid tumor s/p multiple resections, most recently in 2019. She has had progressively worsening back pain that radiates into her legs (R>L) as well as numbness in her feet. She has had urinary incontinence as well for the majority of her life, for which she self-catheterizes. MRI demonstrates recurrence of intradural lesion for which she has consented for revision resection today. (16 Mar 2022 13:14)    On admission, the patient was:  GCS: 15  Hunt-Riggs:  modified Roque:  ICH score:  NIHSS:    *** HIGH RISK OF DVT PRESENT ON ADMISSION ***    VITALS:  T(C): , Max: 36.9 (03-17-22 @ 05:53)  HR:  (62 - 113)  BP:  (99/72 - 117/72)  ABP:  (99/59 - 119/66)  RR:  (16 - 19)  SpO2:  (94% - 98%)  Wt(kg): --      03-16-22 @ 07:01  -  03-17-22 @ 07:00  --------------------------------------------------------  IN: 1610 mL / OUT: 1165 mL / NET: 445 mL    03-17-22 @ 07:01  -  03-17-22 @ 07:49  --------------------------------------------------------  IN: 50 mL / OUT: 220 mL / NET: -170 mL      LABS:  Na: 138 (03-17 @ 05:15), 143 (03-17 @ 00:35)  K: 3.8 (03-17 @ 05:15), 3.7 (03-17 @ 00:35)  Cl: 110 (03-17 @ 05:15), 115 (03-17 @ 00:35)  CO2: 19 (03-17 @ 05:15), 20 (03-17 @ 00:35)  BUN: 7 (03-17 @ 05:15), 7 (03-17 @ 00:35)  Cr: 0.54 (03-17 @ 05:15), 0.63 (03-17 @ 00:35)  Glu: 127(03-17 @ 05:15), 118(03-17 @ 00:35)    Hgb: 11.7 (03-17 @ 05:16), 11.2 (03-17 @ 00:35)  Hct: 34.5 (03-17 @ 05:16), 34.4 (03-17 @ 00:35)  WBC: 16.09 (03-17 @ 05:16), 8.63 (03-17 @ 00:35)  Plt: 244 (03-17 @ 05:16), 255 (03-17 @ 00:35)      IMAGING:   Recent imaging studies were reviewed.    EXAMINATION:   GEN: laying in bed, appears well, NAD  NEURO: AOx3. FC, OE spont, speech intact, face symmetric. CNII-XII intact. PERRL, EOMI. No pronator drift. MAEx4. b/l UE 5/5. b/l LEs 4/5 throughout. SILT  CV: RRR +S1/S2  PULM: CTAB  GI: Abd soft, NT/ND  EXT: ext warm, dry, nontender  WOUND: thoracolumbar incision c/d/i    MEDICATIONS:  acetaminophen     Tablet .. 1000 milliGRAM(s) Oral every 8 hours  buPROPion XL (24-Hour) . 300 milliGRAM(s) Oral daily  ceFAZolin   IVPB 2000 milliGRAM(s) IV Intermittent every 8 hours  chlorhexidine 2% Cloths 1 Application(s) Topical <User Schedule>  cyclobenzaprine 10 milliGRAM(s) Oral daily PRN  dexAMETHasone     Tablet 4 milliGRAM(s) Oral every 6 hours  dexAMETHasone     Tablet   Oral   dextrose 5%. 1000 milliLiter(s) IV Continuous <Continuous>  dextrose 50% Injectable 25 Gram(s) IV Push once  gabapentin 300 milliGRAM(s) Oral every 8 hours  glucagon  Injectable 1 milliGRAM(s) IntraMuscular once  influenza   Vaccine 0.5 milliLiter(s) IntraMuscular once  insulin lispro (ADMELOG) corrective regimen sliding scale   SubCutaneous Before meals and at bedtime  magnesium hydroxide Suspension 30 milliLiter(s) Oral every 12 hours PRN  oxybutynin 5 milliGRAM(s) Oral two times a day  oxyCODONE    IR 5 milliGRAM(s) Oral every 4 hours PRN  pantoprazole    Tablet 40 milliGRAM(s) Oral before breakfast  potassium phosphate / sodium phosphate Tablet (K-PHOS No. 2) 1 Tablet(s) Oral every 2 hours  povidone iodine 5% Nasal Swab 1 Application(s) Both Nostrils once  senna 2 Tablet(s) Oral at bedtime  sodium chloride 0.9%. 1000 milliLiter(s) IV Continuous <Continuous>  traZODone 100 milliGRAM(s) Oral at bedtime               HPI:  36F w/hx of spina bifida, depression, and spinal epidermoid tumor s/p multiple resections, most recently in 2019. She has had progressively worsening back pain that radiates into her legs (R>L) as well as numbness in her feet. She has had urinary incontinence as well for the majority of her life, for which she self-catheterizes. MRI demonstrates recurrence of intradural lesion for which she has consented for revision resection today. (16 Mar 2022 13:14)    On admission, the patient was:  GCS: 15  Hunt-Riggs:  modified Roque:  ICH score:  NIHSS:    *** HIGH RISK OF DVT PRESENT ON ADMISSION ***    ICU Vital Signs Last 24 Hrs  T(C): 36.9 (17 Mar 2022 09:06), Max: 36.9 (17 Mar 2022 05:53)  T(F): 98.5 (17 Mar 2022 09:06), Max: 98.5 (17 Mar 2022 05:53)  HR: 74 (17 Mar 2022 12:00) (62 - 113)  BP: 108/63 (17 Mar 2022 12:00) (88/57 - 117/72)  BP(mean): 81 (17 Mar 2022 12:00) (66 - 89)  ABP: 121/70 (17 Mar 2022 12:00) (92/54 - 121/70)  ABP(mean): 93 (17 Mar 2022 12:00) (71 - 93)  RR: 18 (17 Mar 2022 12:00) (16 - 19)  SpO2: 97% (17 Mar 2022 12:00) (93% - 98%)      03-16-22 @ 07:01  -  03-17-22 @ 07:00  --------------------------------------------------------  IN: 1610 mL / OUT: 1165 mL / NET: 445 mL    03-17-22 @ 07:01  -  03-17-22 @ 12:38  --------------------------------------------------------  IN: 275 mL / OUT: 370 mL / NET: -95 mL      EXAMINATION:   GEN: No acute distress  NEURO: AOx3, following commands, PERRLA, 3mm and reactive, face symmetric. CNII-XII intact.  No pronator drift. Moving all extremities bilateral upper extremities 5/5, b/l lower extremities at least 4/5. Sensation intact  CV: RRR, S1/S2  PULM: Clear to auscultation B/L  GI: Abd soft, non tender  EXT: Warm, dry, nontender  WOUND: C/D/I    MEDICATIONS:   acetaminophen     Tablet .. 1000 milliGRAM(s) Oral every 8 hours  buPROPion XL (24-Hour) . 300 milliGRAM(s) Oral daily  ceFAZolin   IVPB 2000 milliGRAM(s) IV Intermittent every 8 hours  chlorhexidine 2% Cloths 1 Application(s) Topical <User Schedule>  cyclobenzaprine 10 milliGRAM(s) Oral daily PRN  dexAMETHasone     Tablet 4 milliGRAM(s) Oral every 6 hours  dexAMETHasone     Tablet   Oral   gabapentin 300 milliGRAM(s) Oral every 8 hours  influenza   Vaccine 0.5 milliLiter(s) IntraMuscular once  magnesium hydroxide Suspension 30 milliLiter(s) Oral every 12 hours PRN  oxybutynin 5 milliGRAM(s) Oral two times a day  oxyCODONE    IR 5 milliGRAM(s) Oral every 4 hours PRN  pantoprazole    Tablet 40 milliGRAM(s) Oral before breakfast  polyethylene glycol 3350 17 Gram(s) Oral daily  potassium phosphate / sodium phosphate Tablet (K-PHOS No. 2) 1 Tablet(s) Oral every 2 hours  senna 2 Tablet(s) Oral at bedtime  traZODone 100 milliGRAM(s) Oral at bedtime                          11.7   16.09 )-----------( 244      ( 17 Mar 2022 05:16 )             34.5     03-17    138  |  110<H>  |  7   ----------------------------<  127<H>  3.8   |  19<L>  |  0.54    Ca    7.7<L>      17 Mar 2022 05:15  Phos  2.2     03-17  Mg     1.5     03-17        ABG - ( 16 Mar 2022 22:31 )  pH, Arterial: 7.45  pH, Blood: x     /  pCO2: 28    /  pO2: 242   / HCO3: 20    / Base Excess: -3.4  /  SaO2: x

## 2022-03-18 LAB
ANION GAP SERPL CALC-SCNC: 12 MMOL/L — SIGNIFICANT CHANGE UP (ref 5–17)
BUN SERPL-MCNC: 7 MG/DL — SIGNIFICANT CHANGE UP (ref 7–23)
CALCIUM SERPL-MCNC: 8.3 MG/DL — LOW (ref 8.4–10.5)
CHLORIDE SERPL-SCNC: 108 MMOL/L — SIGNIFICANT CHANGE UP (ref 96–108)
CO2 SERPL-SCNC: 19 MMOL/L — LOW (ref 22–31)
CREAT SERPL-MCNC: 0.55 MG/DL — SIGNIFICANT CHANGE UP (ref 0.5–1.3)
EGFR: 121 ML/MIN/1.73M2 — SIGNIFICANT CHANGE UP
GLUCOSE SERPL-MCNC: 149 MG/DL — HIGH (ref 70–99)
HCT VFR BLD CALC: 38.8 % — SIGNIFICANT CHANGE UP (ref 34.5–45)
HGB BLD-MCNC: 12.6 G/DL — SIGNIFICANT CHANGE UP (ref 11.5–15.5)
MAGNESIUM SERPL-MCNC: 2.2 MG/DL — SIGNIFICANT CHANGE UP (ref 1.6–2.6)
MCHC RBC-ENTMCNC: 32.5 GM/DL — SIGNIFICANT CHANGE UP (ref 32–36)
MCHC RBC-ENTMCNC: 32.8 PG — SIGNIFICANT CHANGE UP (ref 27–34)
MCV RBC AUTO: 101 FL — HIGH (ref 80–100)
NRBC # BLD: 0 /100 WBCS — SIGNIFICANT CHANGE UP (ref 0–0)
PHOSPHATE SERPL-MCNC: 2.4 MG/DL — LOW (ref 2.5–4.5)
PLATELET # BLD AUTO: 242 K/UL — SIGNIFICANT CHANGE UP (ref 150–400)
POTASSIUM SERPL-MCNC: 4 MMOL/L — SIGNIFICANT CHANGE UP (ref 3.5–5.3)
POTASSIUM SERPL-SCNC: 4 MMOL/L — SIGNIFICANT CHANGE UP (ref 3.5–5.3)
RBC # BLD: 3.84 M/UL — SIGNIFICANT CHANGE UP (ref 3.8–5.2)
RBC # FLD: 15 % — HIGH (ref 10.3–14.5)
SARS-COV-2 N GENE NPH QL NAA+PROBE: NOT DETECTED
SODIUM SERPL-SCNC: 139 MMOL/L — SIGNIFICANT CHANGE UP (ref 135–145)
WBC # BLD: 21.55 K/UL — HIGH (ref 3.8–10.5)
WBC # FLD AUTO: 21.55 K/UL — HIGH (ref 3.8–10.5)

## 2022-03-18 PROCEDURE — 99223 1ST HOSP IP/OBS HIGH 75: CPT

## 2022-03-18 RX ORDER — METHOCARBAMOL 500 MG/1
500 TABLET, FILM COATED ORAL EVERY 8 HOURS
Refills: 0 | Status: DISCONTINUED | OUTPATIENT
Start: 2022-03-18 | End: 2022-03-22

## 2022-03-18 RX ORDER — GABAPENTIN 400 MG/1
400 CAPSULE ORAL EVERY 8 HOURS
Refills: 0 | Status: DISCONTINUED | OUTPATIENT
Start: 2022-03-18 | End: 2022-03-21

## 2022-03-18 RX ORDER — HYDROMORPHONE HYDROCHLORIDE 2 MG/ML
4 INJECTION INTRAMUSCULAR; INTRAVENOUS; SUBCUTANEOUS EVERY 4 HOURS
Refills: 0 | Status: DISCONTINUED | OUTPATIENT
Start: 2022-03-18 | End: 2022-03-22

## 2022-03-18 RX ORDER — HYDROMORPHONE HYDROCHLORIDE 2 MG/ML
2 INJECTION INTRAMUSCULAR; INTRAVENOUS; SUBCUTANEOUS EVERY 4 HOURS
Refills: 0 | Status: DISCONTINUED | OUTPATIENT
Start: 2022-03-18 | End: 2022-03-22

## 2022-03-18 RX ORDER — SODIUM,POTASSIUM PHOSPHATES 278-250MG
1 POWDER IN PACKET (EA) ORAL ONCE
Refills: 0 | Status: COMPLETED | OUTPATIENT
Start: 2022-03-18 | End: 2022-03-18

## 2022-03-18 RX ADMIN — Medication 4 MILLIGRAM(S): at 11:22

## 2022-03-18 RX ADMIN — GABAPENTIN 400 MILLIGRAM(S): 400 CAPSULE ORAL at 14:26

## 2022-03-18 RX ADMIN — Medication 1000 MILLIGRAM(S): at 13:35

## 2022-03-18 RX ADMIN — OXYCODONE HYDROCHLORIDE 5 MILLIGRAM(S): 5 TABLET ORAL at 06:08

## 2022-03-18 RX ADMIN — Medication 1000 MILLIGRAM(S): at 22:17

## 2022-03-18 RX ADMIN — Medication 1000 MILLIGRAM(S): at 21:17

## 2022-03-18 RX ADMIN — METHOCARBAMOL 500 MILLIGRAM(S): 500 TABLET, FILM COATED ORAL at 21:17

## 2022-03-18 RX ADMIN — POLYETHYLENE GLYCOL 3350 17 GRAM(S): 17 POWDER, FOR SOLUTION ORAL at 11:21

## 2022-03-18 RX ADMIN — Medication 5 MILLIGRAM(S): at 05:07

## 2022-03-18 RX ADMIN — MAGNESIUM HYDROXIDE 30 MILLILITER(S): 400 TABLET, CHEWABLE ORAL at 21:16

## 2022-03-18 RX ADMIN — Medication 1000 MILLIGRAM(S): at 14:35

## 2022-03-18 RX ADMIN — Medication 100 MILLIGRAM(S): at 21:16

## 2022-03-18 RX ADMIN — GABAPENTIN 300 MILLIGRAM(S): 400 CAPSULE ORAL at 13:35

## 2022-03-18 RX ADMIN — Medication 4 MILLIGRAM(S): at 23:24

## 2022-03-18 RX ADMIN — Medication 4 MILLIGRAM(S): at 00:08

## 2022-03-18 RX ADMIN — Medication 5 MILLIGRAM(S): at 18:44

## 2022-03-18 RX ADMIN — HYDROMORPHONE HYDROCHLORIDE 4 MILLIGRAM(S): 2 INJECTION INTRAMUSCULAR; INTRAVENOUS; SUBCUTANEOUS at 12:20

## 2022-03-18 RX ADMIN — Medication 4 MILLIGRAM(S): at 05:07

## 2022-03-18 RX ADMIN — Medication 1000 MILLIGRAM(S): at 05:07

## 2022-03-18 RX ADMIN — Medication 1000 MILLIGRAM(S): at 06:51

## 2022-03-18 RX ADMIN — GABAPENTIN 400 MILLIGRAM(S): 400 CAPSULE ORAL at 21:16

## 2022-03-18 RX ADMIN — BUPROPION HYDROCHLORIDE 300 MILLIGRAM(S): 150 TABLET, EXTENDED RELEASE ORAL at 11:21

## 2022-03-18 RX ADMIN — GABAPENTIN 300 MILLIGRAM(S): 400 CAPSULE ORAL at 05:07

## 2022-03-18 RX ADMIN — HYDROMORPHONE HYDROCHLORIDE 4 MILLIGRAM(S): 2 INJECTION INTRAMUSCULAR; INTRAVENOUS; SUBCUTANEOUS at 11:26

## 2022-03-18 RX ADMIN — OXYCODONE HYDROCHLORIDE 5 MILLIGRAM(S): 5 TABLET ORAL at 05:08

## 2022-03-18 RX ADMIN — PANTOPRAZOLE SODIUM 40 MILLIGRAM(S): 20 TABLET, DELAYED RELEASE ORAL at 05:07

## 2022-03-18 RX ADMIN — Medication 4 MILLIGRAM(S): at 18:44

## 2022-03-18 RX ADMIN — SENNA PLUS 2 TABLET(S): 8.6 TABLET ORAL at 21:17

## 2022-03-18 RX ADMIN — METHOCARBAMOL 500 MILLIGRAM(S): 500 TABLET, FILM COATED ORAL at 12:05

## 2022-03-18 RX ADMIN — Medication 1 PACKET(S): at 11:21

## 2022-03-18 NOTE — OCCUPATIONAL THERAPY INITIAL EVALUATION ADULT - DIAGNOSIS, OT EVAL
Pt is T12-L3 lami resection of recurrent extramedullary spinal tumor presents with impaired balance, 6/10 back pain, impaired postural control, and decreased activity tolerance impacting overall ease and ability to perform ADLs and functional mobility tasks at [facility].

## 2022-03-18 NOTE — PROGRESS NOTE ADULT - SUBJECTIVE AND OBJECTIVE BOX
HPI:  36F w/hx of spina bifida, depression, and spinal epidermoid tumor s/p multiple resections, most recently in 2019. She has had progressively worsening back pain that radiates into her legs (R>L) as well as numbness in her feet. She has had urinary incontinence as well for the majority of her life, for which she self-catheterizes. MRI demonstrates recurrence of intradural lesion for which she has consented for revision resection today. (16 Mar 2022 13:14)    INTERVAL EVENTS:  JO, pain is controlled     HOSPITAL COURSE:  3/16: POD0 T12-L3 lami for subtotal intradural extramedullary tumor resection. ICU post op. JO neuro stable  3/17: POD1. JO o/n neuro stable. Remains on bedrest  3/18: POD 2. PT/OT today.     Vital Signs Last 24 Hrs  T(C): 36 (17 Mar 2022 14:30), Max: 36.9 (17 Mar 2022 05:53)  T(F): 96.8 (17 Mar 2022 14:30), Max: 98.5 (17 Mar 2022 05:53)  HR: 92 (17 Mar 2022 20:32) (62 - 92)  BP: 104/66 (18 Mar 2022 00:00) (88/57 - 115/70)  BP(mean): 79 (17 Mar 2022 15:00) (66 - 87)  RR: 17 (17 Mar 2022 20:32) (16 - 18)  SpO2: 95% (17 Mar 2022 20:32) (93% - 98%)    I&O's Summary    16 Mar 2022 07:01  -  17 Mar 2022 07:00  --------------------------------------------------------  IN: 1610 mL / OUT: 1165 mL / NET: 445 mL    17 Mar 2022 07:01  -  18 Mar 2022 03:02  --------------------------------------------------------  IN: 375 mL / OUT: 1345 mL / NET: -970 mL        PHYSICAL EXAM:  GEN: laying in bed, appears well, NAD  NEURO: AOx3. FC, OE spont, speech intact, face symmetric. CNII-XII intact. PERRL, EOMI. No pronator drift. MAEx4. b/l UE 5/5. b/l LEs 4/5 throughout. SILT  CV: RRR +S1/S2  PULM: CTAB  GI: Abd soft, NT/ND  EXT: ext warm, dry, nontender  WOUND: thoracolumbar incision c/d/i      TUBES/LINES:  [] Michel  [] Trach  [] NGT  [] PEG  [x] Wound Drains - HMV  [] Others    DIET:  [] NPO  [x] Mechanical  [] Tube feeds    LABS:                        11.7   16.09 )-----------( 244      ( 17 Mar 2022 05:16 )             34.5     03-17    138  |  110<H>  |  7   ----------------------------<  127<H>  3.8   |  19<L>  |  0.54    Ca    7.7<L>      17 Mar 2022 05:15  Phos  2.2     03-17  Mg     1.5     03-17              CAPILLARY BLOOD GLUCOSE      POCT Blood Glucose.: 117 mg/dL (17 Mar 2022 05:29)      Drug Levels: [] N/A    CSF Analysis: [] N/A      Allergies    No Known Drug Allergies  shellfish (Rash)    Intolerances      MEDICATIONS:  Antibiotics:    Neuro:  acetaminophen     Tablet .. 1000 milliGRAM(s) Oral every 8 hours  buPROPion XL (24-Hour) . 300 milliGRAM(s) Oral daily  cyclobenzaprine 10 milliGRAM(s) Oral daily PRN  diphenhydrAMINE 50 milliGRAM(s) Oral every 6 hours PRN  gabapentin 300 milliGRAM(s) Oral every 8 hours  oxyCODONE    IR 5 milliGRAM(s) Oral every 4 hours PRN  traZODone 100 milliGRAM(s) Oral at bedtime    Anticoagulation:    OTHER:  dexAMETHasone     Tablet 4 milliGRAM(s) Oral every 6 hours  dexAMETHasone     Tablet   Oral   influenza   Vaccine 0.5 milliLiter(s) IntraMuscular once  magnesium hydroxide Suspension 30 milliLiter(s) Oral every 12 hours PRN  oxybutynin 5 milliGRAM(s) Oral two times a day  pantoprazole    Tablet 40 milliGRAM(s) Oral before breakfast  polyethylene glycol 3350 17 Gram(s) Oral daily  senna 2 Tablet(s) Oral at bedtime    IVF:    CULTURES:    RADIOLOGY & ADDITIONAL TESTS:      ASSESSMENT:  36F w/hx of spina bifida, depression, and spinal epidermoid tumor s/p multiple resections, most recently in 2019, presented w/ b/l LE radiculopathy R>L, now s/p T12-L3 laminectomy, subtotal intradural extramedullary tumor resection (3/16/22)    NEURO:   - neuro checks q4h  - pain control, ERAS  - 1 HMV to gravity, NO SUCTION, monitor output  - may be OOB 3/18  - no post op imaging required  - cont home wellbutrin, trazodone  - decadron taper to off    CARDIOVASCULAR:   - normotensive BP goal    PULMONARY:   - RA  - IS    GI:   - regular diet  - bowel regimen  - GI PPX protonix while on steroids    RENAL:   - IVL  - continue self-cath PRN   - cont home oxybutynin     HEME:   - 1u pRBC intraop  - trend h/h   - SCDs    ID:   - afebrile  - trend leukocytosis    ENDO:   - ISS    DISPO: SDU status, full code, dispo pending    D/w Dr Bernabe

## 2022-03-18 NOTE — PHYSICAL THERAPY INITIAL EVALUATION ADULT - GENERAL OBSERVATIONS, REHAB EVAL
Pt received semi supine in bed +hemovac +hep lock. PT received consent to treat from BRYANT Abel. Pt c/o pain in L foot denies numbnes and tingling in B/L LE. Pt was left as received with call bell in reach, VSS, and in NAD. PT to follow up.

## 2022-03-18 NOTE — OCCUPATIONAL THERAPY INITIAL EVALUATION ADULT - MODIFIED CLINICAL TEST OF SENSORY INTEGRATION IN BALANCE TEST
Pt able to ambulate ~30' with RW and min-CGA, demo flexed posture, decreased step length, and slow gait speed, no LOB noted, with pt reporting moderate fatigue following ambulation

## 2022-03-18 NOTE — OCCUPATIONAL THERAPY INITIAL EVALUATION ADULT - GENERAL OBSERVATIONS, REHAB EVAL
Pt received semi-supine in bed, +tele, +heplock, +hemovac, +spine surgical dressing C/D/I, in NAD and agreeable to OT. Cleared by BRYANT Velasquez to see.

## 2022-03-18 NOTE — OCCUPATIONAL THERAPY INITIAL EVALUATION ADULT - ADDITIONAL COMMENTS
Pt states she lives with her parent s in an apartment ~4STE and then 1 flight of stairs with railing on one side. Pt has a home health aide (nephew) who comes 5 hours a day/ 5 days a week. Prior to admit, pt states on days where she experiences no pain she typically ambulates with rollator or cane and is able to complete dressing, grooming, and toileting independently. When patient is not at her highest level, she states family will assist supervision ambulation/stair negotiation and assists with dressing and bathing. Pt has a shower chair for her walk-in shower. Pt states she lives with her parents in an apartment ~4STE and then 1 flight of stairs with railing on one side. Pt has a home health aide (nephew) who comes 5 hours a day/ 5 days a week. Prior to admit, pt states on days where she experiences no pain she typically ambulates with rollator or cane and is able to complete dressing, grooming, and toileting independently. When patient is not at her highest level, she states family will provide supervision-SBA for ambulation/stair negotiation and assists with dressing and bathing. Pt has a shower chair for her walk-in shower.

## 2022-03-18 NOTE — OCCUPATIONAL THERAPY INITIAL EVALUATION ADULT - ASR WT BEARING STATUS EVAL
Addended by: Iris Maki on: 10/25/2017 07:43 AM     Modules accepted: Génesis no weight-bearing restrictions

## 2022-03-18 NOTE — PROGRESS NOTE ADULT - SUBJECTIVE AND OBJECTIVE BOX
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    Patient main complaint is intermittent tremors in her LE b/l which has been going on for several weeks, no new complaints. Neg infectious ROS- no cough, dysuria, no BM in over 1 week though still tolerating po and no n/v or abd distention     12 point ROS reviewed and negative except as otherwise stated in HPI and below    PAST MEDICAL & SURGICAL HISTORY:  Tumor  spinal    Insomnia    Depression    Bladder disorder  pt self catheterizes    Spinal cord tumor    History of cholecystectomy    History of eye surgery  muscle repair      SOCIAL HISTORY:  No toxic habits     FAMILY HISTORY: not contributory to admission     ALLERGIES:  No Known Drug Allergies- confirmed   shellfish (Rash)      HOME MEDICATIONS:  buPROPion 300 mg/24 hours (XL) oral tablet, extended release: 1 tab(s) orally once a day  cyclobenzaprine 10 mg oral tablet: orally once a day (at bedtime)  gabapentin 300 mg oral capsule: orally once a day (at bedtime)  oxybutynin 5 mg oral tablet: orally 2 times a day  oxyCODONE 5 mg oral tablet: 1 tab(s) orally every 6 hours  pantoprazole 40 mg oral delayed release tablet: 1 tab(s) orally once a day  traZODone 100 mg oral tablet: orally once a day (at bedtime)      Vital Signs Last 24 Hrs  T(F): 98.7 (18 Mar 2022 14:07), Max: 98.7 (18 Mar 2022 14:07)  HR: 96 (18 Mar 2022 14:07) (83 - 102)  BP: 104/72 (18 Mar 2022 14:07) (104/66 - 127/71)  RR: 15 (18 Mar 2022 14:07) (15 - 17)  SpO2: 95% (18 Mar 2022 14:07) (95% - 98%)    PHYSICAL EXAM:  GENERAL: pleasant, NAD  NEURO: AOx3, 4/5 strength in LE   HEAD:  Atraumatic, Normocephalic  EYES: EOMI, conjunctiva and sclera clear  ENMT: Clear op, good dentition   CHEST/LUNG: Clear to auscultation bilaterally; No rales, rhonchi, wheezing, or rubs  HEART: Regular rate and rhythm; S1/S2, No murmurs, rubs, or gallops  ABDOMEN: Soft, Nontender, Nondistended, Normal BS  EXT: No sig le edema, wwp   Skin: unable to view surgical site on back but reported per team c/d/i     LABS:                        12.6   21.55 )-----------( 242      ( 18 Mar 2022 08:22 )             38.8     03-18    139  |  108  |  7   ----------------------------<  149  4.0   |  19  |  0.55    Ca    8.3      18 Mar 2022 08:22  Phos  2.4     03-18  Mg     2.2     03-18      ASSESSMENT AND PLAN: 36F w/hx of spina bifida, depression, and 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 here with worsening back pain and LE numbness found on MRI to have intradural lesion s/p T12-l3 laminectomy and tumor resection  #S/p laminectomy and tumor resection- postop care per primary team, f/u path, c/w steroids, f/u pain management recs, consider enema as no BM in 1 week   #Leukocytosis but neg ROS for infection, likely 2/2 steroids- obtain differential to see if bands, if further uptrend would obtain UA/CXR   #Spinal bifida hx with urinary complaints- c/w oxybutynin  #Depression hx- c/w home wellbutrin     #DVT px: SCDs    Patient was seen and examined by me at bedside    Greater than 110 minutes spent on total encounter; more than 50% of the visit was spent counseling and/or coordinating care by the attending physician.    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    Patient main complaint is intermittent tremors in her LE b/l which has been going on for several weeks, no new complaints. Neg infectious ROS- no cough, dysuria (needed straight cath earlier today for retention), no BM in over 1 week though still tolerating po and no n/v or abd distention     12 point ROS reviewed and negative except as otherwise stated in HPI and below    PAST MEDICAL & SURGICAL HISTORY:  Tumor  spinal    Insomnia    Depression    Bladder disorder  pt self catheterizes    Spinal cord tumor    History of cholecystectomy    History of eye surgery  muscle repair      SOCIAL HISTORY:  No toxic habits     FAMILY HISTORY: not contributory to admission     ALLERGIES:  No Known Drug Allergies- confirmed   shellfish (Rash)      HOME MEDICATIONS:  buPROPion 300 mg/24 hours (XL) oral tablet, extended release: 1 tab(s) orally once a day  cyclobenzaprine 10 mg oral tablet: orally once a day (at bedtime)  gabapentin 300 mg oral capsule: orally once a day (at bedtime)  oxybutynin 5 mg oral tablet: orally 2 times a day  oxyCODONE 5 mg oral tablet: 1 tab(s) orally every 6 hours  pantoprazole 40 mg oral delayed release tablet: 1 tab(s) orally once a day  traZODone 100 mg oral tablet: orally once a day (at bedtime)      Vital Signs Last 24 Hrs  T(F): 98.7 (18 Mar 2022 14:07), Max: 98.7 (18 Mar 2022 14:07)  HR: 96 (18 Mar 2022 14:07) (83 - 102)  BP: 104/72 (18 Mar 2022 14:07) (104/66 - 127/71)  RR: 15 (18 Mar 2022 14:07) (15 - 17)  SpO2: 95% (18 Mar 2022 14:07) (95% - 98%)    PHYSICAL EXAM:  GENERAL: pleasant, NAD  NEURO: AOx3, 4/5 strength in LE   HEAD:  Atraumatic, Normocephalic  EYES: EOMI, conjunctiva and sclera clear  ENMT: Clear op, good dentition   CHEST/LUNG: Clear to auscultation bilaterally; No rales, rhonchi, wheezing, or rubs  HEART: Regular rate and rhythm; S1/S2, No murmurs, rubs, or gallops  ABDOMEN: Soft, Nontender, Nondistended, Normal BS  EXT: No sig le edema, wwp   Skin: unable to view surgical site on back but reported per team c/d/i     LABS:                        12.6   21.55 )-----------( 242      ( 18 Mar 2022 08:22 )             38.8     03-18    139  |  108  |  7   ----------------------------<  149  4.0   |  19  |  0.55    Ca    8.3      18 Mar 2022 08:22  Phos  2.4     03-18  Mg     2.2     03-18      ASSESSMENT AND PLAN: 36F w/hx of spina bifida, depression, and 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 here with worsening back pain and LE numbness found on MRI to have intradural lesion s/p T12-l3 laminectomy and tumor resection  #S/p laminectomy and tumor resection- postop care per primary team, f/u path, c/w steroids, f/u pain management recs, consider enema as no BM in 1 week   #Leukocytosis but neg ROS for infection, likely 2/2 steroids- obtain differential to see if bands, if further uptrend would obtain UA/CXR   #Spinal bifida hx with urinary complaints- c/w oxybutynin, monitor for urinary retention  #Depression hx- c/w home wellbutrin     #DVT px: SCDs    Patient was seen and examined by me at bedside    Greater than 110 minutes spent on total encounter; more than 50% of the visit was spent counseling and/or coordinating care by the attending physician.    Shiraz Orr MD [phone]

## 2022-03-18 NOTE — OCCUPATIONAL THERAPY INITIAL EVALUATION ADULT - MODALITIES TREATMENT COMMENTS
Pt reporting pain with any touch at dorsum of L foot specifically at all toes. Pt able to visually track, no deficits noted during L eye quadrant test. 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

## 2022-03-18 NOTE — PHYSICAL THERAPY INITIAL EVALUATION ADULT - ADDITIONAL COMMENTS
Pt states she lives with her parents in an apartment ~4STE and then 1 flight of stairs with railing on one side. Pt has a home health aide (nephew) who comes 5 hours a day/ 5 days a week. Prior to admit, pt states on days where she experiences no pain she typically ambulates with rollator or cane and is able to complete dressing, grooming, and toileting independently. When patient is not at her highest level, she states family will provide supervision-SBA for ambulation/stair negotiation and assists with dressing and bathing. Pt has a shower chair for her walk-in shower.

## 2022-03-18 NOTE — CONSULT NOTE ADULT - SUBJECTIVE AND OBJECTIVE BOX
NEUROSURGERY PAIN MANAGEMENT CONSULT NOTE    Chief Complaint: back radiating to b/l lower extremities     HPI:  36F w/hx of spina bifida, depression, and spinal epidermoid tumor s/p multiple resections, most recently in 2019. She has had progressively worsening back pain that radiates into her legs (R>L) as well as numbness in her feet. She has had urinary incontinence as well for the majority of her life, for which she self-catheterizes. MRI demonstrates recurrence of intradural lesion for which she has consented for revision resection today. (16 Mar 2022 13:14)      PAST MEDICAL & SURGICAL HISTORY:  Tumor  spinal    Insomnia    Depression    Bladder disorder  pt self catheterizes    Spinal cord tumor    History of cholecystectomy    History of eye surgery  muscle repair        FAMILY HISTORY:      SOCIAL HISTORY:  [ ] Denies Smoking, Alcohol, or Drug Use    HOME MEDICATIONS:   Please refer to initial HNP    PAIN HOME MEDICATIONS:    Allergies    No Known Drug Allergies  shellfish (Rash)    Intolerances        PAIN MEDICATIONS:  acetaminophen     Tablet .. 1000 milliGRAM(s) Oral every 8 hours  buPROPion XL (24-Hour) . 300 milliGRAM(s) Oral daily  diphenhydrAMINE 50 milliGRAM(s) Oral every 6 hours PRN  gabapentin 300 milliGRAM(s) Oral every 8 hours  HYDROmorphone   Tablet 2 milliGRAM(s) Oral every 4 hours PRN  HYDROmorphone   Tablet 4 milliGRAM(s) Oral every 4 hours PRN  methocarbamol 500 milliGRAM(s) Oral every 8 hours  traZODone 100 milliGRAM(s) Oral at bedtime    Heme:    Antibiotics:    Cardiovascular:    GI:  magnesium hydroxide Suspension 30 milliLiter(s) Oral every 12 hours PRN  pantoprazole    Tablet 40 milliGRAM(s) Oral before breakfast  polyethylene glycol 3350 17 Gram(s) Oral daily  senna 2 Tablet(s) Oral at bedtime    Endocrine:  dexAMETHasone     Tablet 4 milliGRAM(s) Oral every 6 hours  dexAMETHasone     Tablet   Oral     All Other Medications:  influenza   Vaccine 0.5 milliLiter(s) IntraMuscular once  oxybutynin 5 milliGRAM(s) Oral two times a day      Vital Signs Last 24 Hrs  T(C): 36.8 (18 Mar 2022 08:49), Max: 36.8 (18 Mar 2022 08:49)  T(F): 98.3 (18 Mar 2022 08:49), Max: 98.3 (18 Mar 2022 08:49)  HR: 89 (18 Mar 2022 08:49) (83 - 96)  BP: 108/68 (18 Mar 2022 08:49) (104/66 - 115/79)  BP(mean): 79 (17 Mar 2022 15:00) (79 - 83)  RR: 16 (18 Mar 2022 08:49) (16 - 17)  SpO2: 98% (18 Mar 2022 08:49) (95% - 98%)    LABS:                        12.6   21.55 )-----------( 242      ( 18 Mar 2022 08:22 )             38.8     03-18    139  |  108  |  7   ----------------------------<  149<H>  4.0   |  19<L>  |  0.55    Ca    8.3<L>      18 Mar 2022 08:22  Phos  2.4     03-18  Mg     2.2     03-18            RADIOLOGY:    Drug Screen:        REVIEW OF SYSTEMS:  CONSTITUTIONAL: No fever or fatigue O/N.   EYES: No eye pain, visual disturbances  ENMT:  No difficulty hearing. No throat pain  NECK: No pain or stiffness  RESPIRATORY: No cough, wheezing; No shortness of breath  CARDIOVASCULAR: No chest pain, palpitations.   GASTROINTESTINAL: Pt reports passing gas. No bowel movements. No abdominal or epigastric pain. No nausea, vomiting. GENITOURINARY: No dysuria, frequency, or incontinence  NEUROLOGICAL: No headaches, loss of strength, numbness, or tremors. No dizzinesss or lightheadedness with pain medications.   MUSCULOSKELETAL: Incisional back pain. No joint pain or swelling; No muscle, or extremity pain    PAIN ASSESSMENT: c/o back incisional pain, b/l lower extremity and b/l foot pain/numbness    PHYSICAL EXAM  GENERAL: Laying in bed, NAD  Neuro: CN II-XII PERRRL, EOMI  Cranial nerves grossly intact  Motor exam:  Sensation intact to LT in UE/LE in 3 dermatomes  CHEST/LUNG: Clear to auscultation bilaterally; No rales, rhonchi, wheezing, or rubs  HEART: Regular rate and rhythm; No murmurs, rubs, or gallops  ABDOMEN: Soft, Nontender, Nondistended; Bowel sounds present  EXTREMITIES:  2+ Peripheral Pulses, No clubbing, cyanosis, or edema  SKIN: No rashes or lesions      ASSESSMENT:   36F w/hx of spina bifida, depression, and spinal epidermoid tumor s/p multiple resections, most recently in 2019, presented w/ b/l LE radiculopathy R>L, now s/p T12-L3 laminectomy, subtotal intradural extramedullary tumor resection (3/16/22)    PLAN:   - Pain:  Tylenol 1000mg every 8 hours  Increase Gabapentin to 400mg every 8 hours   Change Flexeril to Robaxin 500mg every 8 hours  Change Oxycodone to Dilaudid 2/4mg every 4 hours PRN for moderate to severe pain     - Home pain regiment: Gabapentin at bedtime, Flexeril PRN, Oxycodone PRN    - Bowel regimen: Senna    - Nausea ppx: Zofran standing  - Functional Goals: Pt will get OOB with PT today. Pt will resume previous level of activity without impairment from surgery.   - Additional Consults: None recommended.   - Additional Labs/Imaging:  None recommended.   - Follow up, Discharge Planning: pending PT/OT post op  - Pain Management follow up plan: will cont to follow    d/w Dr. Sánchez

## 2022-03-19 LAB
BASOPHILS # BLD AUTO: 0.01 K/UL — SIGNIFICANT CHANGE UP (ref 0–0.2)
BASOPHILS NFR BLD AUTO: 0.1 % — SIGNIFICANT CHANGE UP (ref 0–2)
EOSINOPHIL # BLD AUTO: 0 K/UL — SIGNIFICANT CHANGE UP (ref 0–0.5)
EOSINOPHIL NFR BLD AUTO: 0 % — SIGNIFICANT CHANGE UP (ref 0–6)
HCT VFR BLD CALC: 36.9 % — SIGNIFICANT CHANGE UP (ref 34.5–45)
HGB BLD-MCNC: 12.1 G/DL — SIGNIFICANT CHANGE UP (ref 11.5–15.5)
IMM GRANULOCYTES NFR BLD AUTO: 0.4 % — SIGNIFICANT CHANGE UP (ref 0–1.5)
LYMPHOCYTES # BLD AUTO: 0.52 K/UL — LOW (ref 1–3.3)
LYMPHOCYTES # BLD AUTO: 2.9 % — LOW (ref 13–44)
MCHC RBC-ENTMCNC: 32.3 PG — SIGNIFICANT CHANGE UP (ref 27–34)
MCHC RBC-ENTMCNC: 32.8 GM/DL — SIGNIFICANT CHANGE UP (ref 32–36)
MCV RBC AUTO: 98.4 FL — SIGNIFICANT CHANGE UP (ref 80–100)
MONOCYTES # BLD AUTO: 1.01 K/UL — HIGH (ref 0–0.9)
MONOCYTES NFR BLD AUTO: 5.6 % — SIGNIFICANT CHANGE UP (ref 2–14)
NEUTROPHILS # BLD AUTO: 16.46 K/UL — HIGH (ref 1.8–7.4)
NEUTROPHILS NFR BLD AUTO: 91 % — HIGH (ref 43–77)
NRBC # BLD: 0 /100 WBCS — SIGNIFICANT CHANGE UP (ref 0–0)
PLATELET # BLD AUTO: 264 K/UL — SIGNIFICANT CHANGE UP (ref 150–400)
RBC # BLD: 3.75 M/UL — LOW (ref 3.8–5.2)
RBC # FLD: 15 % — HIGH (ref 10.3–14.5)
WBC # BLD: 18.07 K/UL — HIGH (ref 3.8–10.5)
WBC # FLD AUTO: 18.07 K/UL — HIGH (ref 3.8–10.5)

## 2022-03-19 PROCEDURE — 99232 SBSQ HOSP IP/OBS MODERATE 35: CPT

## 2022-03-19 PROCEDURE — 72158 MRI LUMBAR SPINE W/O & W/DYE: CPT | Mod: 26

## 2022-03-19 PROCEDURE — 99024 POSTOP FOLLOW-UP VISIT: CPT

## 2022-03-19 RX ORDER — ENOXAPARIN SODIUM 100 MG/ML
40 INJECTION SUBCUTANEOUS EVERY 24 HOURS
Refills: 0 | Status: DISCONTINUED | OUTPATIENT
Start: 2022-03-19 | End: 2022-03-22

## 2022-03-19 RX ORDER — SODIUM,POTASSIUM PHOSPHATES 278-250MG
1 POWDER IN PACKET (EA) ORAL ONCE
Refills: 0 | Status: COMPLETED | OUTPATIENT
Start: 2022-03-19 | End: 2022-03-19

## 2022-03-19 RX ADMIN — Medication 5 MILLIGRAM(S): at 17:57

## 2022-03-19 RX ADMIN — ENOXAPARIN SODIUM 40 MILLIGRAM(S): 100 INJECTION SUBCUTANEOUS at 20:37

## 2022-03-19 RX ADMIN — METHOCARBAMOL 500 MILLIGRAM(S): 500 TABLET, FILM COATED ORAL at 05:57

## 2022-03-19 RX ADMIN — HYDROMORPHONE HYDROCHLORIDE 4 MILLIGRAM(S): 2 INJECTION INTRAMUSCULAR; INTRAVENOUS; SUBCUTANEOUS at 12:31

## 2022-03-19 RX ADMIN — BUPROPION HYDROCHLORIDE 300 MILLIGRAM(S): 150 TABLET, EXTENDED RELEASE ORAL at 13:30

## 2022-03-19 RX ADMIN — METHOCARBAMOL 500 MILLIGRAM(S): 500 TABLET, FILM COATED ORAL at 21:24

## 2022-03-19 RX ADMIN — Medication 100 MILLIGRAM(S): at 21:24

## 2022-03-19 RX ADMIN — GABAPENTIN 400 MILLIGRAM(S): 400 CAPSULE ORAL at 13:30

## 2022-03-19 RX ADMIN — Medication 5 MILLIGRAM(S): at 05:58

## 2022-03-19 RX ADMIN — POLYETHYLENE GLYCOL 3350 17 GRAM(S): 17 POWDER, FOR SOLUTION ORAL at 13:30

## 2022-03-19 RX ADMIN — Medication 4 MILLIGRAM(S): at 13:30

## 2022-03-19 RX ADMIN — HYDROMORPHONE HYDROCHLORIDE 4 MILLIGRAM(S): 2 INJECTION INTRAMUSCULAR; INTRAVENOUS; SUBCUTANEOUS at 12:01

## 2022-03-19 RX ADMIN — METHOCARBAMOL 500 MILLIGRAM(S): 500 TABLET, FILM COATED ORAL at 13:30

## 2022-03-19 RX ADMIN — PANTOPRAZOLE SODIUM 40 MILLIGRAM(S): 20 TABLET, DELAYED RELEASE ORAL at 05:57

## 2022-03-19 RX ADMIN — Medication 4 MILLIGRAM(S): at 21:23

## 2022-03-19 RX ADMIN — Medication 1000 MILLIGRAM(S): at 13:30

## 2022-03-19 RX ADMIN — Medication 1000 MILLIGRAM(S): at 06:57

## 2022-03-19 RX ADMIN — GABAPENTIN 400 MILLIGRAM(S): 400 CAPSULE ORAL at 05:57

## 2022-03-19 RX ADMIN — Medication 1000 MILLIGRAM(S): at 05:57

## 2022-03-19 RX ADMIN — Medication 1000 MILLIGRAM(S): at 14:03

## 2022-03-19 RX ADMIN — HYDROMORPHONE HYDROCHLORIDE 4 MILLIGRAM(S): 2 INJECTION INTRAMUSCULAR; INTRAVENOUS; SUBCUTANEOUS at 08:15

## 2022-03-19 RX ADMIN — Medication 1000 MILLIGRAM(S): at 22:22

## 2022-03-19 RX ADMIN — GABAPENTIN 400 MILLIGRAM(S): 400 CAPSULE ORAL at 21:24

## 2022-03-19 RX ADMIN — Medication 1000 MILLIGRAM(S): at 21:22

## 2022-03-19 RX ADMIN — Medication 1 PACKET(S): at 14:49

## 2022-03-19 NOTE — PROGRESS NOTE ADULT - SUBJECTIVE AND OBJECTIVE BOX
HPI:  36F w/hx of spina bifida, depression, and spinal epidermoid tumor s/p multiple resections, most recently in 2019. She has had progressively worsening back pain that radiates into her legs (R>L) as well as numbness in her feet. She has had urinary incontinence as well for the majority of her life, for which she self-catheterizes. MRI demonstrates recurrence of intradural lesion for which she has consented for revision resection today. (16 Mar 2022 13:14)    OVERNIGHT EVENTS: Patient reports persistent back pain, unrelieved with pain medication. Otherwise JO o/n.     HOSPITAL COURSE:   3/16: POD0 T12-L3 lami for subtotal intradural extramedullary tumor resection. ICU post op. JO neuro stable  3/17: POD1. JO o/n neuro stable. Remains on bedrest  3/18: POD 2. PT/OT today.   3/19: POD3. JO o/n. pend MRI     Vital Signs Last 24 Hrs  T(C): 36.7 (18 Mar 2022 20:40), Max: 37.1 (18 Mar 2022 14:07)  T(F): 98.1 (18 Mar 2022 20:40), Max: 98.7 (18 Mar 2022 14:07)  HR: 83 (18 Mar 2022 20:40) (83 - 102)  BP: 107/69 (18 Mar 2022 20:40) (102/67 - 127/71)  BP(mean): 82 (18 Mar 2022 20:40) (82 - 82)  RR: 16 (18 Mar 2022 20:40) (15 - 16)  SpO2: 94% (18 Mar 2022 20:40) (94% - 98%)    I&O's Summary    17 Mar 2022 07:01  -  18 Mar 2022 07:00  --------------------------------------------------------  IN: 375 mL / OUT: 1945 mL / NET: -1570 mL    18 Mar 2022 07:01  -  19 Mar 2022 02:54  --------------------------------------------------------  IN: 1220 mL / OUT: 480 mL / NET: 740 mL        PHYSICAL EXAM:  GEN: laying in bed, appears well, NAD  NEURO: AOx3. FC, OE spont, speech intact, face symmetric. CNII-XII intact. PERRL, EOMI. No pronator drift. MAEx4. b/l UE 5/5. b/l LEs 4/5 throughout. sensation intact  CV: Regular rate   PULM: nonlabored breathing, normal chest rise   GI: Abdomen soft, nontender nondistended  EXT: ext warm, dry, nontender  WOUND: thoracolumbar incision c/d/i      TUBES/LINES:  [] Michel  [] Trach  [] NGT  [] PEG  [x] Wound Drains - HMV  [] Others    DIET:  [] NPO  [x] Mechanical  [] Tube feeds    LABS:                        12.6   21.55 )-----------( 242      ( 18 Mar 2022 08:22 )             38.8     03-18    139  |  108  |  7   ----------------------------<  149<H>  4.0   |  19<L>  |  0.55    Ca    8.3<L>      18 Mar 2022 08:22  Phos  2.4     03-18  Mg     2.2     03-18              CAPILLARY BLOOD GLUCOSE          Drug Levels: [] N/A    CSF Analysis: [] N/A      Allergies    No Known Drug Allergies  shellfish (Rash)    Intolerances      MEDICATIONS:  Antibiotics:    Neuro:  acetaminophen     Tablet .. 1000 milliGRAM(s) Oral every 8 hours  buPROPion XL (24-Hour) . 300 milliGRAM(s) Oral daily  diphenhydrAMINE 50 milliGRAM(s) Oral every 6 hours PRN  gabapentin 400 milliGRAM(s) Oral every 8 hours  HYDROmorphone   Tablet 2 milliGRAM(s) Oral every 4 hours PRN  HYDROmorphone   Tablet 4 milliGRAM(s) Oral every 4 hours PRN  methocarbamol 500 milliGRAM(s) Oral every 8 hours  traZODone 100 milliGRAM(s) Oral at bedtime    Anticoagulation:    OTHER:  dexAMETHasone     Tablet 4 milliGRAM(s) Oral every 8 hours  dexAMETHasone     Tablet   Oral   influenza   Vaccine 0.5 milliLiter(s) IntraMuscular once  magnesium hydroxide Suspension 30 milliLiter(s) Oral every 12 hours PRN  oxybutynin 5 milliGRAM(s) Oral two times a day  pantoprazole    Tablet 40 milliGRAM(s) Oral before breakfast  polyethylene glycol 3350 17 Gram(s) Oral daily  senna 2 Tablet(s) Oral at bedtime    IVF:    CULTURES:    RADIOLOGY & ADDITIONAL TESTS:  < from: MR Thoracic Spine w/wo IV Cont (02.12.22 @ 13:46) >  IMPRESSION: Interval progression of tumoral disease extending from the   conus to the L2 level.      < from: MR Lumbar Spine w/wo IV Cont (02.12.22 @ 13:46) >  IMPRESSION: Interval progression of tumoral disease extending from the   conus to the L2 level.      ASSESSMENT:  36F w/hx of spina bifida, depression, and spinal epidermoid tumor s/p multiple resections, most recently in 2019, presented w/ b/l LE radiculopathy R>L, now s/p T12-L3 laminectomy, subtotal intradural extramedullary tumor resection (3/16/22)    D43.4 G99.2    Handoff    MEWS Score    Tumor    Insomnia    Depression    Bladder disorder    Intradural extramedullary spinal tumor    Laminectomy, spine, thoracolumbar, 4 levels, posterior approach    Spinal cord tumor    History of cholecystectomy    History of eye surgery    [email or web address]        PLAN:    NEURO:   - neuro checks/vital signs q4hrs  - ERAS for pain control  - 1 HMV to gravity, monitor output  - pend postop MRI   - cont home wellbutrin, trazodone  - decadron taper over 1 week    CARDIOVASCULAR:   - SBP goal 100-140  - no issues    PULMONARY:   - satting well on RA   - IS    GI:   - regular diet  - bowel regimen  - Protonix while on steroids    RENAL:   - IVL  - continue self-cath PRN   - cont home oxybutynin     HEME:   - s/p 1u pRBC intraop  - SCDs for DVT ppx    ID:   - afebrile  - trend leukocytosis    ENDO:   - ISS dc'd  - no issues    DISPO: SDU status, full code, PT/OT recs AR     Assessment and Plan discussed with Dr Bernabe

## 2022-03-19 NOTE — PROGRESS NOTE ADULT - ASSESSMENT
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    #S/p laminectomy and tumor resection- postop care per primary team, f/u path, c/w steroids, f/u pain management recs; continue bowel regimen  #Leukocytosis - likely 2/2 steroids - no evidence of infection, afebrile  #Spinal bifida hx with urinary complaints- c/w oxybutynin, monitor for urinary retention  #Depression hx- c/w home wellbutrin     #DVT px: SCDs

## 2022-03-19 NOTE — PROGRESS NOTE ADULT - SUBJECTIVE AND OBJECTIVE BOX
Subjective/Interval events:  No acute overnight events. Reports back pain, relieved with pain meds, but pain meds wearing off at the time I saw her. No fever/chills, dizziness, lightheadedness, SOB, CP.    MEDICATIONS  (STANDING):  acetaminophen     Tablet .. 1000 milliGRAM(s) Oral every 8 hours  buPROPion XL (24-Hour) . 300 milliGRAM(s) Oral daily  dexAMETHasone     Tablet 4 milliGRAM(s) Oral every 8 hours  dexAMETHasone     Tablet   Oral   enoxaparin Injectable 40 milliGRAM(s) SubCutaneous every 24 hours  gabapentin 400 milliGRAM(s) Oral every 8 hours  influenza   Vaccine 0.5 milliLiter(s) IntraMuscular once  methocarbamol 500 milliGRAM(s) Oral every 8 hours  oxybutynin 5 milliGRAM(s) Oral two times a day  pantoprazole    Tablet 40 milliGRAM(s) Oral before breakfast  polyethylene glycol 3350 17 Gram(s) Oral daily  senna 2 Tablet(s) Oral at bedtime  traZODone 100 milliGRAM(s) Oral at bedtime    MEDICATIONS  (PRN):  diphenhydrAMINE 50 milliGRAM(s) Oral every 6 hours PRN Rash and/or Itching  HYDROmorphone   Tablet 2 milliGRAM(s) Oral every 4 hours PRN Moderate Pain (4 - 6)  HYDROmorphone   Tablet 4 milliGRAM(s) Oral every 4 hours PRN Severe Pain (7 - 10)  magnesium hydroxide Suspension 30 milliLiter(s) Oral every 12 hours PRN Constipation      Vital Signs Last 24 Hrs  T(C): 36.7 (19 Mar 2022 08:52), Max: 37.1 (18 Mar 2022 14:07)  T(F): 98 (19 Mar 2022 08:52), Max: 98.7 (18 Mar 2022 14:07)  HR: 72 (19 Mar 2022 08:52) (72 - 96)  BP: 101/62 (19 Mar 2022 08:52) (101/62 - 117/79)  BP(mean): 83 (19 Mar 2022 04:21) (82 - 83)  RR: 16 (19 Mar 2022 08:52) (15 - 16)  SpO2: 94% (19 Mar 2022 08:52) (94% - 97%)    PHYSICAL EXAM:  GENERAL: pleasant, appropriate, no acute distress. Participating appropriately in interview  HEENT - NC/AT, EOMI, PERLLA, oropharynx clear without exudate or erythema, moist mucus membranes  NECK: Soft, supple, No JVD, no lymphadenopathy  CHEST/LUNG: Clear to auscultation bilaterally; No rales, rhonchi, wheezing. Normal work of breathing, not tachypneic  HEART: Regular rate and rhythm; No murmurs, rubs, or gallops, normal s1/s2. Warm and well-perfused  ABDOMEN: Soft, Nontender, Nondistended. Normoactive bowel sounds.  EXTREMITIES:  2+ Peripheral Pulses, No clubbing, cyanosis, or edema  NEURO:  awake, alert, oriented to person, place, time, and situation. Cranial nerves intact, no motor or sensory deficits. Moves all extremities.  SKIN: No rashes or lesions. Surgical site c/d/i    LABS:  03-18    139  |  108  |  7   ----------------------------<  149  4.0   |  19  |  0.55    Ca    8.3      18 Mar 2022 08:22  Phos  2.4     03-18  Mg     2.2     03-18                            12.1   18.07 )-----------( 264      ( 19 Mar 2022 11:11 )             36.9       RADIOLOGY & ADDITIONAL TESTS:  reviewed, none new

## 2022-03-20 LAB
ALBUMIN SERPL ELPH-MCNC: 3.5 G/DL — SIGNIFICANT CHANGE UP (ref 3.3–5)
ALP SERPL-CCNC: 102 U/L — SIGNIFICANT CHANGE UP (ref 40–120)
ALT FLD-CCNC: 218 U/L — HIGH (ref 10–45)
ANION GAP SERPL CALC-SCNC: 13 MMOL/L — SIGNIFICANT CHANGE UP (ref 5–17)
AST SERPL-CCNC: 158 U/L — HIGH (ref 10–40)
BILIRUB SERPL-MCNC: 0.3 MG/DL — SIGNIFICANT CHANGE UP (ref 0.2–1.2)
BUN SERPL-MCNC: 14 MG/DL — SIGNIFICANT CHANGE UP (ref 7–23)
CALCIUM SERPL-MCNC: 9 MG/DL — SIGNIFICANT CHANGE UP (ref 8.4–10.5)
CHLORIDE SERPL-SCNC: 103 MMOL/L — SIGNIFICANT CHANGE UP (ref 96–108)
CO2 SERPL-SCNC: 20 MMOL/L — LOW (ref 22–31)
CREAT SERPL-MCNC: 0.57 MG/DL — SIGNIFICANT CHANGE UP (ref 0.5–1.3)
EGFR: 120 ML/MIN/1.73M2 — SIGNIFICANT CHANGE UP
GLUCOSE SERPL-MCNC: 136 MG/DL — HIGH (ref 70–99)
MAGNESIUM SERPL-MCNC: 1.9 MG/DL — SIGNIFICANT CHANGE UP (ref 1.6–2.6)
PHOSPHATE SERPL-MCNC: 3.5 MG/DL — SIGNIFICANT CHANGE UP (ref 2.5–4.5)
POTASSIUM SERPL-MCNC: 4.5 MMOL/L — SIGNIFICANT CHANGE UP (ref 3.5–5.3)
POTASSIUM SERPL-SCNC: 4.5 MMOL/L — SIGNIFICANT CHANGE UP (ref 3.5–5.3)
PROT SERPL-MCNC: 6.1 G/DL — SIGNIFICANT CHANGE UP (ref 6–8.3)
SODIUM SERPL-SCNC: 136 MMOL/L — SIGNIFICANT CHANGE UP (ref 135–145)

## 2022-03-20 PROCEDURE — 99024 POSTOP FOLLOW-UP VISIT: CPT

## 2022-03-20 RX ORDER — LACTULOSE 10 G/15ML
10 SOLUTION ORAL ONCE
Refills: 0 | Status: COMPLETED | OUTPATIENT
Start: 2022-03-20 | End: 2022-03-20

## 2022-03-20 RX ORDER — MULTIVIT WITH MIN/MFOLATE/K2 340-15/3 G
1 POWDER (GRAM) ORAL ONCE
Refills: 0 | Status: COMPLETED | OUTPATIENT
Start: 2022-03-20 | End: 2022-03-20

## 2022-03-20 RX ADMIN — POLYETHYLENE GLYCOL 3350 17 GRAM(S): 17 POWDER, FOR SOLUTION ORAL at 14:09

## 2022-03-20 RX ADMIN — METHOCARBAMOL 500 MILLIGRAM(S): 500 TABLET, FILM COATED ORAL at 05:36

## 2022-03-20 RX ADMIN — Medication 1000 MILLIGRAM(S): at 05:37

## 2022-03-20 RX ADMIN — Medication 1 BOTTLE: at 14:09

## 2022-03-20 RX ADMIN — METHOCARBAMOL 500 MILLIGRAM(S): 500 TABLET, FILM COATED ORAL at 14:09

## 2022-03-20 RX ADMIN — HYDROMORPHONE HYDROCHLORIDE 4 MILLIGRAM(S): 2 INJECTION INTRAMUSCULAR; INTRAVENOUS; SUBCUTANEOUS at 10:14

## 2022-03-20 RX ADMIN — BUPROPION HYDROCHLORIDE 300 MILLIGRAM(S): 150 TABLET, EXTENDED RELEASE ORAL at 14:09

## 2022-03-20 RX ADMIN — GABAPENTIN 400 MILLIGRAM(S): 400 CAPSULE ORAL at 21:28

## 2022-03-20 RX ADMIN — Medication 4 MILLIGRAM(S): at 21:28

## 2022-03-20 RX ADMIN — Medication 1000 MILLIGRAM(S): at 14:42

## 2022-03-20 RX ADMIN — HYDROMORPHONE HYDROCHLORIDE 4 MILLIGRAM(S): 2 INJECTION INTRAMUSCULAR; INTRAVENOUS; SUBCUTANEOUS at 10:48

## 2022-03-20 RX ADMIN — Medication 4 MILLIGRAM(S): at 14:09

## 2022-03-20 RX ADMIN — METHOCARBAMOL 500 MILLIGRAM(S): 500 TABLET, FILM COATED ORAL at 21:29

## 2022-03-20 RX ADMIN — Medication 4 MILLIGRAM(S): at 05:37

## 2022-03-20 RX ADMIN — Medication 5 MILLIGRAM(S): at 05:37

## 2022-03-20 RX ADMIN — Medication 1000 MILLIGRAM(S): at 14:09

## 2022-03-20 RX ADMIN — GABAPENTIN 400 MILLIGRAM(S): 400 CAPSULE ORAL at 14:09

## 2022-03-20 RX ADMIN — ENOXAPARIN SODIUM 40 MILLIGRAM(S): 100 INJECTION SUBCUTANEOUS at 19:31

## 2022-03-20 RX ADMIN — Medication 100 MILLIGRAM(S): at 21:28

## 2022-03-20 RX ADMIN — GABAPENTIN 400 MILLIGRAM(S): 400 CAPSULE ORAL at 05:37

## 2022-03-20 RX ADMIN — HYDROMORPHONE HYDROCHLORIDE 2 MILLIGRAM(S): 2 INJECTION INTRAMUSCULAR; INTRAVENOUS; SUBCUTANEOUS at 22:55

## 2022-03-20 RX ADMIN — PANTOPRAZOLE SODIUM 40 MILLIGRAM(S): 20 TABLET, DELAYED RELEASE ORAL at 05:36

## 2022-03-20 RX ADMIN — Medication 5 MILLIGRAM(S): at 18:36

## 2022-03-20 RX ADMIN — HYDROMORPHONE HYDROCHLORIDE 2 MILLIGRAM(S): 2 INJECTION INTRAMUSCULAR; INTRAVENOUS; SUBCUTANEOUS at 21:51

## 2022-03-20 RX ADMIN — Medication 1000 MILLIGRAM(S): at 06:37

## 2022-03-20 RX ADMIN — SENNA PLUS 2 TABLET(S): 8.6 TABLET ORAL at 21:28

## 2022-03-20 NOTE — PROGRESS NOTE ADULT - SUBJECTIVE AND OBJECTIVE BOX
HPI:  36F w/hx of spina bifida, depression, and spinal epidermoid tumor s/p multiple resections, most recently in 2019. She has had progressively worsening back pain that radiates into her legs (R>L) as well as numbness in her feet. She has had urinary incontinence as well for the majority of her life, for which she self-catheterizes. MRI demonstrates recurrence of intradural lesion for which she has consented for revision resection today. (16 Mar 2022 13:14)    OVERNIGHT EVENTS: Patient doing well, pain controlled.     HOSPITAL COURSE:   3/16: POD0 T12-L3 lami for subtotal intradural extramedullary tumor resection. ICU post op. JO neuro stable  3/17: POD1. JO o/n neuro stable. Remains on bedrest  3/18: POD 2. PT/OT today.   3/19: POD3. JO o/n. pend MRI. started SQL.   3/20: POD4. JO o/n,     Vital Signs Last 24 Hrs  T(C): 36.8 (19 Mar 2022 20:26), Max: 37.1 (19 Mar 2022 15:51)  T(F): 98.2 (19 Mar 2022 20:26), Max: 98.7 (19 Mar 2022 15:51)  HR: 77 (19 Mar 2022 20:26) (72 - 81)  BP: 107/70 (19 Mar 2022 20:26) (101/62 - 115/74)  BP(mean): 83 (19 Mar 2022 20:26) (83 - 83)  RR: 17 (19 Mar 2022 20:26) (16 - 17)  SpO2: 95% (19 Mar 2022 20:26) (94% - 95%)    I&O's Summary    18 Mar 2022 07:01  -  19 Mar 2022 07:00  --------------------------------------------------------  IN: 1220 mL / OUT: 1010 mL / NET: 210 mL    19 Mar 2022 07:01  -  20 Mar 2022 03:00  --------------------------------------------------------  IN: 0 mL / OUT: 900 mL / NET: -900 mL        PHYSICAL EXAM:  GEN: laying in bed, appears well, NAD  NEURO: AOx3. FC, OE spont, speech intact, face symmetric. CNII-XII intact. PERRL, EOMI. No pronator drift. MAEx4. b/l UE 5/5. b/l LEs 4/5 throughout. sensation intact  CV: Regular rate   PULM: nonlabored breathing, normal chest rise   GI: Abdomen soft, nontender nondistended  EXT: ext warm, dry, nontender  WOUND: thoracolumbar incision c/d/i      TUBES/LINES:  [] Michel  [] Trach  [] NGT  [] PEG  [x] Wound Drains - HMV  [] Others    DIET:  [] NPO  [x] Mechanical  [] Tube feeds    LABS:                        12.1   18.07 )-----------( 264      ( 19 Mar 2022 11:11 )             36.9     03-18    139  |  108  |  7   ----------------------------<  149<H>  4.0   |  19<L>  |  0.55    Ca    8.3<L>      18 Mar 2022 08:22  Phos  2.4     03-18  Mg     2.2     03-18              CAPILLARY BLOOD GLUCOSE          Drug Levels: [] N/A    CSF Analysis: [] N/A      Allergies    No Known Drug Allergies  shellfish (Rash)    Intolerances      MEDICATIONS:  Antibiotics:    Neuro:  acetaminophen     Tablet .. 1000 milliGRAM(s) Oral every 8 hours  buPROPion XL (24-Hour) . 300 milliGRAM(s) Oral daily  diphenhydrAMINE 50 milliGRAM(s) Oral every 6 hours PRN  gabapentin 400 milliGRAM(s) Oral every 8 hours  HYDROmorphone   Tablet 2 milliGRAM(s) Oral every 4 hours PRN  HYDROmorphone   Tablet 4 milliGRAM(s) Oral every 4 hours PRN  methocarbamol 500 milliGRAM(s) Oral every 8 hours  traZODone 100 milliGRAM(s) Oral at bedtime    Anticoagulation:  enoxaparin Injectable 40 milliGRAM(s) SubCutaneous every 24 hours    OTHER:  dexAMETHasone     Tablet 4 milliGRAM(s) Oral every 8 hours  dexAMETHasone     Tablet   Oral   influenza   Vaccine 0.5 milliLiter(s) IntraMuscular once  magnesium hydroxide Suspension 30 milliLiter(s) Oral every 12 hours PRN  oxybutynin 5 milliGRAM(s) Oral two times a day  pantoprazole    Tablet 40 milliGRAM(s) Oral before breakfast  polyethylene glycol 3350 17 Gram(s) Oral daily  senna 2 Tablet(s) Oral at bedtime    IVF:    CULTURES:    RADIOLOGY & ADDITIONAL TESTS:      ASSESSMENT:  36F w/hx of spina bifida, depression, and spinal epidermoid tumor s/p multiple resections, most recently in 2019, presented w/ b/l LE radiculopathy R>L, now s/p T12-L3 laminectomy, subtotal intradural extramedullary tumor resection (3/16/22)    D43.4 G99.2    Handoff    MEWS Score    Tumor    Insomnia    Depression    Bladder disorder    Intradural extramedullary spinal tumor    Laminectomy, spine, thoracolumbar, 4 levels, posterior approach    Spinal cord tumor    History of cholecystectomy    History of eye surgery    [email or web address]        PLAN:    NEURO:   - neuro checks/vital signs q4hrs  - ERAS for pain control  - 1 HMV to gravity, monitor output  - post-op L spine MRI 3/19: f/u read   - cont home wellbutrin, trazodone  - decadron taper over 1 week    CARDIOVASCULAR:   - SBP goal 100-140  - no issues    PULMONARY:   - satting well on RA   - IS    GI:   - regular diet  - bowel regimen  - Protonix while on steroids    RENAL:   - IVL  - continue self-cath PRN   - cont home oxybutynin     HEME:   - s/p 1u pRBC intraop  - SCDs/SQL for DVT ppx    ID:   - afebrile  - trend leukocytosis    ENDO:   - ISS dc'd  - no issues    DISPO: SDU status, full code, PT/OT recs AR     Assessment and Plan discussed with Dr Bernabe

## 2022-03-21 DIAGNOSIS — F32.9 MAJOR DEPRESSIVE DISORDER, SINGLE EPISODE, UNSPECIFIED: ICD-10-CM

## 2022-03-21 DIAGNOSIS — D72.829 ELEVATED WHITE BLOOD CELL COUNT, UNSPECIFIED: ICD-10-CM

## 2022-03-21 DIAGNOSIS — N32.9 BLADDER DISORDER, UNSPECIFIED: ICD-10-CM

## 2022-03-21 DIAGNOSIS — R74.01 ELEVATION OF LEVELS OF LIVER TRANSAMINASE LEVELS: ICD-10-CM

## 2022-03-21 DIAGNOSIS — G95.89 OTHER SPECIFIED DISEASES OF SPINAL CORD: ICD-10-CM

## 2022-03-21 LAB
ANION GAP SERPL CALC-SCNC: 10 MMOL/L — SIGNIFICANT CHANGE UP (ref 5–17)
BUN SERPL-MCNC: 14 MG/DL — SIGNIFICANT CHANGE UP (ref 7–23)
CALCIUM SERPL-MCNC: 8.7 MG/DL — SIGNIFICANT CHANGE UP (ref 8.4–10.5)
CHLORIDE SERPL-SCNC: 102 MMOL/L — SIGNIFICANT CHANGE UP (ref 96–108)
CO2 SERPL-SCNC: 24 MMOL/L — SIGNIFICANT CHANGE UP (ref 22–31)
CREAT SERPL-MCNC: 0.65 MG/DL — SIGNIFICANT CHANGE UP (ref 0.5–1.3)
EGFR: 116 ML/MIN/1.73M2 — SIGNIFICANT CHANGE UP
GLUCOSE SERPL-MCNC: 129 MG/DL — HIGH (ref 70–99)
HCT VFR BLD CALC: 38.3 % — SIGNIFICANT CHANGE UP (ref 34.5–45)
HGB BLD-MCNC: 12.5 G/DL — SIGNIFICANT CHANGE UP (ref 11.5–15.5)
MAGNESIUM SERPL-MCNC: 2.1 MG/DL — SIGNIFICANT CHANGE UP (ref 1.6–2.6)
MCHC RBC-ENTMCNC: 32.1 PG — SIGNIFICANT CHANGE UP (ref 27–34)
MCHC RBC-ENTMCNC: 32.6 GM/DL — SIGNIFICANT CHANGE UP (ref 32–36)
MCV RBC AUTO: 98.5 FL — SIGNIFICANT CHANGE UP (ref 80–100)
NRBC # BLD: 0 /100 WBCS — SIGNIFICANT CHANGE UP (ref 0–0)
PHOSPHATE SERPL-MCNC: 3.6 MG/DL — SIGNIFICANT CHANGE UP (ref 2.5–4.5)
PLATELET # BLD AUTO: 278 K/UL — SIGNIFICANT CHANGE UP (ref 150–400)
POTASSIUM SERPL-MCNC: 4.7 MMOL/L — SIGNIFICANT CHANGE UP (ref 3.5–5.3)
POTASSIUM SERPL-SCNC: 4.7 MMOL/L — SIGNIFICANT CHANGE UP (ref 3.5–5.3)
RBC # BLD: 3.89 M/UL — SIGNIFICANT CHANGE UP (ref 3.8–5.2)
RBC # FLD: 14.7 % — HIGH (ref 10.3–14.5)
SARS-COV-2 RNA SPEC QL NAA+PROBE: SIGNIFICANT CHANGE UP
SODIUM SERPL-SCNC: 136 MMOL/L — SIGNIFICANT CHANGE UP (ref 135–145)
WBC # BLD: 12.86 K/UL — HIGH (ref 3.8–10.5)
WBC # FLD AUTO: 12.86 K/UL — HIGH (ref 3.8–10.5)

## 2022-03-21 PROCEDURE — 99233 SBSQ HOSP IP/OBS HIGH 50: CPT

## 2022-03-21 PROCEDURE — 99024 POSTOP FOLLOW-UP VISIT: CPT

## 2022-03-21 RX ORDER — NALOXEGOL OXALATE 12.5 MG/1
12.5 TABLET, FILM COATED ORAL DAILY
Refills: 0 | Status: DISCONTINUED | OUTPATIENT
Start: 2022-03-21 | End: 2022-03-22

## 2022-03-21 RX ORDER — GABAPENTIN 400 MG/1
500 CAPSULE ORAL EVERY 8 HOURS
Refills: 0 | Status: DISCONTINUED | OUTPATIENT
Start: 2022-03-21 | End: 2022-03-22

## 2022-03-21 RX ORDER — LACTULOSE 10 G/15ML
10 SOLUTION ORAL ONCE
Refills: 0 | Status: COMPLETED | OUTPATIENT
Start: 2022-03-21 | End: 2022-03-21

## 2022-03-21 RX ADMIN — Medication 2 MILLIGRAM(S): at 21:10

## 2022-03-21 RX ADMIN — Medication 1000 MILLIGRAM(S): at 21:11

## 2022-03-21 RX ADMIN — Medication 100 MILLIGRAM(S): at 23:45

## 2022-03-21 RX ADMIN — Medication 4 MILLIGRAM(S): at 05:20

## 2022-03-21 RX ADMIN — LACTULOSE 10 GRAM(S): 10 SOLUTION ORAL at 17:27

## 2022-03-21 RX ADMIN — Medication 1000 MILLIGRAM(S): at 06:24

## 2022-03-21 RX ADMIN — GABAPENTIN 400 MILLIGRAM(S): 400 CAPSULE ORAL at 05:20

## 2022-03-21 RX ADMIN — BUPROPION HYDROCHLORIDE 300 MILLIGRAM(S): 150 TABLET, EXTENDED RELEASE ORAL at 12:12

## 2022-03-21 RX ADMIN — PANTOPRAZOLE SODIUM 40 MILLIGRAM(S): 20 TABLET, DELAYED RELEASE ORAL at 05:19

## 2022-03-21 RX ADMIN — Medication 5 MILLIGRAM(S): at 17:27

## 2022-03-21 RX ADMIN — GABAPENTIN 500 MILLIGRAM(S): 400 CAPSULE ORAL at 21:09

## 2022-03-21 RX ADMIN — ENOXAPARIN SODIUM 40 MILLIGRAM(S): 100 INJECTION SUBCUTANEOUS at 19:41

## 2022-03-21 RX ADMIN — METHOCARBAMOL 500 MILLIGRAM(S): 500 TABLET, FILM COATED ORAL at 21:10

## 2022-03-21 RX ADMIN — GABAPENTIN 500 MILLIGRAM(S): 400 CAPSULE ORAL at 13:47

## 2022-03-21 RX ADMIN — Medication 2 MILLIGRAM(S): at 13:46

## 2022-03-21 RX ADMIN — METHOCARBAMOL 500 MILLIGRAM(S): 500 TABLET, FILM COATED ORAL at 13:46

## 2022-03-21 RX ADMIN — Medication 1 ENEMA: at 13:47

## 2022-03-21 RX ADMIN — POLYETHYLENE GLYCOL 3350 17 GRAM(S): 17 POWDER, FOR SOLUTION ORAL at 12:12

## 2022-03-21 RX ADMIN — Medication 1000 MILLIGRAM(S): at 14:46

## 2022-03-21 RX ADMIN — Medication 1000 MILLIGRAM(S): at 05:19

## 2022-03-21 RX ADMIN — LACTULOSE 10 GRAM(S): 10 SOLUTION ORAL at 00:35

## 2022-03-21 RX ADMIN — SENNA PLUS 2 TABLET(S): 8.6 TABLET ORAL at 21:09

## 2022-03-21 RX ADMIN — Medication 10 MILLIGRAM(S): at 23:45

## 2022-03-21 RX ADMIN — MAGNESIUM HYDROXIDE 30 MILLILITER(S): 400 TABLET, CHEWABLE ORAL at 21:09

## 2022-03-21 RX ADMIN — Medication 1000 MILLIGRAM(S): at 13:46

## 2022-03-21 RX ADMIN — Medication 5 MILLIGRAM(S): at 05:20

## 2022-03-21 RX ADMIN — METHOCARBAMOL 500 MILLIGRAM(S): 500 TABLET, FILM COATED ORAL at 05:20

## 2022-03-21 RX ADMIN — Medication 1000 MILLIGRAM(S): at 22:11

## 2022-03-21 NOTE — DIETITIAN INITIAL EVALUATION ADULT. - ORAL INTAKE PTA/DIET HISTORY
Follow regular diet, no restriction. Shellfish & fish allergy; noted on EMR. Pt lives with parents and home attendant that help prepares meals daily.

## 2022-03-21 NOTE — DIETITIAN INITIAL EVALUATION ADULT. - OTHER CALCULATIONS
Ideal body weight used for calculations as pt >120% of IBW (%IBW: 178). Adjusted for increased protein s/p spine laminectomy.

## 2022-03-21 NOTE — PROGRESS NOTE ADULT - SUBJECTIVE AND OBJECTIVE BOX
NEUROSURGERY PAIN MANAGEMENT CONSULT NOTE    Chief Complaint: back pain radiating to b/l lower extremities     HPI:  36F w/hx of spina bifida, depression, and spinal epidermoid tumor s/p multiple resections, most recently in 2019. She has had progressively worsening back pain that radiates into her legs (R>L) as well as numbness in her feet. She has had urinary incontinence as well for the majority of her life, for which she self-catheterizes. MRI demonstrates recurrence of intradural lesion for which she has consented for revision resection today. (16 Mar 2022 13:14)      PAST MEDICAL & SURGICAL HISTORY:  Tumor  spinal    Insomnia    Depression    Bladder disorder  pt self catheterizes    Spinal cord tumor    History of cholecystectomy    History of eye surgery  muscle repair        FAMILY HISTORY:      SOCIAL HISTORY:  [ ] Denies Smoking, Alcohol, or Drug Use    HOME MEDICATIONS:   Please refer to initial HNP    PAIN HOME MEDICATIONS:    Allergies    No Known Drug Allergies  shellfish (Rash)    Intolerances        PAIN MEDICATIONS:  acetaminophen     Tablet .. 1000 milliGRAM(s) Oral every 8 hours  buPROPion XL (24-Hour) . 300 milliGRAM(s) Oral daily  diphenhydrAMINE 50 milliGRAM(s) Oral every 6 hours PRN  gabapentin 500 milliGRAM(s) Oral every 8 hours  HYDROmorphone   Tablet 2 milliGRAM(s) Oral every 4 hours PRN  HYDROmorphone   Tablet 4 milliGRAM(s) Oral every 4 hours PRN  methocarbamol 500 milliGRAM(s) Oral every 8 hours  traZODone 100 milliGRAM(s) Oral at bedtime    Heme:  enoxaparin Injectable 40 milliGRAM(s) SubCutaneous every 24 hours    Antibiotics:    Cardiovascular:    GI:  magnesium hydroxide Suspension 30 milliLiter(s) Oral every 12 hours PRN  pantoprazole    Tablet 40 milliGRAM(s) Oral before breakfast  polyethylene glycol 3350 17 Gram(s) Oral daily  saline laxative (FLEET) Rectal Enema 1 Enema Rectal once  senna 2 Tablet(s) Oral at bedtime    Endocrine:  dexAMETHasone     Tablet 2 milliGRAM(s) Oral every 8 hours  dexAMETHasone     Tablet   Oral     All Other Medications:  influenza   Vaccine 0.5 milliLiter(s) IntraMuscular once  oxybutynin 5 milliGRAM(s) Oral two times a day      Vital Signs Last 24 Hrs  T(C): 36.8 (21 Mar 2022 08:41), Max: 36.9 (20 Mar 2022 20:05)  T(F): 98.3 (21 Mar 2022 08:41), Max: 98.4 (20 Mar 2022 20:05)  HR: 81 (21 Mar 2022 08:41) (56 - 86)  BP: 102/66 (21 Mar 2022 08:41) (102/65 - 113/72)  BP(mean): --  RR: 18 (21 Mar 2022 08:41) (16 - 18)  SpO2: 95% (21 Mar 2022 08:41) (94% - 95%)    LABS:                        12.5   12.86 )-----------( 278      ( 21 Mar 2022 05:54 )             38.3     03-21    136  |  102  |  14  ----------------------------<  129<H>  4.7   |  24  |  0.65    Ca    8.7      21 Mar 2022 05:54  Phos  3.6     03-21  Mg     2.1     03-21    TPro  6.1  /  Alb  3.5  /  TBili  0.3  /  DBili  x   /  AST  158<H>  /  ALT  218<H>  /  AlkPhos  102  03-20          RADIOLOGY:    Drug Screen:        REVIEW OF SYSTEMS:  CONSTITUTIONAL: No fever or fatigue O/N.   EYES: No eye pain, visual disturbances  ENMT:  No difficulty hearing. No throat pain  NECK: No pain or stiffness  RESPIRATORY: No cough, wheezing; No shortness of breath  CARDIOVASCULAR: No chest pain, palpitations.   GASTROINTESTINAL: Pt reports ___ passing gas. No bowel movements. No abdominal or epigastric pain. No nausea, vomiting. GENITOURINARY: No dysuria, frequency, or incontinence  NEUROLOGICAL: No headaches, loss of strength, numbness, or tremors. No dizzinesss or lightheadedness with pain medications.   MUSCULOSKELETAL: Incisional back pain. No joint pain or swelling; No muscle, or extremity pain      FUNCTIONAL ASSESSMENT:  PAIN SCORE AT REST:         SCALE USED: (1-10 VNRS)  PAIN SCORE WITH ACTIVITY:         SCALE USED: (1-10 VNRS)    PAIN ASSESSMENT:    PHYSICAL EXAM  GENERAL: Laying in bed, NAD  Neuro: CN II-XII PERRRL, EOMI  Cranial nerves grossly intact  Motor exam:  Sensation intact to LT in UE/LE in 3 dermatomes  CHEST/LUNG: Clear to auscultation bilaterally; No rales, rhonchi, wheezing, or rubs  HEART: Regular rate and rhythm; No murmurs, rubs, or gallops  ABDOMEN: Soft, Nontender, Nondistended; Bowel sounds present  EXTREMITIES:  2+ Peripheral Pulses, No clubbing, cyanosis, or edema  SKIN: No rashes or lesions      ASSESSMENT:       PLAN:   - Pain:    Since yesterday 6am, pt has required:     PCA Setting:   - Opioids:   - Initial Bolus:   - Initial Demand:   - Lockout:   - Continuous Rate:   - 4 hour limit:     - Transition to . LA not required. Pt receiving adequete coverage. First step. For rescue dosing, will order .      - Home pain regiment:    - Bowel regimen: Senna    - Nausea ppx: Zofran standing  - Functional Goals: Pt will get OOB with PT today. Pt will resume previous level of activity without impairment from surgery.   - Additional Consults: None recommended.   - Additional Labs/Imaging:  None recommended.     - Follow up, Discharge Planning:     Patient is set for discharge to:     Discharge is pending:   - Pain Management follow up plan:    NEUROSURGERY PAIN MANAGEMENT CONSULT NOTE    Chief Complaint: back pain radiating to b/l lower extremities     HPI:  36F w/hx of spina bifida, depression, and spinal epidermoid tumor s/p multiple resections, most recently in 2019. She has had progressively worsening back pain that radiates into her legs (R>L) as well as numbness in her feet. She has had urinary incontinence as well for the majority of her life, for which she self-catheterizes. MRI demonstrates recurrence of intradural lesion for which she has consented for revision resection today. (16 Mar 2022 13:14)      PAST MEDICAL & SURGICAL HISTORY:  Tumor  spinal    Insomnia    Depression    Bladder disorder  pt self catheterizes    Spinal cord tumor    History of cholecystectomy    History of eye surgery  muscle repair        FAMILY HISTORY:      SOCIAL HISTORY:  [ ] Denies Smoking, Alcohol, or Drug Use    HOME MEDICATIONS:   Please refer to initial HNP    PAIN HOME MEDICATIONS:    Allergies    No Known Drug Allergies  shellfish (Rash)    Intolerances        PAIN MEDICATIONS:  acetaminophen     Tablet .. 1000 milliGRAM(s) Oral every 8 hours  buPROPion XL (24-Hour) . 300 milliGRAM(s) Oral daily  diphenhydrAMINE 50 milliGRAM(s) Oral every 6 hours PRN  gabapentin 500 milliGRAM(s) Oral every 8 hours  HYDROmorphone   Tablet 2 milliGRAM(s) Oral every 4 hours PRN  HYDROmorphone   Tablet 4 milliGRAM(s) Oral every 4 hours PRN  methocarbamol 500 milliGRAM(s) Oral every 8 hours  traZODone 100 milliGRAM(s) Oral at bedtime    Heme:  enoxaparin Injectable 40 milliGRAM(s) SubCutaneous every 24 hours    Antibiotics:    Cardiovascular:    GI:  magnesium hydroxide Suspension 30 milliLiter(s) Oral every 12 hours PRN  pantoprazole    Tablet 40 milliGRAM(s) Oral before breakfast  polyethylene glycol 3350 17 Gram(s) Oral daily  saline laxative (FLEET) Rectal Enema 1 Enema Rectal once  senna 2 Tablet(s) Oral at bedtime    Endocrine:  dexAMETHasone     Tablet 2 milliGRAM(s) Oral every 8 hours  dexAMETHasone     Tablet   Oral     All Other Medications:  influenza   Vaccine 0.5 milliLiter(s) IntraMuscular once  oxybutynin 5 milliGRAM(s) Oral two times a day      Vital Signs Last 24 Hrs  T(C): 36.8 (21 Mar 2022 08:41), Max: 36.9 (20 Mar 2022 20:05)  T(F): 98.3 (21 Mar 2022 08:41), Max: 98.4 (20 Mar 2022 20:05)  HR: 81 (21 Mar 2022 08:41) (56 - 86)  BP: 102/66 (21 Mar 2022 08:41) (102/65 - 113/72)  BP(mean): --  RR: 18 (21 Mar 2022 08:41) (16 - 18)  SpO2: 95% (21 Mar 2022 08:41) (94% - 95%)    LABS:                        12.5   12.86 )-----------( 278      ( 21 Mar 2022 05:54 )             38.3     03-21    136  |  102  |  14  ----------------------------<  129<H>  4.7   |  24  |  0.65    Ca    8.7      21 Mar 2022 05:54  Phos  3.6     03-21  Mg     2.1     03-21    TPro  6.1  /  Alb  3.5  /  TBili  0.3  /  DBili  x   /  AST  158<H>  /  ALT  218<H>  /  AlkPhos  102  03-20          RADIOLOGY:    Drug Screen:        REVIEW OF SYSTEMS:  CONSTITUTIONAL: No fever or fatigue O/N.   EYES: No eye pain, visual disturbances  ENMT:  No difficulty hearing. No throat pain  NECK: No pain or stiffness  RESPIRATORY: No cough, wheezing; No shortness of breath  CARDIOVASCULAR: No chest pain, palpitations.   GASTROINTESTINAL: Pt reports passing gas. No bowel movements. No abdominal or epigastric pain. No nausea, vomiting. GENITOURINARY: No dysuria, frequency, or incontinence  NEUROLOGICAL: No headaches, loss of strength, numbness, or tremors. No dizzinesss or lightheadedness with pain medications.   MUSCULOSKELETAL: Incisional back pain. No joint pain or swelling; No muscle, or extremity pain    PAIN ASSESSMENT: c/o back pain and lower extremity pain    PHYSICAL EXAM  GENERAL: Laying in bed, NAD  Neuro: CN II-XII PERRL, EOMI  Cranial nerves grossly intact  Motor exam: MAEx4  Sensation intact to LT in UE/LE in 3 dermatomes  CHEST/LUNG: Clear to auscultation bilaterally; No rales, rhonchi, wheezing, or rubs  HEART: Regular rate and rhythm; No murmurs, rubs, or gallops  ABDOMEN: Soft, Nontender, Nondistended; Bowel sounds present  EXTREMITIES:  2+ Peripheral Pulses, No clubbing, cyanosis, or edema  SKIN: No rashes or lesions      ASSESSMENT:   36F w/hx of spina bifida, depression, and spinal epidermoid tumor s/p multiple resections, most recently in 2019, presented w/ b/l LE radiculopathy R>L, now s/p T12-L3 laminectomy, subtotal intradural extramedullary tumor resection     PLAN:   - Pain:  Tylenol 1000mg every 8 hours  Increase Gabapentin to 500mg every 8 hours   Robaxin 500mg every 8 hours  Dilaudid 2/4mg every 4 hours PRN for moderate to severe pain    - Bowel regimen: Senna    - Nausea ppx: Zofran standing  - Functional Goals: Pt will get OOB with PT today. Pt will resume previous level of activity without impairment from surgery.   - Additional Consults: None recommended.   - Additional Labs/Imaging:  None recommended.   - Follow up, Discharge Planning: AR when ready  - Pain Management follow up plan: will cont to follow    d/w Dr. Sánchez

## 2022-03-21 NOTE — PROGRESS NOTE ADULT - PROBLEM SELECTOR PLAN 4
Likely non-infectious given lack of other systemic signs. Multifactorial in setting of leukocytosis and Post-op state.

## 2022-03-21 NOTE — CHART NOTE - NSCHARTNOTEFT_GEN_A_CORE
Patient is able to tolerate 3 hours of PT/OT at rehab
Neurosurgical Indications for Screening Dopplers on Admission:       1) Known hypercoagulation disorder (h/o VTE, thrombophilia, HIT, etc.)   2) Admitted from prolonged stay from another institution (straight forward ER transfers not included)  3) Presenting with significant leg immobility   4) Presenting with signs and symptoms of VTE?    5) With significant critical illness, Including "found down" for unknown period of time in HPI  6) With significant neurotrauma (TBI, SCI / TLS spine fractures)   7) Who are comatose   8) With known malignancy (e.g. glioblastoma multiforme, meningioma, etc.). Excludes IA chemo 23hr observation stays  9) On hemodialysis   10) Who have received platelet transfusion or antithrombotic reversal agents recently   11) Who have had recent major orthopedic surgery          Screening dopplers indicated?   Y x   N _    DVT Prophylaxis:  x SCD's   _ chemoprophylaxis

## 2022-03-21 NOTE — PROGRESS NOTE ADULT - PROBLEM SELECTOR PLAN 5
Mild elevation  -Hepatitis Panel  -Transition to outpatient provider for repeat LFTs within 1-2 months after discharge.

## 2022-03-21 NOTE — DIETITIAN INITIAL EVALUATION ADULT. - ADD RECOMMEND
1. Continue regular diet. Honor food preferences as able 2. Reinforce healthy eating pattern, vegetables intake on f/up 3. Trend weights (weekly), monitor labs, replete lytes PRN, BMP, CBC per MD discretion. 4. Pain, bowel regimen PRN

## 2022-03-21 NOTE — PROGRESS NOTE ADULT - SUBJECTIVE AND OBJECTIVE BOX
O/N Events: JO  Subjective/ROS: Denies HA, CP, SOB, n/v, changes in bowel/urinary habits.  12pt ROS otherwise negative.    VITALS  Vital Signs Last 24 Hrs  T(C): 36.8 (21 Mar 2022 08:41), Max: 36.9 (20 Mar 2022 20:05)  T(F): 98.3 (21 Mar 2022 08:41), Max: 98.4 (20 Mar 2022 20:05)  HR: 81 (21 Mar 2022 08:41) (56 - 86)  BP: 102/66 (21 Mar 2022 08:41) (102/65 - 113/72)  BP(mean): --  RR: 18 (21 Mar 2022 08:41) (16 - 18)  SpO2: 95% (21 Mar 2022 08:41) (94% - 95%)    CAPILLARY BLOOD GLUCOSE    PHYSICAL EXAM  GENERAL: pleasant, appropriate, no acute distress. Participating appropriately in interview  HEENT - NC/AT, EOMI, PERLLA, oropharynx clear without exudate or erythema, moist mucus membranes  NECK: Soft, supple, No JVD, no lymphadenopathy  CHEST/LUNG: Clear to auscultation bilaterally; No rales, rhonchi, wheezing. Normal work of breathing, not tachypneic  HEART: Regular rate and rhythm; No murmurs, rubs, or gallops, normal s1/s2. Warm and well-perfused  ABDOMEN: Soft, Nontender, Nondistended. Normoactive bowel sounds.  EXTREMITIES:  2+ Peripheral Pulses, No clubbing, cyanosis, or edema  NEURO:  awake, alert, oriented to person, place, time, and situation. Cranial nerves intact, no motor or sensory deficits. Moves all extremities.  SKIN: No rashes or lesions. Surgical site c/d/i    MEDICATIONS  (STANDING):  acetaminophen     Tablet .. 1000 milliGRAM(s) Oral every 8 hours  buPROPion XL (24-Hour) . 300 milliGRAM(s) Oral daily  dexAMETHasone     Tablet 2 milliGRAM(s) Oral every 8 hours  dexAMETHasone     Tablet   Oral   enoxaparin Injectable 40 milliGRAM(s) SubCutaneous every 24 hours  gabapentin 500 milliGRAM(s) Oral every 8 hours  influenza   Vaccine 0.5 milliLiter(s) IntraMuscular once  methocarbamol 500 milliGRAM(s) Oral every 8 hours  oxybutynin 5 milliGRAM(s) Oral two times a day  pantoprazole    Tablet 40 milliGRAM(s) Oral before breakfast  polyethylene glycol 3350 17 Gram(s) Oral daily  saline laxative (FLEET) Rectal Enema 1 Enema Rectal once  senna 2 Tablet(s) Oral at bedtime  traZODone 100 milliGRAM(s) Oral at bedtime    MEDICATIONS  (PRN):  diphenhydrAMINE 50 milliGRAM(s) Oral every 6 hours PRN Rash and/or Itching  HYDROmorphone   Tablet 2 milliGRAM(s) Oral every 4 hours PRN Moderate Pain (4 - 6)  HYDROmorphone   Tablet 4 milliGRAM(s) Oral every 4 hours PRN Severe Pain (7 - 10)  magnesium hydroxide Suspension 30 milliLiter(s) Oral every 12 hours PRN Constipation      No Known Drug Allergies  shellfish (Rash)      LABS                        12.5   12.86 )-----------( 278      ( 21 Mar 2022 05:54 )             38.3     03-21    136  |  102  |  14  ----------------------------<  129<H>  4.7   |  24  |  0.65    Ca    8.7      21 Mar 2022 05:54  Phos  3.6     03-21  Mg     2.1     03-21    TPro  6.1  /  Alb  3.5  /  TBili  0.3  /  DBili  x   /  AST  158<H>  /  ALT  218<H>  /  AlkPhos  102  03-20      IMAGING/EKG/ETC reviewed

## 2022-03-21 NOTE — PROGRESS NOTE ADULT - ASSESSMENT
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

## 2022-03-21 NOTE — PROGRESS NOTE ADULT - SUBJECTIVE AND OBJECTIVE BOX
HPI:  36F w/hx of spina bifida, depression, and spinal epidermoid tumor s/p multiple resections, most recently in 2019. She has had progressively worsening back pain that radiates into her legs (R>L) as well as numbness in her feet. She has had urinary incontinence as well for the majority of her life, for which she self-catheterizes. MRI demonstrates recurrence of intradural lesion for which she has consented for revision resection today. (16 Mar 2022 13:14)    OVERNIGHT EVENTS: Patient doing well, no acute complaints.     HOSPITAL COURSE:   3/16: POD0 T12-L3 lami for subtotal intradural extramedullary tumor resection. ICU post op. JO neuro stable  3/17: POD1. JO o/n neuro stable. Remains on bedrest  3/18: POD 2. PT/OT today.   3/19: POD3. JO o/n. pend MRI. started SQL.   3/20: POD4. JO o/n. pend dispo  3/21: POD5. JO o/n, drain in place, pend dispo       Vital Signs Last 24 Hrs  T(C): 36.8 (21 Mar 2022 00:03), Max: 36.9 (20 Mar 2022 20:05)  T(F): 98.2 (21 Mar 2022 00:03), Max: 98.4 (20 Mar 2022 20:05)  HR: 72 (21 Mar 2022 00:03) (61 - 86)  BP: 106/69 (21 Mar 2022 00:03) (102/65 - 112/72)  BP(mean): 85 (20 Mar 2022 05:34) (85 - 85)  RR: 16 (21 Mar 2022 00:03) (16 - 17)  SpO2: 94% (21 Mar 2022 00:03) (93% - 95%)    I&O's Summary    19 Mar 2022 07:01  -  20 Mar 2022 07:00  --------------------------------------------------------  IN: 0 mL / OUT: 1560 mL / NET: -1560 mL    20 Mar 2022 07:01  -  21 Mar 2022 01:38  --------------------------------------------------------  IN: 0 mL / OUT: 1055 mL / NET: -1055 mL        PHYSICAL EXAM:  GEN: laying in bed, appears well, NAD  NEURO: AOx3. FC, OE spont, speech intact, face symmetric.   CNII-XII intact. PERRL, EOMI. No pronator drift.   MAEx4. 5/5 strength throughout. sensation intact  CV: Regular rate   PULM: nonlabored breathing, normal chest rise   GI: Abdomen soft, nontender nondistended  EXT: ext warm, dry, nontender  WOUND: thoracolumbar incision c/d/i      TUBES/LINES:  [] Michel  [] Trach  [] NGT  [] PEG  [x] Wound Drains - HMV  [] Others    DIET:  [] NPO  [x] Mechanical  [] Tube feeds    LABS:                        12.1   18.07 )-----------( 264      ( 19 Mar 2022 11:11 )             36.9     03-20    136  |  103  |  14  ----------------------------<  136<H>  4.5   |  20<L>  |  0.57    Ca    9.0      20 Mar 2022 07:20  Phos  3.5     03-20  Mg     1.9     03-20    TPro  6.1  /  Alb  3.5  /  TBili  0.3  /  DBili  x   /  AST  158<H>  /  ALT  218<H>  /  AlkPhos  102  03-20            CAPILLARY BLOOD GLUCOSE          Drug Levels: [] N/A    CSF Analysis: [] N/A      Allergies    No Known Drug Allergies  shellfish (Rash)    Intolerances      MEDICATIONS:  Antibiotics:    Neuro:  acetaminophen     Tablet .. 1000 milliGRAM(s) Oral every 8 hours  buPROPion XL (24-Hour) . 300 milliGRAM(s) Oral daily  diphenhydrAMINE 50 milliGRAM(s) Oral every 6 hours PRN  gabapentin 400 milliGRAM(s) Oral every 8 hours  HYDROmorphone   Tablet 2 milliGRAM(s) Oral every 4 hours PRN  HYDROmorphone   Tablet 4 milliGRAM(s) Oral every 4 hours PRN  methocarbamol 500 milliGRAM(s) Oral every 8 hours  traZODone 100 milliGRAM(s) Oral at bedtime    Anticoagulation:  enoxaparin Injectable 40 milliGRAM(s) SubCutaneous every 24 hours    OTHER:  dexAMETHasone     Tablet 4 milliGRAM(s) Oral every 8 hours  dexAMETHasone     Tablet 2 milliGRAM(s) Oral every 8 hours  dexAMETHasone     Tablet   Oral   influenza   Vaccine 0.5 milliLiter(s) IntraMuscular once  magnesium hydroxide Suspension 30 milliLiter(s) Oral every 12 hours PRN  oxybutynin 5 milliGRAM(s) Oral two times a day  pantoprazole    Tablet 40 milliGRAM(s) Oral before breakfast  polyethylene glycol 3350 17 Gram(s) Oral daily  senna 2 Tablet(s) Oral at bedtime    IVF:    CULTURES:    RADIOLOGY & ADDITIONAL TESTS:      ASSESSMENT:  36F w/hx of spina bifida, depression, and spinal epidermoid tumor s/p multiple resections, most recently in 2019, presented w/ b/l LE radiculopathy R>L, now s/p T12-L3 laminectomy, subtotal intradural extramedullary tumor resection (3/16/22)    D43.4 G99.2    Handoff    MEWS Score    Tumor    Insomnia    Depression    Bladder disorder    Intradural extramedullary spinal tumor    Laminectomy, spine, thoracolumbar, 4 levels, posterior approach    Spinal cord tumor    History of cholecystectomy    History of eye surgery    [email or web address]        PLAN:    NEURO:   - neuro checks/vital signs q4hrs  - ERAS for pain control  - 1 HMV to gravity, monitor output  - post-op L spine MRI 3/19  - cont home wellbutrin, trazodone  - decadron taper over 1 week    CARDIOVASCULAR:   - SBP goal 100-140  - no issues    PULMONARY:   - satting well on RA   - IS    GI:   - regular diet  - bowel regimen  - Protonix while on steroids    RENAL:   - IVL  - continue self-cath PRN   - cont home oxybutynin     HEME:   - s/p 1u pRBC intraop  - SCDs/SQL for DVT ppx    ID:   - afebrile  - trend leukocytosis    ENDO:   - ISS dc'd  - no issues    DISPO: SDU status, full code, PT/OT recs AR     Assessment and Plan discussed with Dr Bernabe

## 2022-03-21 NOTE — PROCEDURE NOTE - GENERAL PROCEDURE DETAILS
After a sterile prep the drain was taken off suction, sutures were cut and the drain was carefully removed. The distal tip of the catheter was visualized. In a sterile fashion 1 monocryl suture was placed for drain site closure.

## 2022-03-21 NOTE — DIETITIAN INITIAL EVALUATION ADULT. - OTHER INFO
36 F w/hx of spina bifida, depression, and 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 here with worsening back pain and LE numbness found on MRI to have intradural lesion s/p T12-l3 laminectomy and tumor resection. MRI demonstrates recurrence of intradural lesion for which pt admitted to [facility] for laminectomy, spine, thoracolumbar on 3/17. Dispo: pending JALYN placement.    Pt seen in 8WO, resting in bed. Assessed for LOS. Currently on regular diet, just received her lunch. Endorse good appetite. Lunch ordered is according to her preferences (baked fries, chicken fingers, grapes and large ginger ale). RD educate pt in general healthy eating, incorporate more vegetables intake, healthy plate. Pt verbalize understanding. Reports UBW: 136 lbs, 1 month ago.  lbs; +14% significant weight gain in 1mo. Endorse long-term constipation which is common according to pt. RN reports that pt schedule for enema later this afternoon after lunch. Currently on bowel regimen. Skin: surgical incision to back. Francisco Javier: 18. No noted edema. Please see additional nutrition recs below.

## 2022-03-22 ENCOUNTER — TRANSCRIPTION ENCOUNTER (OUTPATIENT)
Age: 37
End: 2022-03-22

## 2022-03-22 VITALS
OXYGEN SATURATION: 94 % | HEART RATE: 80 BPM | RESPIRATION RATE: 18 BRPM | SYSTOLIC BLOOD PRESSURE: 98 MMHG | DIASTOLIC BLOOD PRESSURE: 67 MMHG | TEMPERATURE: 98 F

## 2022-03-22 LAB
ANION GAP SERPL CALC-SCNC: 11 MMOL/L — SIGNIFICANT CHANGE UP (ref 5–17)
BUN SERPL-MCNC: 21 MG/DL — SIGNIFICANT CHANGE UP (ref 7–23)
CALCIUM SERPL-MCNC: 9 MG/DL — SIGNIFICANT CHANGE UP (ref 8.4–10.5)
CHLORIDE SERPL-SCNC: 100 MMOL/L — SIGNIFICANT CHANGE UP (ref 96–108)
CO2 SERPL-SCNC: 24 MMOL/L — SIGNIFICANT CHANGE UP (ref 22–31)
CREAT SERPL-MCNC: 0.68 MG/DL — SIGNIFICANT CHANGE UP (ref 0.5–1.3)
EGFR: 115 ML/MIN/1.73M2 — SIGNIFICANT CHANGE UP
GLUCOSE SERPL-MCNC: 134 MG/DL — HIGH (ref 70–99)
HCT VFR BLD CALC: 39.3 % — SIGNIFICANT CHANGE UP (ref 34.5–45)
HGB BLD-MCNC: 12.6 G/DL — SIGNIFICANT CHANGE UP (ref 11.5–15.5)
MAGNESIUM SERPL-MCNC: 2.3 MG/DL — SIGNIFICANT CHANGE UP (ref 1.6–2.6)
MCHC RBC-ENTMCNC: 31.3 PG — SIGNIFICANT CHANGE UP (ref 27–34)
MCHC RBC-ENTMCNC: 32.1 GM/DL — SIGNIFICANT CHANGE UP (ref 32–36)
MCV RBC AUTO: 97.8 FL — SIGNIFICANT CHANGE UP (ref 80–100)
NRBC # BLD: 0 /100 WBCS — SIGNIFICANT CHANGE UP (ref 0–0)
PHOSPHATE SERPL-MCNC: 3.8 MG/DL — SIGNIFICANT CHANGE UP (ref 2.5–4.5)
PLATELET # BLD AUTO: 315 K/UL — SIGNIFICANT CHANGE UP (ref 150–400)
POTASSIUM SERPL-MCNC: 4.4 MMOL/L — SIGNIFICANT CHANGE UP (ref 3.5–5.3)
POTASSIUM SERPL-SCNC: 4.4 MMOL/L — SIGNIFICANT CHANGE UP (ref 3.5–5.3)
RBC # BLD: 4.02 M/UL — SIGNIFICANT CHANGE UP (ref 3.8–5.2)
RBC # FLD: 14.7 % — HIGH (ref 10.3–14.5)
SODIUM SERPL-SCNC: 135 MMOL/L — SIGNIFICANT CHANGE UP (ref 135–145)
WBC # BLD: 17.48 K/UL — HIGH (ref 3.8–10.5)
WBC # FLD AUTO: 17.48 K/UL — HIGH (ref 3.8–10.5)

## 2022-03-22 PROCEDURE — 86923 COMPATIBILITY TEST ELECTRIC: CPT

## 2022-03-22 PROCEDURE — P9045: CPT

## 2022-03-22 PROCEDURE — 83036 HEMOGLOBIN GLYCOSYLATED A1C: CPT

## 2022-03-22 PROCEDURE — 85027 COMPLETE CBC AUTOMATED: CPT

## 2022-03-22 PROCEDURE — P9016: CPT

## 2022-03-22 PROCEDURE — 80053 COMPREHEN METABOLIC PANEL: CPT

## 2022-03-22 PROCEDURE — U0005: CPT

## 2022-03-22 PROCEDURE — 86900 BLOOD TYPING SEROLOGIC ABO: CPT

## 2022-03-22 PROCEDURE — A9585: CPT

## 2022-03-22 PROCEDURE — C1889: CPT

## 2022-03-22 PROCEDURE — 72158 MRI LUMBAR SPINE W/O & W/DYE: CPT

## 2022-03-22 PROCEDURE — 97535 SELF CARE MNGMENT TRAINING: CPT

## 2022-03-22 PROCEDURE — 83735 ASSAY OF MAGNESIUM: CPT

## 2022-03-22 PROCEDURE — 84100 ASSAY OF PHOSPHORUS: CPT

## 2022-03-22 PROCEDURE — 84295 ASSAY OF SERUM SODIUM: CPT

## 2022-03-22 PROCEDURE — 97116 GAIT TRAINING THERAPY: CPT

## 2022-03-22 PROCEDURE — 97110 THERAPEUTIC EXERCISES: CPT

## 2022-03-22 PROCEDURE — U0003: CPT

## 2022-03-22 PROCEDURE — 97161 PT EVAL LOW COMPLEX 20 MIN: CPT

## 2022-03-22 PROCEDURE — 86901 BLOOD TYPING SEROLOGIC RH(D): CPT

## 2022-03-22 PROCEDURE — 82330 ASSAY OF CALCIUM: CPT

## 2022-03-22 PROCEDURE — 82962 GLUCOSE BLOOD TEST: CPT

## 2022-03-22 PROCEDURE — 36430 TRANSFUSION BLD/BLD COMPNT: CPT

## 2022-03-22 PROCEDURE — 88307 TISSUE EXAM BY PATHOLOGIST: CPT

## 2022-03-22 PROCEDURE — 85018 HEMOGLOBIN: CPT

## 2022-03-22 PROCEDURE — 86850 RBC ANTIBODY SCREEN: CPT

## 2022-03-22 PROCEDURE — 84132 ASSAY OF SERUM POTASSIUM: CPT

## 2022-03-22 PROCEDURE — 80048 BASIC METABOLIC PNL TOTAL CA: CPT

## 2022-03-22 PROCEDURE — 85025 COMPLETE CBC W/AUTO DIFF WBC: CPT

## 2022-03-22 PROCEDURE — 36415 COLL VENOUS BLD VENIPUNCTURE: CPT

## 2022-03-22 PROCEDURE — 82947 ASSAY GLUCOSE BLOOD QUANT: CPT

## 2022-03-22 PROCEDURE — 76000 FLUOROSCOPY <1 HR PHYS/QHP: CPT

## 2022-03-22 PROCEDURE — 97530 THERAPEUTIC ACTIVITIES: CPT

## 2022-03-22 RX ORDER — CYCLOBENZAPRINE HYDROCHLORIDE 10 MG/1
0 TABLET, FILM COATED ORAL
Qty: 0 | Refills: 0 | DISCHARGE

## 2022-03-22 RX ORDER — GABAPENTIN 400 MG/1
5 CAPSULE ORAL
Qty: 0 | Refills: 0 | DISCHARGE
Start: 2022-03-22

## 2022-03-22 RX ORDER — ENOXAPARIN SODIUM 100 MG/ML
40 INJECTION SUBCUTANEOUS
Qty: 0 | Refills: 0 | DISCHARGE
Start: 2022-03-22

## 2022-03-22 RX ORDER — POLYETHYLENE GLYCOL 3350 17 G/17G
17 POWDER, FOR SOLUTION ORAL
Qty: 0 | Refills: 0 | DISCHARGE
Start: 2022-03-22

## 2022-03-22 RX ORDER — GABAPENTIN 400 MG/1
3 CAPSULE ORAL
Qty: 0 | Refills: 0 | DISCHARGE
Start: 2022-03-22

## 2022-03-22 RX ORDER — SENNA PLUS 8.6 MG/1
2 TABLET ORAL
Qty: 0 | Refills: 0 | DISCHARGE
Start: 2022-03-22

## 2022-03-22 RX ORDER — OXYCODONE HYDROCHLORIDE 5 MG/1
1 TABLET ORAL
Qty: 0 | Refills: 0 | DISCHARGE

## 2022-03-22 RX ORDER — ACETAMINOPHEN 500 MG
2 TABLET ORAL
Qty: 0 | Refills: 0 | DISCHARGE
Start: 2022-03-22

## 2022-03-22 RX ORDER — HYDROMORPHONE HYDROCHLORIDE 2 MG/ML
1 INJECTION INTRAMUSCULAR; INTRAVENOUS; SUBCUTANEOUS
Qty: 0 | Refills: 0 | DISCHARGE
Start: 2022-03-22

## 2022-03-22 RX ORDER — DEXAMETHASONE 0.5 MG/5ML
2 ELIXIR ORAL
Qty: 0 | Refills: 0 | DISCHARGE
Start: 2022-03-22

## 2022-03-22 RX ORDER — GABAPENTIN 400 MG/1
0 CAPSULE ORAL
Qty: 0 | Refills: 0 | DISCHARGE

## 2022-03-22 RX ORDER — METHOCARBAMOL 500 MG/1
1 TABLET, FILM COATED ORAL
Qty: 0 | Refills: 0 | DISCHARGE
Start: 2022-03-22

## 2022-03-22 RX ADMIN — Medication 2 MILLIGRAM(S): at 06:29

## 2022-03-22 RX ADMIN — PANTOPRAZOLE SODIUM 40 MILLIGRAM(S): 20 TABLET, DELAYED RELEASE ORAL at 06:29

## 2022-03-22 RX ADMIN — Medication 1000 MILLIGRAM(S): at 07:31

## 2022-03-22 RX ADMIN — NALOXEGOL OXALATE 12.5 MILLIGRAM(S): 12.5 TABLET, FILM COATED ORAL at 00:55

## 2022-03-22 RX ADMIN — Medication 2 MILLIGRAM(S): at 14:39

## 2022-03-22 RX ADMIN — Medication 1000 MILLIGRAM(S): at 06:31

## 2022-03-22 RX ADMIN — GABAPENTIN 500 MILLIGRAM(S): 400 CAPSULE ORAL at 13:38

## 2022-03-22 RX ADMIN — METHOCARBAMOL 500 MILLIGRAM(S): 500 TABLET, FILM COATED ORAL at 06:31

## 2022-03-22 RX ADMIN — BUPROPION HYDROCHLORIDE 300 MILLIGRAM(S): 150 TABLET, EXTENDED RELEASE ORAL at 11:37

## 2022-03-22 RX ADMIN — Medication 5 MILLIGRAM(S): at 06:29

## 2022-03-22 RX ADMIN — METHOCARBAMOL 500 MILLIGRAM(S): 500 TABLET, FILM COATED ORAL at 13:32

## 2022-03-22 RX ADMIN — Medication 1000 MILLIGRAM(S): at 13:28

## 2022-03-22 RX ADMIN — GABAPENTIN 500 MILLIGRAM(S): 400 CAPSULE ORAL at 06:30

## 2022-03-22 RX ADMIN — POLYETHYLENE GLYCOL 3350 17 GRAM(S): 17 POWDER, FOR SOLUTION ORAL at 11:38

## 2022-03-22 RX ADMIN — Medication 1000 MILLIGRAM(S): at 14:12

## 2022-03-22 NOTE — DISCHARGE NOTE PROVIDER - NSDCMRMEDTOKEN_GEN_ALL_CORE_FT
buPROPion 300 mg/24 hours (XL) oral tablet, extended release: 1 tab(s) orally once a day  cyclobenzaprine 10 mg oral tablet: orally once a day (at bedtime)  gabapentin 300 mg oral capsule: orally once a day (at bedtime)  oxybutynin 5 mg oral tablet: orally 2 times a day  oxyCODONE 5 mg oral tablet: 1 tab(s) orally every 6 hours  pantoprazole 40 mg oral delayed release tablet: 1 tab(s) orally once a day  traZODone 100 mg oral tablet: orally once a day (at bedtime)   acetaminophen 500 mg oral tablet: 1-2 tab(s) orally every 8 hours, As Needed  buPROPion 300 mg/24 hours (XL) oral tablet, extended release: 1 tab(s) orally once a day  dexamethasone: TAPER:     2 milligram(s) orally every 8 hours (3/22)  2mg every 12 hours (3/23)   Then OFF    enoxaparin: 40 milligram(s) subcutaneous once a day (at bedtime)  gabapentin 100 mg oral capsule: 5 cap(s) orally every 8 hours  HYDROmorphone 2 mg oral tablet: 1 tab(s) orally every 4 hours, As needed, severe pain   methocarbamol 500 mg oral tablet: 1 tab(s) orally every 8 hours, As Needed for muscle spasm  oxybutynin 5 mg oral tablet: orally 2 times a day  pantoprazole 40 mg oral delayed release tablet: 1 tab(s) orally once a day  polyethylene glycol 3350 oral powder for reconstitution: 17 gram(s) orally once a day  senna oral tablet: 2 tab(s) orally once a day (at bedtime)  traZODone 100 mg oral tablet: orally once a day (at bedtime)

## 2022-03-22 NOTE — DISCHARGE NOTE PROVIDER - HOSPITAL COURSE
HPI:    Hospital Course:    Patient evaluated by PT/OT who recommended:  Patient is going home? rehab? hospice? Facility Name:     Hospital course c/b:     Exam on day of discharge:    Checklist:   - Obtained follow up appointment from NP  - Reviewed final recommendations of inpatient consults  - review discharge planning on provider handoff  - post op imaging completed  - Neurologically stable for discharge  - Vitals stable for discharge   - Afebrile for discharge  - WBC is stable  - Sodium level is normal  - Pain is adequately controlled  - Pt has PICC/walker/brace/collar        HPI:  36F w/hx of spina bifida, depression, and spinal epidermoid tumor s/p multiple resections, most recently in 2019. She has had progressively worsening back pain that radiates into her legs (R>L) as well as numbness in her feet. She has had urinary incontinence as well for the majority of her life, for which she self-catheterizes. MRI demonstrates recurrence of intradural lesion for which she has consented for revision resection today.    Hospital Course:  3/16: POD0 T12-L3 lami for subtotal intradural extramedullary tumor resection. ICU post op. JO neuro stable  3/17: POD1. JO o/n neuro stable. Remains on bedrest  3/18: POD 2. PT/OT today.   3/19: POD3. JO o/n. pend MRI. started SQL.   3/20: POD4. JO o/n. pend dispo  3/21: POD5. JO o/n, drain in place, pend dispo. HMV removed  3/22: POD 6. Started on Movantik for constipation.    Patient evaluated by PT/OT who recommended: Acute Rehab  Patient is going [facility]    Hospital course uncomplicated     Exam on day of discharge:  GEN: laying in bed, appears well, NAD  NEURO: AOx3. FC, OE spont, speech intact, face symmetric.   CNII-XII intact. PERRL, EOMI. No pronator drift.   MAEx4. 5/5 strength throughout. sensation intact  CV: Regular rate   PULM: nonlabored breathing, normal chest rise   GI: Abdomen soft, nontender nondistended  EXT: ext warm, dry, nontender  WOUND: thoracolumbar incision c/d/i    patient is neuro stable, vitals stable, afebrile, medically ready for discharge     Checklist:   - Obtained follow up appointment from NP             HPI:  36F w/hx of spina bifida, depression, and spinal epidermoid tumor s/p multiple resections, most recently in 2019. She has had progressively worsening back pain that radiates into her legs (R>L) as well as numbness in her feet. She has had urinary incontinence as well for the majority of her life, for which she self-catheterizes. MRI demonstrates recurrence of intradural lesion for which she has consented for revision resection today.    Hospital Course:  3/16: POD0 T12-L3 lami for subtotal intradural extramedullary tumor resection. ICU post op. JO neuro stable  3/17: POD1. JO o/n neuro stable. Remains on bedrest  3/18: POD 2. PT/OT today.   3/19: POD3. JO o/n. pend MRI. started SQL.   3/20: POD4. JO o/n. pend dispo  3/21: POD5. JO o/n, drain in place, pend dispo. HMV removed  3/22: POD 6. Started on Movantik for constipation.    Patient evaluated by PT/OT who recommended: Acute Rehab  Patient is going to [facility]    Hospital course uncomplicated     Exam on day of discharge:  GEN: laying in bed, appears well, NAD  NEURO: AOx3. FC, OE spont, speech intact, face symmetric.   CNII-XII intact. PERRL, EOMI. No pronator drift.   MAEx4. 5/5 strength throughout. sensation intact  CV: Regular rate   PULM: nonlabored breathing, normal chest rise   GI: Abdomen soft, nontender nondistended  EXT: ext warm, dry, nontender  WOUND: thoracolumbar incision c/d/i    patient is neuro stable, vitals stable, afebrile, medically ready for discharge

## 2022-03-22 NOTE — DISCHARGE NOTE PROVIDER - NSDCFUADDINST_GEN_ALL_CORE_FT
HPI:    Hospital Course:    Patient evaluated by PT/OT who recommended:  Patient is going home? rehab? hospice? Facility Name:     Hospital course c/b:     Exam on day of discharge:    Checklist:   - Obtained follow up appointment from NP  - Reviewed final recommendations of inpatient consults  - review discharge planning on provider handoff  - post op imaging completed  - Neurologically stable for discharge  - Vitals stable for discharge   - Afebrile for discharge  - WBC is stable  - Sodium level is normal  - Pain is adequately controlled  - Pt has PICC/walker/brace/collar        Neurosurgery follow up appointment date/time:  - Follow up in the office for a wound check and suture removal  - please call the office to confirm appointment: [phone]    Wound Care:  - you may shower  - pat dry incision after showering   - leave incision uncovered, open to air     Devices:    Drains/Lines:    Activity:  - fatigue is common after surgery, rest if you feel tired   - no bending, lifting, twisting or heavy lifting   - walking is recommended, ambulate as tolerated  - you may shower when you get home, keep your incision dry  - no bathing   - no driving within 24 hours of anesthesia or while taking prescription pain medications   - keep hydrated, drink plenty of water       Inpatient consults:  Please follow up with Dr. Sánchez if needed for pain management    Please also follow up with your primary care doctor.     Pain Expectations:  - pain after surgery is expected  - please take pain meds as prescribed     Medications:  - changes to home meds (ex. AED's)?  - new meds?  - pain meds?  - pain medications can cause constipation, you should eat a high fiber diet and may take a stool softener while on pain meds   - Avoid taking Advil (ibuprofen), Motrin (naproxen), or Aspirin for pain as they can cause bleeding     Call the office or come to ED if:  - wound has drainage or bleeding, increased redness or pain at incision site, neurological change, fever (>101), chills, night sweats, syncope, nausea/vomiting      Playback:  - See [employer] [facility] for a copy of your discharge paperwork     WITHIN 24 HOURS OF DISCHARGE, PLEASE CONTACT NEURO PA  WITH ANY QUESTIONS OR CONCERNS: [phone]   OTHERWISE, PLEASE CALL THE OFFICE WITH ANY QUESTIONS OR CONCERNS: [phone] Neurosurgery follow up appointment date/time:  - Follow up in the office for a wound check and suture removal  - please call the office to confirm appointment: [phone]    Wound Care:  - you may shower  - pat dry incision after showering   - leave incision uncovered, open to air     Devices:    Drains/Lines:    Activity:  - fatigue is common after surgery, rest if you feel tired   - no bending, lifting, twisting or heavy lifting   - walking is recommended, ambulate as tolerated  - you may shower when you get home, keep your incision dry  - no bathing   - no driving within 24 hours of anesthesia or while taking prescription pain medications   - keep hydrated, drink plenty of water       Inpatient consults:  Please follow up with Dr. Sánchez if needed for pain management    Please also follow up with your primary care doctor.     Pain Expectations:  - pain after surgery is expected  - please take pain meds as prescribed     Medications:  - You should continue taking your home medications: Protonix, Wellbutrin XL, Trazadone, Oxybutynin  - You are being discharged with a decadron taper. This medication is a steroid and should be tapered down as instructed. Please make sure to take your Protonix daily while you are on decadron, to protect your stomach lining.   - You are being discharged on Gabapentin for neuropathic pain, please take as directed  - You are also being discharged on Robaxin, which you should take as needed for muscle spasm  - For mild pain, you should take Tylenol as needed  - For severe pain please take Dilauded as needed  - pain medications can cause constipation, you should eat a high fiber diet and may take a stool softener while on pain meds. You are being discharged with a stool softener.  - Avoid taking Advil (ibuprofen), Motrin (naproxen), or Aspirin for pain as they can cause bleeding     Call the office or come to ED if:  - wound has drainage or bleeding, increased redness or pain at incision site, neurological change, fever (>101), chills, night sweats, syncope, nausea/vomiting      Playback:  - See [employer] [facility] for a copy of your discharge paperwork     WITHIN 24 HOURS OF DISCHARGE, PLEASE CONTACT NEURO PA  WITH ANY QUESTIONS OR CONCERNS: [phone]   OTHERWISE, PLEASE CALL THE OFFICE WITH ANY QUESTIONS OR CONCERNS: [phone] Neurosurgery follow up appointment date/time:  - Follow up in the office for a wound check and suture removal on POD 21 (4/6/22)   - please call the office to confirm appointment: [phone]    Wound Care:  - you may shower  - pat dry incision after showering   - leave incision uncovered, open to air   - sutures in place, plan to remove in the office close to POD 21 (4/6/22)   Activity:  - fatigue is common after surgery, rest if you feel tired   - no bending, lifting, twisting or heavy lifting   - walking is recommended, ambulate as tolerated  - you may shower when you get home, keep your incision dry  - no bathing   - no driving within 24 hours of anesthesia or while taking prescription pain medications   - keep hydrated, drink plenty of water       Inpatient consults:  Please follow up with Dr. Sánchez if needed for pain management    Please also follow up with your primary care doctor.     Pain Expectations:  - pain after surgery is expected  - please take pain meds as prescribed     Medications:  - You should continue taking your home medications: Protonix, Wellbutrin XL, Trazadone, Oxybutynin  - You are being discharged with a decadron taper. This medication is a steroid and should be tapered down as instructed. Please make sure to take your Protonix daily while you are on decadron, to protect your stomach lining.   - You are being discharged on Gabapentin for neuropathic pain, please take as directed  - You are also being discharged on Robaxin, which you should take as needed for muscle spasm  - For mild pain, you should take Tylenol as needed  - For severe pain please take Dilauded as needed  - pain medications can cause constipation, you should eat a high fiber diet and may take a stool softener while on pain meds. You are being discharged with a stool softener.  - Avoid taking Advil (ibuprofen), Motrin (naproxen), or Aspirin for pain as they can cause bleeding     Call the office or come to ED if:  - wound has drainage or bleeding, increased redness or pain at incision site, neurological change, fever (>101), chills, night sweats, syncope, nausea/vomiting      Playback:  - See [employer] [facility] for a copy of your discharge paperwork     WITHIN 24 HOURS OF DISCHARGE, PLEASE CONTACT NEURO PA  WITH ANY QUESTIONS OR CONCERNS: [phone]   OTHERWISE, PLEASE CALL THE OFFICE WITH ANY QUESTIONS OR CONCERNS: [phone] Neurosurgery follow up appointment date/time: 4/8/22 at 11AM  - Follow up in the office for a wound check and suture removal on POD 21 (4/6/22)   - please call the office to confirm appointment: [phone]    Wound Care:  - you may shower  - pat dry incision after showering   - leave incision uncovered, open to air   - sutures in place, plan to remove in the office close to POD 21 (4/6/22)   Activity:  - fatigue is common after surgery, rest if you feel tired   - no bending, lifting, twisting or heavy lifting   - walking is recommended, ambulate as tolerated  - you may shower when you get home, keep your incision dry  - no bathing   - no driving within 24 hours of anesthesia or while taking prescription pain medications   - keep hydrated, drink plenty of water       Inpatient consults:  Please follow up with Dr. Sánchez if needed for pain management    Please also follow up with your primary care doctor.     Pain Expectations:  - pain after surgery is expected  - please take pain meds as prescribed     Medications:  - You should continue taking your home medications: Protonix, Wellbutrin XL, Trazadone, Oxybutynin  - You are being discharged with a decadron taper. This medication is a steroid and should be tapered down as instructed. Please make sure to take your Protonix daily while you are on decadron, to protect your stomach lining.   - You are being discharged on Gabapentin for neuropathic pain, please take as directed  - You are also being discharged on Robaxin, which you should take as needed for muscle spasm  - For mild pain, you should take Tylenol as needed  - For severe pain please take Dilauded as needed  - pain medications can cause constipation, you should eat a high fiber diet and may take a stool softener while on pain meds. You are being discharged with a stool softener.  - Avoid taking Advil (ibuprofen), Motrin (naproxen), or Aspirin for pain as they can cause bleeding     Call the office or come to ED if:  - wound has drainage or bleeding, increased redness or pain at incision site, neurological change, fever (>101), chills, night sweats, syncope, nausea/vomiting      Playback:  - See [facility] for a copy of your discharge paperwork     WITHIN 24 HOURS OF DISCHARGE, PLEASE CONTACT NEURO PA  WITH ANY QUESTIONS OR CONCERNS: [phone]   OTHERWISE, PLEASE CALL THE OFFICE WITH ANY QUESTIONS OR CONCERNS: [phone] Neurosurgery follow up appointment date/time: 4/8/22 at 11AM  - Follow up in the office for a wound check and suture removal on POD 21 (4/6/22)   - please call the office to confirm appointment: [phone]    Wound Care:  - you may shower  - pat dry incision after showering   - leave incision uncovered, open to air   - sutures in place, plan to remove in the office at post op visit     Activity:  - fatigue is common after surgery, rest if you feel tired   - no bending, lifting, twisting or heavy lifting   - walking is recommended, ambulate as tolerated  - you may shower when you get home, keep your incision dry  - no bathing   - no driving within 24 hours of anesthesia or while taking prescription pain medications   - keep hydrated, drink plenty of water       Inpatient consults:  Please follow up with Dr. Sánchez if needed for pain management    Please also follow up with your primary care doctor.     Pain Expectations:  - pain after surgery is expected  - please take pain meds as prescribed     Medications:  - You should continue taking your home medications: Protonix, Wellbutrin XL, Trazadone, Oxybutynin  - You are being discharged with a decadron taper. This medication is a steroid and should be tapered down as instructed. Please make sure to take your Protonix daily while you are on decadron, to protect your stomach lining.   TAPER:  2mg every 8 hours (3/22), 2mg every 12 hours (3/23/) then OFF  - You are being discharged on Gabapentin for neuropathic pain, please take as directed  - You are also being discharged on Robaxin, which you should take as needed for muscle spasm  - For mild pain, you should take Tylenol as needed  - For severe pain please take Dilaudid 2mg as needed  - pain medications can cause constipation, you should eat a high fiber diet and may take a stool softener while on pain meds. You are being discharged with a stool softener.  - Avoid taking Advil (ibuprofen), Motrin (naproxen), or Aspirin for pain as they can cause bleeding     Call the office or come to ED if:  - wound has drainage or bleeding, increased redness or pain at incision site, neurological change, fever (>101), chills, night sweats, syncope, nausea/vomiting      Playback:  - See [facility] for a copy of your discharge paperwork     WITHIN 24 HOURS OF DISCHARGE, PLEASE CONTACT NEURO PA  WITH ANY QUESTIONS OR CONCERNS: [phone]   OTHERWISE, PLEASE CALL THE OFFICE WITH ANY QUESTIONS OR CONCERNS: [phone] Neurosurgery follow up appointment date/time: 4/8/22 at 11AM  - Follow up in the office for a wound check and suture removal on POD 21 (4/6/22)   - please call the office to confirm appointment: [phone]    Wound Care:  - you may shower  - pat dry incision after showering   - leave incision uncovered, open to air   - sutures in place, plan to remove in the office at post op visit     Drains/Devices/Lines/Etc:  - patient straight catheterizes at home for urinary retention and can continue to straight cath PRN at rehab     Activity:  - fatigue is common after surgery, rest if you feel tired   - no bending, lifting, twisting or heavy lifting   - walking is recommended, ambulate as tolerated  - you may shower when you get home, keep your incision dry  - no bathing   - no driving within 24 hours of anesthesia or while taking prescription pain medications   - keep hydrated, drink plenty of water       Inpatient consults:  Please follow up with Dr. Sánchez if needed for pain management    Please also follow up with your primary care doctor.     Pain Expectations:  - pain after surgery is expected  - please take pain meds as prescribed     Medications:  - You should continue taking your home medications: Protonix, Wellbutrin XL, Trazadone, Oxybutynin  - You are being discharged with a decadron taper. This medication is a steroid and should be tapered down as instructed. Please make sure to take your Protonix daily while you are on decadron, to protect your stomach lining.   TAPER:  2mg every 8 hours (3/22), 2mg every 12 hours (3/23/) then OFF  - You are being discharged on Gabapentin for neuropathic pain, please take as directed  - You are also being discharged on Robaxin, which you should take as needed for muscle spasm  - For mild pain, you should take Tylenol as needed  - For severe pain please take Dilaudid 2mg as needed  - pain medications can cause constipation, you should eat a high fiber diet and may take a stool softener while on pain meds. You are being discharged with a stool softener.  - Avoid taking Advil (ibuprofen), Motrin (naproxen), or Aspirin for pain as they can cause bleeding     Call the office or come to ED if:  - wound has drainage or bleeding, increased redness or pain at incision site, neurological change, fever (>101), chills, night sweats, syncope, nausea/vomiting      Playback:  - See [facility] for a copy of your discharge paperwork     WITHIN 24 HOURS OF DISCHARGE, PLEASE CONTACT NEURO PA  WITH ANY QUESTIONS OR CONCERNS: [phone]   OTHERWISE, PLEASE CALL THE OFFICE WITH ANY QUESTIONS OR CONCERNS: [phone]

## 2022-03-22 NOTE — PROGRESS NOTE ADULT - PROVIDER SPECIALTY LIST ADULT
Hospitalist
Hospitalist
Neurosurgery
Pain Medicine
NSICU
Neurosurgery
Pain Medicine
Neurosurgery
Neurosurgery
Hospitalist

## 2022-03-22 NOTE — PROGRESS NOTE ADULT - SUBJECTIVE AND OBJECTIVE BOX
NEUROSURGERY PAIN MANAGEMENT CONSULT NOTE    Chief Complaint: back pain radiating to b/l lower extremities    HPI:  36F w/hx of spina bifida, depression, and spinal epidermoid tumor s/p multiple resections, most recently in 2019. She has had progressively worsening back pain that radiates into her legs (R>L) as well as numbness in her feet. She has had urinary incontinence as well for the majority of her life, for which she self-catheterizes. MRI demonstrates recurrence of intradural lesion for which she has consented for revision resection today. (16 Mar 2022 13:14)      PAST MEDICAL & SURGICAL HISTORY:  Tumor  spinal    Insomnia    Depression    Bladder disorder  pt self catheterizes    Spinal cord tumor    History of cholecystectomy    History of eye surgery  muscle repair        FAMILY HISTORY:      SOCIAL HISTORY:  [ ] Denies Smoking, Alcohol, or Drug Use    HOME MEDICATIONS:   Please refer to initial HNP    PAIN HOME MEDICATIONS:    Allergies    No Known Drug Allergies  shellfish (Rash)    Intolerances        PAIN MEDICATIONS:  acetaminophen     Tablet .. 1000 milliGRAM(s) Oral every 8 hours  buPROPion XL (24-Hour) . 300 milliGRAM(s) Oral daily  diphenhydrAMINE 50 milliGRAM(s) Oral every 6 hours PRN  gabapentin 500 milliGRAM(s) Oral every 8 hours  HYDROmorphone   Tablet 2 milliGRAM(s) Oral every 4 hours PRN  HYDROmorphone   Tablet 4 milliGRAM(s) Oral every 4 hours PRN  methocarbamol 500 milliGRAM(s) Oral every 8 hours  traZODone 100 milliGRAM(s) Oral at bedtime    Heme:  enoxaparin Injectable 40 milliGRAM(s) SubCutaneous every 24 hours    Antibiotics:    Cardiovascular:    GI:  magnesium hydroxide Suspension 30 milliLiter(s) Oral every 12 hours PRN  naloxegol 12.5 milliGRAM(s) Oral daily  pantoprazole    Tablet 40 milliGRAM(s) Oral before breakfast  polyethylene glycol 3350 17 Gram(s) Oral daily  senna 2 Tablet(s) Oral at bedtime    Endocrine:  dexAMETHasone     Tablet 2 milliGRAM(s) Oral every 8 hours  dexAMETHasone     Tablet   Oral     All Other Medications:  influenza   Vaccine 0.5 milliLiter(s) IntraMuscular once  oxybutynin 5 milliGRAM(s) Oral two times a day      Vital Signs Last 24 Hrs  T(C): 36.9 (22 Mar 2022 08:40), Max: 37.1 (22 Mar 2022 06:30)  T(F): 98.5 (22 Mar 2022 08:40), Max: 98.7 (22 Mar 2022 06:30)  HR: 80 (22 Mar 2022 08:40) (70 - 97)  BP: 98/67 (22 Mar 2022 08:40) (98/67 - 118/80)  BP(mean): 93 (22 Mar 2022 06:30) (86 - 93)  RR: 18 (22 Mar 2022 08:40) (16 - 18)  SpO2: 94% (22 Mar 2022 08:40) (92% - 95%)    LABS:                        12.6   17.48 )-----------( 315      ( 22 Mar 2022 08:25 )             39.3     03-22    135  |  100  |  21  ----------------------------<  134<H>  4.4   |  24  |  0.68    Ca    9.0      22 Mar 2022 08:25  Phos  3.8     03-22  Mg     2.3     03-22        REVIEW OF SYSTEMS:  CONSTITUTIONAL: No fever or fatigue O/N.   EYES: No eye pain, visual disturbances  ENMT:  No difficulty hearing. No throat pain  NECK: No pain or stiffness  RESPIRATORY: No cough, wheezing; No shortness of breath  CARDIOVASCULAR: No chest pain, palpitations.   GASTROINTESTINAL: Pt reports ___ passing gas. No bowel movements. No abdominal or epigastric pain. No nausea, vomiting. GENITOURINARY: No dysuria, frequency, or incontinence  NEUROLOGICAL: No headaches, loss of strength, numbness, or tremors. No dizzinesss or lightheadedness with pain medications.   MUSCULOSKELETAL: Incisional back pain. No joint pain or swelling; No muscle, or extremity pain      FUNCTIONAL ASSESSMENT:  PAIN SCORE AT REST:         SCALE USED: (1-10 VNRS)  PAIN SCORE WITH ACTIVITY:         SCALE USED: (1-10 VNRS)    PAIN ASSESSMENT:    PHYSICAL EXAM  GENERAL: Laying in bed, NAD  Neuro: CN II-XII PERRRL, EOMI  Cranial nerves grossly intact  Motor exam:  Sensation intact to LT in UE/LE in 3 dermatomes  CHEST/LUNG: Clear to auscultation bilaterally; No rales, rhonchi, wheezing, or rubs  HEART: Regular rate and rhythm; No murmurs, rubs, or gallops  ABDOMEN: Soft, Nontender, Nondistended; Bowel sounds present  EXTREMITIES:  2+ Peripheral Pulses, No clubbing, cyanosis, or edema  SKIN: No rashes or lesions      ASSESSMENT:   36F w/hx of spina bifida, depression, and spinal epidermoid tumor s/p multiple resections, most recently in 2019, presented w/ b/l LE radiculopathy R>L, now s/p T12-L3 laminectomy, subtotal intradural extramedullary tumor resection       PLAN:   - Pain:  Tylenol 1000mg every 8 hours  Increase Gabapentin to 500mg every 8 hours   Robaxin 500mg every 8 hours  Dilaudid 2/4mg every 4 hours PRN for moderate to severe pain      - Bowel regimen: Senna    - Nausea ppx: Zofran standing  - Functional Goals: Pt will get OOB with PT today. Pt will resume previous level of activity without impairment from surgery.   - Additional Consults: None recommended.   - Additional Labs/Imaging:  None recommended.     - Follow up, Discharge Planning: [place] when ready   - Pain Management follow up plan: will cont to follow

## 2022-03-22 NOTE — PROGRESS NOTE ADULT - REASON FOR ADMISSION
Elective revision laminectomy and resection of intradural extramedullary tumor

## 2022-03-22 NOTE — DISCHARGE NOTE PROVIDER - CARE PROVIDER_API CALL
Patrick Bernabe)  Neurosurgery  [address], [place] [facility]  Phone: [phone]  Fax: [phone]  Follow Up Time:    Patrick Bernabe)  Neurosurgery  [address] [place] [address]  Phone: [phone]  Fax: [phone]  Follow Up Time:     Timbo Sánchez)  Anesthesiology; Pain Medicine  [address] [place] [id number]  Phone: [phone]  Fax: [phone]  Follow Up Time:

## 2022-03-22 NOTE — PROGRESS NOTE ADULT - SUBJECTIVE AND OBJECTIVE BOX
HPI:  36F w/hx of spina bifida, depression, and spinal epidermoid tumor s/p multiple resections, most recently in 2019. She has had progressively worsening back pain that radiates into her legs (R>L) as well as numbness in her feet. She has had urinary incontinence as well for the majority of her life, for which she self-catheterizes. MRI demonstrates recurrence of intradural lesion for which she has consented for revision resection today. (16 Mar 2022 13:14)    INTERVAL EVENTS:  JO o/n. Started on movantik for constipation.     HOSPITAL COURSE:  3/16: POD0 T12-L3 lami for subtotal intradural extramedullary tumor resection. ICU post op. JO neuro stable  3/17: POD1. JO o/n neuro stable. Remains on bedrest  3/18: POD 2. PT/OT today.   3/19: POD3. JO o/n. pend MRI. started SQL.   3/20: POD4. JO o/n. pend dispo  3/21: POD5. JO o/n, drain in place, pend dispo. HMV removed  3/22: POD 6. Started on Movantik for constipation.      Vital Signs Last 24 Hrs  T(C): 36.9 (21 Mar 2022 20:35), Max: 36.9 (21 Mar 2022 05:35)  T(F): 98.4 (21 Mar 2022 20:35), Max: 98.4 (21 Mar 2022 05:35)  HR: 97 (21 Mar 2022 20:35) (56 - 97)  BP: 108/76 (21 Mar 2022 20:35) (100/66 - 113/72)  BP(mean): 86 (21 Mar 2022 20:35) (86 - 86)  RR: 18 (21 Mar 2022 20:35) (16 - 18)  SpO2: 94% (21 Mar 2022 20:35) (94% - 95%)    I&O's Summary    20 Mar 2022 07:01  -  21 Mar 2022 07:00  --------------------------------------------------------  IN: 0 mL / OUT: 1825 mL / NET: -1825 mL    21 Mar 2022 07:01  -  22 Mar 2022 01:31  --------------------------------------------------------  IN: 0 mL / OUT: 1615 mL / NET: -1615 mL      PHYSICAL EXAM:  GEN: laying in bed, appears well, NAD  NEURO: AOx3. FC, OE spont, speech intact, face symmetric.   CNII-XII intact. PERRL, EOMI. No pronator drift.   MAEx4. 5/5 strength throughout. sensation intact  CV: Regular rate   PULM: nonlabored breathing, normal chest rise   GI: Abdomen soft, nontender nondistended  EXT: ext warm, dry, nontender  WOUND: thoracolumbar incision c/d/i        TUBES/LINES:  [] Michel  [] Trach  [] NGT  [] PEG  [] Wound Drains  [] Others    DIET:  [] NPO  [x] Mechanical  [] Tube feeds    LABS:                        12.5   12.86 )-----------( 278      ( 21 Mar 2022 05:54 )             38.3     03-21    136  |  102  |  14  ----------------------------<  129<H>  4.7   |  24  |  0.65    Ca    8.7      21 Mar 2022 05:54  Phos  3.6     03-21  Mg     2.1     03-21    TPro  6.1  /  Alb  3.5  /  TBili  0.3  /  DBili  x   /  AST  158<H>  /  ALT  218<H>  /  AlkPhos  102  03-20            CAPILLARY BLOOD GLUCOSE          Drug Levels: [] N/A    CSF Analysis: [] N/A      Allergies    No Known Drug Allergies  shellfish (Rash)    Intolerances      MEDICATIONS:  Antibiotics:    Neuro:  acetaminophen     Tablet .. 1000 milliGRAM(s) Oral every 8 hours  buPROPion XL (24-Hour) . 300 milliGRAM(s) Oral daily  diphenhydrAMINE 50 milliGRAM(s) Oral every 6 hours PRN  gabapentin 500 milliGRAM(s) Oral every 8 hours  HYDROmorphone   Tablet 2 milliGRAM(s) Oral every 4 hours PRN  HYDROmorphone   Tablet 4 milliGRAM(s) Oral every 4 hours PRN  methocarbamol 500 milliGRAM(s) Oral every 8 hours  traZODone 100 milliGRAM(s) Oral at bedtime    Anticoagulation:  enoxaparin Injectable 40 milliGRAM(s) SubCutaneous every 24 hours    OTHER:  dexAMETHasone     Tablet 2 milliGRAM(s) Oral every 8 hours  dexAMETHasone     Tablet   Oral   influenza   Vaccine 0.5 milliLiter(s) IntraMuscular once  magnesium hydroxide Suspension 30 milliLiter(s) Oral every 12 hours PRN  naloxegol 12.5 milliGRAM(s) Oral daily  oxybutynin 5 milliGRAM(s) Oral two times a day  pantoprazole    Tablet 40 milliGRAM(s) Oral before breakfast  polyethylene glycol 3350 17 Gram(s) Oral daily  senna 2 Tablet(s) Oral at bedtime    IVF:    CULTURES:    RADIOLOGY & ADDITIONAL TESTS:      ASSESSMENT:  36F w/hx of spina bifida, depression, and spinal epidermoid tumor s/p multiple resections, most recently in 2019, presented w/ b/l LE radiculopathy R>L, now s/p T12-L3 laminectomy, subtotal intradural extramedullary tumor resection (3/16/22)    NEURO:   - neuro checks/vital signs q4hrs  - ERAS for pain control  - 1 HMV d/c 1 monocryl suture placed for closure  - post-op L spine MRI 3/19  - cont home wellbutrin, trazodone  - decadron taper over 1 week    CARDIOVASCULAR:   - SBP goal 100-140  - no issues    PULMONARY:   - satting well on RA   - IS    GI:   - regular diet  - bowel regimen, including movantik  - Protonix while on steroids    RENAL:   - IVL  - continue self-cath PRN   - cont home oxybutynin     HEME:   - s/p 1u pRBC intraop  - SCDs/SQL for DVT ppx    ID:   - afebrile  - trend leukocytosis    ENDO:   - ISS dc'd  - no issues    DISPO: SDU status, full code, PT/OT recs AR     Assessment and Plan discussed with Dr Bernabe

## 2022-03-22 NOTE — DISCHARGE NOTE PROVIDER - NSDCCPTREATMENT_GEN_ALL_CORE_FT
PRINCIPAL PROCEDURE  Procedure: Laminectomy, spine, thoracolumbar, 4 levels, posterior approach  Findings and Treatment: T12-L3 laminectomy for resection of spine lesion

## 2022-03-22 NOTE — DISCHARGE NOTE PROVIDER - PROVIDER TOKENS
PROVIDER:[TOKEN:[[id number]]] PROVIDER:[TOKEN:[[id number]]],PROVIDER:[TOKEN:[[id number]]]

## 2022-03-22 NOTE — DISCHARGE NOTE PROVIDER - NSDCCPCAREPLAN_GEN_ALL_CORE_FT
PRINCIPAL DISCHARGE DIAGNOSIS  Diagnosis: Spinal cord mass  Assessment and Plan of Treatment:       SECONDARY DISCHARGE DIAGNOSES  Diagnosis: Bladder disorder  Assessment and Plan of Treatment: pt self catheterizes    Diagnosis: Depression  Assessment and Plan of Treatment:      PRINCIPAL DISCHARGE DIAGNOSIS  Diagnosis: Spinal cord mass  Assessment and Plan of Treatment:       SECONDARY DISCHARGE DIAGNOSES  Diagnosis: Depression  Assessment and Plan of Treatment:     Diagnosis: Leukocytosis  Assessment and Plan of Treatment:     Diagnosis: Transaminitis  Assessment and Plan of Treatment:     Diagnosis: Bladder disorder  Assessment and Plan of Treatment: pt self catheterizes    Diagnosis: Spina bifida  Assessment and Plan of Treatment:

## 2022-03-22 NOTE — DISCHARGE NOTE PROVIDER - CARE PROVIDERS DIRECT ADDRESSES
[email or web address]

## 2022-03-22 NOTE — DISCHARGE NOTE NURSING/CASE MANAGEMENT/SOCIAL WORK - NSDCFUADDAPPT_GEN_ALL_CORE_FT
Please follow up with Dr. Bernabe outpatient on 4/8/22 at 11AM, call the office to confirm appointment at [phone]    Please follow up with Dr. Sánchez if needed for pain management    Please follow up with your primary care doctor

## 2022-03-22 NOTE — DISCHARGE NOTE NURSING/CASE MANAGEMENT/SOCIAL WORK - PATIENT PORTAL LINK FT
You can access the FollowMyHealth Patient Portal offered by [facility] by registering at the following website: [email or web address]. By joining [facility]’s FollowMyHealth portal, you will also be able to view your health information using other applications (apps) compatible with our system.

## 2022-03-22 NOTE — DISCHARGE NOTE PROVIDER - NSDCFUADDAPPT_GEN_ALL_CORE_FT
Please follow up with Dr. Bernabe outpatient    Please follow up with Dr. Sánchez if needed for pain management    Please follow up with your primary care doctor  Please follow up with Dr. Bernabe outpatient on 4/8/22 at 11AM, call the office to confirm appointment at [phone]    Please follow up with Dr. Sánchez if needed for pain management    Please follow up with your primary care doctor

## 2022-03-28 ENCOUNTER — NON-APPOINTMENT (OUTPATIENT)
Age: 37
End: 2022-03-28

## 2022-03-30 DIAGNOSIS — R32 UNSPECIFIED URINARY INCONTINENCE: ICD-10-CM

## 2022-03-30 DIAGNOSIS — E83.42 HYPOMAGNESEMIA: ICD-10-CM

## 2022-03-30 DIAGNOSIS — F32.A DEPRESSION, UNSPECIFIED: ICD-10-CM

## 2022-03-30 DIAGNOSIS — Q05.9 SPINA BIFIDA, UNSPECIFIED: ICD-10-CM

## 2022-03-30 DIAGNOSIS — G95.20 UNSPECIFIED CORD COMPRESSION: ICD-10-CM

## 2022-03-30 DIAGNOSIS — R74.01 ELEVATION OF LEVELS OF LIVER TRANSAMINASE LEVELS: ICD-10-CM

## 2022-03-30 DIAGNOSIS — D33.4 BENIGN NEOPLASM OF SPINAL CORD: ICD-10-CM

## 2022-03-30 DIAGNOSIS — D49.7 NEOPLASM OF UNSPECIFIED BEHAVIOR OF ENDOCRINE GLANDS AND OTHER PARTS OF NERVOUS SYSTEM: ICD-10-CM

## 2022-04-06 NOTE — REVIEW OF SYSTEMS
Anesthesia Pre Eval Note    Anesthesia ROS/Med Hx    Overall Review:  Echo was reviewed     Anesthetic Complication History:  Patient does not have a history of anesthetic complications      Pulmonary Review:  Patient does not have a pulmonary history      Neuro/Psych Review:  Patient does not have a neuro/psych history       Cardiovascular Review:  Exercise tolerance: good (>4 METS)  Positive for hypertension  Positive for hyperlipidemia    GI/HEPATIC/RENAL Review:    Positive for GERD  Positive for renal disease - chronic renal insufficiency    Overall Review of Systems Comments:  Unintentional weight loss  Additional Results:  Echo:  No results found for: EF   ALLERGIES:   -- Spironolactone -- Other (See Comments)    --  Leg and ankle cramps   -- Codeine -- Other (See Comments)    --  UNKNOWN   -- Lisinopril -- Other (See Comments)    --  lorsorton angioedema       Last Labs        Component                Value               Date/Time                  WBC                      7.6                 09/30/2021 1116            RBC                      4.19                09/30/2021 1116            HGB                      12.4                09/30/2021 1116            HCT                      37.7                09/30/2021 1116            MCV                      90.0                09/30/2021 1116            MCH                      29.6                09/30/2021 1116            MCHC                     32.9                09/30/2021 1116            RDW-CV                   15.1 (H)            09/30/2021 1116            Sodium                   138                 04/05/2022 1338            Potassium                4.6                 04/05/2022 1338            Chloride                 106                 04/05/2022 1338            Carbon Dioxide           25                  04/05/2022 1338            Glucose                  117 (H)             04/05/2022 1338            BUN                      29 (H)               04/05/2022 1338            Creatinine               1.17 (H)            04/05/2022 1338            Glomerular Filtrati*     50 (L)              04/05/2022 1338            Calcium                  10.3 (H)            04/05/2022 1338            PLT                      352                 09/30/2021 1116            INR                      1.0                 01/27/2022 1027        Past Medical History:  No date: Cataract  No date: Dyslipidemia  No date: Essential (primary) hypertension  No date: GERD (gastroesophageal reflux disease)  No date: Gout  No date: Hyperlipidemia  1958: Nicotine dependence  No date: Proteinuria  No date: Sinus bradycardia    Past Surgical History:  07/2018: Cataract extraction, bilateral; Bilateral       Prior to Admission medications :  Medication amLODIPine (NORVASC) 10 MG tablet, Sig Take 1 tablet by mouth daily. For blood pressure, Start Date 3/30/22, End Date , Taking? , Authorizing Provider Gretta Baker MD    Medication metoPROLOL succinate (TOPROL-XL) 25 MG 24 hr tablet, Sig Take 1 tablet by mouth daily. For blood pressure, Start Date 3/30/22, End Date , Taking? , Authorizing Provider Gretta Baker MD    Medication triamterene-hydroCHLOROthiazide (MAXZIDE-25) 37.5-25 MG per tablet, Sig Take two tabs daily for blood pressure control, Start Date 3/30/22, End Date , Taking? , Authorizing Provider Gretta Baker MD    Medication allopurinol (ZYLOPRIM) 100 MG tablet, Sig Take one tab daily for gout., Start Date 3/30/22, End Date , Taking? , Authorizing Provider Gretta Baker MD    Medication Ascorbic Acid (vitamin C) 1000 MG tablet, Sig 1 tab by mouth daily for years per patient --- takes OTC, Start Date 6/18/21, End Date , Taking? , Authorizing Provider Marcela Beebe MD    Medication hydrALAZINE (APRESOLINE) 100 MG tablet, Sig Take 1 tablet by mouth 2 times daily., Start Date 4/2/21, End Date , Taking? , Authorizing Provider Keith Epstein MD    Medication aspirin 81 MG  tablet, Sig Take 81 mg by mouth daily., Start Date , End Date , Taking? , Authorizing Provider Outside Provider         Patient Vitals in the past 24 hrs:      Relevant Problems   No relevant active problems       Physical Exam     Airway   Mallampati: III  TM Distance: >3 FB  Neck ROM: Full  TMJ Mobility: Good    Cardiovascular    Cardio Rhythm: Regular  Cardio Rate: Normal    Head Assessment  Head assessment: Normocephalic    General Assessment  General Assessment: Alert and oriented    Dental Exam    Patient has:  Edentulous    Pulmonary Exam    Breath sounds clear to auscultation:  Yes      Anesthesia Plan:    ASA Status: 3  Anesthesia Type: MAC      Post-op Pain Management: Per Surgeon      Checklist  Reviewed: Lab Results, Past Med History, NPO Status, Allergies, Medications and Problem list  Consent/Risks Discussed Statement:  The proposed anesthetic plan, including its risks and benefits, have been discussed with the Patient along with the risks and benefits of alternatives. Questions were encouraged and answered and the patient and/or representative understands and agrees to proceed.    I have discussed elements of the patient's history or examination, as noted above and/or as follows, that place the patient at higher risk of complications; kidney disease.    I discussed with the patient (and/or patient's legal representative) the risks and benefits of the proposed anesthesia plan, MAC, which may include services performed by other anesthesia providers.    Alternative anesthesia plans, if available, were reviewed with the patient (and/or patient's legal representative). Discussion has been held with the patient (and/or patient's legal representative) regarding risks of anesthesia, which include emergent situations that may require change in anesthesia plan.    The patient (and/or patient's legal representative) has indicated understanding, his/her questions have been answered, and he/she wishes to proceed  with the planned anesthetic.      Blood Products: Not Anticipated     [As Noted in HPI] : as noted in HPI [Leg Weakness] : leg weakness [Numbness] : numbness [Tingling] : tingling [Difficulty Walking] : difficulty walking [Limping] : limping [Incontinence] : incontinence [Negative] : Heme/Lymph

## 2022-04-08 ENCOUNTER — APPOINTMENT (OUTPATIENT)
Dept: VASCULAR SURGERY | Facility: CLINIC | Age: 37
End: 2022-04-08
Payer: MEDICAID

## 2022-04-08 ENCOUNTER — APPOINTMENT (OUTPATIENT)
Dept: NEUROSURGERY | Facility: CLINIC | Age: 37
End: 2022-04-08
Payer: MEDICAID

## 2022-04-08 ENCOUNTER — NON-APPOINTMENT (OUTPATIENT)
Age: 37
End: 2022-04-08

## 2022-04-08 VITALS
TEMPERATURE: 99.2 F | RESPIRATION RATE: 18 BRPM | SYSTOLIC BLOOD PRESSURE: 103 MMHG | BODY MASS INDEX: 26.58 KG/M2 | DIASTOLIC BLOOD PRESSURE: 69 MMHG | WEIGHT: 150 LBS | HEIGHT: 63 IN | HEART RATE: 106 BPM | OXYGEN SATURATION: 98 %

## 2022-04-08 DIAGNOSIS — R60.0 LOCALIZED EDEMA: ICD-10-CM

## 2022-04-08 DIAGNOSIS — R20.2 ANESTHESIA OF SKIN: ICD-10-CM

## 2022-04-08 DIAGNOSIS — R26.9 UNSPECIFIED ABNORMALITIES OF GAIT AND MOBILITY: ICD-10-CM

## 2022-04-08 DIAGNOSIS — Z48.02 ENCOUNTER FOR REMOVAL OF SUTURES: ICD-10-CM

## 2022-04-08 DIAGNOSIS — M54.50 LOW BACK PAIN, UNSPECIFIED: ICD-10-CM

## 2022-04-08 DIAGNOSIS — R29.898 OTHER SYMPTOMS AND SIGNS INVOLVING THE MUSCULOSKELETAL SYSTEM: ICD-10-CM

## 2022-04-08 DIAGNOSIS — R20.0 ANESTHESIA OF SKIN: ICD-10-CM

## 2022-04-08 DIAGNOSIS — G95.89 OTHER SPECIFIED DISEASES OF SPINAL CORD: ICD-10-CM

## 2022-04-08 DIAGNOSIS — R11.0 NAUSEA: ICD-10-CM

## 2022-04-08 DIAGNOSIS — Z87.728 PERSONAL HISTORY OF OTHER SPECIFIED (CORRECTED) CONGENITAL MALFORMATIONS OF NERVOUS SYSTEM AND SENSE ORGANS: ICD-10-CM

## 2022-04-08 PROCEDURE — 93970 EXTREMITY STUDY: CPT

## 2022-04-08 PROCEDURE — 99024 POSTOP FOLLOW-UP VISIT: CPT

## 2022-04-09 PROBLEM — R26.9 GAIT DIFFICULTY: Status: ACTIVE | Noted: 2019-01-15

## 2022-04-09 PROBLEM — R29.898 LEG WEAKNESS: Status: ACTIVE | Noted: 2019-01-14

## 2022-04-09 PROBLEM — G95.89 SPINAL CORD MASS: Status: ACTIVE | Noted: 2019-01-15

## 2022-04-09 PROBLEM — R20.0 NUMBNESS AND TINGLING OF RIGHT LEG: Status: ACTIVE | Noted: 2019-01-15

## 2022-04-09 PROBLEM — R60.0 UNILATERAL EDEMA OF LOWER EXTREMITY: Status: ACTIVE | Noted: 2022-04-09

## 2022-04-09 PROBLEM — Z48.02 ENCOUNTER FOR REMOVAL OF SUTURES: Status: ACTIVE | Noted: 2022-04-04

## 2022-04-09 PROBLEM — M54.50 LOW BACK PAIN: Status: ACTIVE | Noted: 2019-01-14

## 2022-04-09 PROBLEM — Z87.728 HISTORY OF SPINA BIFIDA: Status: ACTIVE | Noted: 2019-01-14

## 2022-04-10 NOTE — REASON FOR VISIT
[Other: _____] : [unfilled] [de-identified] : thoracolumbar laminectomy for subtotal resection of intradural, intrameduallry and extramedualrry tumor, T10-L3. [de-identified] : 3/16/22 [de-identified] : \par \par Denies any signs of postop wound infection which could include but not limited to redness/swelling/purulent drainage.\par Denies SOB/CP//chills/fever/cough, myalgia, loss of taste/smell.\par She is slowly introducing preop activities.\par \par Patient is now home from rehab since April 2. She participates in physical therapy 2x/week.\par She reports no change in pre-op symptoms after surgery. Pain is under control with medications.\par She reports b/l leg swelling but denies calf pain when ambulating, or calf tenderness/warmth to touch.\par Patient and nephew reported redness and pruritus at surgical incision site without drainage, but significantly improved today. She endorses taking daily showers.\par \par \par \par

## 2022-04-10 NOTE — DATA REVIEWED
[de-identified] : [facility]\par    [facility] Department of Radiology\par   Radiology Report\par \par \par Patient Name: VIRAL GARCIA   Report Date: 21-Mar-2022 11:30.00 \par Patient ID: [id number] ([id number]), [id number] (EPI)  Accession No.: [id number] \par Patient Birth Date: 1985  Report Status: F \par Referring Physician: [id number] AAN MONGE   Reason For Study: postop  \par \par \par \par \par ACC: [id number] EXAM: MR SPINE LUMBAR WAW IC\par \par PROCEDURE DATE: 03/19/2022\par \par \par \par INTERPRETATION: PROCEDURE: MRI of the lumbosacral spine with and without contrast\par \par INDICATION: Postoperative exam status post laminectomy T11-L3 for resection of recurrent intradural extramedullary tumor (epidermoid per history)\par \par TECHNIQUE: Sagittal T1, T2, STIR and axial T2 weighted images of the lumbosacral spine were obtained. Following the intravenous administration of 7.5 cc Gadavist, sagittal and axial T1 weighted images of the spine were obtained.\par \par COMPARISON: None\par \par FINDINGS:\par \par The patient is status post revision laminectomy, now extended one level to L2-3, and resection of previously seen T1 hyperintense and nonenhancing mass.\par \par Currently, there is new heterogeneous diffuse abnormal enhancement within the intradural compartment at the T12-L1 levels, likely postoperative change and/or arachnoiditis. Small sites of T1 and T2 hypointense signal may be gas versus hemorrhage. There is a small bore surgical drain at the laminectomy bed. There is epidural fluid collection best appreciated on sagittal series 4, image 8 that spans the laminectomy bed. Superiorly, there is a convex anterior margin of this fluid (series 10, image 22 for example), compressing the thecal sac and conus medullaris. No completely rim enhancement of this fluid to suggest abscess formation, and findings may reflect seroma and/or pseudomeningocele. The sacral thecal sac (series 7, image 66) shows fluid hematocrit level consistent with intradural hemorrhage.\par \par Upon comparison of the sagittal precontrast images, there is much debulking of tumor if not completely excised. Few small foci of remaining T1 shortening are noted (series 5, image 7) are indeterminate on this recent postop exam. Small volume residual is suspected and attention on follow-up is advised.\par \par The lumbar vertebrae remain preserved in height, and alignment is similar to before with mild kyphotic angulation at T12-L1. There is again central disc extrusion at the L1-2 level, not significantly changed in volume without high-grade spinal stenosis. There is no additional level of extrinsic spinal canal narrowing aside from that secondary to the dorsal epidural fluid collection as mentioned above. Neural foramina remain patent.\par \par \par IMPRESSION:\par \par Status post revision resection of spinal epidermoid tumor. Now post T11-L3 laminectomy with long segment fluid collection with partial compression of the conus.\par \par Complex tissue in the intradural operative spine now has enhancement after surgery which complicates assessment for residual tumor. Compared to February 2022, small foci of T1 shortening remain and could be sites of subacute blood versus residual epidermoid. Attention on follow-up advised.\par \par --- End of Report ---\par \par \par \par \par \par ARIANNE SY MD; Attending Radiologist\par This document has been electronically signed. Mar 21 2022 11:30AM. \par

## 2022-04-10 NOTE — PHYSICAL EXAM
[General Appearance - Alert] : alert [General Appearance - In No Acute Distress] : in no acute distress [Longitudinal] : longitudinal [Dry] : dry [Sutures Intact] : closed with intact sutures [No Drainage] : without drainage [Normal Skin] : normal [Oriented To Time, Place, And Person] : oriented to person, place, and time [Impaired Insight] : insight and judgment were intact [Cranial Nerves Optic (II)] : visual acuity intact bilaterally,  pupils equal round and reactive to light [Cranial Nerves Oculomotor (III)] : extraocular motion intact [Cranial Nerves Trigeminal (V)] : facial sensation intact symmetrically [Cranial Nerves Facial (VII)] : face symmetrical [Cranial Nerves Vestibulocochlear (VIII)] : hearing was intact bilaterally [Cranial Nerves Glossopharyngeal (IX)] : tongue and palate midline [Cranial Nerves Accessory (XI - Cranial And Spinal)] : head turning and shoulder shrug symmetric [Cranial Nerves Hypoglossal (XII)] : there was no tongue deviation with protrusion [Neck Appearance] : the appearance of the neck was normal [] : no respiratory distress [Erythema] : not erythematous [Tender] : not tender [FreeTextEntry6] : 5/5 on b/l UE, 4/5 on b/l LE [FreeTextEntry1] : RIGHT CALF SWELLING--WARM TO TOUCH, 2+ DP/PT, NO CALF PAIN ON AMBULATION

## 2022-04-10 NOTE — ASSESSMENT
[FreeTextEntry1] : Patient is s/p thoracolumbar laminectomy for subtotal resection of intradural, intrameduallry and extramedualrry tumor, T10-L3.  A tumor capsule was stuck to nerve root and could not be safely peeled away from functional nerve roots. The tumor capsule was left in place.\par \par Dr. Bernabe assessed the surgical incision site--no need for antibiotics. Advised patient to continue daily showers to remove scabs. \par Wash wound with mild soap, i.e Garry and Garry. Pat incision line dry with dry separate towel.\par Report any S/S infection including wound drainage, redness, warmth, fever, chills,weakness.\par No tub bath/pool for 8 weeks.\par Keep incision covered and protected from the sun for 3-6 months following surgery.\par \par No Bend/Lift/Twist\par Gradually increase activities as tolerated\par Continue Physical therapy for gait training, muscular strengthening, balance and coordination.\par Notify MD or report to ED for worsening neurological s/s, including but not limited to weakness, incontinence gait disturbance.\par \par Follow up with Dr. Bernabe in one month for postop check.\par \par No radiation planned for minimal residual tumor capsule stuck to nerve per Dr. Benrabe, can consider if there is progression on followup MRI. Will follow up with a repeat MRI thoracic/lumbar w/wo contrast in 6 months (Sept 2022) to assess tumor stability.\par \par \par \par I, Dr. Bernabe, personally performed the evaluation and management (E/M) services for this established patient who presents today with (a) new problem(s)/exacerbation of (an) existing condition(s). That E/M includes conducting the examination, assessing all new/exacerbated conditions, and establishing a new plan of care. Today, my ACP, Tamra Mayorga, was here to observe my evaluation and management services for this new problem/exacerbated condition to be followed going forward.\par \par \par

## 2022-04-10 NOTE — HISTORY OF PRESENT ILLNESS
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 She reports difficulty walking and uses cane to ambulate for assistance. Reports imbalance. She states that these symptoms have become progressively worse over the years. Also reports bowel/bladder incontinence and chronic constipation since initial surgery 6 years ago. \par \par MRI thoracic and lumbar spine done on 2/23/19 demonstrates recurrent tumor. 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 Pathology indicated keratinous debris with no cyst lining present.\par \par Postoperatively, she did well. She continued to report bilateral leg weakness (RIGHT greater than LEFT). Follow up MRI of her spine negative for recurrence.\par \par MRI thoracic and lumbar spine with and without contrast, done on 12/16/2020, which demonstrated possible residual epidermoid cyst. \par \par She returned for follow up on 1/25/21 and Ms. Rowan reported persistent bilateral lower extremity weakness (RIGHT greater than LEFT) as well as muscle spasms in both lower extremities. She states her legs have given out causing her to fall. She uses a walker for assistance with ambulation. Currently reports low back pain radiating to bilateral lower extremities - she is followed by pain management at [place] and is currently on oxycodone prn, gabapentin and cyclobenzaprine. \par She reports urinary incontinence, which she had preoperatively.\par MRI thoracic and lumbar spine done on 1/3/21 reviewed by Dr. 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 The patient's neurological symptoms are stable.\par Recommended repeat MRI thoracic and lumbar spine in one year (January 2022)\par \par 6/7/21 She came back for routine follow up after recent hospitalization at [facility] due to worsening lower extremity weakness as well as vomiting bloody emesis. She states vomiting has subsided. She did have repeat MRI thoracic and lumbar spine done at [place], which demonstrates progression of epidermoid tumor.\par She reported worsening numbness in bilateral lower extremities as well as weakness of bilateral lower extremities. Uses a walker for assistance with walking. Reports occasional falls. \par \par MRI thoracic and lumbar spine done in May 2021 demonstrated slight increase in residual epidermoid tumor. Due to high risk of complications with repeat surgery as well as minimal increase in residual tumor, surgical resection not recommend. Plan made to repeat MRI thoracic and lumbar spine with and without contrast in one year.\par \par Patient presented to office 1/28/22:\par She went to [facility] ED 1/27/22 for worsening back pain and bilateral leg "shaking". Happens almost everyday, per patient and sister. She currently denies leg numbness but reports numbness when legs are "shaking". She is not able to ambulate far, uses rolling walker. She was given Morphine for pain which helped with pain and aid with ambulation. She is currently on Gabapentin, Flexeril, Oxycontin with moderate pain control. She is wearing diapers for bowel incontinence, which is baseline.\par She did not get Jan 2022 MRI thoracic. \par Recommended MRI lumbar and thoracic spine w/wo contrast; RTC after completion to review. 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 . She endorsed continued worsening back pain that radiates down bilateral legs, now with numbness/ tingling to feet and toes bilaterally, spastic gait, baseline urinary/fecal incontinence.\par \par On 3/16/22, she underwent thoracolumbar laminectomy for subtotal resection of intradural, intramedullary and extramedullary tumor, T10-L3. A tumor capsule was stuck to nerve root and could not be safely peeled away from functional nerve roots. The tumor capsule was left in place.\par \par Final Path: spine tumor capsule-dermoid cyst\par Consider RT per [facility].\par \par She was discharged on 3/22/22.\par \par

## 2022-04-19 ENCOUNTER — NON-APPOINTMENT (OUTPATIENT)
Age: 37
End: 2022-04-19

## 2022-04-19 PROBLEM — R11.0 NAUSEA: Status: ACTIVE | Noted: 2022-04-19

## 2022-04-19 RX ORDER — ONDANSETRON 4 MG/1
4 TABLET, ORALLY DISINTEGRATING ORAL
Qty: 21 | Refills: 0 | Status: ACTIVE | COMMUNITY
Start: 2022-04-19 | End: 1900-01-01

## 2022-04-21 ENCOUNTER — NON-APPOINTMENT (OUTPATIENT)
Age: 37
End: 2022-04-21

## 2022-04-21 RX ORDER — SCOPOLAMINE 1.5 MG/1
1 PATCH, EXTENDED RELEASE TRANSDERMAL
Qty: 4 | Refills: 0 | Status: ACTIVE | COMMUNITY
Start: 2022-04-21 | End: 1900-01-01

## 2022-04-21 RX ORDER — METOCLOPRAMIDE HYDROCHLORIDE 5 MG/1
5 TABLET, ORALLY DISINTEGRATING ORAL
Qty: 12 | Refills: 0 | Status: ACTIVE | COMMUNITY
Start: 2022-04-21 | End: 1900-01-01

## 2022-04-29 ENCOUNTER — EMERGENCY (EMERGENCY)
Facility: HOSPITAL | Age: 37
LOS: 1 days | Discharge: HOME | End: 2022-04-29
Attending: EMERGENCY MEDICINE | Admitting: EMERGENCY MEDICINE
Payer: COMMERCIAL

## 2022-04-29 VITALS
RESPIRATION RATE: 16 BRPM | HEART RATE: 121 BPM | WEIGHT: 139.99 LBS | HEIGHT: 63 IN | DIASTOLIC BLOOD PRESSURE: 74 MMHG | SYSTOLIC BLOOD PRESSURE: 100 MMHG | TEMPERATURE: 98 F | OXYGEN SATURATION: 94 %

## 2022-04-29 VITALS
HEART RATE: 95 BPM | OXYGEN SATURATION: 97 % | RESPIRATION RATE: 17 BRPM | DIASTOLIC BLOOD PRESSURE: 81 MMHG | TEMPERATURE: 98 F | SYSTOLIC BLOOD PRESSURE: 113 MMHG

## 2022-04-29 DIAGNOSIS — Z79.01 LONG TERM (CURRENT) USE OF ANTICOAGULANTS: ICD-10-CM

## 2022-04-29 DIAGNOSIS — Z98.890 OTHER SPECIFIED POSTPROCEDURAL STATES: Chronic | ICD-10-CM

## 2022-04-29 DIAGNOSIS — R00.0 TACHYCARDIA, UNSPECIFIED: ICD-10-CM

## 2022-04-29 DIAGNOSIS — Z20.822 CONTACT WITH AND (SUSPECTED) EXPOSURE TO COVID-19: ICD-10-CM

## 2022-04-29 DIAGNOSIS — Z90.49 ACQUIRED ABSENCE OF OTHER SPECIFIED PARTS OF DIGESTIVE TRACT: Chronic | ICD-10-CM

## 2022-04-29 DIAGNOSIS — Q05.9 SPINA BIFIDA, UNSPECIFIED: ICD-10-CM

## 2022-04-29 DIAGNOSIS — E87.6 HYPOKALEMIA: ICD-10-CM

## 2022-04-29 DIAGNOSIS — I25.2 OLD MYOCARDIAL INFARCTION: ICD-10-CM

## 2022-04-29 DIAGNOSIS — Z86.018 PERSONAL HISTORY OF OTHER BENIGN NEOPLASM: ICD-10-CM

## 2022-04-29 DIAGNOSIS — D49.7 NEOPLASM OF UNSPECIFIED BEHAVIOR OF ENDOCRINE GLANDS AND OTHER PARTS OF NERVOUS SYSTEM: Chronic | ICD-10-CM

## 2022-04-29 DIAGNOSIS — R07.89 OTHER CHEST PAIN: ICD-10-CM

## 2022-04-29 DIAGNOSIS — R11.2 NAUSEA WITH VOMITING, UNSPECIFIED: ICD-10-CM

## 2022-04-29 DIAGNOSIS — R10.9 UNSPECIFIED ABDOMINAL PAIN: ICD-10-CM

## 2022-04-29 DIAGNOSIS — F32.A DEPRESSION, UNSPECIFIED: ICD-10-CM

## 2022-04-29 DIAGNOSIS — Z90.49 ACQUIRED ABSENCE OF OTHER SPECIFIED PARTS OF DIGESTIVE TRACT: ICD-10-CM

## 2022-04-29 DIAGNOSIS — Z91.013 ALLERGY TO SEAFOOD: ICD-10-CM

## 2022-04-29 LAB
ALBUMIN SERPL ELPH-MCNC: 3 G/DL — LOW (ref 3.3–5)
ALBUMIN SERPL ELPH-MCNC: 4 G/DL — SIGNIFICANT CHANGE UP (ref 3.3–5)
ALP SERPL-CCNC: 53 U/L — SIGNIFICANT CHANGE UP (ref 40–120)
ALP SERPL-CCNC: SIGNIFICANT CHANGE UP (ref 40–120)
ALT FLD-CCNC: <5 U/L — LOW (ref 10–45)
ALT FLD-CCNC: SIGNIFICANT CHANGE UP (ref 10–45)
ANION GAP SERPL CALC-SCNC: 10 MMOL/L — SIGNIFICANT CHANGE UP (ref 5–17)
ANION GAP SERPL CALC-SCNC: 15 MMOL/L — SIGNIFICANT CHANGE UP (ref 5–17)
AST SERPL-CCNC: 14 U/L — SIGNIFICANT CHANGE UP (ref 10–40)
AST SERPL-CCNC: SIGNIFICANT CHANGE UP (ref 10–40)
BASOPHILS # BLD AUTO: 0.04 K/UL — SIGNIFICANT CHANGE UP (ref 0–0.2)
BASOPHILS NFR BLD AUTO: 0.3 % — SIGNIFICANT CHANGE UP (ref 0–2)
BILIRUB SERPL-MCNC: 0.8 MG/DL — SIGNIFICANT CHANGE UP (ref 0.2–1.2)
BILIRUB SERPL-MCNC: 1.2 MG/DL — SIGNIFICANT CHANGE UP (ref 0.2–1.2)
BUN SERPL-MCNC: 8 MG/DL — SIGNIFICANT CHANGE UP (ref 7–23)
BUN SERPL-MCNC: 9 MG/DL — SIGNIFICANT CHANGE UP (ref 7–23)
CALCIUM SERPL-MCNC: 6.6 MG/DL — LOW (ref 8.4–10.5)
CALCIUM SERPL-MCNC: 9.4 MG/DL — SIGNIFICANT CHANGE UP (ref 8.4–10.5)
CHLORIDE SERPL-SCNC: 108 MMOL/L — SIGNIFICANT CHANGE UP (ref 96–108)
CHLORIDE SERPL-SCNC: 98 MMOL/L — SIGNIFICANT CHANGE UP (ref 96–108)
CO2 SERPL-SCNC: 21 MMOL/L — LOW (ref 22–31)
CO2 SERPL-SCNC: 25 MMOL/L — SIGNIFICANT CHANGE UP (ref 22–31)
CREAT SERPL-MCNC: 0.41 MG/DL — LOW (ref 0.5–1.3)
CREAT SERPL-MCNC: 0.55 MG/DL — SIGNIFICANT CHANGE UP (ref 0.5–1.3)
EGFR: 121 ML/MIN/1.73M2 — SIGNIFICANT CHANGE UP
EGFR: 130 ML/MIN/1.73M2 — SIGNIFICANT CHANGE UP
EOSINOPHIL # BLD AUTO: 0.02 K/UL — SIGNIFICANT CHANGE UP (ref 0–0.5)
EOSINOPHIL NFR BLD AUTO: 0.1 % — SIGNIFICANT CHANGE UP (ref 0–6)
GLUCOSE SERPL-MCNC: 77 MG/DL — SIGNIFICANT CHANGE UP (ref 70–99)
GLUCOSE SERPL-MCNC: 93 MG/DL — SIGNIFICANT CHANGE UP (ref 70–99)
HCT VFR BLD CALC: 41.9 % — SIGNIFICANT CHANGE UP (ref 34.5–45)
HGB BLD-MCNC: 13.9 G/DL — SIGNIFICANT CHANGE UP (ref 11.5–15.5)
IMM GRANULOCYTES NFR BLD AUTO: 0.4 % — SIGNIFICANT CHANGE UP (ref 0–1.5)
LIDOCAIN IGE QN: 29 U/L — SIGNIFICANT CHANGE UP (ref 7–60)
LYMPHOCYTES # BLD AUTO: 16.3 % — SIGNIFICANT CHANGE UP (ref 13–44)
LYMPHOCYTES # BLD AUTO: 2.32 K/UL — SIGNIFICANT CHANGE UP (ref 1–3.3)
MAGNESIUM SERPL-MCNC: 2.1 MG/DL — SIGNIFICANT CHANGE UP (ref 1.6–2.6)
MCHC RBC-ENTMCNC: 32.7 PG — SIGNIFICANT CHANGE UP (ref 27–34)
MCHC RBC-ENTMCNC: 33.2 GM/DL — SIGNIFICANT CHANGE UP (ref 32–36)
MCV RBC AUTO: 98.6 FL — SIGNIFICANT CHANGE UP (ref 80–100)
MONOCYTES # BLD AUTO: 1.41 K/UL — HIGH (ref 0–0.9)
MONOCYTES NFR BLD AUTO: 9.9 % — SIGNIFICANT CHANGE UP (ref 2–14)
NEUTROPHILS # BLD AUTO: 10.38 K/UL — HIGH (ref 1.8–7.4)
NEUTROPHILS NFR BLD AUTO: 73 % — SIGNIFICANT CHANGE UP (ref 43–77)
NRBC # BLD: 0 /100 WBCS — SIGNIFICANT CHANGE UP (ref 0–0)
PLATELET # BLD AUTO: 271 K/UL — SIGNIFICANT CHANGE UP (ref 150–400)
POTASSIUM SERPL-MCNC: 3 MMOL/L — LOW (ref 3.5–5.3)
POTASSIUM SERPL-MCNC: SIGNIFICANT CHANGE UP (ref 3.5–5.3)
POTASSIUM SERPL-SCNC: 3 MMOL/L — LOW (ref 3.5–5.3)
POTASSIUM SERPL-SCNC: SIGNIFICANT CHANGE UP (ref 3.5–5.3)
PROT SERPL-MCNC: 5 G/DL — LOW (ref 6–8.3)
PROT SERPL-MCNC: 7.1 G/DL — SIGNIFICANT CHANGE UP (ref 6–8.3)
RBC # BLD: 4.25 M/UL — SIGNIFICANT CHANGE UP (ref 3.8–5.2)
RBC # FLD: 14 % — SIGNIFICANT CHANGE UP (ref 10.3–14.5)
SARS-COV-2 RNA SPEC QL NAA+PROBE: NEGATIVE — SIGNIFICANT CHANGE UP
SODIUM SERPL-SCNC: 138 MMOL/L — SIGNIFICANT CHANGE UP (ref 135–145)
SODIUM SERPL-SCNC: 139 MMOL/L — SIGNIFICANT CHANGE UP (ref 135–145)
TROPONIN T SERPL-MCNC: 0.01 NG/ML — SIGNIFICANT CHANGE UP (ref 0–0.01)
WBC # BLD: 14.22 K/UL — HIGH (ref 3.8–10.5)
WBC # FLD AUTO: 14.22 K/UL — HIGH (ref 3.8–10.5)

## 2022-04-29 PROCEDURE — 84484 ASSAY OF TROPONIN QUANT: CPT

## 2022-04-29 PROCEDURE — 71046 X-RAY EXAM CHEST 2 VIEWS: CPT | Mod: 26

## 2022-04-29 PROCEDURE — 87635 SARS-COV-2 COVID-19 AMP PRB: CPT

## 2022-04-29 PROCEDURE — 36415 COLL VENOUS BLD VENIPUNCTURE: CPT

## 2022-04-29 PROCEDURE — 80053 COMPREHEN METABOLIC PANEL: CPT

## 2022-04-29 PROCEDURE — 83735 ASSAY OF MAGNESIUM: CPT

## 2022-04-29 PROCEDURE — 93005 ELECTROCARDIOGRAM TRACING: CPT

## 2022-04-29 PROCEDURE — 99285 EMERGENCY DEPT VISIT HI MDM: CPT | Mod: 25

## 2022-04-29 PROCEDURE — 83690 ASSAY OF LIPASE: CPT

## 2022-04-29 PROCEDURE — 71046 X-RAY EXAM CHEST 2 VIEWS: CPT

## 2022-04-29 PROCEDURE — 96374 THER/PROPH/DIAG INJ IV PUSH: CPT

## 2022-04-29 PROCEDURE — 85025 COMPLETE CBC W/AUTO DIFF WBC: CPT

## 2022-04-29 PROCEDURE — 96375 TX/PRO/DX INJ NEW DRUG ADDON: CPT

## 2022-04-29 PROCEDURE — 99284 EMERGENCY DEPT VISIT MOD MDM: CPT

## 2022-04-29 RX ORDER — FAMOTIDINE 10 MG/ML
20 INJECTION INTRAVENOUS ONCE
Refills: 0 | Status: COMPLETED | OUTPATIENT
Start: 2022-04-29 | End: 2022-04-29

## 2022-04-29 RX ORDER — KETOROLAC TROMETHAMINE 30 MG/ML
15 SYRINGE (ML) INJECTION ONCE
Refills: 0 | Status: DISCONTINUED | OUTPATIENT
Start: 2022-04-29 | End: 2022-04-29

## 2022-04-29 RX ORDER — POTASSIUM CHLORIDE 20 MEQ
40 PACKET (EA) ORAL ONCE
Refills: 0 | Status: COMPLETED | OUTPATIENT
Start: 2022-04-29 | End: 2022-04-29

## 2022-04-29 RX ORDER — POTASSIUM CHLORIDE 20 MEQ
20 PACKET (EA) ORAL
Refills: 0 | Status: DISCONTINUED | OUTPATIENT
Start: 2022-04-29 | End: 2022-04-29

## 2022-04-29 RX ORDER — ONDANSETRON 8 MG/1
4 TABLET, FILM COATED ORAL ONCE
Refills: 0 | Status: COMPLETED | OUTPATIENT
Start: 2022-04-29 | End: 2022-04-29

## 2022-04-29 RX ADMIN — Medication 15 MILLIGRAM(S): at 19:02

## 2022-04-29 RX ADMIN — Medication 40 MILLIEQUIVALENT(S): at 22:00

## 2022-04-29 RX ADMIN — ONDANSETRON 4 MILLIGRAM(S): 8 TABLET, FILM COATED ORAL at 19:02

## 2022-04-29 RX ADMIN — FAMOTIDINE 20 MILLIGRAM(S): 10 INJECTION INTRAVENOUS at 19:02

## 2022-04-29 NOTE — ED ADULT TRIAGE NOTE - CHIEF COMPLAINT QUOTE
mid sternal chest pain with n/v for 2 weeks worsening today, denies fever/sob. reports hx MI in 2021

## 2022-04-29 NOTE — ED ADULT NURSE NOTE - OBJECTIVE STATEMENT
pt. a&ox4 ambulatory with cane comes to ED c/o midsternal nonradiating constant cp that worsens on exertion x 2 weeks. Pt. reports the pain has gotten progressively worse over past two weeks, with n&v qd. Pt. reports decreased appetite and inability to tolerate anything PO. Pt. reports she has been seen for same chief complaint at 3 different EDs, all of which d/c her, reports the antiemetics given to her provided no relief. Pt. has hx of gastritis, and c/o reflux with the worsening cp. Pt. describes the cp as sharp, tender upon palpation, and exacerbated when walking and during / after vomiting. Pt. denies fever, chills, diarrhea. Pt. denies SOB, airway patent, speaking in clear coherent sentences.

## 2022-04-29 NOTE — ED PROVIDER NOTE - NS ED ROS FT
Constitutional: No fever or chills.   Eyes: No pain, blurry vision, or discharge.  ENMT: No hearing changes, pain, discharge or infections. No neck pain or stiffness.  Cardiac: No chest pain, SOB or edema. No chest pain with exertion.  Respiratory: No cough or respiratory distress. No hemoptysis. No history of asthma or RAD.  GI: + Nausea, + vomiting, no diarrhea or abdominal pain.  : No dysuria, frequency or burning.  MS: No myalgia, muscle weakness, joint pain or back pain.  Neuro: No headache or weakness. No LOC.  Skin: No skin rash.   Endocrine: No history of thyroid disease or diabetes.  Except as documented in the HPI, all other systems are negative.

## 2022-04-29 NOTE — ED PROVIDER NOTE - CLINICAL SUMMARY MEDICAL DECISION MAKING FREE TEXT BOX
Patient in ED w concern for nasuea and vomiting x 2 weeks.  Patient notes symptoms when she attempts to eat any food.  She denies fever or chills, no abd pain.  Labs, CXR ordered and all results are reviewed.  EKG non ischemic. Patient in ED w concern for nasuea and vomiting x 2 weeks.  Patient notes symptoms when she attempts to eat any food.  She denies fever or chills, no abd pain.  Labs, CXR ordered and all results are reviewed.  EKG non ischemic.  Patient without any emesis in ED.  CXR clear.  Labs reviewed - hypokalemia noted - patient given oral repletion following PO challenge and tolerating without difficulty.  Calcium noted on repeat CMP - initial Calcium WNL and consistent with recent lab draw result.  Suspect possible error.  Patient tolerating PO and requesting discharge.  Notes she has zofran at home and is in agreement with plan for discharge with prompt outpatient PCP follow up.  She is given strict return to ED precautions.  Patient is advised to follow up with their PCP in 1-2 days without fail.  Patient instructed to return to ED immediately should their symptoms worsen or if there is any concern prior to the recommended PCP follow up.  Patient is aware of plan and verbalizes their understanding.  Will discharge home at this time.

## 2022-04-29 NOTE — ED PROVIDER NOTE - OBJECTIVE STATEMENT
36 year old female with history of spina bifida, depression, and spinal epidermoid tumor s/p multiple resections, s/p T12-L3 laminectomies for resection of recurrent intradural extramedullary spinal tumor on 3/16/22 36 year old female with history of spina bifida, depression, and spinal epidermoid tumor s/p multiple resections, s/p T12-L3 laminectomies for resection of recurrent intradural extramedullary spinal tumor on 3/16/22, "MI" (notes she was told last year after EKG and has followed up with cardiologist, no intervention) presents to ED with concern for nausea and emesis x 2 weeks.  Patient notes she is now feeling chest burning.  She states she has not been able to keep anything down.  She denies associated abdominal pain, changes to bowel movements, fever, chills, shortness of breath, rashes, recent travel, known sick contacts or additional acute complaints or concerns at this time.  Patient has been seen in several area EDs for these symptoms recently and prescribed zofran which she does not feel is helping.

## 2022-04-29 NOTE — ED PROVIDER NOTE - CARE PLAN
1 Principal Discharge DX:	Nausea and vomiting   Principal Discharge DX:	Nausea and vomiting  Secondary Diagnosis:	Hypokalemia

## 2022-04-29 NOTE — ED PROVIDER NOTE - NSFOLLOWUPINSTRUCTIONS_ED_ALL_ED_FT
Please follow up with your primary physician in 1-2 days for re evaluation.  Please return to ER immediately should your symptoms worsen or if you have any concern prior to this recommended follow up.          Hypokalemia    WHAT YOU NEED TO KNOW:    Hypokalemia is a low level of potassium in your blood. Potassium helps control how your muscles, heart, and digestive system work. Hypokalemia occurs when your body loses too much potassium or does not absorb enough from food.     DISCHARGE INSTRUCTIONS:    Return to the emergency department if:   •You cannot move your arm or leg.      •You have a fast or irregular heartbeat.      •You are too tired or weak to stand up.      Contact your healthcare provider if:   •You are vomiting, or you have diarrhea.      •You have numbness or tingling in your arms or legs.      •Your symptoms do not go away or they get worse.      •You have questions or concerns about your condition or care.      Medicines:   •Potassium will be given to bring your potassium levels back to normal.      •Take your medicine as directed. Contact your healthcare provider if you think your medicine is not helping or if you have side effects. Tell him of her if you are allergic to any medicine. Keep a list of the medicines, vitamins, and herbs you take. Include the amounts, and when and why you take them. Bring the list or the pill bottles to follow-up visits. Carry your medicine list with you in case of an emergency.      Eat foods that are high in potassium: Foods that are high in potassium include bananas, oranges, tomatoes, potatoes, and avocado. Elizabeth beans, turkey, salmon, lean beef, yogurt, and milk are also high in potassium. Ask your healthcare provider or dietitian for more information about foods that are high in potassium.     Follow up with your healthcare provider as directed: Write down your questions so you remember to ask them during your visits.        © Copyright [employer] 2022           back to top                          © Copyright [employer] 2022               Acute Nausea and Vomiting    WHAT YOU NEED TO KNOW:    Acute nausea and vomiting start suddenly, worsen quickly, and last a short time.    DISCHARGE INSTRUCTIONS:    Return to the emergency department if:   •You see blood in your vomit or your bowel movements.      •You have sudden, severe pain in your chest and upper abdomen after hard vomiting or retching.      •You have swelling in your neck and chest.       •You are dizzy, cold, and thirsty and your eyes and mouth are dry.      •You are urinating very little or not at all.      •You have muscle weakness, leg cramps, and trouble breathing.       •Your heart is beating much faster than normal.       •You continue to vomit for more than 48 hours.       Contact your healthcare provider if:   •You have frequent dry heaves (vomiting but nothing comes out).      •Your nausea and vomiting does not get better or go away after you use medicine.      •You have questions or concerns about your condition or treatment.      Medicines: You may need any of the following:   •Medicines may be given to calm your stomach and stop your vomiting. You may also need medicines to help you feel more relaxed or to stop nausea and vomiting caused by motion sickness.      •Gastrointestinal stimulants are used to help empty your stomach and bowels. This may help decrease nausea and vomiting.      •Take your medicine as directed. Contact your healthcare provider if you think your medicine is not helping or if you have side effects. Tell him or her if you are allergic to any medicine. Keep a list of the medicines, vitamins, and herbs you take. Include the amounts, and when and why you take them. Bring the list or the pill bottles to follow-up visits. Carry your medicine list with you in case of an emergency.      Prevent or manage acute nausea and vomiting:   •Do not drink alcohol. Alcohol may upset or irritate your stomach. Too much alcohol can also cause acute nausea and vomiting.      •Control stress. Headaches due to stress may cause nausea and vomiting. Find ways to relax and manage your stress. Get more rest and sleep.      •Drink more liquids as directed. Vomiting can lead to dehydration. It is important to drink more liquids to help replace lost body fluids. Ask your healthcare provider how much liquid to drink each day and which liquids are best for you. Your provider may recommend that you drink an oral rehydration solution (ORS). ORS contains water, salts, and sugar that are needed to replace the lost body fluids. Ask what kind of ORS to use, how much to drink, and where to get it.      •Eat smaller meals, more often. Eat small amounts of food every 2 to 3 hours, even if you are not hungry. Food in your stomach may decrease your nausea.      •Talk to your healthcare provider before you take over-the-counter (OTC) medicines. These medicines can cause serious problems if you use certain other medicines, or you have a medical condition. You may have problems if you use too much or use them for longer than the label says. Follow directions on the label carefully.       Follow up with your healthcare provider as directed: Write down your questions so you remember to ask them during your follow-up visits.       © Copyright [employer] 2022           back to top                          © Copyright [employer] 2022

## 2022-04-29 NOTE — ED PROVIDER NOTE - PATIENT PORTAL LINK FT
You can access the FollowMyHealth Patient Portal offered by [facility] by registering at the following website: [email or web address]. By joining [facility]’s FollowMyHealth portal, you will also be able to view your health information using other applications (apps) compatible with our system.

## 2022-04-29 NOTE — ED ADULT NURSE NOTE - NSFALLRSKINDICATORS_ED_ALL_ED
Patient, Latha Jamison calling for medication refill. Medication(s) submitted for a refill request and is pending approval from the Provider.    Caller has been advised that their call does not guarantee an immediate refill. This refill will be reviewed within 24-72 hours by a qualified provider who will determine whether he or she can refill the medication.    Patient has contacted the pharmacy?  Yes    Call Back Number: [phone]    Can a detailed message be left? Yes_No_---: Yes    Additional information:     Patient is completely out of medications    Patient’s preferred pharmacy has been noted and populated.       [id number] - [place] [address] [facility]  [address] [address] [place] [address]  Phone: [phone] Fax: [phone]    [id number] - [place] [address] [place]  [address]  [facility] [address]  Phone: [phone] Fax: [phone]     no

## 2022-04-30 VITALS
TEMPERATURE: 98 F | HEART RATE: 106 BPM | HEIGHT: 63 IN | WEIGHT: 139.99 LBS | OXYGEN SATURATION: 98 % | SYSTOLIC BLOOD PRESSURE: 111 MMHG | DIASTOLIC BLOOD PRESSURE: 81 MMHG | RESPIRATION RATE: 29 BRPM

## 2022-04-30 LAB
ALBUMIN SERPL ELPH-MCNC: 4.4 G/DL — SIGNIFICANT CHANGE UP (ref 3.3–5)
ALP SERPL-CCNC: 89 U/L — SIGNIFICANT CHANGE UP (ref 40–120)
ALT FLD-CCNC: 7 U/L — LOW (ref 10–45)
ANION GAP SERPL CALC-SCNC: 14 MMOL/L — SIGNIFICANT CHANGE UP (ref 5–17)
APTT BLD: 32.8 SEC — SIGNIFICANT CHANGE UP (ref 27.5–35.5)
AST SERPL-CCNC: 14 U/L — SIGNIFICANT CHANGE UP (ref 10–40)
BILIRUB SERPL-MCNC: 1.1 MG/DL — SIGNIFICANT CHANGE UP (ref 0.2–1.2)
BUN SERPL-MCNC: 10 MG/DL — SIGNIFICANT CHANGE UP (ref 7–23)
CALCIUM SERPL-MCNC: 9.5 MG/DL — SIGNIFICANT CHANGE UP (ref 8.4–10.5)
CHLORIDE SERPL-SCNC: 100 MMOL/L — SIGNIFICANT CHANGE UP (ref 96–108)
CO2 SERPL-SCNC: 25 MMOL/L — SIGNIFICANT CHANGE UP (ref 22–31)
CREAT SERPL-MCNC: 0.61 MG/DL — SIGNIFICANT CHANGE UP (ref 0.5–1.3)
EGFR: 118 ML/MIN/1.73M2 — SIGNIFICANT CHANGE UP
GLUCOSE SERPL-MCNC: 98 MG/DL — SIGNIFICANT CHANGE UP (ref 70–99)
HCT VFR BLD CALC: 44.6 % — SIGNIFICANT CHANGE UP (ref 34.5–45)
HGB BLD-MCNC: 14.6 G/DL — SIGNIFICANT CHANGE UP (ref 11.5–15.5)
INR BLD: 1.17 — HIGH (ref 0.88–1.16)
MCHC RBC-ENTMCNC: 32.7 GM/DL — SIGNIFICANT CHANGE UP (ref 32–36)
MCHC RBC-ENTMCNC: 32.7 PG — SIGNIFICANT CHANGE UP (ref 27–34)
MCV RBC AUTO: 99.8 FL — SIGNIFICANT CHANGE UP (ref 80–100)
NRBC # BLD: 0 /100 WBCS — SIGNIFICANT CHANGE UP (ref 0–0)
PLATELET # BLD AUTO: 252 K/UL — SIGNIFICANT CHANGE UP (ref 150–400)
POTASSIUM SERPL-MCNC: 3.5 MMOL/L — SIGNIFICANT CHANGE UP (ref 3.5–5.3)
POTASSIUM SERPL-SCNC: 3.5 MMOL/L — SIGNIFICANT CHANGE UP (ref 3.5–5.3)
PROT SERPL-MCNC: 7.5 G/DL — SIGNIFICANT CHANGE UP (ref 6–8.3)
PROTHROM AB SERPL-ACNC: 14 SEC — HIGH (ref 10.5–13.4)
RBC # BLD: 4.47 M/UL — SIGNIFICANT CHANGE UP (ref 3.8–5.2)
RBC # FLD: 13.9 % — SIGNIFICANT CHANGE UP (ref 10.3–14.5)
SARS-COV-2 RNA SPEC QL NAA+PROBE: NEGATIVE — SIGNIFICANT CHANGE UP
SODIUM SERPL-SCNC: 139 MMOL/L — SIGNIFICANT CHANGE UP (ref 135–145)
WBC # BLD: 12.12 K/UL — HIGH (ref 3.8–10.5)
WBC # FLD AUTO: 12.12 K/UL — HIGH (ref 3.8–10.5)

## 2022-04-30 PROCEDURE — 99285 EMERGENCY DEPT VISIT HI MDM: CPT

## 2022-04-30 PROCEDURE — 74177 CT ABD & PELVIS W/CONTRAST: CPT | Mod: 26,MA

## 2022-04-30 PROCEDURE — 71045 X-RAY EXAM CHEST 1 VIEW: CPT | Mod: 26

## 2022-04-30 RX ORDER — ONDANSETRON 8 MG/1
4 TABLET, FILM COATED ORAL ONCE
Refills: 0 | Status: COMPLETED | OUTPATIENT
Start: 2022-04-30 | End: 2022-04-30

## 2022-04-30 RX ORDER — SODIUM CHLORIDE 9 MG/ML
1000 INJECTION INTRAMUSCULAR; INTRAVENOUS; SUBCUTANEOUS ONCE
Refills: 0 | Status: COMPLETED | OUTPATIENT
Start: 2022-04-30 | End: 2022-04-30

## 2022-04-30 RX ADMIN — SODIUM CHLORIDE 1000 MILLILITER(S): 9 INJECTION INTRAMUSCULAR; INTRAVENOUS; SUBCUTANEOUS at 21:21

## 2022-04-30 RX ADMIN — ONDANSETRON 4 MILLIGRAM(S): 8 TABLET, FILM COATED ORAL at 21:24

## 2022-04-30 NOTE — ED PROVIDER NOTE - OBJECTIVE STATEMENT
36 year old female with history of spina bifida, depression, and spinal epidermoid tumor s/p multiple resections, s/p T12-L3 laminectomies for resection of recurrent intradural extramedullary spinal tumor on 3/16/22, "MI" (notes she was told last year after EKG and has followed up with cardiologist, no intervention) presents to ED with concern for nausea and emesis.  Patient was seen in ED yesterday for similar symptoms, though notes she experienced blood mixed with emesis today, prompting her return to ED.  Patient states she has been taking her Zofran, though she does not feel it is working.  She feels slightly nauseous on arrival to ED, without active emesis.  She states she is ok unless she attempts to eat something.  She has not been able to keep down any food today.  Patient denies abdominal pain, changes to bowel movements, rashes, known sick contacts, chest pain, shortness of breath or any additional acute complaints or concerns at this time.

## 2022-04-30 NOTE — ED ADULT NURSE REASSESSMENT NOTE - NS ED NURSE REASSESS COMMENT FT1
RN attempted IV access attempts x2, failed access. BRYANT King attempted IV access, failed attempt. IV placed in rt upper arm by BRYANT Hanley.

## 2022-04-30 NOTE — ED ADULT TRIAGE NOTE - CHIEF COMPLAINT QUOTE
Patient reporting chest pain and vomiting x 2 weeks, "I was here last night for the same thing, but today I saw blood when I vomited."

## 2022-04-30 NOTE — ED ADULT NURSE NOTE - NSIMPLEMENTINTERV_GEN_ALL_ED
Implemented All Fall Risk Interventions:  [place] to call system. Call bell, personal items and telephone within reach. Instruct patient to call for assistance. Room bathroom lighting operational. Non-slip footwear when patient is off stretcher. Physically safe environment: no spills, clutter or unnecessary equipment. Stretcher in lowest position, wheels locked, appropriate side rails in place. Provide visual cue, wrist band, yellow gown, etc. Monitor gait and stability. Monitor for mental status changes and reorient to person, place, and time. Review medications for side effects contributing to fall risk. Reinforce activity limits and safety measures with patient and family.

## 2022-04-30 NOTE — ED PROVIDER NOTE - CLINICAL SUMMARY MEDICAL DECISION MAKING FREE TEXT BOX
Patient in ED w concern for n/v - worsened today with specs of blood in emesis.  She notes feeling ok until she tries to eat.  She has not kept down any food today.  Of note, patient seen in ED yesterday with similar symptoms and discharged home.  Labs, imaging completed again today and all results are reviewed.  Patient without active emesis in ED, though has not attempted to eat. Patient in ED w concern for n/v - worsened today with specs of blood in emesis.  She notes feeling ok until she tries to eat.  She has not kept down any food today.  Of note, patient seen in ED yesterday with similar symptoms and discharged home.  Labs, imaging completed again today and all results are reviewed.  Patient without active emesis in ED, though has not attempted to eat.  CT abd/pel completed and recs for US noted - US completed.  All results are reviewed and noted.  No acute process requiring intervention at this time.  Given patient unable to tolerate PO intake and back in ED for 2nd time in 24 hours, will plan for admission at this time with close monitoring and continued hydration, iv antiemetic, etc.  Patient is aware of plan and in agreement.  Will admit at this time.

## 2022-04-30 NOTE — ED ADULT NURSE NOTE - COVID-19 RESULT
What Type Of Note Output Would You Prefer (Optional)?: Standard Output How Severe Is Your Rash?: moderate Is This A New Presentation, Or A Follow-Up?: Rash NEGATIVE

## 2022-04-30 NOTE — ED ADULT NURSE NOTE - OBJECTIVE STATEMENT
36y F, presents to the ED c/o intermittent chest pain and vomiting x2 weeks. Pt stated, "I was here last night for the same thing, but I vomited 3 times with blood. Voiding without difficulty. Denies back pain, abdominal pain, diarrhea, lightheadedness, dizziness, numbness, tingling.

## 2022-05-01 ENCOUNTER — INPATIENT (INPATIENT)
Facility: HOSPITAL | Age: 37
LOS: 0 days | Discharge: HOME | DRG: 395 | End: 2022-05-02
Attending: FAMILY MEDICINE | Admitting: STUDENT IN AN ORGANIZED HEALTH CARE EDUCATION/TRAINING PROGRAM
Payer: COMMERCIAL

## 2022-05-01 DIAGNOSIS — D49.7 NEOPLASM OF UNSPECIFIED BEHAVIOR OF ENDOCRINE GLANDS AND OTHER PARTS OF NERVOUS SYSTEM: ICD-10-CM

## 2022-05-01 DIAGNOSIS — Z98.890 OTHER SPECIFIED POSTPROCEDURAL STATES: Chronic | ICD-10-CM

## 2022-05-01 DIAGNOSIS — Z90.49 ACQUIRED ABSENCE OF OTHER SPECIFIED PARTS OF DIGESTIVE TRACT: Chronic | ICD-10-CM

## 2022-05-01 DIAGNOSIS — Z29.9 ENCOUNTER FOR PROPHYLACTIC MEASURES, UNSPECIFIED: ICD-10-CM

## 2022-05-01 DIAGNOSIS — F32.9 MAJOR DEPRESSIVE DISORDER, SINGLE EPISODE, UNSPECIFIED: ICD-10-CM

## 2022-05-01 DIAGNOSIS — R11.2 NAUSEA WITH VOMITING, UNSPECIFIED: ICD-10-CM

## 2022-05-01 DIAGNOSIS — K92.0 HEMATEMESIS: ICD-10-CM

## 2022-05-01 DIAGNOSIS — D49.7 NEOPLASM OF UNSPECIFIED BEHAVIOR OF ENDOCRINE GLANDS AND OTHER PARTS OF NERVOUS SYSTEM: Chronic | ICD-10-CM

## 2022-05-01 DIAGNOSIS — G47.00 INSOMNIA, UNSPECIFIED: ICD-10-CM

## 2022-05-01 LAB
ALBUMIN SERPL ELPH-MCNC: 4.3 G/DL — SIGNIFICANT CHANGE UP (ref 3.3–5)
ALP SERPL-CCNC: 83 U/L — SIGNIFICANT CHANGE UP (ref 40–120)
ALT FLD-CCNC: 7 U/L — LOW (ref 10–45)
AMPHET UR-MCNC: NEGATIVE — SIGNIFICANT CHANGE UP
ANION GAP SERPL CALC-SCNC: 14 MMOL/L — SIGNIFICANT CHANGE UP (ref 5–17)
APPEARANCE UR: CLEAR — SIGNIFICANT CHANGE UP
AST SERPL-CCNC: 13 U/L — SIGNIFICANT CHANGE UP (ref 10–40)
BACTERIA # UR AUTO: SIGNIFICANT CHANGE UP /HPF
BARBITURATES UR SCN-MCNC: NEGATIVE — SIGNIFICANT CHANGE UP
BASOPHILS # BLD AUTO: 0.04 K/UL — SIGNIFICANT CHANGE UP (ref 0–0.2)
BASOPHILS NFR BLD AUTO: 0.3 % — SIGNIFICANT CHANGE UP (ref 0–2)
BENZODIAZ UR-MCNC: POSITIVE
BILIRUB SERPL-MCNC: 1.1 MG/DL — SIGNIFICANT CHANGE UP (ref 0.2–1.2)
BILIRUB UR-MCNC: ABNORMAL
BUN SERPL-MCNC: 8 MG/DL — SIGNIFICANT CHANGE UP (ref 7–23)
CALCIUM SERPL-MCNC: 9.5 MG/DL — SIGNIFICANT CHANGE UP (ref 8.4–10.5)
CHLORIDE SERPL-SCNC: 101 MMOL/L — SIGNIFICANT CHANGE UP (ref 96–108)
CO2 SERPL-SCNC: 25 MMOL/L — SIGNIFICANT CHANGE UP (ref 22–31)
COCAINE METAB.OTHER UR-MCNC: NEGATIVE — SIGNIFICANT CHANGE UP
COLOR SPEC: YELLOW — SIGNIFICANT CHANGE UP
CREAT SERPL-MCNC: 0.62 MG/DL — SIGNIFICANT CHANGE UP (ref 0.5–1.3)
DIFF PNL FLD: ABNORMAL
EGFR: 118 ML/MIN/1.73M2 — SIGNIFICANT CHANGE UP
EOSINOPHIL # BLD AUTO: 0.05 K/UL — SIGNIFICANT CHANGE UP (ref 0–0.5)
EOSINOPHIL NFR BLD AUTO: 0.4 % — SIGNIFICANT CHANGE UP (ref 0–6)
EPI CELLS # UR: SIGNIFICANT CHANGE UP /HPF (ref 0–5)
GLUCOSE SERPL-MCNC: 92 MG/DL — SIGNIFICANT CHANGE UP (ref 70–99)
GLUCOSE UR QL: NEGATIVE — SIGNIFICANT CHANGE UP
HCG SERPL-ACNC: <0 MIU/ML — SIGNIFICANT CHANGE UP
HCT VFR BLD CALC: 42.9 % — SIGNIFICANT CHANGE UP (ref 34.5–45)
HGB BLD-MCNC: 13.9 G/DL — SIGNIFICANT CHANGE UP (ref 11.5–15.5)
IMM GRANULOCYTES NFR BLD AUTO: 0.7 % — SIGNIFICANT CHANGE UP (ref 0–1.5)
KETONES UR-MCNC: >=80 MG/DL
LEUKOCYTE ESTERASE UR-ACNC: NEGATIVE — SIGNIFICANT CHANGE UP
LYMPHOCYTES # BLD AUTO: 15.3 % — SIGNIFICANT CHANGE UP (ref 13–44)
LYMPHOCYTES # BLD AUTO: 2.07 K/UL — SIGNIFICANT CHANGE UP (ref 1–3.3)
MAGNESIUM SERPL-MCNC: 2.1 MG/DL — SIGNIFICANT CHANGE UP (ref 1.6–2.6)
MCHC RBC-ENTMCNC: 32 PG — SIGNIFICANT CHANGE UP (ref 27–34)
MCHC RBC-ENTMCNC: 32.4 GM/DL — SIGNIFICANT CHANGE UP (ref 32–36)
MCV RBC AUTO: 98.6 FL — SIGNIFICANT CHANGE UP (ref 80–100)
METHADONE UR-MCNC: NEGATIVE — SIGNIFICANT CHANGE UP
MONOCYTES # BLD AUTO: 1.14 K/UL — HIGH (ref 0–0.9)
MONOCYTES NFR BLD AUTO: 8.4 % — SIGNIFICANT CHANGE UP (ref 2–14)
NEUTROPHILS # BLD AUTO: 10.15 K/UL — HIGH (ref 1.8–7.4)
NEUTROPHILS NFR BLD AUTO: 74.9 % — SIGNIFICANT CHANGE UP (ref 43–77)
NITRITE UR-MCNC: NEGATIVE — SIGNIFICANT CHANGE UP
NRBC # BLD: 0 /100 WBCS — SIGNIFICANT CHANGE UP (ref 0–0)
OPIATES UR-MCNC: NEGATIVE — SIGNIFICANT CHANGE UP
PCP SPEC-MCNC: SIGNIFICANT CHANGE UP
PCP UR-MCNC: NEGATIVE — SIGNIFICANT CHANGE UP
PH UR: 7 — SIGNIFICANT CHANGE UP (ref 5–8)
PHOSPHATE SERPL-MCNC: 2.5 MG/DL — SIGNIFICANT CHANGE UP (ref 2.5–4.5)
PLATELET # BLD AUTO: 246 K/UL — SIGNIFICANT CHANGE UP (ref 150–400)
POTASSIUM SERPL-MCNC: 4 MMOL/L — SIGNIFICANT CHANGE UP (ref 3.5–5.3)
POTASSIUM SERPL-SCNC: 4 MMOL/L — SIGNIFICANT CHANGE UP (ref 3.5–5.3)
PROT SERPL-MCNC: 7.1 G/DL — SIGNIFICANT CHANGE UP (ref 6–8.3)
PROT UR-MCNC: NEGATIVE MG/DL — SIGNIFICANT CHANGE UP
RBC # BLD: 4.35 M/UL — SIGNIFICANT CHANGE UP (ref 3.8–5.2)
RBC # FLD: 14.1 % — SIGNIFICANT CHANGE UP (ref 10.3–14.5)
RBC CASTS # UR COMP ASSIST: < 5 /HPF — SIGNIFICANT CHANGE UP
SODIUM SERPL-SCNC: 140 MMOL/L — SIGNIFICANT CHANGE UP (ref 135–145)
SP GR SPEC: 1.02 — SIGNIFICANT CHANGE UP (ref 1–1.03)
THC UR QL: POSITIVE
UROBILINOGEN FLD QL: 0.2 E.U./DL — SIGNIFICANT CHANGE UP
WBC # BLD: 13.54 K/UL — HIGH (ref 3.8–10.5)
WBC # FLD AUTO: 13.54 K/UL — HIGH (ref 3.8–10.5)
WBC UR QL: < 5 /HPF — SIGNIFICANT CHANGE UP

## 2022-05-01 PROCEDURE — 76830 TRANSVAGINAL US NON-OB: CPT | Mod: 26

## 2022-05-01 PROCEDURE — 76856 US EXAM PELVIC COMPLETE: CPT | Mod: 26

## 2022-05-01 PROCEDURE — 99222 1ST HOSP IP/OBS MODERATE 55: CPT

## 2022-05-01 RX ORDER — BUPROPION HYDROCHLORIDE 150 MG/1
150 TABLET, EXTENDED RELEASE ORAL DAILY
Refills: 0 | Status: DISCONTINUED | OUTPATIENT
Start: 2022-05-01 | End: 2022-05-02

## 2022-05-01 RX ORDER — TRAZODONE HCL 50 MG
100 TABLET ORAL ONCE
Refills: 0 | Status: COMPLETED | OUTPATIENT
Start: 2022-05-01 | End: 2022-05-01

## 2022-05-01 RX ORDER — ONDANSETRON 8 MG/1
4 TABLET, FILM COATED ORAL ONCE
Refills: 0 | Status: COMPLETED | OUTPATIENT
Start: 2022-05-01 | End: 2022-05-01

## 2022-05-01 RX ORDER — PANTOPRAZOLE SODIUM 20 MG/1
40 TABLET, DELAYED RELEASE ORAL DAILY
Refills: 0 | Status: DISCONTINUED | OUTPATIENT
Start: 2022-05-01 | End: 2022-05-01

## 2022-05-01 RX ORDER — ONDANSETRON 8 MG/1
4 TABLET, FILM COATED ORAL EVERY 6 HOURS
Refills: 0 | Status: DISCONTINUED | OUTPATIENT
Start: 2022-05-01 | End: 2022-05-01

## 2022-05-01 RX ORDER — SODIUM CHLORIDE 9 MG/ML
1000 INJECTION INTRAMUSCULAR; INTRAVENOUS; SUBCUTANEOUS
Refills: 0 | Status: DISCONTINUED | OUTPATIENT
Start: 2022-05-01 | End: 2022-05-02

## 2022-05-01 RX ORDER — ONDANSETRON 8 MG/1
4 TABLET, FILM COATED ORAL EVERY 6 HOURS
Refills: 0 | Status: DISCONTINUED | OUTPATIENT
Start: 2022-05-01 | End: 2022-05-02

## 2022-05-01 RX ORDER — PANTOPRAZOLE SODIUM 20 MG/1
40 TABLET, DELAYED RELEASE ORAL EVERY 12 HOURS
Refills: 0 | Status: DISCONTINUED | OUTPATIENT
Start: 2022-05-01 | End: 2022-05-02

## 2022-05-01 RX ORDER — TRAZODONE HCL 50 MG
100 TABLET ORAL ONCE
Refills: 0 | Status: DISCONTINUED | OUTPATIENT
Start: 2022-05-01 | End: 2022-05-01

## 2022-05-01 RX ORDER — ENOXAPARIN SODIUM 100 MG/ML
40 INJECTION SUBCUTANEOUS EVERY 24 HOURS
Refills: 0 | Status: DISCONTINUED | OUTPATIENT
Start: 2022-05-01 | End: 2022-05-02

## 2022-05-01 RX ORDER — LANOLIN ALCOHOL/MO/W.PET/CERES
3 CREAM (GRAM) TOPICAL AT BEDTIME
Refills: 0 | Status: DISCONTINUED | OUTPATIENT
Start: 2022-05-01 | End: 2022-05-02

## 2022-05-01 RX ADMIN — SODIUM CHLORIDE 60 MILLILITER(S): 9 INJECTION INTRAMUSCULAR; INTRAVENOUS; SUBCUTANEOUS at 11:43

## 2022-05-01 RX ADMIN — PANTOPRAZOLE SODIUM 40 MILLIGRAM(S): 20 TABLET, DELAYED RELEASE ORAL at 05:58

## 2022-05-01 RX ADMIN — ONDANSETRON 4 MILLIGRAM(S): 8 TABLET, FILM COATED ORAL at 22:31

## 2022-05-01 RX ADMIN — ENOXAPARIN SODIUM 40 MILLIGRAM(S): 100 INJECTION SUBCUTANEOUS at 11:15

## 2022-05-01 RX ADMIN — BUPROPION HYDROCHLORIDE 150 MILLIGRAM(S): 150 TABLET, EXTENDED RELEASE ORAL at 12:31

## 2022-05-01 RX ADMIN — Medication 3 MILLIGRAM(S): at 03:13

## 2022-05-01 RX ADMIN — ONDANSETRON 4 MILLIGRAM(S): 8 TABLET, FILM COATED ORAL at 06:09

## 2022-05-01 RX ADMIN — ONDANSETRON 4 MILLIGRAM(S): 8 TABLET, FILM COATED ORAL at 14:03

## 2022-05-01 RX ADMIN — Medication 100 MILLIGRAM(S): at 22:31

## 2022-05-01 RX ADMIN — PANTOPRAZOLE SODIUM 40 MILLIGRAM(S): 20 TABLET, DELAYED RELEASE ORAL at 17:25

## 2022-05-01 NOTE — H&P ADULT - NSHPLABSRESULTS_GEN_ALL_CORE
LABS:                        14.6   12.12 )-----------( 252      ( 2022 21:08 )             44.6     04-30    139  |  100  |  10  ----------------------------<  98  3.5   |  25  |  0.61    Ca    9.5      2022 21:08  Mg     2.1     04-29    TPro  7.5  /  Alb  4.4  /  TBili  1.1  /  DBili  x   /  AST  14  /  ALT  7<L>  /  AlkPhos  89  04-30    PT/INR - ( 2022 21:08 )   PT: 14.0 sec;   INR: 1.17          PTT - ( 2022 21:08 )  PTT:32.8 sec  Urinalysis Basic - ( 01 May 2022 01:07 )    Color: Yellow / Appearance: Clear / S.020 / pH: x  Gluc: x / Ketone: >=80 mg/dL  / Bili: Small / Urobili: 0.2 E.U./dL   Blood: x / Protein: NEGATIVE mg/dL / Nitrite: NEGATIVE   Leuk Esterase: NEGATIVE / RBC: < 5 /HPF / WBC < 5 /HPF   Sq Epi: x / Non Sq Epi: 0-5 /HPF / Bacteria: None /HPF      LIVER FUNCTIONS - ( 2022 21:08 )  Alb: 4.4 g/dL / Pro: 7.5 g/dL / ALK PHOS: 89 U/L / ALT: 7 U/L / AST: 14 U/L / GGT: x LABS:                        14.6   12.12 )-----------( 252      ( 2022 21:08 )             44.6     04-30    139  |  100  |  10  ----------------------------<  98  3.5   |  25  |  0.61    Ca    9.5      2022 21:08  Mg     2.1     04-29    TPro  7.5  /  Alb  4.4  /  TBili  1.1  /  DBili  x   /  AST  14  /  ALT  7<L>  /  AlkPhos  89  04-30    PT/INR - ( 2022 21:08 )   PT: 14.0 sec;   INR: 1.17          PTT - ( 2022 21:08 )  PTT:32.8 sec  Urinalysis Basic - ( 01 May 2022 01:07 )    Color: Yellow / Appearance: Clear / S.020 / pH: x  Gluc: x / Ketone: >=80 mg/dL  / Bili: Small / Urobili: 0.2 E.U./dL   Blood: x / Protein: NEGATIVE mg/dL / Nitrite: NEGATIVE   Leuk Esterase: NEGATIVE / RBC: < 5 /HPF / WBC < 5 /HPF   Sq Epi: x / Non Sq Epi: 0-5 /HPF / Bacteria: None /HPF      LIVER FUNCTIONS - ( 2022 21:08 )  Alb: 4.4 g/dL / Pro: 7.5 g/dL / ALK PHOS: 89 U/L / ALT: 7 U/L / AST: 14 U/L / GGT: x    < from: US Transvaginal (22 @ 00:35) >      IMPRESSION:  3.1 cm complex right ovarian cyst with internal echogenicity most likely   representing an endometrioma.    3.8 cm right ovarian cyst.    < end of copied text >    < from: CT Abdomen and Pelvis w/ IV Cont (22 @ 23:02) >      IMPRESSION:  Two right ovarian cysts. The anterior cyst appears high attenuation with   possible layering contrast, likely an endometrioma versus hemorrhagic   cyst. Recommend pelvic ultrasound..    Moderate hiatal hernia.    Circumferential bladder wall thickening. Findings could be related to   underdistention versus cystitis. Recommend correlation with urinalysis..    < end of copied text >    < from: Xray Chest 2 Views PA/Lat (22 @ 18:59) >    IMPRESSION:    No acute cardiopulmonary disease process.    < end of copied text >

## 2022-05-01 NOTE — CONSULT NOTE ADULT - ATTENDING COMMENTS
Pt seen and d/w fellow this afternoon.  Continue Zofran.  Marijuana avoidance.  Monitor Hgb.  PPI bid for now.

## 2022-05-01 NOTE — H&P ADULT - HISTORY OF PRESENT ILLNESS
JOSE STRATTON [id number] is a 36y yo Female  with PMH of   , who presents to the ED with       Denies fever, chills, chest pain, palpitations, SOB, cough, abdominal pain, nausea, vomiting, diarrhea, constipation, urinary frequency, urgency, or dysuria, headaches, changes in vision, dizziness, numbness, tingling.  Denies recent travel, recent antibiotic use, or sick contacts.      ED vitals: T 98.1, , /89, RR 29, O2 98% on RA  ED labs: wbc 12.12 Hb 14.6 Plt 252 Na 139 K 3.5 Cl 3.5 CO2 25 BUN 10 Cr .61 Gluc 98. urinalysis w/ ketones, no uti.   ED studies: CT A/P 2 R ovarian cysts. ultrasound with 2 R ovarian cysts, likely endometrioma.   ED Interventions:  s/p 4 mg IV Zofran, 1L NS.      JOSE STRATTON [id number] is a 36y yo Female  with PMH of spina bifida, depression, and spinal epidermoid tumor s/p multiple resections, s/p T12-L3 laminectomies for resection of recurrent intradural extramedullary spinal tumor on 3/16/22, depression/anxiety, insomnia, GERD, and chronic urinary incontinence (has been intermittently self-catheterizing for many years) who presents to the ED with with cc of nausea and vomiting for the past two weeks, which has gotten worse and has progressed to intolerance of PO intake and an episode of bloody emesis today.  She was recently in the hospital for another spinal surgery, after which she was discharged to rehab around Apil 1s. She reports onset of nausea/vomiting around 2 weeks ago, without any inciting events. It is not positional. Aggravated by eating. She has tried to eat very small portions at a time, but symptoms have worsened. Was trialed on PO antiemetics by an outpatient doctor without imrovement.   She smokes 1-2 blunts of marijuana per day. Has not been smoking during this time due to the nausea.   States that she usually gets a few days of nausea around the time of her period, which usually self resolves. Her period is about 10 days late. She does not believe there is any chance she could be pregnant and POCT pregnancy urine test in ED was negative.       Denies fever, chills, chest pain, palpitations, SOB, cough, abdominal pain, nausea, vomiting, diarrhea, constipation, urinary frequency, urgency, or dysuria, headaches, changes in vision, dizziness, numbness, tingling.  Denies recent travel, recent antibiotic use, or sick contacts.      ED vitals: T 98.1, , /89, RR 29, O2 98% on RA  ED labs: wbc 12.12 Hb 14.6 Plt 252 Na 139 K 3.5 Cl 3.5 CO2 25 BUN 10 Cr .61 Gluc 98. urinalysis w/ ketones, no uti.   ED studies: CT A/P 2 R ovarian cysts. ultrasound with 2 R ovarian cysts, likely endometrioma.   ED Interventions:  s/p 4 mg IV Zofran, 1L NS.      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  She was recently in the hospital for another spinal surgery, after which she was discharged to rehab around Apil 1s. She reports onset of nausea/vomiting around 2 weeks ago, without any inciting events/illness/trauma. It is not positional. Aggravated by eating. She has tried to eat very small portions at a time, but symptoms have worsened. Was trialed on PO antiemetics by an outpatient doctor without improvement In the past few days, it has become more severe, and now she experiences epigastric pain and burning when she vomits. She also had emesis with streaks of blood. No coffee ground emesis.   She smokes 1-2 blunts of marijuana per day. Has not been smoking during this time due to the nausea.   States that she usually gets a few days of nausea around the time of her period, which usually self resolves. Her period is about 10 days late. She does not believe there is any chance she could be pregnant and POCT pregnancy urine test in ED was negative.  She was staying with her sister in early April, and her sister had a cold.      Denies fever, chills, SOB, cough, abdominal pain, diarrhea, constipation, urinary frequency, urgency, or dysuria, headaches, changes in vision, dizziness, numbness, tingling.    ED vitals: T 98.1, , /89, RR 29, O2 98% on RA  ED labs: wbc 12.12 Hb 14.6 Plt 252 Na 139 K 3.5 Cl 3.5 CO2 25 BUN 10 Cr .61 Gluc 98. urinalysis w/ ketones, no uti.   ED studies: CT A/P 2 R ovarian cysts. ultrasound with 2 R ovarian cysts, likely endometrioma.   ED Interventions:  s/p 4 mg IV Zofran, 1L NS.     Had episode of emesis in the ED with streaks of blood. After IV Zofran, has some improvement and is no longer vomiting. Still feels nausea.     Does not remember all of her medications, but reports taking the following:  - oxycodone 5 mg PRN (1-2 times per day for spine-related back pain)  - Gabapentin 300 mg at night  - Trazodone 150 mg at night  - Buproprion 100 mg in the morning  - Protonix 40 with breakfast  - "a muscle relaxer"  - "an anxiety medication"

## 2022-05-01 NOTE — H&P ADULT - PROBLEM SELECTOR PLAN 2
Reports episode of hematemesis after 2 weeks of n/v daily. Now with streaks of blood in vomit. Epigastric pain. RUQ ttp. Concern for Patrica Monaco Tear in setting of repeat vomiting.   H/H stable. Tachycardic.   - GI consult if hematemesis persists  - IV Protonix while not tolerating PO

## 2022-05-01 NOTE — CONSULT NOTE ADULT - SUBJECTIVE AND OBJECTIVE BOX
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  She was recently in the hospital for another spinal surgery, after which she was discharged to rehab around Apil 1s. She reports onset of nausea/vomiting around 2 weeks ago, without any inciting events/illness/trauma. It is not positional. Aggravated by eating. She has tried to eat very small portions at a time, but symptoms have worsened. Was trialed on PO antiemetics by an outpatient doctor without improvement In the past few days, it has become more severe, and now she experiences epigastric pain and burning when she vomits. She also had emesis with streaks of blood. No coffee ground emesis.   She smokes 1-2 blunts of marijuana per day. Has not been smoking during this time due to the nausea.   States that she usually gets a few days of nausea around the time of her period, which usually self resolves. Her period is about 10 days late. She does not believe there is any chance she could be pregnant and POCT pregnancy urine test in ED was negative.  She was staying with her sister in early April, and her sister had a cold.      Denies fever, chills, SOB, cough, abdominal pain, diarrhea, constipation, urinary frequency, urgency, or dysuria, headaches, changes in vision, dizziness, numbness, tingling.    ED vitals: T 98.1, , /89, RR 29, O2 98% on RA  ED labs: wbc 12.12 Hb 14.6 Plt 252 Na 139 K 3.5 Cl 3.5 CO2 25 BUN 10 Cr .61 Gluc 98. urinalysis w/ ketones, no uti.   ED studies: CT A/P 2 R ovarian cysts. ultrasound with 2 R ovarian cysts, likely endometrioma.   ED Interventions:  s/p 4 mg IV Zofran, 1L NS.     Had episode of emesis in the ED with streaks of blood. After IV Zofran, has some improvement and is no longer vomiting. Still feels nausea.     Does not remember all of her medications, but reports taking the following:  - oxycodone 5 mg PRN (1-2 times per day for spine-related back pain)  - Gabapentin 300 mg at night  - Trazodone 150 mg at night  - Buproprion 100 mg in the morning  - Protonix 40 with breakfast  - "a muscle relaxer"  - "an anxiety medication"   (01 May 2022 01:44)    GI consulted for nausea/vomiting.     Endoscopic History  EGD (2020) - notable for gastritis per patient    Allergies    No Known Drug Allergies  shellfish (Rash)    Intolerances      MEDICATIONS:  MEDICATIONS  (STANDING):  enoxaparin Injectable 40 milliGRAM(s) SubCutaneous every 24 hours  pantoprazole  Injectable 40 milliGRAM(s) IV Push every 12 hours    MEDICATIONS  (PRN):  melatonin 3 milliGRAM(s) Oral at bedtime PRN Insomnia  ondansetron Injectable 4 milliGRAM(s) IV Push every 6 hours PRN Nausea and/or Vomiting    PAST MEDICAL & SURGICAL HISTORY:  Tumor  spinal    Insomnia    Depression    Bladder disorder  pt self catheterizes    Spinal cord tumor    History of cholecystectomy    History of eye surgery  muscle repair      FAMILY HISTORY:    SOCIAL HISTORY:  [place]: [ ] Current, [ ] Former, [ ] Never; Pack Years:  Alcohol:  Illicit Drugs:    REVIEW OF SYSTEMS:  Constitutional: No fever, chills, weight loss, or fatigue  ENMT:  No visual changes; No difficulty hearing, tinnitus, vertigo; No sinus or throat pain  Neck: No pain or stiffness  Respiratory: No cough, wheezing, or hemoptysis; No shortness of breath  Cardiovascular: No chest pain, palpitations, dizziness, orthopnea, PND, or leg swelling  Gastrointestinal: No abdominal or epigastric pain. No  nausea, vomiting, or hematemesis. No diarrhea, constipation, or steatorrhea. No melena or hematochezia  Genitourinary: No dysuria, urinary frequency/hesitancy, or hematuria  Skin: No itching, burning, rashes or lesions   Musculoskeletal: No joint pain or swelling; No muscle, back or extremity pain    Vital Signs Last 24 Hrs  T(C): 36.9 (01 May 2022 09:20), Max: 37.1 (01 May 2022 01:51)  T(F): 98.5 (01 May 2022 09:20), Max: 98.8 (01 May 2022 01:51)  HR: 90 (01 May 2022 09:20) (71 - 106)  BP: 124/88 (01 May 2022 09:20) (108/71 - 124/89)  BP(mean): --  RR: 18 (01 May 2022 09:20) (16 - 29)  SpO2: 99% (01 May 2022 09:20) (96% - 100%)      PHYSICAL EXAM:    General: young female; sitting up in bed; appears comfortable; in no acute distress  HEENT: MMM, conjunctiva and sclera clear  Gastrointestinal: Soft, non-tender non-distended; Normal bowel sounds; No rebound or guarding; sites of previously healed trochar sites   Back: healing large midline scar on back from recent surgery  Extremities: Normal range of motion, No clubbing, cyanosis or edema  Neurological: Alert and oriented x3  Skin: Warm and dry. No obvious rash    LABS:                        13.9   13.54 )-----------( 246      ( 01 May 2022 06:45 )             42.9     05-01    140  |  101  |  8   ----------------------------<  92  4.0   |  25  |  0.62    Ca    9.5      01 May 2022 06:45  Phos  2.5     05-01  Mg     2.1     05-01    TPro  7.1  /  Alb  4.3  /  TBili  1.1  /  DBili  x   /  AST  13  /  ALT  7<L>  /  AlkPhos  83  05-01        RADIOLOGY & ADDITIONAL STUDIES:

## 2022-05-01 NOTE — PATIENT PROFILE ADULT - FUNCTIONAL SCREEN CURRENT LEVEL: COMMUNICATION, MLM
Detail Level: Zone
Detail Level: Simple
Detail Level: Detailed
Detail Level: Generalized
0 = understands/communicates without difficulty

## 2022-05-01 NOTE — H&P ADULT - NSHPPHYSICALEXAM_GEN_ALL_CORE
Vital Signs Last 24 Hrs  T(C): 36.7 (01 May 2022 01:30), Max: 36.7 (30 Apr 2022 19:50)  T(F): 98 (01 May 2022 01:30), Max: 98.1 (30 Apr 2022 19:50)  HR: 71 (01 May 2022 01:30) (71 - 106)  BP: 108/71 (01 May 2022 01:30) (108/71 - 124/89)  BP(mean): --  RR: 19 (01 May 2022 01:30) (19 - 29)  SpO2: 100% (01 May 2022 01:30) (97% - 100%)    Height (cm): 160 (04-30-22 @ 19:05)  Weight (kg): 63.5 (04-30-22 @ 19:05)  BMI (kg/m2): 24.8 (04-30-22 @ 19:05) Vital Signs Last 24 Hrs  T(C): 36.7 (01 May 2022 01:30), Max: 36.7 (30 Apr 2022 19:50)  T(F): 98 (01 May 2022 01:30), Max: 98.1 (30 Apr 2022 19:50)  HR: 71 (01 May 2022 01:30) (71 - 106)  BP: 108/71 (01 May 2022 01:30) (108/71 - 124/89)  BP(mean): --  RR: 19 (01 May 2022 01:30) (19 - 29)  SpO2: 100% (01 May 2022 01:30) (97% - 100%)    Height (cm): 160 (04-30-22 @ 19:05)  Weight (kg): 63.5 (04-30-22 @ 19:05)  BMI (kg/m2): 24.8 (04-30-22 @ 19:05)      PHYSICAL EXAM:    General: NAD  HEENT: NC/AT; PERRL, clear conjunctiva  Neck: supple  Respiratory: CTA b/l  Cardiovascular: +S1/S2; RRR  Abdomen: soft, NT/ND; +BS x4  Extremities: 2+ peripheral pulses b/l; no LE edema  Skin: normal color and turgor; no rash  Neurological:   Psychiatry: Vital Signs Last 24 Hrs  T(C): 36.7 (01 May 2022 01:30), Max: 36.7 (30 Apr 2022 19:50)  T(F): 98 (01 May 2022 01:30), Max: 98.1 (30 Apr 2022 19:50)  HR: 71 (01 May 2022 01:30) (71 - 106)  BP: 108/71 (01 May 2022 01:30) (108/71 - 124/89)  BP(mean): --  RR: 19 (01 May 2022 01:30) (19 - 29)  SpO2: 100% (01 May 2022 01:30) (97% - 100%)    Height (cm): 160 (04-30-22 @ 19:05)  Weight (kg): 63.5 (04-30-22 @ 19:05)  BMI (kg/m2): 24.8 (04-30-22 @ 19:05)      PHYSICAL EXAM:    General: NAD, young female resting in bed.   HEENT: NC/AT; PERRL, clear conjunctiva  Neck: supple  Respiratory: CTA b/l  Cardiovascular: +S1/S2; RRR  Abdomen: TTP in RUQ, soft, ND; +BS x4  Extremities: 2+ peripheral pulses b/l; no LE edema  Skin: normal color and turgor; no rash, post-op scar along spine, well-healed,   Neurological: AAOx3  Psychiatry: appropriate mood and affect

## 2022-05-01 NOTE — H&P ADULT - PROBLEM SELECTOR PLAN 1
2 weeks of progressively worsening n/v, now unable to tolerate PO.  p/w leukocytosis. Afebrile. No vertigo/dizziness. No bowel dilation or obstruction on imaging.   H/o GERD. Although she reports chronic marijuana use, she reports abstaining over the past two weeks, is not taking hot showers, and is not craving marijuana during this time. Possibly this could be cannabis use associated hyperemesis, and still would recommend abstaining from marijuana use.  She has imaging finding of a moderate hiatal hernia and a small fat containing hernia, without loops of bowel. Unknown if this is new, as there is no previous abdominal imaging to compare.  Ddx to include cyclic vomiting syndrome/cannabis hyperemesis syndrome, peptic ulcer disease, gastroenteritis, and central causes.   Improved in the ED with IV Zofran.   - monitor QTc when continuing to give Zofran or other QTc prolonging agents.  - Zofran 4 mg IV PRN  - fluids as needed

## 2022-05-01 NOTE — H&P ADULT - NSVTERISKREFERASSESS_GEN_ALL_CORE
Chief Complaint   Patient presents with     Physical     annual physical   Emma Tillman LPN 8:37 AM on 4/16/2019    Will bring copy of Health Directive to clinic to have scanned into chart.Emma Tillman LPN 8:38 AM on 4/16/2019     Refer to the Assessment tab to view/cancel completed assessment.

## 2022-05-01 NOTE — PATIENT PROFILE ADULT - FALL HARM RISK - RISK INTERVENTIONS

## 2022-05-01 NOTE — H&P ADULT - NSTOBACCOSCREENHP_GEN_A_NCS
Received a call that there was gram + growth in clusters on blood cx. Pt is currently on CTX and azithromycin. Will continue to monitor for sensitivities
No

## 2022-05-01 NOTE — H&P ADULT - NSHPSOCIALHISTORY_GEN_ALL_CORE
Tobacco: none  EtOH: none  Drug Use: 1-2 blunts of Marijuana daily  Lives with parents.  Baseline ambulatory with a cane indoors and walker outdoors.

## 2022-05-01 NOTE — H&P ADULT - ASSESSMENT
36y yo Female  with PMH of spina bifida, depression, and spinal epidermoid tumor s/p multiple resections, s/p T12-L3 laminectomies for resection of recurrent intradural extramedullary spinal tumor on 3/16/22, depression/anxiety, insomnia, GERD, and chronic urinary incontinence (has been intermittently self-catheterizing for many years) who presents to the ED with with cc of nausea and vomiting for the past two weeks, which has gotten worse and has progressed to intolerance of PO intake and an episode of bloody emesis on day of presentation to ED.

## 2022-05-01 NOTE — H&P ADULT - PROBLEM SELECTOR PLAN 6
F: IVF if not tolerating PO  E: Replete electrolytes as needed, K>4, M>2  N: as tolerates  DVT ppx: lovenox 40   GI ppx: protonix IV, zofran prn  Dispo:  rmf    CODE STATUS: Full Code

## 2022-05-01 NOTE — CONSULT NOTE ADULT - ASSESSMENT
36y yo Female PMH of spina bifida, depression, and spinal epidermoid tumor s/p multiple resections, s/p T12-L3 laminectomies for 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 GI consulted for persistent nausea/vomiting.    #nausea/vomiting  Patient presenting to ED first on 4/29 with complaints of nausea/vomiting x 2 weeks, chest pain, discharged same day after receiving supportive care. Returned again to the ED on 4/30 with the same complaints. EKG revealing sinus tachycardia. Chest pain reported more c/w burning sensation, mainly noted with eating. Reports history of episodic nausea/vomiting in the past which she correlates with the timing of her menstrual cycles, noting periods at a time with no symptoms of nausea/vomiting at all. With a long-standing history of marijuana use, almost daily use for years for insomnia, anxiety and pain. Last used about 2 weeks ago when her symptoms started. Blood streaked emesis likely 2/2 tracie frazier tear in the setting of persistent nausea/vomiting, which should heal with control of sxs. Hyperemesis cannabinoid syndrome high on the ddx for n/v complaints however gastroparesis also on the ddx as well in the setting of ongoing marijuana and opiate use which is known to slow gastric motility   -Zofran 4mg IVP q6h PRN for nausea/vomiting ( in ED, 4/30), obtain daily EKGs if requiring regular zofran  -Can empirically start Protonix 40 mg IVP BID for likely tracie frazier tear in the setting of persistent nausea/vomiting  -Urgent EGD not warranted at this time, can continue to re-evaluate need based on clinical progress  -Counseled on Marijuana cessation  -Consider pain management consult for pain control alternatives    Case discussed with c attending and primary team.     Alyx Jenkins DO  Gastroenterology Fellow  Pager: [phone]   36y yo Female PMH of spina bifida, depression, and spinal epidermoid tumor s/p multiple resections, s/p T12-L3 laminectomies for 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 GI consulted for persistent nausea/vomiting.    #nausea/vomiting  Patient presenting to ED first on 4/29 with complaints of nausea/vomiting x 2 weeks, chest pain, discharged same day after receiving supportive care. Returned again to the ED on 4/30 with the same complaints. EKG revealing sinus tachycardia. Chest pain reported more c/w burning sensation, mainly noted with eating. Reports history of episodic nausea/vomiting in the past which she correlates with the timing of her menstrual cycles, noting periods at a time with no symptoms of nausea/vomiting at all. With a long-standing history of marijuana use, almost daily use for years for insomnia, anxiety and pain. Last used about 2 weeks ago when her symptoms started. Blood streaked emesis likely 2/2 tracie frazier tear in the setting of persistent nausea/vomiting, which should heal with control of sxs. Hyperemesis cannabinoid syndrome high on the ddx for n/v complaints however gastroparesis also on the ddx as well in the setting of ongoing marijuana and opiate use which is known to slow gastric motility   -Pregnancy test negative  -f/u Utox  -Zofran 4mg IVP q6h PRN for nausea/vomiting ( in ED, 4/30), obtain daily EKGs if requiring regular zofran  -Can empirically start Protonix 40 mg IVP BID for likely tracie frazier tear in the setting of persistent nausea/vomiting  -Urgent EGD not warranted at this time, can continue to re-evaluate need based on clinical progress  -Counseled on Marijuana cessation  -Consider pain management consult for pain control alternatives    Case discussed with c attending and primary team.     Alyx Jenkins DO  Gastroenterology Fellow  Pager: [phone]   36y yo Female PMH of spina bifida, depression, and spinal epidermoid tumor s/p multiple resections, s/p T12-L3 laminectomies for 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 GI consulted for persistent nausea/vomiting.    #nausea/vomiting  Patient presenting to ED first on 4/29 with complaints of nausea/vomiting x 2 weeks, chest pain, discharged same day after receiving supportive care. Returned again to the ED on 4/30 with the same complaints. EKG revealing sinus tachycardia. Chest pain reported more c/w burning sensation, mainly noted with eating. Reports history of episodic nausea/vomiting in the past which she correlates with the timing of her menstrual cycles, noting periods at a time with no symptoms of nausea/vomiting at all. With a long-standing history of marijuana use, almost daily use for years for insomnia, anxiety and pain. Last used about 2 weeks ago when her symptoms started. Blood streaked emesis likely 2/2 tracie frazier tear in the setting of persistent nausea/vomiting, which should heal with control of sxs. Hyperemesis cannabinoid syndrome high on the ddx for n/v complaints however gastroparesis also on the ddx as well in the setting of ongoing marijuana and opiate use which is known to slow gastric motility   -Pregnancy test negative  -f/u Utox  -standing Zofran 4mg IVP q6h x 24-48 hrs for nausea/vomiting ( in ED, 4/30), obtain daily EKGs   -Can empirically start Protonix 40 mg IVP BID for likely tracie frazier tear in the setting of persistent nausea/vomiting  -Urgent EGD not warranted at this time, can continue to re-evaluate need based on clinical progress  -Counseled on Marijuana cessation  -Consider pain management consult for pain control alternatives    Case discussed with c attending and primary team.     Alyx Jenkins DO  Gastroenterology Fellow  Pager: [phone]

## 2022-05-02 ENCOUNTER — TRANSCRIPTION ENCOUNTER (OUTPATIENT)
Age: 37
End: 2022-05-02

## 2022-05-02 VITALS
DIASTOLIC BLOOD PRESSURE: 80 MMHG | OXYGEN SATURATION: 96 % | HEART RATE: 92 BPM | SYSTOLIC BLOOD PRESSURE: 118 MMHG | TEMPERATURE: 98 F | RESPIRATION RATE: 16 BRPM

## 2022-05-02 LAB
ANION GAP SERPL CALC-SCNC: 16 MMOL/L — SIGNIFICANT CHANGE UP (ref 5–17)
BASOPHILS # BLD AUTO: 0.04 K/UL — SIGNIFICANT CHANGE UP (ref 0–0.2)
BASOPHILS NFR BLD AUTO: 0.3 % — SIGNIFICANT CHANGE UP (ref 0–2)
BUN SERPL-MCNC: 7 MG/DL — SIGNIFICANT CHANGE UP (ref 7–23)
CALCIUM SERPL-MCNC: 8.9 MG/DL — SIGNIFICANT CHANGE UP (ref 8.4–10.5)
CHLORIDE SERPL-SCNC: 104 MMOL/L — SIGNIFICANT CHANGE UP (ref 96–108)
CO2 SERPL-SCNC: 24 MMOL/L — SIGNIFICANT CHANGE UP (ref 22–31)
CREAT SERPL-MCNC: 0.69 MG/DL — SIGNIFICANT CHANGE UP (ref 0.5–1.3)
EGFR: 115 ML/MIN/1.73M2 — SIGNIFICANT CHANGE UP
EOSINOPHIL # BLD AUTO: 0.05 K/UL — SIGNIFICANT CHANGE UP (ref 0–0.5)
EOSINOPHIL NFR BLD AUTO: 0.4 % — SIGNIFICANT CHANGE UP (ref 0–6)
GLUCOSE SERPL-MCNC: 84 MG/DL — SIGNIFICANT CHANGE UP (ref 70–99)
HCT VFR BLD CALC: 40.7 % — SIGNIFICANT CHANGE UP (ref 34.5–45)
HGB BLD-MCNC: 13.4 G/DL — SIGNIFICANT CHANGE UP (ref 11.5–15.5)
IMM GRANULOCYTES NFR BLD AUTO: 0.3 % — SIGNIFICANT CHANGE UP (ref 0–1.5)
LYMPHOCYTES # BLD AUTO: 1.28 K/UL — SIGNIFICANT CHANGE UP (ref 1–3.3)
LYMPHOCYTES # BLD AUTO: 9.8 % — LOW (ref 13–44)
MAGNESIUM SERPL-MCNC: 2 MG/DL — SIGNIFICANT CHANGE UP (ref 1.6–2.6)
MCHC RBC-ENTMCNC: 32.9 GM/DL — SIGNIFICANT CHANGE UP (ref 32–36)
MCHC RBC-ENTMCNC: 33.1 PG — SIGNIFICANT CHANGE UP (ref 27–34)
MCV RBC AUTO: 100.5 FL — HIGH (ref 80–100)
MONOCYTES # BLD AUTO: 0.92 K/UL — HIGH (ref 0–0.9)
MONOCYTES NFR BLD AUTO: 7 % — SIGNIFICANT CHANGE UP (ref 2–14)
NEUTROPHILS # BLD AUTO: 10.74 K/UL — HIGH (ref 1.8–7.4)
NEUTROPHILS NFR BLD AUTO: 82.2 % — HIGH (ref 43–77)
NRBC # BLD: 0 /100 WBCS — SIGNIFICANT CHANGE UP (ref 0–0)
PHOSPHATE SERPL-MCNC: 2.3 MG/DL — LOW (ref 2.5–4.5)
PLATELET # BLD AUTO: 231 K/UL — SIGNIFICANT CHANGE UP (ref 150–400)
POTASSIUM SERPL-MCNC: 3.1 MMOL/L — LOW (ref 3.5–5.3)
POTASSIUM SERPL-SCNC: 3.1 MMOL/L — LOW (ref 3.5–5.3)
RBC # BLD: 4.05 M/UL — SIGNIFICANT CHANGE UP (ref 3.8–5.2)
RBC # FLD: 13.9 % — SIGNIFICANT CHANGE UP (ref 10.3–14.5)
SODIUM SERPL-SCNC: 144 MMOL/L — SIGNIFICANT CHANGE UP (ref 135–145)
WBC # BLD: 13.07 K/UL — HIGH (ref 3.8–10.5)
WBC # FLD AUTO: 13.07 K/UL — HIGH (ref 3.8–10.5)

## 2022-05-02 PROCEDURE — 99238 HOSP IP/OBS DSCHRG MGMT 30/<: CPT

## 2022-05-02 PROCEDURE — 99232 SBSQ HOSP IP/OBS MODERATE 35: CPT

## 2022-05-02 RX ORDER — BACLOFEN 100 %
1 POWDER (GRAM) MISCELLANEOUS
Qty: 0 | Refills: 0 | DISCHARGE

## 2022-05-02 RX ORDER — BACLOFEN 100 %
10 POWDER (GRAM) MISCELLANEOUS DAILY
Refills: 0 | Status: DISCONTINUED | OUTPATIENT
Start: 2022-05-02 | End: 2022-05-02

## 2022-05-02 RX ORDER — SODIUM,POTASSIUM PHOSPHATES 278-250MG
1 POWDER IN PACKET (EA) ORAL ONCE
Refills: 0 | Status: COMPLETED | OUTPATIENT
Start: 2022-05-02 | End: 2022-05-02

## 2022-05-02 RX ORDER — ACETAMINOPHEN 500 MG
650 TABLET ORAL EVERY 6 HOURS
Refills: 0 | Status: DISCONTINUED | OUTPATIENT
Start: 2022-05-02 | End: 2022-05-02

## 2022-05-02 RX ORDER — POTASSIUM CHLORIDE 20 MEQ
40 PACKET (EA) ORAL ONCE
Refills: 0 | Status: COMPLETED | OUTPATIENT
Start: 2022-05-02 | End: 2022-05-02

## 2022-05-02 RX ORDER — POTASSIUM CHLORIDE 20 MEQ
10 PACKET (EA) ORAL ONCE
Refills: 0 | Status: DISCONTINUED | OUTPATIENT
Start: 2022-05-02 | End: 2022-05-02

## 2022-05-02 RX ORDER — TRAZODONE HCL 50 MG
100 TABLET ORAL EVERY 24 HOURS
Refills: 0 | Status: DISCONTINUED | OUTPATIENT
Start: 2022-05-02 | End: 2022-05-02

## 2022-05-02 RX ORDER — GABAPENTIN 400 MG/1
300 CAPSULE ORAL AT BEDTIME
Refills: 0 | Status: DISCONTINUED | OUTPATIENT
Start: 2022-05-02 | End: 2022-05-02

## 2022-05-02 RX ORDER — TRAZODONE HCL 50 MG
0 TABLET ORAL
Qty: 0 | Refills: 0 | DISCHARGE

## 2022-05-02 RX ORDER — POTASSIUM PHOSPHATE, MONOBASIC POTASSIUM PHOSPHATE, DIBASIC 236; 224 MG/ML; MG/ML
30 INJECTION, SOLUTION INTRAVENOUS ONCE
Refills: 0 | Status: COMPLETED | OUTPATIENT
Start: 2022-05-02 | End: 2022-05-02

## 2022-05-02 RX ORDER — FAMOTIDINE 10 MG/ML
1 INJECTION INTRAVENOUS
Qty: 0 | Refills: 0 | DISCHARGE

## 2022-05-02 RX ORDER — CYCLOBENZAPRINE HYDROCHLORIDE 10 MG/1
1 TABLET, FILM COATED ORAL
Qty: 0 | Refills: 0 | DISCHARGE

## 2022-05-02 RX ORDER — METOCLOPRAMIDE HCL 10 MG
10 TABLET ORAL EVERY 4 HOURS
Refills: 0 | Status: DISCONTINUED | OUTPATIENT
Start: 2022-05-02 | End: 2022-05-02

## 2022-05-02 RX ORDER — OXYBUTYNIN CHLORIDE 5 MG
0 TABLET ORAL
Qty: 0 | Refills: 0 | DISCHARGE

## 2022-05-02 RX ORDER — METOCLOPRAMIDE HCL 10 MG
1 TABLET ORAL
Qty: 0 | Refills: 0 | DISCHARGE

## 2022-05-02 RX ORDER — POTASSIUM CHLORIDE 20 MEQ
10 PACKET (EA) ORAL
Refills: 0 | Status: DISCONTINUED | OUTPATIENT
Start: 2022-05-02 | End: 2022-05-02

## 2022-05-02 RX ORDER — METOCLOPRAMIDE HCL 10 MG
1 TABLET ORAL
Qty: 56 | Refills: 0
Start: 2022-05-02 | End: 2022-05-15

## 2022-05-02 RX ORDER — PANTOPRAZOLE SODIUM 20 MG/1
1 TABLET, DELAYED RELEASE ORAL
Qty: 0 | Refills: 0 | DISCHARGE

## 2022-05-02 RX ADMIN — PANTOPRAZOLE SODIUM 40 MILLIGRAM(S): 20 TABLET, DELAYED RELEASE ORAL at 06:33

## 2022-05-02 RX ADMIN — Medication 40 MILLIEQUIVALENT(S): at 12:31

## 2022-05-02 RX ADMIN — ENOXAPARIN SODIUM 40 MILLIGRAM(S): 100 INJECTION SUBCUTANEOUS at 11:37

## 2022-05-02 RX ADMIN — Medication 100 MILLIEQUIVALENT(S): at 09:32

## 2022-05-02 RX ADMIN — Medication 650 MILLIGRAM(S): at 05:15

## 2022-05-02 RX ADMIN — ONDANSETRON 4 MILLIGRAM(S): 8 TABLET, FILM COATED ORAL at 04:02

## 2022-05-02 RX ADMIN — Medication 1 PACKET(S): at 12:31

## 2022-05-02 RX ADMIN — Medication 40 MILLIEQUIVALENT(S): at 09:31

## 2022-05-02 RX ADMIN — Medication 10 MILLIGRAM(S): at 09:29

## 2022-05-02 RX ADMIN — Medication 650 MILLIGRAM(S): at 04:44

## 2022-05-02 RX ADMIN — Medication 10 MILLIGRAM(S): at 11:37

## 2022-05-02 NOTE — DISCHARGE NOTE PROVIDER - NSDCFUSCHEDAPPT_GEN_ALL_CORE_FT
Patrick Bernabe Physician [facility]  Neurosurg [address]  Scheduled Appointment: 05/06/2022

## 2022-05-02 NOTE — DISCHARGE NOTE PROVIDER - NSDCCPCAREPLAN_GEN_ALL_CORE_FT
PRINCIPAL DISCHARGE DIAGNOSIS  Diagnosis: Nausea and vomiting  Assessment and Plan of Treatment: You were admitted due to your nausea and vomiting  This was likely due to your marijuana use   There was no blood seen in your vomit during admission and we deemed you stable for discharge with symptomatic treatment of reglan (2 weeks) as needed and advancing your diet slowly  Please follow up with Dr Bryant

## 2022-05-02 NOTE — PROGRESS NOTE ADULT - ATTENDING COMMENTS
Pt with h/o depression, marijuna dependence admitted for nausea and hemetemesis which has now resolved and she is tolerating diet. S.hcg neg. Hb/hct stable , abdominal exam neg for acute abdomen and pt is medically cleared for d/c with appt with gi  Discussed about marijuana cessation.  Discussed about cont antidepressants and f/u with Psych and Psychotherapist. She is not suicidal at this time

## 2022-05-02 NOTE — PROGRESS NOTE ADULT - PROBLEM SELECTOR PLAN 2
Reports episode of hematemesis after 2 weeks of n/v daily. Now with streaks of blood in vomit. Epigastric pain. RUQ ttp. Concern for Patrica Monaco Tear in setting of repeat vomiting.   H/H stable. vitals stable  continued vomiting overnight  - gi consulted, supportive care   - IV Protonix while not tolerating PO

## 2022-05-02 NOTE — DISCHARGE NOTE NURSING/CASE MANAGEMENT/SOCIAL WORK - PATIENT PORTAL LINK FT
You can access the FollowMyHealth Patient Portal offered by [facility] by registering at the following website: [email or web address]. By joining [facility]’s FollowMyHealth portal, you will also be able to view your health information using other applications (apps) compatible with our system.

## 2022-05-02 NOTE — PROGRESS NOTE ADULT - ASSESSMENT
36y yo Female PMH of spina bifida, depression, and spinal epidermoid tumor s/p multiple resections, s/p T12-L3 laminectomies for 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 GI consulted for persistent nausea/vomiting.    #nausea/vomiting  Patient presenting to ED first on 4/29 with complaints of nausea/vomiting x 2 weeks, chest pain, discharged same day after receiving supportive care. Returned again to the ED on 4/30 with the same complaints. EKG revealing sinus tachycardia. Chest pain reported more c/w burning sensation, mainly noted with eating. Reports history of episodic nausea/vomiting in the past which she correlates with the timing of her menstrual cycles, noting periods at a time with no symptoms of nausea/vomiting at all. With a long-standing history of marijuana use, almost daily use for years for insomnia, anxiety and pain. Last used about 2 weeks ago when her symptoms started. Blood streaked emesis likely 2/2 tracie frazier tear in the setting of persistent nausea/vomiting, which should heal with control of sxs. Hyperemesis cannabinoid syndrome high on the ddx for n/v complaints however gastroparesis also on the ddx as well in the setting of ongoing marijuana and opiate use which is known to slow gastric motility. Prior EGD 2020 for same complaint with findings of esophagitis.   -Pregnancy test negative  -Utox benzo + THC  -standing Zofran 4mg IVP q6h x 24-48 hrs for nausea/vomiting ( in ED, 4/30), obtain daily EKGs   -Can empirically start Protonix 40 mg IVP BID for likely tracie rfazier tear in the setting of persistent nausea/vomiting  -Urgent EGD not warranted at this time, can continue to re-evaluate need based on clinical progress  -Counseled on Marijuana cessation  -Consider pain management consult for pain control alternatives    Cindy Lowe MD  Gastroenterology Fellow, PGY -4   Pager [phone]  [phone]   36y yo Female PMH of spina bifida, depression, and spinal epidermoid tumor s/p multiple resections, s/p T12-L3 laminectomies for 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 GI consulted for persistent nausea/vomiting.    #nausea/vomiting  Patient presenting to ED first on 4/29 with complaints of nausea/vomiting x 2 weeks, chest pain, discharged same day after receiving supportive care. Returned again to the ED on 4/30 with the same complaints. EKG revealing sinus tachycardia. Chest pain reported more c/w burning sensation, mainly noted with eating. Reports history of episodic nausea/vomiting in the past which she correlates with the timing of her menstrual cycles, noting periods at a time with no symptoms of nausea/vomiting at all. With a long-standing history of marijuana use, almost daily use for years for insomnia, anxiety and pain. Last used about 2 weeks ago when her symptoms started. Blood streaked emesis likely 2/2 tracie frazier tear in the setting of persistent nausea/vomiting, which should heal with control of sxs. Hyperemesis cannabinoid syndrome high on the ddx for n/v complaints however gastroparesis also on the ddx as well in the setting of ongoing marijuana and opiate use which is known to slow gastric motility. Prior EGD 2020 for same complaint with findings of esophagitis.   -Pregnancy test negative  -Utox benzo + THC  -standing Zofran 4mg IVP q6h x 24-48 hrs for nausea/vomiting ( in ED, 4/30), obtain daily EKGs   -Can empirically start Protonix 40 mg IVP BID for likely tracie frazier tear in the setting of persistent nausea/vomiting  -Urgent EGD not warranted at this time, can continue to re-evaluate need based on clinical progress  -Gastric emptying study to evaluate for gastroparesis  -GYN f/u for consideration of endometriosis as symptoms correlate with menses  -Counseled on Marijuana cessation  -Consider pain management consult for pain control alternatives    case discussed w c attending and primary team    Cindy Lowe MD  Gastroenterology Fellow, PGY -4   Pager [phone]  [phone]

## 2022-05-02 NOTE — DISCHARGE NOTE PROVIDER - NSDCMRMEDTOKEN_GEN_ALL_CORE_FT
baclofen 10 mg oral tablet: 1 tab(s) orally once a day  buPROPion 300 mg/24 hours (XL) oral tablet, extended release: 1 tab(s) orally once a day  cyclobenzaprine 10 mg oral tablet: 1 tab(s) orally 3 times a day  famotidine 40 mg oral tablet: 1 tab(s) orally once a day (at bedtime)  gabapentin 100 mg oral capsule: 3 cap(s) orally once a day (at bedtime)  polyethylene glycol 3350 oral powder for reconstitution: 17 gram(s) orally once a day  Reglan 10 mg oral tablet: 1 tab(s) orally 4 times a day (before meals and at bedtime) as needed for nausea   senna oral tablet: 2 tab(s) orally once a day (at bedtime)  traZODone 100 mg oral tablet: orally once a day (at bedtime)

## 2022-05-02 NOTE — PROGRESS NOTE ADULT - PROBLEM SELECTOR PLAN 1
2 weeks of progressively worsening n/v, now unable to tolerate PO.  p/w leukocytosis. Afebrile. No vertigo/dizziness. No bowel dilation or obstruction on imaging.   H/o GERD. Although she reports chronic marijuana use, she reports abstaining over the past two weeks, is not taking hot showers, and is not craving marijuana during this time. Possibly this could be cannabis use associated hyperemesis, and still would recommend abstaining from marijuana use.  She has imaging finding of a moderate hiatal hernia and a small fat containing hernia, without loops of bowel. Unknown if this is new, as there is no previous abdominal imaging to compare.  Missed period, however serum hcg negative   Ddx to include cyclic vomiting syndrome/cannabis hyperemesis syndrome, peptic ulcer disease, gastroenteritis, and central causes.   Improved in the ED with IV Zofran. however overnight last night no improvement with standing zofran  - started on standing reglan 10 q4   - fluids as needed

## 2022-05-02 NOTE — PROGRESS NOTE ADULT - PROBLEM SELECTOR PLAN 6
F: IVF if not tolerating PO  E: Replete electrolytes as needed, K>4, M>2  N: as tolerates  DVT ppx: lovenox 40   GI ppx: protonix IV, reglan standing  Dispo:  [facility]    CODE STATUS: Full Code

## 2022-05-02 NOTE — PROGRESS NOTE ADULT - SUBJECTIVE AND OBJECTIVE BOX
GASTROENTEROLOGY PROGRESS NOTE  Patient seen and examined at bedside.  C/o N/V overnight. Tolerated diet yesterday lunch. Denies abd pain.  Normal BM.     PERTINENT REVIEW OF SYSTEMS:  CONSTITUTIONAL: No weakness, fevers or chills  HEENT: No visual changes; No vertigo or throat pain   GASTROINTESTINAL: As above.  NEUROLOGICAL: No numbness or weakness  SKIN: No itching, burning, rashes, or lesions     Allergies    No Known Drug Allergies  shellfish (Rash)    Intolerances      MEDICATIONS:  MEDICATIONS  (STANDING):  buPROPion XL (24-Hour) . 150 milliGRAM(s) Oral daily  enoxaparin Injectable 40 milliGRAM(s) SubCutaneous every 24 hours  metoclopramide Injectable 10 milliGRAM(s) IV Push every 4 hours  pantoprazole  Injectable 40 milliGRAM(s) IV Push every 12 hours  sodium chloride 0.9%. 1000 milliLiter(s) (60 mL/Hr) IV Continuous <Continuous>    MEDICATIONS  (PRN):  acetaminophen     Tablet .. 650 milliGRAM(s) Oral every 6 hours PRN Temp greater or equal to 38C (100.4F), Mild Pain (1 - 3), Moderate Pain (4 - 6)  melatonin 3 milliGRAM(s) Oral at bedtime PRN Insomnia    Vital Signs Last 24 Hrs  T(C): 37 (02 May 2022 05:27), Max: 37 (02 May 2022 05:27)  T(F): 98.6 (02 May 2022 05:27), Max: 98.6 (02 May 2022 05:27)  HR: 94 (02 May 2022 05:27) (75 - 94)  BP: 134/86 (02 May 2022 05:27) (104/69 - 134/86)  BP(mean): --  RR: 18 (02 May 2022 05:27) (18 - 19)  SpO2: 97% (02 May 2022 05:27) (96% - 99%)    05- @ 07:01  -  - @ 07:00  --------------------------------------------------------  IN: 360 mL / OUT: 250 mL / NET: 110 mL      PHYSICAL EXAM:    General: in no acute distress  HEENT: MMM, conjunctiva and sclera clear  Gastrointestinal: Soft non-tender non-distended; No rebound or guarding  Skin: Warm and dry. No obvious rash    LABS:                        13.9   13.54 )-----------( 246      ( 01 May 2022 06:45 )             42.9     05-    140  |  101  |  8   ----------------------------<  92  4.0   |  25  |  0.62    Ca    9.5      01 May 2022 06:45  Phos  2.5     05-  Mg     2.1     05-    TPro  7.1  /  Alb  4.3  /  TBili  1.1  /  DBili  x   /  AST  13  /  ALT  7<L>  /  AlkPhos  83  05-    PT/INR - ( 2022 21:08 )   PT: 14.0 sec;   INR: 1.17          PTT - ( 2022 21:08 )  PTT:32.8 sec      Urinalysis Basic - ( 01 May 2022 01:07 )    Color: Yellow / Appearance: Clear / S.020 / pH: x  Gluc: x / Ketone: >=80 mg/dL  / Bili: Small / Urobili: 0.2 E.U./dL   Blood: x / Protein: NEGATIVE mg/dL / Nitrite: NEGATIVE   Leuk Esterase: NEGATIVE / RBC: < 5 /HPF / WBC < 5 /HPF   Sq Epi: x / Non Sq Epi: 0-5 /HPF / Bacteria: None /HPF                RADIOLOGY & ADDITIONAL STUDIES:  Reviewed
CC: Patient is a 36y old  Female who presents with a chief complaint of nausea and vomiting (01 May 2022 10:51)      INTERVAL EVENTS: JO    SUBJECTIVE / INTERVAL HPI: Patient seen and examined at bedside. Patient has had consistent vomiting overnight and says she is not having any pain in her chest area at this time.    ROS: negative unless otherwise stated above.    VITAL SIGNS:  Vital Signs Last 24 Hrs  T(C): 37 (02 May 2022 05:27), Max: 37 (02 May 2022 05:27)  T(F): 98.6 (02 May 2022 05:27), Max: 98.6 (02 May 2022 05:27)  HR: 94 (02 May 2022 05:27) (75 - 94)  BP: 134/86 (02 May 2022 05:27) (104/69 - 134/86)  BP(mean): --  RR: 18 (02 May 2022 05:27) (18 - 19)  SpO2: 97% (02 May 2022 05:27) (96% - 99%)      22 @ 07:01  -  22 @ 07:00  --------------------------------------------------------  IN: 360 mL / OUT: 250 mL / NET: 110 mL        PHYSICAL EXAM:    General: NAD  HEENT: MMM  Neck: supple  Cardiovascular: +S1/S2; RRR  Respiratory: CTA B/L; no W/R/R  Gastrointestinal: soft, NT/ND  Extremities: WWP; no edema, clubbing or cyanosis  Vascular: 2+ radial, DP/PT pulses B/L  Neurological: AAOx3; no focal deficits    MEDICATIONS:  MEDICATIONS  (STANDING):  buPROPion XL (24-Hour) . 150 milliGRAM(s) Oral daily  enoxaparin Injectable 40 milliGRAM(s) SubCutaneous every 24 hours  metoclopramide  IVPB 10 milliGRAM(s) IV Intermittent every 4 hours  pantoprazole  Injectable 40 milliGRAM(s) IV Push every 12 hours  sodium chloride 0.9%. 1000 milliLiter(s) (60 mL/Hr) IV Continuous <Continuous>    MEDICATIONS  (PRN):  acetaminophen     Tablet .. 650 milliGRAM(s) Oral every 6 hours PRN Temp greater or equal to 38C (100.4F), Mild Pain (1 - 3), Moderate Pain (4 - 6)  melatonin 3 milliGRAM(s) Oral at bedtime PRN Insomnia      ALLERGIES:  Allergies    No Known Drug Allergies  shellfish (Rash)    Intolerances        LABS:                        13.9   13.54 )-----------( 246      ( 01 May 2022 06:45 )             42.9     05-    140  |  101  |  8   ----------------------------<  92  4.0   |  25  |  0.62    Ca    9.5      01 May 2022 06:45  Phos  2.5     05-  Mg     2.1     -    TPro  7.1  /  Alb  4.3  /  TBili  1.1  /  DBili  x   /  AST  13  /  ALT  7<L>  /  AlkPhos  83  05-    PT/INR - ( 2022 21:08 )   PT: 14.0 sec;   INR: 1.17          PTT - ( 2022 21:08 )  PTT:32.8 sec  Urinalysis Basic - ( 01 May 2022 01:07 )    Color: Yellow / Appearance: Clear / S.020 / pH: x  Gluc: x / Ketone: >=80 mg/dL  / Bili: Small / Urobili: 0.2 E.U./dL   Blood: x / Protein: NEGATIVE mg/dL / Nitrite: NEGATIVE   Leuk Esterase: NEGATIVE / RBC: < 5 /HPF / WBC < 5 /HPF   Sq Epi: x / Non Sq Epi: 0-5 /HPF / Bacteria: None /HPF      CAPILLARY BLOOD GLUCOSE          RADIOLOGY & ADDITIONAL TESTS: Reviewed.

## 2022-05-02 NOTE — DISCHARGE NOTE NURSING/CASE MANAGEMENT/SOCIAL WORK - NSDCPEFALRISK_GEN_ALL_CORE
For information on Fall & Injury Prevention, visit: [email or web address] OR  [email or web address] OR  https://www.cdc.gov/steadi/patient.html

## 2022-05-02 NOTE — DISCHARGE NOTE PROVIDER - CARE PROVIDER_API CALL
NOLA KENNEDY  Internal Medicine  [facility] [place] [address]  Phone: [phone]  Fax: [phone]  Scheduled Appointment: 05/16/2022 11:30 AM

## 2022-05-02 NOTE — DISCHARGE NOTE PROVIDER - HOSPITAL COURSE
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 GI consutled.who recommends continued supportive care. patient stable for discharge with reglan.     Problem List/Main Diagnoses (system-based):     Nausea & vomiting.   2 weeks of progressively worsening n/v, now unable to tolerate PO.  p/w leukocytosis. Afebrile. No vertigo/dizziness. No bowel dilation or obstruction on imaging.   H/o GERD.likely marijuana induced cyclic vomiting  GI consulted - no intervention  Pt safe to dc with supportive care  -advance diet as tolerated  - c/w reglan 10 q4 prn  - fluids as needed.    Hematemesis.   Reports episode of hematemesis after 2 weeks of n/v daily. Now with streaks of blood in vomit. Epigastric pain. RUQ ttp. Concern for Patrica Monaco Tear in setting of repeat vomiting.   H/H stable. vitals stable  continued vomiting overnight  - gi consulted, supportive care   - IV Protonix while not tolerating PO.    Depression.   MED REC  - c/w Buproprion 150.    Insomnia.   - MED REC  - c/w Trazodone 100.    Intradural extramedullary spinal tumor.   - pain control PRN  - c/w home meds     Inpatient treatment course: as above  New medications: reglan

## 2022-05-03 LAB
-  AMPICILLIN: SIGNIFICANT CHANGE UP
-  CIPROFLOXACIN: SIGNIFICANT CHANGE UP
-  NITROFURANTOIN: SIGNIFICANT CHANGE UP
-  TETRACYCLINE: SIGNIFICANT CHANGE UP
-  VANCOMYCIN: SIGNIFICANT CHANGE UP
CULTURE RESULTS: SIGNIFICANT CHANGE UP
METHOD TYPE: SIGNIFICANT CHANGE UP
ORGANISM # SPEC MICROSCOPIC CNT: SIGNIFICANT CHANGE UP
ORGANISM # SPEC MICROSCOPIC CNT: SIGNIFICANT CHANGE UP
SPECIMEN SOURCE: SIGNIFICANT CHANGE UP

## 2022-05-06 ENCOUNTER — NON-APPOINTMENT (OUTPATIENT)
Age: 37
End: 2022-05-06

## 2022-05-06 ENCOUNTER — APPOINTMENT (OUTPATIENT)
Dept: NEUROSURGERY | Facility: CLINIC | Age: 37
End: 2022-05-06
Payer: MEDICAID

## 2022-05-06 VITALS
WEIGHT: 150 LBS | DIASTOLIC BLOOD PRESSURE: 74 MMHG | BODY MASS INDEX: 26.58 KG/M2 | TEMPERATURE: 98 F | HEART RATE: 88 BPM | OXYGEN SATURATION: 97 % | SYSTOLIC BLOOD PRESSURE: 105 MMHG | HEIGHT: 63 IN

## 2022-05-06 DIAGNOSIS — R11.15 CYCLICAL VOMITING SYNDROME UNRELATED TO MIGRAINE: ICD-10-CM

## 2022-05-06 DIAGNOSIS — G89.29 OTHER CHRONIC PAIN: ICD-10-CM

## 2022-05-06 DIAGNOSIS — K21.9 GASTRO-ESOPHAGEAL REFLUX DISEASE WITHOUT ESOPHAGITIS: ICD-10-CM

## 2022-05-06 DIAGNOSIS — Q05.9 SPINA BIFIDA, UNSPECIFIED: ICD-10-CM

## 2022-05-06 DIAGNOSIS — R32 UNSPECIFIED URINARY INCONTINENCE: ICD-10-CM

## 2022-05-06 DIAGNOSIS — F41.9 ANXIETY DISORDER, UNSPECIFIED: ICD-10-CM

## 2022-05-06 DIAGNOSIS — R11.2 NAUSEA WITH VOMITING, UNSPECIFIED: ICD-10-CM

## 2022-05-06 DIAGNOSIS — G47.00 INSOMNIA, UNSPECIFIED: ICD-10-CM

## 2022-05-06 DIAGNOSIS — F32.9 MAJOR DEPRESSIVE DISORDER, SINGLE EPISODE, UNSPECIFIED: ICD-10-CM

## 2022-05-06 DIAGNOSIS — D33.4 BENIGN NEOPLASM OF SPINAL CORD: ICD-10-CM

## 2022-05-06 DIAGNOSIS — F12.288 CANNABIS DEPENDENCE WITH OTHER CANNABIS-INDUCED DISORDER: ICD-10-CM

## 2022-05-06 DIAGNOSIS — Z09 ENCOUNTER FOR FOLLOW-UP EXAMINATION AFTER COMPLETED TREATMENT FOR CONDITIONS OTHER THAN MALIGNANT NEOPLASM: ICD-10-CM

## 2022-05-06 DIAGNOSIS — Z91.013 ALLERGY TO SEAFOOD: ICD-10-CM

## 2022-05-06 PROCEDURE — 99024 POSTOP FOLLOW-UP VISIT: CPT

## 2022-05-06 RX ORDER — CEPHALEXIN 500 MG/1
500 TABLET ORAL
Qty: 20 | Refills: 0 | Status: ACTIVE | COMMUNITY
Start: 2022-05-06 | End: 1900-01-01

## 2022-05-07 PROBLEM — Z09 POSTOP CHECK: Status: ACTIVE | Noted: 2019-07-08

## 2022-05-08 NOTE — PHYSICAL EXAM
[General Appearance - Alert] : alert [General Appearance - In No Acute Distress] : in no acute distress [Oriented To Time, Place, And Person] : oriented to person, place, and time [Impaired Insight] : insight and judgment were intact [Cranial Nerves Optic (II)] : visual acuity intact bilaterally,  pupils equal round and reactive to light [Cranial Nerves Oculomotor (III)] : extraocular motion intact [Cranial Nerves Facial (VII)] : face symmetrical [Cranial Nerves Vestibulocochlear (VIII)] : hearing was intact bilaterally [Cranial Nerves Glossopharyngeal (IX)] : tongue and palate midline [Cranial Nerves Accessory (XI - Cranial And Spinal)] : head turning and shoulder shrug symmetric [Cranial Nerves Hypoglossal (XII)] : there was no tongue deviation with protrusion [Respiration, Rhythm And Depth] : normal respiratory rhythm and effort [] : no respiratory distress [FreeTextEntry6] : 5/5 on b/l UE, 4/5 on b/l LE.   [FreeTextEntry1] : Gait is stable

## 2022-05-08 NOTE — HISTORY OF PRESENT ILLNESS
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 She reports difficulty walking and uses cane to ambulate for assistance. Reports imbalance. She states that these symptoms have become progressively worse over the years. Also reports bowel/bladder incontinence and chronic constipation since initial surgery 6 years ago. \par \par MRI thoracic and lumbar spine done on 2/23/19 demonstrates recurrent tumor. 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 Pathology indicated keratinous debris with no cyst lining present.\par \par Postoperatively, she did well. She continued to report bilateral leg weakness (RIGHT greater than LEFT). Follow up MRI of her spine negative for recurrence.\par \par MRI thoracic and lumbar spine with and without contrast, done on 12/16/2020, which demonstrated possible residual epidermoid cyst. \par \par She returned for follow up on 1/25/21 and Ms. Rowan reported persistent bilateral lower extremity weakness (RIGHT greater than LEFT) as well as muscle spasms in both lower extremities. She states her legs have given out causing her to fall. She uses a walker for assistance with ambulation. Currently reports low back pain radiating to bilateral lower extremities - she is followed by pain management at [place] and is currently on oxycodone prn, gabapentin and cyclobenzaprine. \par She reports urinary incontinence, which she had preoperatively.\par MRI thoracic and lumbar spine done on 1/3/21 reviewed by Dr. 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 The patient's neurological symptoms are stable.\par Recommended repeat MRI thoracic and lumbar spine in one year (January 2022)\par \par 6/7/21 She came back for routine follow up after recent hospitalization at [facility] due to worsening lower extremity weakness as well as vomiting bloody emesis. She states vomiting has subsided. She did have repeat MRI thoracic and lumbar spine done at [place], which demonstrates progression of epidermoid tumor.\par She reported worsening numbness in bilateral lower extremities as well as weakness of bilateral lower extremities. Uses a walker for assistance with walking. Reports occasional falls. \par \par MRI thoracic and lumbar spine done in May 2021 demonstrated slight increase in residual epidermoid tumor. Due to high risk of complications with repeat surgery as well as minimal increase in residual tumor, surgical resection not recommend. Plan made to repeat MRI thoracic and lumbar spine with and without contrast in one year.\par \par Patient presented to office 1/28/22:\par She went to [facility] ED 1/27/22 for worsening back pain and bilateral leg "shaking". Happens almost everyday, per patient and sister. She currently denies leg numbness but reports numbness when legs are "shaking". She is not able to ambulate far, uses rolling walker. She was given Morphine for pain which helped with pain and aid with ambulation. She is currently on Gabapentin, Flexeril, Oxycontin with moderate pain control. She is wearing diapers for bowel incontinence, which is baseline.\par She did not get Jan 2022 MRI thoracic. \par Recommended MRI lumbar and thoracic spine w/wo contrast; RTC after completion to review. 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 . She endorsed continued worsening back pain that radiates down bilateral legs, now with numbness/ tingling to feet and toes bilaterally, spastic gait, baseline urinary/fecal incontinence.\par \par On 3/16/22, she underwent thoracolumbar laminectomy for subtotal resection of intradural, intramedullary and extramedullary tumor, T10-L3. A tumor capsule was stuck to nerve root and could not be safely peeled away from functional nerve roots. The tumor capsule was left in place.\par \par Final Path: spine tumor capsule-dermoid cyst\par Consider RT per [facility] Spine Tumor [facility].\par \par She was discharged on 3/22/22.\par \par On 4/8//22 follow up:\par Patient is now home from rehab since April 2. She participates in physical therapy 2x/week.\par She reports no change in pre-op symptoms after surgery. Pain is under control with medications.\par She reports b/l leg swelling but denies calf pain when ambulating, or calf tenderness/warmth to touch.\par Patient and nephew reported redness and pruritus at surgical incision site without drainage, but significantly improved today. She endorses taking daily showers.\par No radiation planned for minimal residual tumor capsule stuck to nerve per Dr. Bernabe, can consider if there is progression on followup MRI. Will follow up with a repeat MRI thoracic/lumbar w/wo contrast in 6 months (Sept 2022) to assess tumor stability.\par \par Today, she presents for 1 month post-op follow up.\par She went to [facility] ED last week for nausea then to [facility] ED last Friday for nausea and bloody emesis. Pending GI appointment on 5.24.22. She is on Pantoprazole for GI protection.\par Overall doing well s/ lami. Gait is improved.\par She showers daily. Most of surgical scab removed when compared to last visit but there is localized scab on the proximal end of the incision. She denies drainage, fever, chills.

## 2022-05-08 NOTE — REASON FOR VISIT
[Other: _____] : [unfilled] [de-identified] : post thoracolumbar laminectomy for subtotal resection of intradural, intrameduallry and extramedualrry tumor, T10-L3. [de-identified] : 3/16/22

## 2022-05-08 NOTE — ASSESSMENT
[FreeTextEntry1] : Wound cleaned by Dr. Bernabe with chlorhexidine and betadine solution. Scab removed. There is a 1 inch opening after scab  removal. [place] placed by Dr. Bernabe.\par \par Will rx Keflex 500 mg BID x 10 days and RTO in 2 weeks for wound check.\par \par Report any S/S infection including wound drainage, redness, warmth, fever, chills,weakness.\par No tub bath/pool for 8 weeks.\par Keep incision covered and protected from the sun for 3-6 months following surgery.\par \par Follow up MRI thoracic and lumbar w/wo contrast in Sept 2022 to assess tumor stability.\par \par Patient requesting new pain management referral with [facility]. Referred to Dr. Timbo Sánchez and Dr. Jose Miguel Kruger,\par .\par \par \par \par I, Dr. Bernabe, personally performed the evaluation and management (E/M) services for this established patient who presents today with (a) new problem(s)/exacerbation of (an) existing condition(s). That E/M includes conducting the examination, assessing all new/exacerbated conditions, and establishing a new plan of care. Today, my ACP, Tamra Mayorga, was here to observe my evaluation and management services for this new problem/exacerbated condition to be followed going forward.\par \par

## 2022-05-20 ENCOUNTER — APPOINTMENT (OUTPATIENT)
Dept: NEUROSURGERY | Facility: CLINIC | Age: 37
End: 2022-05-20
Payer: MEDICAID

## 2022-05-20 ENCOUNTER — NON-APPOINTMENT (OUTPATIENT)
Age: 37
End: 2022-05-20

## 2022-05-20 VITALS
SYSTOLIC BLOOD PRESSURE: 77 MMHG | WEIGHT: 150 LBS | OXYGEN SATURATION: 98 % | BODY MASS INDEX: 26.58 KG/M2 | DIASTOLIC BLOOD PRESSURE: 49 MMHG | HEART RATE: 90 BPM | TEMPERATURE: 98 F | HEIGHT: 63 IN

## 2022-05-20 VITALS — SYSTOLIC BLOOD PRESSURE: 87 MMHG | DIASTOLIC BLOOD PRESSURE: 59 MMHG

## 2022-05-20 DIAGNOSIS — G95.9 DISEASE OF SPINAL CORD, UNSPECIFIED: ICD-10-CM

## 2022-05-20 DIAGNOSIS — Z48.02 ENCOUNTER FOR REMOVAL OF SUTURES: ICD-10-CM

## 2022-05-20 PROCEDURE — 99024 POSTOP FOLLOW-UP VISIT: CPT

## 2022-05-21 PROBLEM — Z48.02: Status: ACTIVE | Noted: 2022-05-21

## 2022-05-21 PROBLEM — G95.9 INTRAMEDULLARY ABNORMALITY OF SPINAL CORD: Status: ACTIVE | Noted: 2022-02-10

## 2022-05-22 NOTE — REASON FOR VISIT
[Parent] : parent [de-identified] : thoracolumbar laminectomy for subtotal resection of intradural, intrameduallry and extramedualrry tumor, T10-L3. [de-identified] : 3/16/22 [de-identified] : \par \par Here for 2 week wound check.

## 2022-05-22 NOTE — PHYSICAL EXAM
[General Appearance - Alert] : alert [General Appearance - In No Acute Distress] : in no acute distress [Oriented To Time, Place, And Person] : oriented to person, place, and time [Impaired Insight] : insight and judgment were intact [Motor Tone] : muscle tone was normal in all four extremities [Motor Strength] : muscle strength was normal in all four extremities [Neck Appearance] : the appearance of the neck was normal [] : no respiratory distress [Respiration, Rhythm And Depth] : normal respiratory rhythm and effort [Abnormal Walk] : normal gait [FreeTextEntry1] : DISTAL LOWER BACK INCISION with 4 staples---yellow/brown scabbing with no noticeable drainage. No redness, pain and tenderness to touch.

## 2022-05-22 NOTE — HISTORY OF PRESENT ILLNESS
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 She reports difficulty walking and uses cane to ambulate for assistance. Reports imbalance. She states that these symptoms have become progressively worse over the years. Also reports bowel/bladder incontinence and chronic constipation since initial surgery 6 years ago. \par \par MRI thoracic and lumbar spine done on 2/23/19 demonstrates recurrent tumor. 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 Pathology indicated keratinous debris with no cyst lining present.\par \par Postoperatively, she did well. She continued to report bilateral leg weakness (RIGHT greater than LEFT). Follow up MRI of her spine negative for recurrence.\par \par MRI thoracic and lumbar spine with and without contrast, done on 12/16/2020, which demonstrated possible residual epidermoid cyst. \par \par She returned for follow up on 1/25/21 and Ms. Rowan reported persistent bilateral lower extremity weakness (RIGHT greater than LEFT) as well as muscle spasms in both lower extremities. She states her legs have given out causing her to fall. She uses a walker for assistance with ambulation. Currently reports low back pain radiating to bilateral lower extremities - she is followed by pain management at [facility] and is currently on oxycodone prn, gabapentin and cyclobenzaprine. \par She reports urinary incontinence, which she had preoperatively.\par MRI thoracic and lumbar spine done on 1/3/21 reviewed by Dr. 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 The patient's neurological symptoms are stable.\par Recommended repeat MRI thoracic and lumbar spine in one year (January 2022)\par \par 6/7/21 She came back for routine follow up after recent hospitalization at [facility] due to worsening lower extremity weakness as well as vomiting bloody emesis. She states vomiting has subsided. She did have repeat MRI thoracic and lumbar spine done at [facility], which demonstrates progression of epidermoid tumor.\par She reported worsening numbness in bilateral lower extremities as well as weakness of bilateral lower extremities. Uses a walker for assistance with walking. Reports occasional falls. \par \par MRI thoracic and lumbar spine done in May 2021 demonstrated slight increase in residual epidermoid tumor. Due to high risk of complications with repeat surgery as well as minimal increase in residual tumor, surgical resection not recommend. Plan made to repeat MRI thoracic and lumbar spine with and without contrast in one year.\par \par Patient presented to office 1/28/22:\par She went to [facility] ED 1/27/22 for worsening back pain and bilateral leg "shaking". Happens almost everyday, per patient and sister. She currently denies leg numbness but reports numbness when legs are "shaking". She is not able to ambulate far, uses rolling walker. She was given Morphine for pain which helped with pain and aid with ambulation. She is currently on Gabapentin, Flexeril, Oxycontin with moderate pain control. She is wearing diapers for bowel incontinence, which is baseline.\par She did not get Jan 2022 MRI thoracic. \par Recommended MRI lumbar and thoracic spine w/wo contrast; RTC after completion to review. 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 . She endorsed continued worsening back pain that radiates down bilateral legs, now with numbness/ tingling to feet and toes bilaterally, spastic gait, baseline urinary/fecal incontinence.\par \par On 3/16/22, she underwent thoracolumbar laminectomy for subtotal resection of intradural, intramedullary and extramedullary tumor, T10-L3. A tumor capsule was stuck to nerve root and could not be safely peeled away from functional nerve roots. The tumor capsule was left in place.\par \par Final Path: spine tumor capsule-dermoid cyst\par Consider RT per [facility] and Spine Tumor [facility].\par \par She was discharged on 3/22/22.\par \par On 4/8//22 follow up:\par Patient is now home from rehab since April 2. She participates in physical therapy 2x/week.\par She reports no change in pre-op symptoms after surgery. Pain is under control with medications.\par She reports b/l leg swelling but denies calf pain when ambulating, or calf tenderness/warmth to touch.\par Patient and nephew reported redness and pruritus at surgical incision site without drainage, but significantly improved today. She endorses taking daily showers.\par No radiation planned for minimal residual tumor capsule stuck to nerve per Dr. Bernabe, can consider if there is progression on followup MRI. Will follow up with a repeat MRI thoracic/lumbar w/wo contrast in 6 months (Sept 2022) to assess tumor stability.\par \par On 5/6/22 follow up:\par She presents for 1 month post-op follow up.\par She went to [facility] ED last week for nausea then to [facility] ED last Friday for nausea and bloody emesis. Pending GI appointment on 5.24.22. She is on Pantoprazole for GI protection.\par Overall doing well s/ lami. Gait is improved.\par She showers daily.\par Most of surgical scab removed when compared to last visit but there is localized scab on the proximal end of the incision. She denies drainage, fever, chills. \par Wound cleaned by Dr. Bernabe with chlorhexidine and betadine solution. Scab removed. There is a 1 inch opening after scab removal. [place] placed by Dr. Bernabe.\par Keflex 500 mg BID x 10 days and RTO in 2 weeks for wound check.\par Follow up MRI thoracic and lumbar w/wo contrast in Sept 2022 to assess tumor stability.\par \par Today, she presents back for wound check.\par There is a noticeable dry brown/green scabbing at the staple site. Patient denies wet drainage, chills or fever.\par She has 3 days left of Keflex course.\par Initial BP 77/49, HR 90, T 98 F. Repeat BP 45 mins later at 3:52 pm was 87/59. She denies lightheadedness. Has not had a meal today. Advise to take fluids and eat.\par Reports "shaking of the legs" again.\par

## 2022-05-22 NOTE — ASSESSMENT
[FreeTextEntry1] : Dr. Bernabe examined the surgical wound and scabbing. Wound cleaned with Chlorhexidine and destiny removed by Dr. Bernabe. Instructed patient to follow up in 1 month for another wound check.\par \par She was advised to notify the office for chills/fever, drainage, redness, tenderness or worsening pain at incision site.\par \par Complete antibiotic course.\par \par \par I, Dr. Bernabe, personally performed the evaluation and management (E/M) services for this established patient who presents today with (a) new problem(s)/exacerbation of (an) existing condition(s). That E/M includes conducting the examination, assessing all new/exacerbated conditions, and establishing a new plan of care. Today, my ACP, Tamra Mayorga, was here to observe my evaluation and management services for this new problem/exacerbated condition to be followed going forward.\par \par \par

## 2022-05-30 PROCEDURE — 85027 COMPLETE CBC AUTOMATED: CPT

## 2022-05-30 PROCEDURE — 80053 COMPREHEN METABOLIC PANEL: CPT

## 2022-05-30 PROCEDURE — 80048 BASIC METABOLIC PNL TOTAL CA: CPT

## 2022-05-30 PROCEDURE — 81001 URINALYSIS AUTO W/SCOPE: CPT

## 2022-05-30 PROCEDURE — 71045 X-RAY EXAM CHEST 1 VIEW: CPT

## 2022-05-30 PROCEDURE — 84702 CHORIONIC GONADOTROPIN TEST: CPT

## 2022-05-30 PROCEDURE — 83735 ASSAY OF MAGNESIUM: CPT

## 2022-05-30 PROCEDURE — 87635 SARS-COV-2 COVID-19 AMP PRB: CPT

## 2022-05-30 PROCEDURE — 76830 TRANSVAGINAL US NON-OB: CPT

## 2022-05-30 PROCEDURE — 76856 US EXAM PELVIC COMPLETE: CPT

## 2022-05-30 PROCEDURE — 87186 SC STD MICRODIL/AGAR DIL: CPT

## 2022-05-30 PROCEDURE — 80307 DRUG TEST PRSMV CHEM ANLYZR: CPT

## 2022-05-30 PROCEDURE — 99285 EMERGENCY DEPT VISIT HI MDM: CPT | Mod: 25

## 2022-05-30 PROCEDURE — 74177 CT ABD & PELVIS W/CONTRAST: CPT | Mod: MA

## 2022-05-30 PROCEDURE — 85025 COMPLETE CBC W/AUTO DIFF WBC: CPT

## 2022-05-30 PROCEDURE — 84100 ASSAY OF PHOSPHORUS: CPT

## 2022-05-30 PROCEDURE — 96374 THER/PROPH/DIAG INJ IV PUSH: CPT

## 2022-05-30 PROCEDURE — 85610 PROTHROMBIN TIME: CPT

## 2022-05-30 PROCEDURE — 85730 THROMBOPLASTIN TIME PARTIAL: CPT

## 2022-05-30 PROCEDURE — 36415 COLL VENOUS BLD VENIPUNCTURE: CPT

## 2022-05-30 PROCEDURE — 87086 URINE CULTURE/COLONY COUNT: CPT

## 2022-06-17 ENCOUNTER — NON-APPOINTMENT (OUTPATIENT)
Age: 37
End: 2022-06-17

## 2022-06-17 ENCOUNTER — APPOINTMENT (OUTPATIENT)
Dept: NEUROSURGERY | Facility: CLINIC | Age: 37
End: 2022-06-17
Payer: MEDICAID

## 2022-06-17 VITALS
SYSTOLIC BLOOD PRESSURE: 111 MMHG | HEART RATE: 86 BPM | OXYGEN SATURATION: 97 % | WEIGHT: 150 LBS | TEMPERATURE: 98 F | BODY MASS INDEX: 26.58 KG/M2 | RESPIRATION RATE: 18 BRPM | HEIGHT: 63 IN | DIASTOLIC BLOOD PRESSURE: 81 MMHG

## 2022-06-17 DIAGNOSIS — Z51.89 ENCOUNTER FOR OTHER SPECIFIED AFTERCARE: ICD-10-CM

## 2022-06-17 PROCEDURE — 99214 OFFICE O/P EST MOD 30 MIN: CPT

## 2022-06-20 NOTE — ASSESSMENT
[FreeTextEntry1] : Wound healed - clean, dry intact. No s/s infection, no drainage.\par Recommend MRI thoracic and lumbar spine with and without contrast 6 months postop (September 2022)\par After MRI complete, RTO to review with Dr. Bernabe.\par Continue physical therapy for muscular strengthening, balance and coordination.\par \par Patient and patient's family verbalize agreement and understanding with plan of care.\par \par I, Dr. Bernabe, personally performed the evaluation and management (E/M) services for this established patient who presents today with (a) new problem(s)/exacerbation of (an) existing condition(s).  That E/M includes conducting the examination, assessing all new/exacerbated conditions, and establishing a new plan of care.  Today, my ACP, Zonia Brizuela, was here to observe my evaluation and management services for this new problem/exacerbated condition to be followed going forward.\par \par

## 2022-06-20 NOTE — OCCUPATIONAL THERAPY INITIAL EVALUATION ADULT - FINE MOTOR COORDINATION, LEFT HAND THUMB/FINGER OPPOSITION SKILLS, OT EVAL
Echo 4/2018 - LVEF 35%  Cardiac cath 1/2018:  Impression:  1) Severe native CAD  2) Patent LIMA to LAD  3) Patent SVG to OM2  4) Moderate dilation of the sinus of Valsalva at 5.1 cm which was previously 4.3 cm by CT    Echo 1/2019 - 35%  Echo 1/2020 - 34%   Low Risk (score 7-11) normal performance

## 2022-06-20 NOTE — PHYSICAL EXAM
[General Appearance - Alert] : alert [General Appearance - In No Acute Distress] : in no acute distress [Clean] : clean [Dry] : dry [Intact] : intact [No Drainage] : without drainage [Normal Skin] : normal [Oriented To Time, Place, And Person] : oriented to person, place, and time [Impaired Insight] : insight and judgment were intact [Motor Tone] : muscle tone was normal in all four extremities [Motor Strength] : muscle strength was normal in all four extremities [Sclera] : the sclera and conjunctiva were normal [Outer Ear] : the ears and nose were normal in appearance [Neck Appearance] : the appearance of the neck was normal [] : no respiratory distress [Respiration, Rhythm And Depth] : normal respiratory rhythm and effort [Abnormal Walk] : normal gait [Skin Color & Pigmentation] : normal skin color and pigmentation [Erythema] : not erythematous [Tender] : not tender [Warm] : not warm

## 2022-06-20 NOTE — HISTORY OF PRESENT ILLNESS
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 She reports difficulty walking and uses cane to ambulate for assistance. Reports imbalance. She states that these symptoms have become progressively worse over the years. Also reports bowel/bladder incontinence and chronic constipation since initial surgery 6 years ago. \par \par MRI thoracic and lumbar spine done on 2/23/19 demonstrates recurrent tumor. 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 Pathology indicated keratinous debris with no cyst lining present.\par \par Postoperatively, she did well. She continued to report bilateral leg weakness (RIGHT greater than LEFT). Follow up MRI of her spine negative for recurrence.\par \par MRI thoracic and lumbar spine with and without contrast, done on 12/16/2020, which demonstrated possible residual epidermoid cyst. \par \par She returned for follow up on 1/25/21 and Ms. Rowan reported persistent bilateral lower extremity weakness (RIGHT greater than LEFT) as well as muscle spasms in both lower extremities. She states her legs have given out causing her to fall. She uses a walker for assistance with ambulation. Currently reports low back pain radiating to bilateral lower extremities - she is followed by pain management at [facility] and is currently on oxycodone prn, gabapentin and cyclobenzaprine. \par She reports urinary incontinence, which she had preoperatively.\par MRI thoracic and lumbar spine done on 1/3/21 reviewed by Dr. 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 The patient's neurological symptoms are stable.\par Recommended repeat MRI thoracic and lumbar spine in one year (January 2022)\par \par 6/7/21 She came back for routine follow up after recent hospitalization at [facility] due to worsening lower extremity weakness as well as vomiting bloody emesis. She states vomiting has subsided. She did have repeat MRI thoracic and lumbar spine done at [facility], which demonstrates progression of epidermoid tumor.\par She reported worsening numbness in bilateral lower extremities as well as weakness of bilateral lower extremities. Uses a walker for assistance with walking. Reports occasional falls. \par \par MRI thoracic and lumbar spine done in May 2021 demonstrated slight increase in residual epidermoid tumor. Due to high risk of complications with repeat surgery as well as minimal increase in residual tumor, surgical resection not recommend. Plan made to repeat MRI thoracic and lumbar spine with and without contrast in one year.\par \par Patient presented to office 1/28/22:\par She went to [facility] ED 1/27/22 for worsening back pain and bilateral leg "shaking". Happens almost everyday, per patient and sister. She currently denies leg numbness but reports numbness when legs are "shaking". She is not able to ambulate far, uses rolling walker. She was given Morphine for pain which helped with pain and aid with ambulation. She is currently on Gabapentin, Flexeril, Oxycontin with moderate pain control. She is wearing diapers for bowel incontinence, which is baseline.\par She did not get Jan 2022 MRI thoracic. \par Recommended MRI lumbar and thoracic spine w/wo contrast; RTC after completion to review. 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 . She endorsed continued worsening back pain that radiates down bilateral legs, now with numbness/ tingling to feet and toes bilaterally, spastic gait, baseline urinary/fecal incontinence.\par \par On 3/16/22, she underwent thoracolumbar laminectomy for subtotal resection of intradural, intramedullary and extramedullary tumor, T10-L3. A tumor capsule was stuck to nerve root and could not be safely peeled away from functional nerve roots. The tumor capsule was left in place.\par \par Final Path: spine tumor capsule-dermoid cyst\par Consider RT per [facility] and Spine Tumor Board.\par \par She was discharged on 3/22/22.\par \par On 4/8//22 follow up:\par Patient is now home from rehab since April 2. She participates in physical therapy 2x/week.\par She reports no change in pre-op symptoms after surgery. Pain is under control with medications.\par She reports b/l leg swelling but denies calf pain when ambulating, or calf tenderness/warmth to touch.\par Patient and nephew reported redness and pruritus at surgical incision site without drainage, but significantly improved today. She endorses taking daily showers.\par No radiation planned for minimal residual tumor capsule stuck to nerve per Dr. Bernabe, can consider if there is progression on followup MRI. Will follow up with a repeat MRI thoracic/lumbar w/wo contrast in 6 months (Sept 2022) to assess tumor stability.\par \par On 5/6/22 follow up:\par She presents for 1 month post-op follow up.\par She went to [facility] ED last week for nausea then to [facility] ED last Friday for nausea and bloody emesis. Pending GI appointment on 5.24.22. She is on Pantoprazole for GI protection.\par Overall doing well s/ lami. Gait is improved.\par She showers daily.\par Most of surgical scab removed when compared to last visit but there is localized scab on the proximal end of the incision. She denies drainage, fever, chills. \par Wound cleaned by Dr. Bernabe with chlorhexidine and betadine solution. Scab removed. There is a 1 inch opening after scab removal. [place] placed by Dr. Bernabe.\par Keflex 500 mg BID x 10 days and RTO in 2 weeks for wound check.\par Follow up MRI thoracic and lumbar w/wo contrast in Sept 2022 to assess tumor stability.\par \par 5/20/22 visit:\par Presents back for wound check.\par There is a noticeable dry brown/green scabbing at the distal lower back staple site. Patient denies wet drainage, chills or fever. Staples removed.\par She has 3 days left of Keflex course.\par Advised to follow up in 1 month for another wound check.\par \par Today, patient presents for wound check. Denies fever, chills, wound drainage. Has completed keflex course. \par Reports improvement in muscle spasms and strength of bilateral lower extremities. \par \par \par  \par

## 2022-06-20 NOTE — REASON FOR VISIT
[de-identified] : thoracolumbar laminectomy for subtotal resection of intradural, intrameduallry and extramedualrry tumor, T10-L3 [de-identified] : 3/16/22 [de-identified] : \par \par Here 1 month wound check follow up.

## 2022-11-10 NOTE — PRE-OP CHECKLIST - ALLERGIES REVIEWED
done Chanda Burks)  Pediatrics  [address] [place] [address]  Phone: [phone]  Fax: [phone]  Follow Up Time: 1-3 Days    Shira Richards)  Pediatric Gastroenterology  [facility], [facility] [place] [address]  Phone: [phone]  Fax: [phone]  Follow Up Time: Routine

## 2022-11-14 ENCOUNTER — OUTPATIENT (OUTPATIENT)
Dept: OUTPATIENT SERVICES | Facility: HOSPITAL | Age: 37
LOS: 1 days | End: 2022-11-14

## 2022-11-14 ENCOUNTER — APPOINTMENT (OUTPATIENT)
Dept: MRI IMAGING | Facility: CLINIC | Age: 37
End: 2022-11-14

## 2022-11-14 ENCOUNTER — RESULT REVIEW (OUTPATIENT)
Age: 37
End: 2022-11-14

## 2022-11-14 DIAGNOSIS — Z90.49 ACQUIRED ABSENCE OF OTHER SPECIFIED PARTS OF DIGESTIVE TRACT: Chronic | ICD-10-CM

## 2022-11-14 DIAGNOSIS — Z98.890 OTHER SPECIFIED POSTPROCEDURAL STATES: Chronic | ICD-10-CM

## 2022-11-14 DIAGNOSIS — D49.7 NEOPLASM OF UNSPECIFIED BEHAVIOR OF ENDOCRINE GLANDS AND OTHER PARTS OF NERVOUS SYSTEM: Chronic | ICD-10-CM

## 2022-11-14 PROCEDURE — 72157 MRI CHEST SPINE W/O & W/DYE: CPT | Mod: 26

## 2022-11-14 PROCEDURE — 72158 MRI LUMBAR SPINE W/O & W/DYE: CPT | Mod: 26

## 2022-12-02 ENCOUNTER — APPOINTMENT (OUTPATIENT)
Dept: NEUROSURGERY | Facility: CLINIC | Age: 37
End: 2022-12-02

## 2022-12-02 ENCOUNTER — NON-APPOINTMENT (OUTPATIENT)
Age: 37
End: 2022-12-02

## 2022-12-02 VITALS
HEART RATE: 65 BPM | BODY MASS INDEX: 24.8 KG/M2 | HEIGHT: 63 IN | OXYGEN SATURATION: 99 % | SYSTOLIC BLOOD PRESSURE: 93 MMHG | RESPIRATION RATE: 17 BRPM | DIASTOLIC BLOOD PRESSURE: 62 MMHG | TEMPERATURE: 97.3 F | WEIGHT: 140 LBS

## 2022-12-02 PROCEDURE — 99214 OFFICE O/P EST MOD 30 MIN: CPT

## 2022-12-02 RX ORDER — LIDOCAINE 5% 700 MG/1
5 PATCH TOPICAL
Qty: 14 | Refills: 0 | Status: ACTIVE | COMMUNITY
Start: 2022-12-02 | End: 1900-01-01

## 2022-12-04 NOTE — REASON FOR VISIT
[Follow-Up: _____] : a [unfilled] follow-up visit [Parent] : parent [FreeTextEntry1] : s/p thoracolumbar laminectomy for subtotal resection of intradural, intramedullary and extramedullary tumor, T10-L3 on 3/16/22

## 2022-12-04 NOTE — DATA REVIEWED
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 Follow-up residual.\par \par TECHNIQUE: Multiplanar multisequence imaging of both the thoracic and lumbar spines is obtained. Images were acquired both before and after IV administration of 7 cc Gadavist.\par \par COMPARISON: Lumbar MRI 03/19/2022, thoracic MRI 02/12/2022\par \par FINDINGS:\par \par Compared to postoperative MRI, fluid signal at the long segment laminectomy site is resolved. There is also resolution of mass effect upon the thecal sac from dorsal epidural fluid collection previously seen at the lower thoracic spine. Intradural soft tissue is noted within the contracted. Along the cauda equina at the L1 level there is a rounded focus of heterogeneous signal and peripheral enhancement which is most consistent with known residual dermoid. This measures 1.2 x 0.7 x 1.1 cm (transverse x AP x CC). Some enhancement may be ongoing healing/granulation around it and may subside on prior imaging.\par \par No additional nodularity or abnormal intradural enhancement seen along the thoracic or lumbar spines. Similar to prior imaging there is dorsal positioning of the cauda equina nerve roots distal to the level of dermoid and nerve roots are subtly thickened and with questionable faint enhancement on axial postcontrast series 23. This is nonspecific but could reflect mild arachnoiditis.\par \par Thoracolumbar vertebral bodies are preserved in height. No compression deformity. No marrow edema signal or abnormal enhancement. Kyphotic angulation at the lower thoracic spine is stable with multilevel anterior disc loss. All paracentral disc herniations at the T12-L1 and L1-2 levels are unchanged. No significant spinal stenosis or neural foraminal narrowing.\par \par Thoracic spinal cord is normal in size, contour and signal. No abnormal intramedullary enhancement.\par \par \par IMPRESSION:\par \par Since March 2022, postoperative intradural tissue is mostly resolved with small residual dermoid suspected: a rounded and heterogenous nodule at the L1 level. Other postoperative changes are resolved/improved as well. Continued follow-up advised.\par \par --- End of Report ---\par \par \par \par \par \par \par ARIANNE SY MD; Attending Radiologist\par This document has been electronically signed. Nov 17 2022 11:02AM\par \par  \par \par  Ordered by: ZOILA GRAJEDA       Collected/Examined: 14Nov2022 09:20AM       \par Verification Required       Stage: Final       \par  Performed at: [facility]       Resulted: 17Nov2022 10:35AM       Last Updated: 17Nov2022 11:05AM       Accession: [id number]       \par \par \par  MR Lumbar Spine w/wo IV Cont             Final\par \par No Documents Attached\par \par \par \par \par   EXAM: [id number] - MR SPINE LUMBAR WAW IC  - ORDERED BY: ZOILA GRAJEDA\par \par EXAM: [id number] - MR SPINE THORACIC WAW IC  - ORDERED BY: ZOILA GRAJEDA\par \par \par PROCEDURE DATE:  11/14/2022\par \par \par \par INTERPRETATION:  PROCEDURES:\par MRI thoracic spine without and with contrast\par MRI lumbar spine without and with contrast\par \par 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 Follow-up residual.\par \par TECHNIQUE: Multiplanar multisequence imaging of both the thoracic and lumbar spines is obtained. Images were acquired both before and after IV administration of 7 cc Gadavist.\par \par COMPARISON: Lumbar MRI 03/19/2022, thoracic MRI 02/12/2022\par \par FINDINGS:\par \par Compared to postoperative MRI, fluid signal at the long segment laminectomy site is resolved. There is also resolution of mass effect upon the thecal sac from dorsal epidural fluid collection previously seen at the lower thoracic spine. Intradural soft tissue is noted within the contracted. Along the cauda equina at the L1 level there is a rounded focus of heterogeneous signal and peripheral enhancement which is most consistent with known residual dermoid. This measures 1.2 x 0.7 x 1.1 cm (transverse x AP x CC). Some enhancement may be ongoing healing/granulation around it and may subside on prior imaging.\par \par No additional nodularity or abnormal intradural enhancement seen along the thoracic or lumbar spines. Similar to prior imaging there is dorsal positioning of the cauda equina nerve roots distal to the level of dermoid and nerve roots are subtly thickened and with questionable faint enhancement on axial postcontrast series 23. This is nonspecific but could reflect mild arachnoiditis.\par \par Thoracolumbar vertebral bodies are preserved in height. No compression deformity. No marrow edema signal or abnormal enhancement. Kyphotic angulation at the lower thoracic spine is stable with multilevel anterior disc loss. All paracentral disc herniations at the T12-L1 and L1-2 levels are unchanged. No significant spinal stenosis or neural foraminal narrowing.\par \par Thoracic spinal cord is normal in size, contour and signal. No abnormal intramedullary enhancement.\par \par \par IMPRESSION:\par \par Since March 2022, postoperative intradural tissue is mostly resolved with small residual dermoid suspected: a rounded and heterogenous nodule at the L1 level. Other postoperative changes are resolved/improved as well. Continued follow-up advised.\par \par --- End of Report ---\par \par \par \par \par \par \par ARIANNE SY MD; Attending Radiologist\par This document has been electronically signed. Nov 17 2022 11:02AM\par \par  \par \par  Ordered by: ZOILA GRAJEDA       Collected/Examined: 14Nov2022 09:20AM       \par Verification Required       Stage: Final       \par  Performed at: [facility]       Resulted: 17Nov2022 10:35AM       Last Updated: 17Nov2022 11:05AM       Accession: [id number]

## 2022-12-04 NOTE — ASSESSMENT
[FreeTextEntry1] : MRI thoracic and lumbar spine from 11/14/22 reviewed by Dr. Bernabe with patient demonstrates small residual dermoid tumor.\par Recommend repeat MRI thoracic/lumbar spine in 6 months to evaluate stability (May 2023)\par Will order lidocaine patches prn for low back pain.\par \par Patient and patient's family verbalize agreement and understanding with plan of care.\par \par I, Dr. Bernabe, personally performed the evaluation and management (E/M) services for this established patient who presents today with (a) new problem(s)/exacerbation of (an) existing condition(s).  That E/M includes conducting the examination, assessing all new/exacerbated conditions, and establishing a new plan of care.  Today, my ACP, Zonia Brizuela, was here to observe my evaluation and management services for this new problem/exacerbated condition to be followed going forward.\par \par

## 2022-12-04 NOTE — HISTORY OF PRESENT ILLNESS
[FreeTextEntry1] : 37 year old female with PMHx of depression, spina bifida, lumbar surgery s/p subtotal resection of epidermoid tumor in 2013 and now s/p thoracolumbar laminectomy for resection of intramedullary spinal cord tumor on 6/20/19.\par \par Initially presented to office 1/14/2019:\par Reports since her initial surgery in 2013, the patient reports severe back pain radiating to RIGHT leg with associated numbness/tingling. She reports difficulty walking and uses cane to ambulate for assistance. Reports imbalance. She states that these symptoms have become progressively worse over the years. Also reports bowel/bladder incontinence and chronic constipation since initial surgery 6 years ago. \par \par MRI thoracic and lumbar spine done on 2/23/19 demonstrates recurrent tumor. 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 Pathology indicated keratinous debris with no cyst lining present.\par \par Postoperatively, she did well. She continued to report bilateral leg weakness (RIGHT greater than LEFT). Follow up MRI of her spine negative for recurrence.\par \par MRI thoracic and lumbar spine with and without contrast, done on 12/16/2020, which demonstrated possible residual epidermoid cyst. \par \par She returned for follow up on 1/25/21 and Ms. Rowan reported persistent bilateral lower extremity weakness (RIGHT greater than LEFT) as well as muscle spasms in both lower extremities. She states her legs have given out causing her to fall. She uses a walker for assistance with ambulation. Currently reports low back pain radiating to bilateral lower extremities - she is followed by pain management at [facility] and is currently on oxycodone prn, gabapentin and cyclobenzaprine. \par She reports urinary incontinence, which she had preoperatively.\par MRI thoracic and lumbar spine done on 1/3/21 reviewed by Dr. 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 The patient's neurological symptoms are stable.\par Recommended repeat MRI thoracic and lumbar spine in one year (January 2022)\par \par 6/7/21 She came back for routine follow up after recent hospitalization at [facility] due to worsening lower extremity weakness as well as vomiting bloody emesis. She states vomiting has subsided. She did have repeat MRI thoracic and lumbar spine done at [facility], which demonstrates progression of epidermoid tumor.\par She reported worsening numbness in bilateral lower extremities as well as weakness of bilateral lower extremities. Uses a walker for assistance with walking. Reports occasional falls. \par \par MRI thoracic and lumbar spine done in May 2021 demonstrated slight increase in residual epidermoid tumor. Due to high risk of complications with repeat surgery as well as minimal increase in residual tumor, surgical resection not recommend. Plan made to repeat MRI thoracic and lumbar spine with and without contrast in one year.\par \par Patient presented to office 1/28/22:\par She went to [facility] ED 1/27/22 for worsening back pain and bilateral leg "shaking". Happens almost everyday, per patient and sister. She currently denies leg numbness but reports numbness when legs are "shaking". She is not able to ambulate far, uses rolling walker. She was given Morphine for pain which helped with pain and aid with ambulation. She is currently on Gabapentin, Flexeril, Oxycontin with moderate pain control. She is wearing diapers for bowel incontinence, which is baseline.\par She did not get Jan 2022 MRI thoracic. \par Recommended MRI lumbar and thoracic spine w/wo contrast; RTC after completion to review. 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 . She endorsed continued worsening back pain that radiates down bilateral legs, now with numbness/ tingling to feet and toes bilaterally, spastic gait, baseline urinary/fecal incontinence.\par \par On 3/16/22, she underwent thoracolumbar laminectomy for subtotal resection of intradural, intramedullary and extramedullary tumor, T10-L3. A tumor capsule was stuck to nerve root and could not be safely peeled away from functional nerve roots. The tumor capsule was left in place.\par \par Final Path: spine tumor capsule-dermoid cyst\par Consider RT per [facility].\par \par She was discharged on 3/22/22.\par \par On 4/8//22 follow up:\par Patient is now home from rehab since April 2. She participates in physical therapy 2x/week.\par She reports no change in pre-op symptoms after surgery. Pain is under control with medications.\par She reports b/l leg swelling but denies calf pain when ambulating, or calf tenderness/warmth to touch.\par Patient and nephew reported redness and pruritus at surgical incision site without drainage, but significantly improved today. She endorses taking daily showers.\par No radiation planned for minimal residual tumor capsule stuck to nerve per Dr. Bernabe, can consider if there is progression on followup MRI. Will follow up with a repeat MRI thoracic/lumbar w/wo contrast in 6 months (Sept 2022) to assess tumor stability.\par \par On 5/6/22 follow up:\par She presents for 1 month post-op follow up.\par She went to [facility] ED last week for nausea then to [facility] ED last Friday for nausea and bloody emesis. Pending GI appointment on 5.24.22. She is on Pantoprazole for GI protection.\par Overall doing well s/ lami. Gait is improved.\par She showers daily.\par Most of surgical scab removed when compared to last visit but there is localized scab on the proximal end of the incision. She denies drainage, fever, chills. \par Wound cleaned by Dr. Bernabe with chlorhexidine and betadine solution. Scab removed. There is a 1 inch opening after scab removal. [place] placed by Dr. Bernabe.\par Keflex 500 mg BID x 10 days and RTO in 2 weeks for wound check.\par \par 6/17/22 follow up:\par Wound healing well. Due for MRI in September.\par \par Returns today to review MRI thoracic and lumbar spine with and without contrast from 11/14/22. \par She reports low back pain as well as occasional radiating leg pain (LEFT greater than RIGHT). \par

## 2022-12-22 NOTE — PATIENT PROFILE ADULT - FALL HARM RISK - PATIENT NEEDS ASSISTANCE
Pt has a longstanding hx of GERD. He has a sore throat when he swallows a lot. He has tried many different antibiotics which don't help. Hasn't seen an ENT. He has a tight dry cough. No dysphagia, anorexia, early satiety, nausea, vomiting, or dark stool. He feels heartburn and reflux. He can get pain in the RUQ but better lately--he was dx previously with sludge in his gallbladder but didn't have a CCY. The RUQ pain was also alleviated with the PPI. He used to take Pantoprazole and Carafate which helped him a lot but his insurance wouldn't cover the Pantoprazole. He has been off of Pantoprazole x 6 months.   I looked up cash price for Pantoprazole which is $8. He is gluten free and tells me that he had both a + celiac panel and + duodenal bxs previously. He is 100% gluten free.  He is NSAID free. No ETOH and he is tobacco free. 
br
br
His last EGDs with us showed hiatal hernia and old food in the stomach. No current signs of gastroparesis. 
br
br
He has no problems with BMs. No blood in the stool or upon wiping. No family hx of GI issues. No previous colonoscopy. br
br
No known heart issues. He tells me he gets stabbing pain in his left chest at times. Just occurs out of the blue. Has SOB at times too. Has been to the ER a few times for this but never to cardiology. No other GI related complaints today. 
Standing/Walking/Toileting/Moving from bed to chair

## 2023-04-27 ENCOUNTER — NON-APPOINTMENT (OUTPATIENT)
Age: 38
End: 2023-04-27

## 2023-05-16 ENCOUNTER — APPOINTMENT (OUTPATIENT)
Dept: MRI IMAGING | Facility: HOSPITAL | Age: 38
End: 2023-05-16

## 2023-05-16 ENCOUNTER — EMERGENCY (EMERGENCY)
Facility: HOSPITAL | Age: 38
LOS: 1 days | Discharge: HOME | End: 2023-05-16
Attending: EMERGENCY MEDICINE | Admitting: EMERGENCY MEDICINE
Payer: COMMERCIAL

## 2023-05-16 VITALS
DIASTOLIC BLOOD PRESSURE: 79 MMHG | RESPIRATION RATE: 18 BRPM | WEIGHT: 149.91 LBS | HEART RATE: 55 BPM | TEMPERATURE: 99 F | OXYGEN SATURATION: 98 % | SYSTOLIC BLOOD PRESSURE: 124 MMHG

## 2023-05-16 DIAGNOSIS — R32 UNSPECIFIED URINARY INCONTINENCE: ICD-10-CM

## 2023-05-16 DIAGNOSIS — Z87.39 PERSONAL HISTORY OF OTHER DISEASES OF THE MUSCULOSKELETAL SYSTEM AND CONNECTIVE TISSUE: ICD-10-CM

## 2023-05-16 DIAGNOSIS — R07.2 PRECORDIAL PAIN: ICD-10-CM

## 2023-05-16 DIAGNOSIS — Z90.49 ACQUIRED ABSENCE OF OTHER SPECIFIED PARTS OF DIGESTIVE TRACT: ICD-10-CM

## 2023-05-16 DIAGNOSIS — Z98.890 OTHER SPECIFIED POSTPROCEDURAL STATES: Chronic | ICD-10-CM

## 2023-05-16 DIAGNOSIS — R68.2 DRY MOUTH, UNSPECIFIED: ICD-10-CM

## 2023-05-16 DIAGNOSIS — Z90.49 ACQUIRED ABSENCE OF OTHER SPECIFIED PARTS OF DIGESTIVE TRACT: Chronic | ICD-10-CM

## 2023-05-16 DIAGNOSIS — Z53.29 PROCEDURE AND TREATMENT NOT CARRIED OUT BECAUSE OF PATIENT'S DECISION FOR OTHER REASONS: ICD-10-CM

## 2023-05-16 DIAGNOSIS — F41.8 OTHER SPECIFIED ANXIETY DISORDERS: ICD-10-CM

## 2023-05-16 DIAGNOSIS — Z91.013 ALLERGY TO SEAFOOD: ICD-10-CM

## 2023-05-16 DIAGNOSIS — K21.9 GASTRO-ESOPHAGEAL REFLUX DISEASE WITHOUT ESOPHAGITIS: ICD-10-CM

## 2023-05-16 DIAGNOSIS — R11.2 NAUSEA WITH VOMITING, UNSPECIFIED: ICD-10-CM

## 2023-05-16 DIAGNOSIS — K59.09 OTHER CONSTIPATION: ICD-10-CM

## 2023-05-16 DIAGNOSIS — Z86.69 PERSONAL HISTORY OF OTHER DISEASES OF THE NERVOUS SYSTEM AND SENSE ORGANS: ICD-10-CM

## 2023-05-16 DIAGNOSIS — D49.7 NEOPLASM OF UNSPECIFIED BEHAVIOR OF ENDOCRINE GLANDS AND OTHER PARTS OF NERVOUS SYSTEM: Chronic | ICD-10-CM

## 2023-05-16 DIAGNOSIS — Z86.018 PERSONAL HISTORY OF OTHER BENIGN NEOPLASM: ICD-10-CM

## 2023-05-16 LAB
ALBUMIN SERPL ELPH-MCNC: 4.1 G/DL — SIGNIFICANT CHANGE UP (ref 3.3–5)
ALP SERPL-CCNC: 93 U/L — SIGNIFICANT CHANGE UP (ref 40–120)
ALT FLD-CCNC: 7 U/L — LOW (ref 10–45)
ANION GAP SERPL CALC-SCNC: 11 MMOL/L — SIGNIFICANT CHANGE UP (ref 5–17)
APPEARANCE UR: CLEAR — SIGNIFICANT CHANGE UP
AST SERPL-CCNC: 17 U/L — SIGNIFICANT CHANGE UP (ref 10–40)
BACTERIA # UR AUTO: PRESENT /HPF
BASE EXCESS BLDV CALC-SCNC: 1.7 MMOL/L — SIGNIFICANT CHANGE UP (ref -2–3)
BASOPHILS # BLD AUTO: 0.02 K/UL — SIGNIFICANT CHANGE UP (ref 0–0.2)
BASOPHILS NFR BLD AUTO: 0.2 % — SIGNIFICANT CHANGE UP (ref 0–2)
BILIRUB SERPL-MCNC: 1 MG/DL — SIGNIFICANT CHANGE UP (ref 0.2–1.2)
BILIRUB UR-MCNC: NEGATIVE — SIGNIFICANT CHANGE UP
BUN SERPL-MCNC: 10 MG/DL — SIGNIFICANT CHANGE UP (ref 7–23)
CA-I SERPL-SCNC: 1.18 MMOL/L — SIGNIFICANT CHANGE UP (ref 1.15–1.33)
CALCIUM SERPL-MCNC: 9.7 MG/DL — SIGNIFICANT CHANGE UP (ref 8.4–10.5)
CHLORIDE SERPL-SCNC: 109 MMOL/L — HIGH (ref 96–108)
CO2 BLDV-SCNC: 28.6 MMOL/L — HIGH (ref 22–26)
CO2 SERPL-SCNC: 24 MMOL/L — SIGNIFICANT CHANGE UP (ref 22–31)
COLOR SPEC: YELLOW — SIGNIFICANT CHANGE UP
CREAT SERPL-MCNC: 0.62 MG/DL — SIGNIFICANT CHANGE UP (ref 0.5–1.3)
DIFF PNL FLD: ABNORMAL
EGFR: 117 ML/MIN/1.73M2 — SIGNIFICANT CHANGE UP
EOSINOPHIL # BLD AUTO: 0.01 K/UL — SIGNIFICANT CHANGE UP (ref 0–0.5)
EOSINOPHIL NFR BLD AUTO: 0.1 % — SIGNIFICANT CHANGE UP (ref 0–6)
EPI CELLS # UR: SIGNIFICANT CHANGE UP /HPF (ref 0–5)
GAS PNL BLDV: 140 MMOL/L — SIGNIFICANT CHANGE UP (ref 136–145)
GAS PNL BLDV: SIGNIFICANT CHANGE UP
GAS PNL BLDV: SIGNIFICANT CHANGE UP
GLUCOSE SERPL-MCNC: 112 MG/DL — HIGH (ref 70–99)
GLUCOSE UR QL: NEGATIVE — SIGNIFICANT CHANGE UP
HCG SERPL-ACNC: <0 MIU/ML — SIGNIFICANT CHANGE UP
HCO3 BLDV-SCNC: 27 MMOL/L — SIGNIFICANT CHANGE UP (ref 22–29)
HCT VFR BLD CALC: 41.7 % — SIGNIFICANT CHANGE UP (ref 34.5–45)
HGB BLD-MCNC: 14 G/DL — SIGNIFICANT CHANGE UP (ref 11.5–15.5)
IMM GRANULOCYTES NFR BLD AUTO: 0.3 % — SIGNIFICANT CHANGE UP (ref 0–0.9)
KETONES UR-MCNC: 40 MG/DL
LEUKOCYTE ESTERASE UR-ACNC: NEGATIVE — SIGNIFICANT CHANGE UP
LIDOCAIN IGE QN: 25 U/L — SIGNIFICANT CHANGE UP (ref 7–60)
LYMPHOCYTES # BLD AUTO: 0.53 K/UL — LOW (ref 1–3.3)
LYMPHOCYTES # BLD AUTO: 4.1 % — LOW (ref 13–44)
MAGNESIUM SERPL-MCNC: 2.2 MG/DL — SIGNIFICANT CHANGE UP (ref 1.6–2.6)
MCHC RBC-ENTMCNC: 33.2 PG — SIGNIFICANT CHANGE UP (ref 27–34)
MCHC RBC-ENTMCNC: 33.6 GM/DL — SIGNIFICANT CHANGE UP (ref 32–36)
MCV RBC AUTO: 98.8 FL — SIGNIFICANT CHANGE UP (ref 80–100)
MONOCYTES # BLD AUTO: 0.3 K/UL — SIGNIFICANT CHANGE UP (ref 0–0.9)
MONOCYTES NFR BLD AUTO: 2.3 % — SIGNIFICANT CHANGE UP (ref 2–14)
NEUTROPHILS # BLD AUTO: 12 K/UL — HIGH (ref 1.8–7.4)
NEUTROPHILS NFR BLD AUTO: 93 % — HIGH (ref 43–77)
NITRITE UR-MCNC: POSITIVE
NRBC # BLD: 0 /100 WBCS — SIGNIFICANT CHANGE UP (ref 0–0)
PCO2 BLDV: 45 MMHG — HIGH (ref 39–42)
PH BLDV: 7.39 — SIGNIFICANT CHANGE UP (ref 7.32–7.43)
PH UR: 6 — SIGNIFICANT CHANGE UP (ref 5–8)
PLATELET # BLD AUTO: 322 K/UL — SIGNIFICANT CHANGE UP (ref 150–400)
PO2 BLDV: 34 MMHG — SIGNIFICANT CHANGE UP (ref 25–45)
POTASSIUM BLDV-SCNC: 4 MMOL/L — SIGNIFICANT CHANGE UP (ref 3.5–5.1)
POTASSIUM SERPL-MCNC: 4.1 MMOL/L — SIGNIFICANT CHANGE UP (ref 3.5–5.3)
POTASSIUM SERPL-SCNC: 4.1 MMOL/L — SIGNIFICANT CHANGE UP (ref 3.5–5.3)
PROT SERPL-MCNC: 7.4 G/DL — SIGNIFICANT CHANGE UP (ref 6–8.3)
PROT UR-MCNC: 100 MG/DL
RBC # BLD: 4.22 M/UL — SIGNIFICANT CHANGE UP (ref 3.8–5.2)
RBC # FLD: 13.5 % — SIGNIFICANT CHANGE UP (ref 10.3–14.5)
RBC CASTS # UR COMP ASSIST: < 5 /HPF — SIGNIFICANT CHANGE UP
SAO2 % BLDV: 58.8 % — LOW (ref 67–88)
SODIUM SERPL-SCNC: 144 MMOL/L — SIGNIFICANT CHANGE UP (ref 135–145)
SP GR SPEC: >=1.03 — SIGNIFICANT CHANGE UP (ref 1–1.03)
UROBILINOGEN FLD QL: 1 E.U./DL — SIGNIFICANT CHANGE UP
WBC # BLD: 12.9 K/UL — HIGH (ref 3.8–10.5)
WBC # FLD AUTO: 12.9 K/UL — HIGH (ref 3.8–10.5)
WBC UR QL: ABNORMAL /HPF

## 2023-05-16 PROCEDURE — 99285 EMERGENCY DEPT VISIT HI MDM: CPT | Mod: 25

## 2023-05-16 PROCEDURE — 85025 COMPLETE CBC W/AUTO DIFF WBC: CPT

## 2023-05-16 PROCEDURE — 84132 ASSAY OF SERUM POTASSIUM: CPT

## 2023-05-16 PROCEDURE — 71046 X-RAY EXAM CHEST 2 VIEWS: CPT

## 2023-05-16 PROCEDURE — 36415 COLL VENOUS BLD VENIPUNCTURE: CPT

## 2023-05-16 PROCEDURE — 83735 ASSAY OF MAGNESIUM: CPT

## 2023-05-16 PROCEDURE — 83690 ASSAY OF LIPASE: CPT

## 2023-05-16 PROCEDURE — 96374 THER/PROPH/DIAG INJ IV PUSH: CPT

## 2023-05-16 PROCEDURE — 71046 X-RAY EXAM CHEST 2 VIEWS: CPT | Mod: 26

## 2023-05-16 PROCEDURE — 84295 ASSAY OF SERUM SODIUM: CPT

## 2023-05-16 PROCEDURE — 84702 CHORIONIC GONADOTROPIN TEST: CPT

## 2023-05-16 PROCEDURE — 82803 BLOOD GASES ANY COMBINATION: CPT

## 2023-05-16 PROCEDURE — 82330 ASSAY OF CALCIUM: CPT

## 2023-05-16 PROCEDURE — 93005 ELECTROCARDIOGRAM TRACING: CPT

## 2023-05-16 PROCEDURE — 99285 EMERGENCY DEPT VISIT HI MDM: CPT

## 2023-05-16 PROCEDURE — 81001 URINALYSIS AUTO W/SCOPE: CPT

## 2023-05-16 PROCEDURE — 80053 COMPREHEN METABOLIC PANEL: CPT

## 2023-05-16 PROCEDURE — 96375 TX/PRO/DX INJ NEW DRUG ADDON: CPT

## 2023-05-16 RX ORDER — METOCLOPRAMIDE HCL 10 MG
10 TABLET ORAL ONCE
Refills: 0 | Status: COMPLETED | OUTPATIENT
Start: 2023-05-16 | End: 2023-05-16

## 2023-05-16 RX ORDER — FAMOTIDINE 10 MG/ML
20 INJECTION INTRAVENOUS ONCE
Refills: 0 | Status: COMPLETED | OUTPATIENT
Start: 2023-05-16 | End: 2023-05-16

## 2023-05-16 RX ORDER — SODIUM CHLORIDE 9 MG/ML
1000 INJECTION INTRAMUSCULAR; INTRAVENOUS; SUBCUTANEOUS ONCE
Refills: 0 | Status: COMPLETED | OUTPATIENT
Start: 2023-05-16 | End: 2023-05-16

## 2023-05-16 RX ORDER — ONDANSETRON 8 MG/1
4 TABLET, FILM COATED ORAL ONCE
Refills: 0 | Status: COMPLETED | OUTPATIENT
Start: 2023-05-16 | End: 2023-05-16

## 2023-05-16 RX ADMIN — ONDANSETRON 4 MILLIGRAM(S): 8 TABLET, FILM COATED ORAL at 19:30

## 2023-05-16 RX ADMIN — SODIUM CHLORIDE 1000 MILLILITER(S): 9 INJECTION INTRAMUSCULAR; INTRAVENOUS; SUBCUTANEOUS at 19:29

## 2023-05-16 RX ADMIN — Medication 30 MILLILITER(S): at 19:30

## 2023-05-16 RX ADMIN — FAMOTIDINE 20 MILLIGRAM(S): 10 INJECTION INTRAVENOUS at 19:30

## 2023-05-16 NOTE — ED ADULT NURSE NOTE - NSFALLUNIVINTERV_ED_ALL_ED
Bed/Stretcher in lowest position, wheels locked, appropriate side rails in place/Call bell, personal items and telephone in reach/Instruct patient to call for assistance before getting out of bed/chair/stretcher/Non-slip footwear applied when patient is off stretcher/[place] to call system/Physically safe environment - no spills, clutter or unnecessary equipment/Purposeful proactive rounding/Room/bathroom lighting operational, light cord in reach

## 2023-05-16 NOTE — ED ADULT NURSE NOTE - OBJECTIVE STATEMENT
The patient is a 37y Female complaining of chest pain. Pt reports constant midsternal CP since this morning associated with nausea and 1 episode of vomiting. Pt denies SOB, dizziness, F/C, diarrhea, abd pain, back pain.

## 2023-05-16 NOTE — ED ADULT NURSE NOTE - NS ED NURSE DISCH DISPOSITION
hx lung ca stage 4, reports due for chemo next week. pt reports receiving bronchoscopy last week, c.o sore throat and persistent cough. denies fever/chills. c.o chest pain and generalized weakness with decreased po intake. pt placed on cm, ekg completed. awaiting md godwin.
Eloped (saw a physician/midlevel provider but left without telling anyone)

## 2023-05-16 NOTE — ED PROVIDER NOTE - PHYSICAL EXAMINATION
Constitutional: Well appearing, awake, alert, oriented to person, place, time/situation and in no apparent distress.  ENMT: Airway patent. Dry MM  Eyes: Clear bilaterally  Cardiac: Normal rate, regular rhythm.  Heart sounds S1, S2.  No murmurs, rubs or gallops.  Respiratory: Breaths sounds equal and clear b/l. No increased WOB, tachypnea, hypoxia, or accessory mm use. Pt speaks in full sentences.   Gastrointestinal: Abd soft, NT, ND, NABS. No guarding, rebound, or rigidity. No pulsatile abdominal masses. No organomegaly appreciated. No CVAT   Musculoskeletal: Range of motion is not limited  Neuro: Alert and oriented x 3, face symmetric and speech fluent. Strength 5/5 x 4 ext and symmetric, nml gross motor movement, nml gait. No focal deficits noted.  Skin: Skin normal color for race, warm, dry and intact. No evidence of rash.  Psych: Alert and oriented to person, place, time/situation. normal mood and affect. no apparent risk to self or others.

## 2023-05-16 NOTE — ED PROVIDER NOTE - OBJECTIVE STATEMENT
Pt w/ PMHx of spina bifida, depression, and spinal epidermoid tumor s/p multiple resections, s/p T12-L3 laminectomies for resection of recurrent intradural extramedullary spinal tumor on 3/16/22, depression/anxiety, insomnia, GERD, and chronic urinary incontinence (has been intermittently self-catheterizing for many years) p/w n/v. She states she gets this during her menstrual periods typically. She also smokes marijuana daily. She reports multiple episodes n/v last night into this morning, non bloody. She denies abd pain, diarrhea. She reports chronic constipation, had small BM normal for her yesterday. LMP yesterday. She reports s/p vomiting she developed substernal, non radiating, burning CP w/ belching and gas feeling. She has not taken anything for her sx. no f/c. She is not sexually active in approx 4-5 months. Does not suspect pregnancy

## 2023-05-16 NOTE — ED PROVIDER NOTE - IV ALTEPLASE INCLUSION HIDDEN
Surgery Post-op note    Post-op Day #1    Subjective:    She is doing well. Complained of feet being swollen. She is Ambulating, tolerating PO,urinating, denies nausea, voiding, has had flatus, appropriate pain control with meds    Objective:  Blood pressure 96/65, pulse 65, temperature 98.4 °F (36.9 °C), temperature source Oral, resp. rate 15, height 5' 5\" (1.651 m), weight 162 lb 1.6 oz (73.5 kg), last menstrual period 02/08/2022, SpO2 97 %, not currently breastfeeding. Abdominal exam: soft, nontender, nondistended, no masses or organomegaly.  BS present    Wound: well approximated incision, 4 incision clean, dry and intact    Labs:    Recent Labs     03/09/22  1235 03/10/22  0508   WBC  --  12.2*   HGB 11.9* 11.3*   HCT 36.4 34.3*   PLT  --  231     Recent Labs     03/10/22  0508   AST 18   ALT 21          Current Facility-Administered Medications:     scopolamine (TRANSDERM-SCOP) transdermal patch 1 patch, 1 patch, TransDERmal, Once, Lachelle Fischer MD, 1 patch at 03/09/22 0653    dextrose 5 % in lactated ringers infusion, , IntraVENous, Continuous, Yolanda Snyder MD, Stopped at 03/10/22 1009    sodium chloride flush 0.9 % injection 5-40 mL, 5-40 mL, IntraVENous, 2 times per day, Yolanda Snyder MD    sodium chloride flush 0.9 % injection 5-40 mL, 5-40 mL, IntraVENous, PRN, Yolanda Snyder MD, 10 mL at 03/10/22 0804    0.9 % sodium chloride infusion, 25 mL, IntraVENous, PRN, Yolanda Snyder MD    ketorolac (TORADOL) injection 30 mg, 30 mg, IntraVENous, Q6H, Yolanda Snyder MD, 30 mg at 03/10/22 0803    ondansetron (ZOFRAN-ODT) disintegrating tablet 4 mg, 4 mg, Oral, Q8H PRN **OR** ondansetron (ZOFRAN) injection 4 mg, 4 mg, IntraVENous, Q6H PRN, Yolanda Snyder MD    oxyCODONE (ROXICODONE) immediate release tablet 5 mg, 5 mg, Oral, Q4H PRN, Yolanda Snyder MD, 5 mg at 03/10/22 0139    oxyCODONE (ROXICODONE) immediate release tablet 10 mg, 10 mg, Oral, Q4H PRN, Yolanda Snyder MD, 10 mg at 03/09/22 1334    acetaminophen (TYLENOL) tablet 1,000 mg, 1,000 mg, Oral, Q8H, Chelsea Brandt MD, 1,000 mg at 03/10/22 1010     Assessment/Plan:    39 y/o female s/p Robotic Hysterctomy POD1 Doing well. Routine p.o. Care.   Diet: General/Regular  Discharge today: Yes  Follow up: 2 weeks  With Dr. Tania Colon show

## 2023-05-16 NOTE — ED PROVIDER NOTE - CLINICAL SUMMARY MEDICAL DECISION MAKING FREE TEXT BOX
Pt p/w n/v, followed by onset of burning chest pain, belching. UCG neg.  Benign, non tender abd. DDx includes but not limited to cyclical vomiting / CHS, less likely sx related to menses, GERD, other pathology. Low suspicion surgical intra-abd pathology. Low suspicion esophageal perf. check labs, EKG, CXR, IVF, supportive care, re-eval. Dispo pending w/u and clinical status

## 2023-05-16 NOTE — ED PROVIDER NOTE - PROGRESS NOTE DETAILS
Was improved, sleeping, but vomited again, will give Reglan S/p getting medicated again, pt told RN she wanted to leave as her ride was here. I was with another patient could not immediately speak w/ pt. IV was removed by RN and pt eloped

## 2023-05-16 NOTE — ED PROVIDER NOTE - CCCP TRG CHIEF CMPLNT
chest pain/vomiting
right upper extremity/right lower extremity/left upper extremity/left lower extremity

## 2023-05-16 NOTE — ED PROVIDER NOTE - CARE PLAN
1 Principal Discharge DX:	Nausea and vomiting  Secondary Diagnosis:	GERD (gastroesophageal reflux disease)

## 2023-05-27 ENCOUNTER — APPOINTMENT (OUTPATIENT)
Dept: MRI IMAGING | Facility: HOSPITAL | Age: 38
End: 2023-05-27

## 2023-05-27 ENCOUNTER — OUTPATIENT (OUTPATIENT)
Dept: OUTPATIENT SERVICES | Facility: HOSPITAL | Age: 38
LOS: 1 days | End: 2023-05-27
Payer: COMMERCIAL

## 2023-05-27 DIAGNOSIS — Z90.49 ACQUIRED ABSENCE OF OTHER SPECIFIED PARTS OF DIGESTIVE TRACT: Chronic | ICD-10-CM

## 2023-05-27 DIAGNOSIS — D49.7 NEOPLASM OF UNSPECIFIED BEHAVIOR OF ENDOCRINE GLANDS AND OTHER PARTS OF NERVOUS SYSTEM: Chronic | ICD-10-CM

## 2023-05-27 DIAGNOSIS — Z98.890 OTHER SPECIFIED POSTPROCEDURAL STATES: Chronic | ICD-10-CM

## 2023-05-27 PROCEDURE — A9585: CPT

## 2023-05-27 PROCEDURE — 72157 MRI CHEST SPINE W/O & W/DYE: CPT

## 2023-05-27 PROCEDURE — 72157 MRI CHEST SPINE W/O & W/DYE: CPT | Mod: 26

## 2023-05-27 PROCEDURE — 72158 MRI LUMBAR SPINE W/O & W/DYE: CPT | Mod: 26

## 2023-05-27 PROCEDURE — 72158 MRI LUMBAR SPINE W/O & W/DYE: CPT

## 2023-06-04 PROBLEM — D36.9 DERMOID TUMOR: Status: ACTIVE | Noted: 2019-08-05

## 2023-06-04 PROBLEM — Z98.890 S/P LAMINECTOMY: Status: ACTIVE | Noted: 2019-08-05

## 2023-06-04 PROBLEM — D49.7 INTRADURAL EXTRAMEDULLARY SPINAL TUMOR: Status: ACTIVE | Noted: 2022-04-04

## 2023-06-04 PROBLEM — Z86.59 HISTORY OF DEPRESSION: Status: RESOLVED | Noted: 2019-01-15 | Resolved: 2023-06-04

## 2023-06-05 ENCOUNTER — APPOINTMENT (OUTPATIENT)
Dept: NEUROSURGERY | Facility: CLINIC | Age: 38
End: 2023-06-05
Payer: MEDICAID

## 2023-06-05 VITALS
OXYGEN SATURATION: 97 % | TEMPERATURE: 97.3 F | HEART RATE: 71 BPM | DIASTOLIC BLOOD PRESSURE: 73 MMHG | SYSTOLIC BLOOD PRESSURE: 108 MMHG

## 2023-06-05 DIAGNOSIS — D49.7 NEOPLASM OF UNSPECIFIED BEHAVIOR OF ENDOCRINE GLANDS AND OTHER PARTS OF NERVOUS SYSTEM: ICD-10-CM

## 2023-06-05 DIAGNOSIS — Z86.59 PERSONAL HISTORY OF OTHER MENTAL AND BEHAVIORAL DISORDERS: ICD-10-CM

## 2023-06-05 DIAGNOSIS — Z98.890 OTHER SPECIFIED POSTPROCEDURAL STATES: ICD-10-CM

## 2023-06-05 DIAGNOSIS — D36.9 BENIGN NEOPLASM, UNSPECIFIED SITE: ICD-10-CM

## 2023-06-05 PROCEDURE — 99214 OFFICE O/P EST MOD 30 MIN: CPT

## 2023-06-05 NOTE — PHYSICAL EXAM
[General Appearance - Alert] : alert [General Appearance - In No Acute Distress] : in no acute distress [General Appearance - Well Nourished] : well nourished [General Appearance - Well-Appearing] : healthy appearing [Oriented To Time, Place, And Person] : oriented to person, place, and time [Impaired Insight] : insight and judgment were intact [Affect] : the affect was normal [Memory Recent] : recent memory was not impaired [Person] : oriented to person [Place] : oriented to place [Time] : oriented to time [Abnormal Walk] : normal gait [Limited Balance] : the patient's balance was impaired

## 2023-06-05 NOTE — REVIEW OF SYSTEMS
[Difficulty Walking] : difficulty walking [As Noted in HPI] : as noted in HPI [Negative] : Constitutional

## 2023-06-07 NOTE — DATA REVIEWED
[de-identified] : ACC: [id number] EXAM: MR SPINE LUMBAR WAW IC ORDERED BY: WHITNEY ESCOBAR\par \par ACC: [id number] EXAM: MR SPINE THORACIC WAW IC ORDERED BY: WHITNEY ESCOBAR\par \par PROCEDURE DATE: 05/27/2023\par \par \par \par INTERPRETATION: CLINICAL INFORMATION: Spinal dermoid tumor, surveillance of residual.\par \par Exam: MRI of the thoracic and lumbar spines obtained using standard protocol. Images acquired both before and after IV administration of 6 cc Gadavist, 1.5 cc discarded.\par \par Comparison: 11/14/2022\par \par Findings:\par \par Heterogeneous nodule in the intradural upper lumbar spine contiguous with conus measures 12 x 7 mm on series 22, image 10, stable from the prior exam. Subcentimeter T1 hyperintense component and peripheral enhancement appears stable as well. Nerve roots of cauda equina inferior to this remains slightly clumped and with faint enhancement. No additional nodularity within the thoracic or lumbar intradural spine, allowing for pulsation artifact on sagittal postcontrast thoracic spine images. No fluid collection.\par \par The patient is again status post T11/L3 laminectomy. There is similar appearance of granulation tissue at the dorsal incision site without fluid collection.\par \par Alignment of the lumbar spine demonstrates preserved lordosis. There is trace retrolisthesis at L1-2 with right paracentral disc herniation that appears similar. Smaller central disc protrusion at T12-L1 is also unchanged. No significant lumbar spinal stenosis. Remainder of thoracic spinal canal is normal caliber as well. Thoracic spinal cord is normal in size, contour and signal.\par \par No compression deformity. There is mild disc height loss at the T12-L1 and L1-L2 levels with anterior degenerative endplate changes at T12-L1 which are increased from the prior study. 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 Jun 2 2023 12:17PM

## 2023-06-07 NOTE — REASON FOR VISIT
[Follow-Up: _____] : a [unfilled] follow-up visit [Parent] : parent [FreeTextEntry1] : Returns today to review MRI T/L spine w/wo.\par She reports chronic persistent lower back pain which has not been controlled with medications (currently following pain management for medication tx) but denies any other new/worsening focal neuro deficits. She ambulates with cane.

## 2023-06-07 NOTE — HISTORY OF PRESENT ILLNESS
[FreeTextEntry1] : 37 year old female with PMHx of depression, spina bifida, lumbar surgery s/p subtotal resection of epidermoid tumor in 2013 and now s/p thoracolumbar laminectomy for resection of intramedullary spinal cord tumor on 6/20/19.\par \par Initially presented to office 1/14/2019:\par Reports since her initial surgery in 2013, the patient reports severe back pain radiating to RIGHT leg with associated numbness/tingling. She reports difficulty walking and uses cane to ambulate for assistance. Reports imbalance. She states that these symptoms have become progressively worse over the years. Also reports bowel/bladder incontinence and chronic constipation since initial surgery 6 years ago. \par \par MRI thoracic and lumbar spine done on 2/23/19 demonstrates recurrent tumor. 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 Pathology indicated keratinous debris with no cyst lining present.\par \par Postoperatively, she did well. She continued to report bilateral leg weakness (RIGHT greater than LEFT). Follow up MRI of her spine negative for recurrence.\par \par MRI thoracic and lumbar spine with and without contrast, done on 12/16/2020, which demonstrated possible residual epidermoid cyst. \par \par She returned for follow up on 1/25/21 and Ms. Rowan reported persistent bilateral lower extremity weakness (RIGHT greater than LEFT) as well as muscle spasms in both lower extremities. She states her legs have given out causing her to fall. She uses a walker for assistance with ambulation. Currently reports low back pain radiating to bilateral lower extremities - she is followed by pain management at [facility] and is currently on oxycodone prn, gabapentin and cyclobenzaprine. \par She reports urinary incontinence, which she had preoperatively.\par MRI thoracic and lumbar spine done on 1/3/21 reviewed by Dr. 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 The patient's neurological symptoms are stable.\par Recommended repeat MRI thoracic and lumbar spine in one year (January 2022)\par \par 6/7/21 She came back for routine follow up after recent hospitalization at [facility] due to worsening lower extremity weakness as well as vomiting bloody emesis. She states vomiting has subsided. She did have repeat MRI thoracic and lumbar spine done at [facility], which demonstrates progression of epidermoid tumor.\par She reported worsening numbness in bilateral lower extremities as well as weakness of bilateral lower extremities. Uses a walker for assistance with walking. Reports occasional falls. \par \par MRI thoracic and lumbar spine done in May 2021 demonstrated slight increase in residual epidermoid tumor. Due to high risk of complications with repeat surgery as well as minimal increase in residual tumor, surgical resection not recommend. Plan made to repeat MRI thoracic and lumbar spine with and without contrast in one year.\par \par Patient presented to office 1/28/22:\par She went to [facility] ED 1/27/22 for worsening back pain and bilateral leg "shaking". Happens almost everyday, per patient and sister. She currently denies leg numbness but reports numbness when legs are "shaking". She is not able to ambulate far, uses rolling walker. She was given Morphine for pain which helped with pain and aid with ambulation. She is currently on Gabapentin, Flexeril, Oxycontin with moderate pain control. She is wearing diapers for bowel incontinence, which is baseline.\par She did not get Jan 2022 MRI thoracic. \par Recommended MRI lumbar and thoracic spine w/wo contrast; RTC after completion to review. 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 . She endorsed continued worsening back pain that radiates down bilateral legs, now with numbness/ tingling to feet and toes bilaterally, spastic gait, baseline urinary/fecal incontinence.\par \par On 3/16/22, she underwent thoracolumbar laminectomy for subtotal resection of intradural, intramedullary and extramedullary tumor, T10-L3. A tumor capsule was stuck to nerve root and could not be safely peeled away from functional nerve roots. The tumor capsule was left in place.\par \par Final Path: spine tumor capsule-dermoid cyst\par Consider RT per [facility] Tumor [facility].\par \par She was discharged on 3/22/22.\par \par On 4/8//22 follow up:\par Patient is now home from rehab since April 2. She participates in physical therapy 2x/week.\par She reports no change in pre-op symptoms after surgery. Pain is under control with medications.\par She reports b/l leg swelling but denies calf pain when ambulating, or calf tenderness/warmth to touch.\par Patient and nephew reported redness and pruritus at surgical incision site without drainage, but significantly improved today. She endorses taking daily showers.\par No radiation planned for minimal residual tumor capsule stuck to nerve per Dr. Bernabe, can consider if there is progression on followup MRI. Will follow up with a repeat MRI thoracic/lumbar w/wo contrast in 6 months (Sept 2022) to assess tumor stability.\par \par On 5/6/22 follow up:\par She presents for 1 month post-op follow up.\par She went to [facility] ED last week for nausea then to [facility] ED last Friday for nausea and bloody emesis. Pending GI appointment on 5.24.22. She is on Pantoprazole for GI protection.\par Overall doing well s/ lami. Gait is improved.\par She showers daily.\par Most of surgical scab removed when compared to last visit but there is localized scab on the proximal end of the incision. She denies drainage, fever, chills. \par Wound cleaned by Dr. Bernabe with chlorhexidine and betadine solution. Scab removed. There is a 1 inch opening after scab removal. [place] placed by Dr. Bernabe.\par Keflex 500 mg BID x 10 days and RTO in 2 weeks for wound check.\par \par 6/17/22 follow up:\par Wound healing well. Due for MRI in September.\par \par 12/2/2022 visit\par Returns today to review MRI thoracic and lumbar spine with and without contrast from 11/14/22. \par She reports low back pain as well as occasional radiating leg pain (LEFT greater than RIGHT). \par repeat MRI T/L spine on 11/14/22 reviewed today which showed stable residual dermoid tumor. Plan was made to repeat MRI T/L spine in 6 months

## 2023-09-02 NOTE — ED ADULT NURSE NOTE - NURSING MUSC EXTREMITY NORMAL ROM
The patient is a 74y Female complaining of headache. left upper extremity/left lower extremity/right upper extremity

## 2023-10-06 NOTE — ED PROVIDER NOTE - CCCP TRG CHIEF CMPLNT
-Apply a Foam dressing to the Coccyx area Q 72hrs PRN  -Elevate/float the patients heels using heel protectors and reposition the patient Q 2hrs using wedges or pillows
vomiting blood

## 2023-11-14 ENCOUNTER — EMERGENCY (EMERGENCY)
Facility: HOSPITAL | Age: 38
LOS: 1 days | Discharge: HOME | End: 2023-11-14
Attending: EMERGENCY MEDICINE | Admitting: EMERGENCY MEDICINE
Payer: COMMERCIAL

## 2023-11-14 VITALS
HEART RATE: 70 BPM | WEIGHT: 169.98 LBS | SYSTOLIC BLOOD PRESSURE: 100 MMHG | TEMPERATURE: 98 F | RESPIRATION RATE: 20 BRPM | OXYGEN SATURATION: 97 % | HEIGHT: 63 IN | DIASTOLIC BLOOD PRESSURE: 68 MMHG

## 2023-11-14 VITALS
HEART RATE: 70 BPM | SYSTOLIC BLOOD PRESSURE: 91 MMHG | OXYGEN SATURATION: 98 % | RESPIRATION RATE: 18 BRPM | DIASTOLIC BLOOD PRESSURE: 65 MMHG

## 2023-11-14 DIAGNOSIS — Z98.890 OTHER SPECIFIED POSTPROCEDURAL STATES: Chronic | ICD-10-CM

## 2023-11-14 DIAGNOSIS — Z90.49 ACQUIRED ABSENCE OF OTHER SPECIFIED PARTS OF DIGESTIVE TRACT: Chronic | ICD-10-CM

## 2023-11-14 DIAGNOSIS — D49.7 NEOPLASM OF UNSPECIFIED BEHAVIOR OF ENDOCRINE GLANDS AND OTHER PARTS OF NERVOUS SYSTEM: Chronic | ICD-10-CM

## 2023-11-14 PROCEDURE — 72131 CT LUMBAR SPINE W/O DYE: CPT | Mod: 26,MA

## 2023-11-14 PROCEDURE — 72128 CT CHEST SPINE W/O DYE: CPT | Mod: 26,MA

## 2023-11-14 PROCEDURE — 99284 EMERGENCY DEPT VISIT MOD MDM: CPT

## 2023-11-14 RX ORDER — CYCLOBENZAPRINE HYDROCHLORIDE 10 MG/1
1 TABLET, FILM COATED ORAL
Qty: 21 | Refills: 0
Start: 2023-11-14 | End: 2023-11-20

## 2023-11-14 NOTE — ED ADULT NURSE NOTE - IS THE PATIENT ABLE TO BE SCREENED?
Electrodesiccation And Curettage Text: The wound bed was treated with electrodesiccation and curettage after the biopsy was performed. Size Of Lesion In Cm: 0 Render Post-Care Instructions In Note?: yes Billing Type: Third-Party Bill Bill [id number] For Specimen Handling/Conveyance To Laboratory?: no Hemostasis: Aluminum Chloride Wound Care: Polysporin ointment Notification Instructions: Patient will be notified of biopsy results. However, patient instructed to call the office if not contacted within 2 weeks. Consent: Written consent was obtained and risks were reviewed including but not limited to scarring, infection, bleeding, scabbing, incomplete removal, nerve damage and allergy to anesthesia. Electrodesiccation Text: The wound bed was treated with electrodesiccation after the biopsy was performed. Silver Nitrate Text: The wound bed was treated with silver nitrate after the biopsy was performed. Detail Level: Detailed Yes Biopsy Type: H and E Post-Care Instructions: I reviewed with the patient in detail post-care instructions: If your area was covered with a Band-Aid, remove with your next bath and gently clean daily with soap and water.  Do not use hydrogen peroxide. Apply a thin later of Aquaphor/Vaseline and cover with a Band-Aid.  The goal is to keep the wound moist to prevent a scab. It is normal to have a little redness 1/8 inch around the area; however, if increased redness, pain, swelling or discolored drainage occurs, call the office.  If bleeding occurs, hold steady pressure for 10-15 minutes to stop it.  Our office normally receives your results in 7-10 business days and will call or email you when we receive them.  If you do not hear from us in two weeks, please call us. Type Of Destruction Used: Curettage Dressing: bandage Cryotherapy Text: The wound bed was treated with cryotherapy after the biopsy was performed. Biopsy Method: sterile single edge surgical blade Curettage Text: The wound bed was treated with curettage after the biopsy was performed. Anesthesia Volume In Cc: 0.5 Anesthesia Type: 1% lidocaine without epinephrine and a 1:10 solution of 8.4% sodium bicarbonate

## 2023-11-14 NOTE — ED ADULT TRIAGE NOTE - CHIEF COMPLAINT QUOTE
Pt presents to ED here for back pain s/p fall [place] last night. Denies head strike or LOC. Pt has hx of spinal surgery on 2022. Pt A&Ox4, conversive in full sentences and ambulates.

## 2023-11-14 NOTE — ED PROVIDER NOTE - PATIENT PORTAL LINK FT
You can access the FollowMyHealth Patient Portal offered by [facility] by registering at the following website: [email or web address]. By joining [employer]’s FollowMyHealth portal, you will also be able to view your health information using other applications (apps) compatible with our system.

## 2023-11-14 NOTE — ED ADULT NURSE NOTE - OBJECTIVE STATEMENT
Pt A&Ox4 presents to ED with complaints of back pain. Pt reports yesterday she had mechanical fall down stairs and landed on back. Pt denies LOC or headstrike or thinners. Pt has pain in middle to lower back. Bruising noted to back. Pt reports hx of tumor on spine with hx of spinal surgery in 2022. Pt told to come in by provider. Endorses numbness/tingling in lower back to lower extremities, but reports "that has been chronic." Pt has full strength in all four extremities. Ambulates with cane at baseline. Uses straight catheterization to remove urine. Denies chest pain, shortness of breath, fevers/chills, nausea/vomiting, headache, changes in vision. Heat pack provided.

## 2023-11-14 NOTE — ED PROVIDER NOTE - CLINICAL SUMMARY MEDICAL DECISION MAKING FREE TEXT BOX
39 y/o F, with spina bifida and hx of prior multiple spinal surgeries, now here with back pain after fall on subway stairs. No new neuro deficits. Plan to CT T and L spine to eval for fx or hardware problem and attempt to control pain. If CT okay, will DC w/ pain meds and spine f/u.

## 2023-11-14 NOTE — ED PROVIDER NOTE - NSICDXPASTMEDICALHX_GEN_ALL_CORE_FT
PAST MEDICAL HISTORY:  Bladder disorder pt self catheterizes    Depression     Insomnia     Tumor spinal     Bladder non-tender and non-distended. Urine clear yellow.

## 2023-11-14 NOTE — ED ADULT NURSE REASSESSMENT NOTE - NS ED NURSE REASSESS COMMENT FT1
Assumed care of pt. at 2000  Pt. placed for discharge at that time  Attempted to locate pt. several times  Called pt. at the "self" number on file  Someone answered the phone and made this RN aware that she already left the ED and was in a taxi  This RN did not see, treat or evaluate pt. prior to her leaving

## 2023-11-14 NOTE — ED PROVIDER NOTE - OBJECTIVE STATEMENT
37 y/o F, PMHx of spina bifida, depression, and spinal epidermoid tumor s/p multiple resections, s/p T12-L3 laminectomies for resection of recurrent intradural extramedullary spinal tumor on 3/16/22 by Dr. Bernabe, depression/anxiety, insomnia, GERD, and chronic urinary incontinence (has been intermittently self-catheterizing for many years), now presenting to the ED today after fall on subway stairs yesterday at 7PM. Pt fell down entire staircase and slid down on her back with resulting contusion on back and back pain of lower and mid back. Pain worse with movement. Pt reports discomfort at R leg and at mid thigh. Pt able to walk. Pt denies numbness and change in usual urinary incontinence which is chronic.

## 2023-11-14 NOTE — ED PROVIDER NOTE - PHYSICAL EXAMINATION
VITAL SIGNS: I have reviewed nursing notes and confirm.  CONSTITUTIONAL: Well-developed; well-nourished; in no acute distress.  SKIN: Agree with RN documentation regarding decubitus evaluation. Remainder of skin exam is warm and dry, no acute rash.  HEAD: Normocephalic; atraumatic.  EYES: PERRL, EOM intact; conjunctiva and sclera clear.  ENT: No nasal discharge; airway clear.  NECK: Supple; non tender.  CARD: S1, S2 normal; no murmurs, gallops, or rubs. Regular rate and rhythm.  RESP: No wheezes, rales or rhonchi.  ABD: Normal bowel sounds; soft; non-distended; non-tender; no hepatosplenomegaly.   MSK: (+) Diffused tenderness to mid T and entire central L spine. +Vertical surgical scar noted. No upper T or C spinal tenderness. 4 inch contusion to lateral T spine, on R side. (+) Tender. Negative straight leg raise.   EXT: Normal ROM. No clubbing, cyanosis or edema.  NEURO: Alert, oriented. Grossly unremarkable.  PSYCH: Cooperative, appropriate.

## 2023-11-16 PROCEDURE — 99284 EMERGENCY DEPT VISIT MOD MDM: CPT | Mod: 25

## 2023-11-16 PROCEDURE — 72128 CT CHEST SPINE W/O DYE: CPT

## 2023-11-16 PROCEDURE — 72131 CT LUMBAR SPINE W/O DYE: CPT | Mod: MA
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
## 2023-11-17 DIAGNOSIS — M54.50 LOW BACK PAIN, UNSPECIFIED: ICD-10-CM
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
## 2023-12-30 ENCOUNTER — TRANSCRIPTION ENCOUNTER (OUTPATIENT)
Age: 38
End: 2023-12-30

## 2024-01-06 ENCOUNTER — EMERGENCY (EMERGENCY)
Facility: HOSPITAL | Age: 39
LOS: 1 days | Discharge: HOME | End: 2024-01-06
Attending: EMERGENCY MEDICINE | Admitting: EMERGENCY MEDICINE
Payer: COMMERCIAL

## 2024-01-06 VITALS
HEIGHT: 63 IN | RESPIRATION RATE: 16 BRPM | OXYGEN SATURATION: 96 % | TEMPERATURE: 98 F | DIASTOLIC BLOOD PRESSURE: 82 MMHG | WEIGHT: 179.9 LBS | HEART RATE: 95 BPM | SYSTOLIC BLOOD PRESSURE: 112 MMHG

## 2024-01-06 VITALS
OXYGEN SATURATION: 98 % | SYSTOLIC BLOOD PRESSURE: 103 MMHG | RESPIRATION RATE: 18 BRPM | TEMPERATURE: 99 F | DIASTOLIC BLOOD PRESSURE: 62 MMHG | HEART RATE: 73 BPM

## 2024-01-06 DIAGNOSIS — R11.2 NAUSEA WITH VOMITING, UNSPECIFIED: ICD-10-CM

## 2024-01-06 DIAGNOSIS — R07.89 OTHER CHEST PAIN: ICD-10-CM

## 2024-01-06 DIAGNOSIS — Q05.9 SPINA BIFIDA, UNSPECIFIED: ICD-10-CM

## 2024-01-06 DIAGNOSIS — Z90.49 ACQUIRED ABSENCE OF OTHER SPECIFIED PARTS OF DIGESTIVE TRACT: Chronic | ICD-10-CM

## 2024-01-06 DIAGNOSIS — E87.6 HYPOKALEMIA: ICD-10-CM

## 2024-01-06 DIAGNOSIS — K44.9 DIAPHRAGMATIC HERNIA WITHOUT OBSTRUCTION OR GANGRENE: ICD-10-CM

## 2024-01-06 DIAGNOSIS — F32.A DEPRESSION, UNSPECIFIED: ICD-10-CM

## 2024-01-06 DIAGNOSIS — D49.7 NEOPLASM OF UNSPECIFIED BEHAVIOR OF ENDOCRINE GLANDS AND OTHER PARTS OF NERVOUS SYSTEM: Chronic | ICD-10-CM

## 2024-01-06 DIAGNOSIS — Z98.890 OTHER SPECIFIED POSTPROCEDURAL STATES: Chronic | ICD-10-CM

## 2024-01-06 DIAGNOSIS — K21.9 GASTRO-ESOPHAGEAL REFLUX DISEASE WITHOUT ESOPHAGITIS: ICD-10-CM

## 2024-01-06 DIAGNOSIS — Z91.013 ALLERGY TO SEAFOOD: ICD-10-CM

## 2024-01-06 LAB
ALBUMIN SERPL ELPH-MCNC: 4.1 G/DL — SIGNIFICANT CHANGE UP (ref 3.3–5)
ALBUMIN SERPL ELPH-MCNC: 4.1 G/DL — SIGNIFICANT CHANGE UP (ref 3.3–5)
ALBUMIN SERPL ELPH-MCNC: 4.4 G/DL — SIGNIFICANT CHANGE UP (ref 3.3–5)
ALBUMIN SERPL ELPH-MCNC: 4.4 G/DL — SIGNIFICANT CHANGE UP (ref 3.3–5)
ALP SERPL-CCNC: 103 U/L — SIGNIFICANT CHANGE UP (ref 40–120)
ALP SERPL-CCNC: 103 U/L — SIGNIFICANT CHANGE UP (ref 40–120)
ALP SERPL-CCNC: SIGNIFICANT CHANGE UP (ref 40–120)
ALP SERPL-CCNC: SIGNIFICANT CHANGE UP (ref 40–120)
ALT FLD-CCNC: 7 U/L — LOW (ref 10–45)
ALT FLD-CCNC: 7 U/L — LOW (ref 10–45)
ALT FLD-CCNC: SIGNIFICANT CHANGE UP (ref 10–45)
ALT FLD-CCNC: SIGNIFICANT CHANGE UP (ref 10–45)
ANION GAP SERPL CALC-SCNC: 12 MMOL/L — SIGNIFICANT CHANGE UP (ref 5–17)
ANION GAP SERPL CALC-SCNC: 12 MMOL/L — SIGNIFICANT CHANGE UP (ref 5–17)
ANION GAP SERPL CALC-SCNC: 14 MMOL/L — SIGNIFICANT CHANGE UP (ref 5–17)
ANION GAP SERPL CALC-SCNC: 14 MMOL/L — SIGNIFICANT CHANGE UP (ref 5–17)
ANION GAP SERPL CALC-SCNC: 8 MMOL/L — SIGNIFICANT CHANGE UP (ref 5–17)
ANION GAP SERPL CALC-SCNC: 8 MMOL/L — SIGNIFICANT CHANGE UP (ref 5–17)
AST SERPL-CCNC: 12 U/L — SIGNIFICANT CHANGE UP (ref 10–40)
AST SERPL-CCNC: 12 U/L — SIGNIFICANT CHANGE UP (ref 10–40)
AST SERPL-CCNC: SIGNIFICANT CHANGE UP (ref 10–40)
AST SERPL-CCNC: SIGNIFICANT CHANGE UP (ref 10–40)
BASE EXCESS BLDV CALC-SCNC: 3.2 MMOL/L — HIGH (ref -2–3)
BASE EXCESS BLDV CALC-SCNC: 3.2 MMOL/L — HIGH (ref -2–3)
BASOPHILS # BLD AUTO: 0.04 K/UL — SIGNIFICANT CHANGE UP (ref 0–0.2)
BASOPHILS # BLD AUTO: 0.04 K/UL — SIGNIFICANT CHANGE UP (ref 0–0.2)
BASOPHILS NFR BLD AUTO: 0.3 % — SIGNIFICANT CHANGE UP (ref 0–2)
BASOPHILS NFR BLD AUTO: 0.3 % — SIGNIFICANT CHANGE UP (ref 0–2)
BILIRUB SERPL-MCNC: 1.1 MG/DL — SIGNIFICANT CHANGE UP (ref 0.2–1.2)
BUN SERPL-MCNC: 6 MG/DL — LOW (ref 7–23)
BUN SERPL-MCNC: 6 MG/DL — LOW (ref 7–23)
BUN SERPL-MCNC: 8 MG/DL — SIGNIFICANT CHANGE UP (ref 7–23)
BUN SERPL-MCNC: 8 MG/DL — SIGNIFICANT CHANGE UP (ref 7–23)
BUN SERPL-MCNC: 9 MG/DL — SIGNIFICANT CHANGE UP (ref 7–23)
BUN SERPL-MCNC: 9 MG/DL — SIGNIFICANT CHANGE UP (ref 7–23)
CA-I SERPL-SCNC: 1.09 MMOL/L — LOW (ref 1.15–1.33)
CA-I SERPL-SCNC: 1.09 MMOL/L — LOW (ref 1.15–1.33)
CALCIUM SERPL-MCNC: 8.3 MG/DL — LOW (ref 8.4–10.5)
CALCIUM SERPL-MCNC: 8.3 MG/DL — LOW (ref 8.4–10.5)
CALCIUM SERPL-MCNC: 9.6 MG/DL — SIGNIFICANT CHANGE UP (ref 8.4–10.5)
CALCIUM SERPL-MCNC: 9.6 MG/DL — SIGNIFICANT CHANGE UP (ref 8.4–10.5)
CALCIUM SERPL-MCNC: 9.9 MG/DL — SIGNIFICANT CHANGE UP (ref 8.4–10.5)
CALCIUM SERPL-MCNC: 9.9 MG/DL — SIGNIFICANT CHANGE UP (ref 8.4–10.5)
CHLORIDE SERPL-SCNC: 103 MMOL/L — SIGNIFICANT CHANGE UP (ref 96–108)
CHLORIDE SERPL-SCNC: 103 MMOL/L — SIGNIFICANT CHANGE UP (ref 96–108)
CHLORIDE SERPL-SCNC: 94 MMOL/L — LOW (ref 96–108)
CHLORIDE SERPL-SCNC: 94 MMOL/L — LOW (ref 96–108)
CHLORIDE SERPL-SCNC: 96 MMOL/L — SIGNIFICANT CHANGE UP (ref 96–108)
CHLORIDE SERPL-SCNC: 96 MMOL/L — SIGNIFICANT CHANGE UP (ref 96–108)
CO2 BLDV-SCNC: 29.9 MMOL/L — HIGH (ref 22–26)
CO2 BLDV-SCNC: 29.9 MMOL/L — HIGH (ref 22–26)
CO2 SERPL-SCNC: 26 MMOL/L — SIGNIFICANT CHANGE UP (ref 22–31)
CO2 SERPL-SCNC: 26 MMOL/L — SIGNIFICANT CHANGE UP (ref 22–31)
CO2 SERPL-SCNC: 33 MMOL/L — HIGH (ref 22–31)
CREAT SERPL-MCNC: 0.64 MG/DL — SIGNIFICANT CHANGE UP (ref 0.5–1.3)
CREAT SERPL-MCNC: 0.64 MG/DL — SIGNIFICANT CHANGE UP (ref 0.5–1.3)
CREAT SERPL-MCNC: 0.71 MG/DL — SIGNIFICANT CHANGE UP (ref 0.5–1.3)
CREAT SERPL-MCNC: 0.71 MG/DL — SIGNIFICANT CHANGE UP (ref 0.5–1.3)
CREAT SERPL-MCNC: 0.76 MG/DL — SIGNIFICANT CHANGE UP (ref 0.5–1.3)
CREAT SERPL-MCNC: 0.76 MG/DL — SIGNIFICANT CHANGE UP (ref 0.5–1.3)
EGFR: 102 ML/MIN/1.73M2 — SIGNIFICANT CHANGE UP
EGFR: 102 ML/MIN/1.73M2 — SIGNIFICANT CHANGE UP
EGFR: 111 ML/MIN/1.73M2 — SIGNIFICANT CHANGE UP
EGFR: 111 ML/MIN/1.73M2 — SIGNIFICANT CHANGE UP
EGFR: 115 ML/MIN/1.73M2 — SIGNIFICANT CHANGE UP
EGFR: 115 ML/MIN/1.73M2 — SIGNIFICANT CHANGE UP
EOSINOPHIL # BLD AUTO: 0.01 K/UL — SIGNIFICANT CHANGE UP (ref 0–0.5)
EOSINOPHIL # BLD AUTO: 0.01 K/UL — SIGNIFICANT CHANGE UP (ref 0–0.5)
EOSINOPHIL NFR BLD AUTO: 0.1 % — SIGNIFICANT CHANGE UP (ref 0–6)
EOSINOPHIL NFR BLD AUTO: 0.1 % — SIGNIFICANT CHANGE UP (ref 0–6)
GAS PNL BLDV: 136 MMOL/L — SIGNIFICANT CHANGE UP (ref 136–145)
GAS PNL BLDV: 136 MMOL/L — SIGNIFICANT CHANGE UP (ref 136–145)
GAS PNL BLDV: SIGNIFICANT CHANGE UP
GAS PNL BLDV: SIGNIFICANT CHANGE UP
GLUCOSE SERPL-MCNC: 100 MG/DL — HIGH (ref 70–99)
GLUCOSE SERPL-MCNC: 100 MG/DL — HIGH (ref 70–99)
GLUCOSE SERPL-MCNC: 105 MG/DL — HIGH (ref 70–99)
GLUCOSE SERPL-MCNC: 105 MG/DL — HIGH (ref 70–99)
GLUCOSE SERPL-MCNC: 113 MG/DL — HIGH (ref 70–99)
GLUCOSE SERPL-MCNC: 113 MG/DL — HIGH (ref 70–99)
HCG SERPL-ACNC: <0 MIU/ML — SIGNIFICANT CHANGE UP
HCG SERPL-ACNC: <0 MIU/ML — SIGNIFICANT CHANGE UP
HCO3 BLDV-SCNC: 28 MMOL/L — SIGNIFICANT CHANGE UP (ref 22–29)
HCO3 BLDV-SCNC: 28 MMOL/L — SIGNIFICANT CHANGE UP (ref 22–29)
HCT VFR BLD CALC: 43.7 % — SIGNIFICANT CHANGE UP (ref 34.5–45)
HCT VFR BLD CALC: 43.7 % — SIGNIFICANT CHANGE UP (ref 34.5–45)
HGB BLD-MCNC: 15 G/DL — SIGNIFICANT CHANGE UP (ref 11.5–15.5)
HGB BLD-MCNC: 15 G/DL — SIGNIFICANT CHANGE UP (ref 11.5–15.5)
IMM GRANULOCYTES NFR BLD AUTO: 0.3 % — SIGNIFICANT CHANGE UP (ref 0–0.9)
IMM GRANULOCYTES NFR BLD AUTO: 0.3 % — SIGNIFICANT CHANGE UP (ref 0–0.9)
LIDOCAIN IGE QN: 21 U/L — SIGNIFICANT CHANGE UP (ref 7–60)
LIDOCAIN IGE QN: 21 U/L — SIGNIFICANT CHANGE UP (ref 7–60)
LYMPHOCYTES # BLD AUTO: 1.88 K/UL — SIGNIFICANT CHANGE UP (ref 1–3.3)
LYMPHOCYTES # BLD AUTO: 1.88 K/UL — SIGNIFICANT CHANGE UP (ref 1–3.3)
LYMPHOCYTES # BLD AUTO: 13 % — SIGNIFICANT CHANGE UP (ref 13–44)
LYMPHOCYTES # BLD AUTO: 13 % — SIGNIFICANT CHANGE UP (ref 13–44)
MCHC RBC-ENTMCNC: 32.6 PG — SIGNIFICANT CHANGE UP (ref 27–34)
MCHC RBC-ENTMCNC: 32.6 PG — SIGNIFICANT CHANGE UP (ref 27–34)
MCHC RBC-ENTMCNC: 34.3 GM/DL — SIGNIFICANT CHANGE UP (ref 32–36)
MCHC RBC-ENTMCNC: 34.3 GM/DL — SIGNIFICANT CHANGE UP (ref 32–36)
MCV RBC AUTO: 95 FL — SIGNIFICANT CHANGE UP (ref 80–100)
MCV RBC AUTO: 95 FL — SIGNIFICANT CHANGE UP (ref 80–100)
MONOCYTES # BLD AUTO: 0.92 K/UL — HIGH (ref 0–0.9)
MONOCYTES # BLD AUTO: 0.92 K/UL — HIGH (ref 0–0.9)
MONOCYTES NFR BLD AUTO: 6.4 % — SIGNIFICANT CHANGE UP (ref 2–14)
MONOCYTES NFR BLD AUTO: 6.4 % — SIGNIFICANT CHANGE UP (ref 2–14)
NEUTROPHILS # BLD AUTO: 11.55 K/UL — HIGH (ref 1.8–7.4)
NEUTROPHILS # BLD AUTO: 11.55 K/UL — HIGH (ref 1.8–7.4)
NEUTROPHILS NFR BLD AUTO: 79.9 % — HIGH (ref 43–77)
NEUTROPHILS NFR BLD AUTO: 79.9 % — HIGH (ref 43–77)
NRBC # BLD: 0 /100 WBCS — SIGNIFICANT CHANGE UP (ref 0–0)
NRBC # BLD: 0 /100 WBCS — SIGNIFICANT CHANGE UP (ref 0–0)
PCO2 BLDV: 45 MMHG — HIGH (ref 39–42)
PCO2 BLDV: 45 MMHG — HIGH (ref 39–42)
PH BLDV: 7.41 — SIGNIFICANT CHANGE UP (ref 7.32–7.43)
PH BLDV: 7.41 — SIGNIFICANT CHANGE UP (ref 7.32–7.43)
PLATELET # BLD AUTO: 340 K/UL — SIGNIFICANT CHANGE UP (ref 150–400)
PLATELET # BLD AUTO: 340 K/UL — SIGNIFICANT CHANGE UP (ref 150–400)
PO2 BLDV: 52 MMHG — HIGH (ref 25–45)
PO2 BLDV: 52 MMHG — HIGH (ref 25–45)
POTASSIUM BLDV-SCNC: 3.4 MMOL/L — LOW (ref 3.5–5.1)
POTASSIUM BLDV-SCNC: 3.4 MMOL/L — LOW (ref 3.5–5.1)
POTASSIUM SERPL-MCNC: 2.9 MMOL/L — CRITICAL LOW (ref 3.5–5.3)
POTASSIUM SERPL-MCNC: 2.9 MMOL/L — CRITICAL LOW (ref 3.5–5.3)
POTASSIUM SERPL-MCNC: 3.8 MMOL/L — SIGNIFICANT CHANGE UP (ref 3.5–5.3)
POTASSIUM SERPL-MCNC: 3.8 MMOL/L — SIGNIFICANT CHANGE UP (ref 3.5–5.3)
POTASSIUM SERPL-MCNC: 9.7 MMOL/L — CRITICAL HIGH (ref 3.5–5.3)
POTASSIUM SERPL-MCNC: 9.7 MMOL/L — CRITICAL HIGH (ref 3.5–5.3)
POTASSIUM SERPL-MCNC: SIGNIFICANT CHANGE UP (ref 3.5–5.3)
POTASSIUM SERPL-MCNC: SIGNIFICANT CHANGE UP (ref 3.5–5.3)
POTASSIUM SERPL-SCNC: 2.9 MMOL/L — CRITICAL LOW (ref 3.5–5.3)
POTASSIUM SERPL-SCNC: 2.9 MMOL/L — CRITICAL LOW (ref 3.5–5.3)
POTASSIUM SERPL-SCNC: 3.8 MMOL/L — SIGNIFICANT CHANGE UP (ref 3.5–5.3)
POTASSIUM SERPL-SCNC: 3.8 MMOL/L — SIGNIFICANT CHANGE UP (ref 3.5–5.3)
POTASSIUM SERPL-SCNC: 9.7 MMOL/L — CRITICAL HIGH (ref 3.5–5.3)
POTASSIUM SERPL-SCNC: 9.7 MMOL/L — CRITICAL HIGH (ref 3.5–5.3)
POTASSIUM SERPL-SCNC: SIGNIFICANT CHANGE UP (ref 3.5–5.3)
POTASSIUM SERPL-SCNC: SIGNIFICANT CHANGE UP (ref 3.5–5.3)
PROT SERPL-MCNC: 7 G/DL — SIGNIFICANT CHANGE UP (ref 6–8.3)
PROT SERPL-MCNC: 7 G/DL — SIGNIFICANT CHANGE UP (ref 6–8.3)
PROT SERPL-MCNC: 7.8 G/DL — SIGNIFICANT CHANGE UP (ref 6–8.3)
PROT SERPL-MCNC: 7.8 G/DL — SIGNIFICANT CHANGE UP (ref 6–8.3)
RBC # BLD: 4.6 M/UL — SIGNIFICANT CHANGE UP (ref 3.8–5.2)
RBC # BLD: 4.6 M/UL — SIGNIFICANT CHANGE UP (ref 3.8–5.2)
RBC # FLD: 12.9 % — SIGNIFICANT CHANGE UP (ref 10.3–14.5)
RBC # FLD: 12.9 % — SIGNIFICANT CHANGE UP (ref 10.3–14.5)
SAO2 % BLDV: 83.3 % — SIGNIFICANT CHANGE UP (ref 67–88)
SAO2 % BLDV: 83.3 % — SIGNIFICANT CHANGE UP (ref 67–88)
SODIUM SERPL-SCNC: 137 MMOL/L — SIGNIFICANT CHANGE UP (ref 135–145)
SODIUM SERPL-SCNC: 137 MMOL/L — SIGNIFICANT CHANGE UP (ref 135–145)
SODIUM SERPL-SCNC: 141 MMOL/L — SIGNIFICANT CHANGE UP (ref 135–145)
TROPONIN T, HIGH SENSITIVITY RESULT: 7 NG/L — SIGNIFICANT CHANGE UP (ref 0–51)
WBC # BLD: 14.45 K/UL — HIGH (ref 3.8–10.5)
WBC # BLD: 14.45 K/UL — HIGH (ref 3.8–10.5)
WBC # FLD AUTO: 14.45 K/UL — HIGH (ref 3.8–10.5)
WBC # FLD AUTO: 14.45 K/UL — HIGH (ref 3.8–10.5)

## 2024-01-06 PROCEDURE — 99284 EMERGENCY DEPT VISIT MOD MDM: CPT | Mod: 25

## 2024-01-06 PROCEDURE — 80053 COMPREHEN METABOLIC PANEL: CPT

## 2024-01-06 PROCEDURE — 84702 CHORIONIC GONADOTROPIN TEST: CPT

## 2024-01-06 PROCEDURE — 99285 EMERGENCY DEPT VISIT HI MDM: CPT

## 2024-01-06 PROCEDURE — 96375 TX/PRO/DX INJ NEW DRUG ADDON: CPT

## 2024-01-06 PROCEDURE — 36415 COLL VENOUS BLD VENIPUNCTURE: CPT

## 2024-01-06 PROCEDURE — 83690 ASSAY OF LIPASE: CPT

## 2024-01-06 PROCEDURE — 83735 ASSAY OF MAGNESIUM: CPT

## 2024-01-06 PROCEDURE — 84132 ASSAY OF SERUM POTASSIUM: CPT

## 2024-01-06 PROCEDURE — 82330 ASSAY OF CALCIUM: CPT

## 2024-01-06 PROCEDURE — 96372 THER/PROPH/DIAG INJ SC/IM: CPT | Mod: XU

## 2024-01-06 PROCEDURE — 71046 X-RAY EXAM CHEST 2 VIEWS: CPT | Mod: 26

## 2024-01-06 PROCEDURE — 96376 TX/PRO/DX INJ SAME DRUG ADON: CPT

## 2024-01-06 PROCEDURE — 82803 BLOOD GASES ANY COMBINATION: CPT

## 2024-01-06 PROCEDURE — 84484 ASSAY OF TROPONIN QUANT: CPT

## 2024-01-06 PROCEDURE — 71046 X-RAY EXAM CHEST 2 VIEWS: CPT

## 2024-01-06 PROCEDURE — 85025 COMPLETE CBC W/AUTO DIFF WBC: CPT

## 2024-01-06 PROCEDURE — 84295 ASSAY OF SERUM SODIUM: CPT

## 2024-01-06 PROCEDURE — 96361 HYDRATE IV INFUSION ADD-ON: CPT

## 2024-01-06 PROCEDURE — 80048 BASIC METABOLIC PNL TOTAL CA: CPT

## 2024-01-06 PROCEDURE — 96374 THER/PROPH/DIAG INJ IV PUSH: CPT

## 2024-01-06 RX ORDER — POTASSIUM CHLORIDE 20 MEQ
40 PACKET (EA) ORAL ONCE
Refills: 0 | Status: COMPLETED | OUTPATIENT
Start: 2024-01-06 | End: 2024-01-06

## 2024-01-06 RX ORDER — SODIUM CHLORIDE 9 MG/ML
1000 INJECTION INTRAMUSCULAR; INTRAVENOUS; SUBCUTANEOUS ONCE
Refills: 0 | Status: COMPLETED | OUTPATIENT
Start: 2024-01-06 | End: 2024-01-06

## 2024-01-06 RX ORDER — LIDOCAINE 4 G/100G
10 CREAM TOPICAL ONCE
Refills: 0 | Status: COMPLETED | OUTPATIENT
Start: 2024-01-06 | End: 2024-01-06

## 2024-01-06 RX ORDER — PANTOPRAZOLE SODIUM 20 MG/1
40 TABLET, DELAYED RELEASE ORAL ONCE
Refills: 0 | Status: COMPLETED | OUTPATIENT
Start: 2024-01-06 | End: 2024-01-06

## 2024-01-06 RX ORDER — POTASSIUM CHLORIDE 20 MEQ
10 PACKET (EA) ORAL
Refills: 0 | Status: COMPLETED | OUTPATIENT
Start: 2024-01-06 | End: 2024-01-06

## 2024-01-06 RX ORDER — ACETAMINOPHEN 500 MG
1000 TABLET ORAL ONCE
Refills: 0 | Status: COMPLETED | OUTPATIENT
Start: 2024-01-06 | End: 2024-01-06

## 2024-01-06 RX ADMIN — LIDOCAINE 10 MILLILITER(S): 4 CREAM TOPICAL at 04:49

## 2024-01-06 RX ADMIN — Medication 5 MILLILITER(S): at 04:49

## 2024-01-06 RX ADMIN — Medication 10 MILLIEQUIVALENT(S): at 07:48

## 2024-01-06 RX ADMIN — SODIUM CHLORIDE 1000 MILLILITER(S): 9 INJECTION INTRAMUSCULAR; INTRAVENOUS; SUBCUTANEOUS at 06:07

## 2024-01-06 RX ADMIN — Medication 200 MILLIGRAM(S): at 05:27

## 2024-01-06 RX ADMIN — PANTOPRAZOLE SODIUM 40 MILLIGRAM(S): 20 TABLET, DELAYED RELEASE ORAL at 04:25

## 2024-01-06 RX ADMIN — Medication 100 MILLIEQUIVALENT(S): at 07:44

## 2024-01-06 RX ADMIN — Medication 40 MILLIEQUIVALENT(S): at 07:48

## 2024-01-06 RX ADMIN — Medication 100 MILLIEQUIVALENT(S): at 05:55

## 2024-01-06 RX ADMIN — Medication 40 MILLIEQUIVALENT(S): at 09:44

## 2024-01-06 RX ADMIN — SODIUM CHLORIDE 1000 MILLILITER(S): 9 INJECTION INTRAMUSCULAR; INTRAVENOUS; SUBCUTANEOUS at 04:24

## 2024-01-06 NOTE — ED PROVIDER NOTE - OBJECTIVE STATEMENT
37 y/o F, PMHx of spina bifida, depression, and spinal epidermoid tumor s/p multiple resections, s/p T12-L3 laminectomies for resection of recurrent intradural extramedullary spinal tumor on 3/16/22 by Dr. Bernabe, depression/anxiety, insomnia, GERD, and chronic urinary incontinence (has been intermittently self-catheterizing for many years), c/o sharp midsternal chest pain, n/v x 1 week. Pt reports symptoms after eating. Seen at [facility] on 12/30 and treated for a UTI, then again on 1/2 and was diagnosed with gastritis and sent home on pepcid and zofran. Pt states still having symptoms. States in the past she has had these symptoms when she is on her menstrual cycle but usually stop after she is done with her period. Initially symptoms started with her period but have persisted. Denies fever, chills, cough, sob, palpitations, back pain, abd pain. Denies smoking or drug use. States had an EGD years ago which showed gastritis. 39 y/o F, PMHx of spina bifida, depression, and spinal epidermoid tumor s/p multiple resections, s/p T12-L3 laminectomies for resection of recurrent intradural extramedullary spinal tumor on 3/16/22 by Dr. Bernabe, depression/anxiety, insomnia, GERD, and chronic urinary incontinence (has been intermittently self-catheterizing for many years), c/o sharp midsternal chest pain, n/v x 1 week. Pt reports symptoms after eating. Seen at [facility] on 12/30 and treated for a UTI, then again on 1/2 and was diagnosed with gastritis and sent home on pepcid and zofran. Pt states still having symptoms. States in the past she has had these symptoms when she is on her menstrual cycle but usually stop after she is done with her period. Initially symptoms started with her period but have persisted. Denies fever, chills, cough, sob, palpitations, back pain, abd pain. Denies smoking or drug use. States had an EGD years ago which showed gastritis.

## 2024-01-06 NOTE — ED PROVIDER NOTE - WHICH SHOWED
How Severe Are Your Spot(S)?: mild
Have Your Spot(S) Been Treated In The Past?: has not been treated
Hpi Title: Evaluation of Skin Lesions
NSR, no st/t wave changes

## 2024-01-06 NOTE — ED PROVIDER NOTE - ATTENDING APP SHARED VISIT CONTRIBUTION OF CARE
38F hx spina bifida, depression, spinal epidermoid tumor, GERD, c/o midsternal chest pain and n/v for past week. states occurs after eating. no abd pain. no diarrhea. no fevers.   gen- nad  cv -rrr  lungs -ctab  abd - soft, nt, nd  ext -wwp  neuro -aox3, pantoja  n/v, midsternal chest pain, doubt PE, doubt acs, no evidence of surgical abd at this time. possible gerd. labs checked. will check cxr, antiemetics

## 2024-01-06 NOTE — ED ADULT TRIAGE NOTE - CHIEF COMPLAINT QUOTE
Pt. presents with CP x 1 week. Pt. was seen at [facility] on 12/30 and dx with UTI, pt. was feeling worse and went back on 1/2 and dx with gastritis. +N/V. Denies abd pain/fevers. Pt. presents with CP x 1 week. Pt. was seen at [facility] on 12/30 and dx with UTI, pt. was feeling worse and went back on 1/2 and dx with gastritis. +N/V. Denies abd pain/fevers.

## 2024-01-06 NOTE — ED PROVIDER NOTE - CARE PLAN
1 Principal Discharge DX:	Chest pain  Secondary Diagnosis:	Nausea and vomiting  Secondary Diagnosis:	Hypokalemia   Principal Discharge DX:	Hiatal hernia  Secondary Diagnosis:	Nausea and vomiting  Secondary Diagnosis:	Hypokalemia

## 2024-01-06 NOTE — ED ADULT NURSE NOTE - CHIEF COMPLAINT QUOTE
Pt. presents with CP x 1 week. Pt. was seen at [facility] on 12/30 and dx with UTI, pt. was feeling worse and went back on 1/2 and dx with gastritis. +N/V. Denies abd pain/fevers. Pt. presents with CP x 1 week. Pt. was seen at [facility] on 12/30 and dx with UTI, pt. was feeling worse and went back on 1/2 and dx with gastritis. +N/V. Denies abd pain/fevers.

## 2024-01-06 NOTE — ED ADULT NURSE NOTE - OBJECTIVE STATEMENT
Pt received from night shift. Pt arrived to ED c/o CP x 1 week. Pt. was seen at [facility] on 12/30 and dx with UTI, pt. was feeling worse and went back on 1/2 and dx with gastritis. +N/V. Denies abd pain/fevers. Pt received from night shift. Pt arrived to ED c/o CP x 1 week. Pt. was seen at [facility] on 12/30 and dx with UTI, pt. was feeling worse and went back on 1/2 and dx with gastritis. +N/V. Denies abd pain/fevers.

## 2024-01-06 NOTE — ED PROVIDER NOTE - CLINICAL SUMMARY MEDICAL DECISION MAKING FREE TEXT BOX
39 y/o F, PMHx of spina bifida, depression, and spinal epidermoid tumor s/p multiple resections, s/p T12-L3 laminectomies, depression/anxiety, insomnia, GERD, and chronic urinary incontinence (has been intermittently self-catheterizing for many years), c/o sharp midsternal chest pain, n/v x 1 week. Pt reports symptoms after eating. On pepcid and zofran. Pt states still having symptoms.  Denies fever, chills, cough, sob, palpitations, back pain, abd pain. Denies smoking or drug use. States had an EGD years ago which showed gastritis. VSS. EKG NSR, no ischemia +QTc prolongation. PERC neg. PE unremarkable. 39 y/o F, PMHx of spina bifida, depression, and spinal epidermoid tumor s/p multiple resections, s/p T12-L3 laminectomies, depression/anxiety, insomnia, GERD, and chronic urinary incontinence (has been intermittently self-catheterizing for many years), c/o sharp midsternal chest pain, n/v x 1 week. Pt reports symptoms after eating. On pepcid and zofran. Pt states still having symptoms.  Denies fever, chills, cough, sob, palpitations, back pain, abd pain. Denies smoking or drug use. States had an EGD years ago which showed gastritis. VSS. EKG NSR, no ischemia +QTc prolongation. PERC neg. PE unremarkable. likely gastritis vs pud, low suspicion for acs or PE

## 2024-01-06 NOTE — ED ADULT NURSE REASSESSMENT NOTE - NS ED NURSE REASSESS COMMENT FT1
rpt mint top and blood gas drawn and sent out as per PA order set. Pt is noted to be aox3, able to maintain airway, in no acute distress and able to talk in clear full sentences.

## 2024-01-06 NOTE — ED PROVIDER NOTE - NSFOLLOWUPINSTRUCTIONS_ED_ALL_ED_FT
Follow up with your gastroenterologist     Hiatal Hernia    WHAT YOU NEED TO KNOW:    What is a hiatal hernia? A hiatal hernia is a condition that causes part of your stomach to bulge through the hiatus (small opening) in your diaphragm. The part of the stomach may move up and down, or it may get trapped above the diaphragm.  Hiatal Hernia    What increases my risk for a hiatal hernia? The exact cause of a hiatal hernia is not known. You may have been born with a large or weak hiatus. The following may increase your risk for a hiatal hernia:    Obesity    Older age    A medical condition such as diverticulosis or esophagitis    Past esophagus or stomach surgery or trauma, such as from a motor vehicle accident  What are the types of hiatal hernia?    Type I (sliding hiatal hernia): A portion of the stomach slides in and out of the hiatus. This type is the most common and usually causes gastroesophageal reflux disease (GERD). GERD occurs when the esophageal sphincter does not close properly and causes acid reflux. The esophageal sphincter is the lower muscle of the esophagus.    Type II (paraesophageal hiatal hernia): Type II hiatal hernia forms when a part of the stomach squeezes through the hiatus and lies next to the esophagus.    Type III (combined): Type III hiatal hernia is a combination of a sliding and a paraesophageal hiatal hernia.    Type IV (complex paraesophageal hiatal hernia): The whole stomach, the small and large bowels, spleen, pancreas, or liver is pushed up into the chest.  What are the signs and symptoms of a hiatal hernia? The most common symptom is heartburn. This usually occurs after meals and spreads to your neck, jaw, or shoulder. You may have no signs or symptoms, or you may have any of the following:    Abdominal pain, especially in the area just above your bellybutton    Bitter or acid taste in your mouth    Trouble swallowing    Cough or hoarseness    Chest pain or shortness of breath after you eat    Burping or hiccups that happen often    Feeling full quickly after you eat a small amount  How is a hiatal hernia diagnosed? Your healthcare provider will ask about your symptoms and when they started. Tell your provider about other medical conditions you have, your eating habits, and your activities. You may also need any of the following:    An upper GI series test includes x-rays of your esophagus, stomach, and your small intestines. It is also called a barium swallow test. You will be given barium (a chalky liquid) to drink before the pictures are taken. This liquid helps your stomach and intestines show up better on the x-rays. An upper GI series can show if you have an ulcer, a blocked intestine, or other problems.    An endoscopy uses a scope to see the inside of your digestive tract. A scope is a long, bendable tube with a light on the end of it. A camera may be hooked to the scope to take pictures.  Upper Endoscopy      Esophageal manometry measures the pressure within the esophagus and stomach.    Esophageal pH monitoring measures how much acid is in your stomach. A small probe is placed inside the esophagus and stomach to check the pH of your stomach acid. This test also measures the amount of acid that goes into your esophagus.  How is a hiatal hernia treated? Treatment depends on the type of hiatal hernia you have and on your symptoms. You may not need any treatment. Any of the following may be used to treat the hernia or symptoms it causes:    Medicines may be given to relieve heartburn symptoms. These medicines help to decrease or block stomach acid. You may also be given medicines that help to tighten the esophageal sphincter.    Surgery may be done when medicines cannot control your symptoms, or other problems are present. Your healthcare provider may also suggest surgery depending on the type of hernia you have. A surgeon can put your stomach back into its normal location. The surgeon may make the hiatus (hole) smaller and anchor your stomach in your abdomen. Fundoplication is a surgery that wraps the upper part of the stomach around the esophageal sphincter to strengthen it.  How can I manage my symptoms? The following nutrition and lifestyle changes may be recommended to relieve symptoms of heartburn:  Prevent GERD    Do not have food or liquid that make your symptoms worse. These may include spicy foods, fruit juices, alcohol, caffeine, chocolate, and mint.    Eat several small meals during the day. Small meals give your stomach less food to digest.    Do not lie down or bend forward after you eat. Do not eat meals 2 to 3 hours before bedtime. This decreases your risk for reflux.    Maintain a healthy weight. If you not at a healthy weight, weight loss may help relieve your symptoms. Your healthcare provider will tell you what a healthy weight is for you. Your provider can help you create a safe weight loss plan, if needed.    Sleep with your head and upper body elevated at least 6 inches. Use pillows or a foam wedge. You can also put 6-inch blocks under the head of your bed frame.    Do not smoke. Smoking can increase your symptoms of heartburn.  Call your local emergency number (911 in the [place]) if:    You have severe chest pain and sudden trouble breathing.    When should I seek immediate care?    You have severe abdominal pain.    You try to vomit but nothing comes out (retching).    Your bowel movements are black or bloody.    Your vomit looks like coffee grounds or has blood in it.  When should I call my doctor?    Your symptoms are getting worse.    You are losing weight without trying.    You have questions or concerns about your condition or care.  CARE AGREEMENT:    You have the right to help plan your care. Learn about your health condition and how it may be treated. Discuss treatment options with your healthcare providers to decide what care you want to receive. You always have the right to refuse treatment.

## 2024-01-06 NOTE — ED PROVIDER NOTE - PROGRESS NOTE DETAILS
cxr clear, trop 7>7. K 2.9. will supplement. reports nausea impoved cxr with large hiatal hernia, trop 7>7. K 2.9. will supplement. reports nausea impoved rpt potassium uptrending, will d/c to f/u with her pmd

## 2024-01-09 ENCOUNTER — APPOINTMENT (OUTPATIENT)
Dept: GASTROENTEROLOGY | Facility: CLINIC | Age: 39
End: 2024-01-09
Payer: MEDICAID

## 2024-01-09 VITALS
HEART RATE: 96 BPM | BODY MASS INDEX: 29.41 KG/M2 | TEMPERATURE: 96.9 F | SYSTOLIC BLOOD PRESSURE: 108 MMHG | OXYGEN SATURATION: 98 % | HEIGHT: 63 IN | DIASTOLIC BLOOD PRESSURE: 70 MMHG | WEIGHT: 166 LBS | RESPIRATION RATE: 16 BRPM

## 2024-01-09 DIAGNOSIS — R63.4 ABNORMAL WEIGHT LOSS: ICD-10-CM

## 2024-01-09 DIAGNOSIS — R13.10 DYSPHAGIA, UNSPECIFIED: ICD-10-CM

## 2024-01-09 PROCEDURE — 99205 OFFICE O/P NEW HI 60 MIN: CPT

## 2024-01-09 RX ORDER — POLYETHYLENE GLYCOL 3350 AND ELECTROLYTES WITH LEMON FLAVOR 236; 22.74; 6.74; 5.86; 2.97 G/4L; G/4L; G/4L; G/4L; G/4L
236 POWDER, FOR SOLUTION ORAL
Qty: 1 | Refills: 0 | Status: ACTIVE | COMMUNITY
Start: 2024-01-09 | End: 1900-01-01

## 2024-02-26 NOTE — PATIENT PROFILE ADULT - STATED REASON FOR ADMISSION
As per pt, she has been having nausea and vomiting for two weeks. On 4/30.2022, pt had blood tinged emesis. Male

## 2024-03-20 ENCOUNTER — NON-APPOINTMENT (OUTPATIENT)
Age: 39
End: 2024-03-20

## 2024-03-20 RX ORDER — POLYETHYLENE GLYCOL 3350 AND ELECTROLYTES WITH LEMON FLAVOR 236; 22.74; 6.74; 5.86; 2.97 G/4L; G/4L; G/4L; G/4L; G/4L
236 POWDER, FOR SOLUTION ORAL
Qty: 1 | Refills: 0 | Status: ACTIVE | COMMUNITY
Start: 2024-03-20 | End: 1900-01-01

## 2024-03-22 ENCOUNTER — NON-APPOINTMENT (OUTPATIENT)
Age: 39
End: 2024-03-22

## 2024-04-03 ENCOUNTER — RESULT REVIEW (OUTPATIENT)
Age: 39
End: 2024-04-03

## 2024-04-04 ENCOUNTER — APPOINTMENT (OUTPATIENT)
Age: 39
End: 2024-04-04
Payer: MEDICAID

## 2024-04-04 PROCEDURE — 43239 EGD BIOPSY SINGLE/MULTIPLE: CPT

## 2024-04-04 PROCEDURE — 45385 COLONOSCOPY W/LESION REMOVAL: CPT

## 2024-04-05 ENCOUNTER — NON-APPOINTMENT (OUTPATIENT)
Age: 39
End: 2024-04-05

## 2024-04-12 ENCOUNTER — NON-APPOINTMENT (OUTPATIENT)
Age: 39
End: 2024-04-12

## 2024-04-18 ENCOUNTER — TRANSCRIPTION ENCOUNTER (OUTPATIENT)
Age: 39
End: 2024-04-18

## 2024-04-19 ENCOUNTER — NON-APPOINTMENT (OUTPATIENT)
Age: 39
End: 2024-04-19

## 2024-04-25 ENCOUNTER — APPOINTMENT (OUTPATIENT)
Dept: GASTROENTEROLOGY | Facility: CLINIC | Age: 39
End: 2024-04-25
Payer: MEDICAID

## 2024-04-25 VITALS
DIASTOLIC BLOOD PRESSURE: 80 MMHG | TEMPERATURE: 96.8 F | HEIGHT: 63 IN | RESPIRATION RATE: 14 BRPM | HEART RATE: 76 BPM | BODY MASS INDEX: 29.59 KG/M2 | WEIGHT: 167 LBS | SYSTOLIC BLOOD PRESSURE: 113 MMHG | OXYGEN SATURATION: 96 %

## 2024-04-25 DIAGNOSIS — R11.2 NAUSEA WITH VOMITING, UNSPECIFIED: ICD-10-CM

## 2024-04-25 DIAGNOSIS — D12.6 BENIGN NEOPLASM OF COLON, UNSPECIFIED: ICD-10-CM

## 2024-04-25 DIAGNOSIS — D36.9 BENIGN NEOPLASM, UNSPECIFIED SITE: ICD-10-CM

## 2024-04-25 PROCEDURE — 99215 OFFICE O/P EST HI 40 MIN: CPT

## 2024-04-26 NOTE — END OF VISIT
[FreeTextEntry3] : Agree w/ above. Pt w/ hx of dysphagia/odynophagia. Prior EGD w/o significant findings other than squamous papilloma s/p bx but unclear if completely removed. Will repeat EGD in 6-12 months to ensure complete removal. Esophagram for dysphagia, consider HRM pending results, and consider hiatal hernia repair referral.  [Time Spent: ___ minutes] : I have spent [unfilled] minutes of time on the encounter.

## 2024-04-26 NOTE — HISTORY OF PRESENT ILLNESS
[FreeTextEntry1] : 38F PMHx depression, spina bifida, lumbar surgery, s/p subtotal resection of epidermoid tumor in 2013, s/p thoracolumbar laminectomy for resection of intramedullary spinal cord tumor on 6/20/19, s/p thoracolumbar laminectomy for subtotal resection of intradural, intramedullary and extramedullary tumor, T10-L3 on 3/16/22, insomnia, GERD, and chronic urinary incontinence (has been intermittently self-catheterizing for many years) presenting for follow up post EGD and for f/u of odynophagia, nausea/vomiting.  4/25/24: EGD (4/4/24): 4 cm HH (Hill Grade IV), polyp at GEJ, patch of abnormal mucosa near GEJ, abnormal whitish plaque noted 16 cm from incisors, normal stomach, normal duodenum Colonoscopy (4/4/24): solid and liquid stool noted throughout colon. Adequate views achieved with copious irrigation and suctioning. 5-6 mm sessile polyp in DC s/p cold snare, 3-4 mm sessile polyp in AC, s/p cold snare, several hyperplastic polyps in rectosigmoid colon (4-5 mm in size, s/p cold snare x3), poor rectal tone however under sedation  PATH: 1.  Gastric polyp; biopsy: -   Gastric mucosa showing focal mild chronic inflammation Negative for HP,  No IM or dysplasia identified  2.  GE junction; biopsy: -   Squamocolumnar mucosa showing mild chronic inflammation and focal intestinal metaplasia in a background of reactive changes. No dysplasia identified  3.  Stomach; biopsy: -   Gastric mucosa without significant histologic abnormality  4.  Esophageal lesion, 16 cm; biopsy: -   Squamous papilloma  5.  Descending colon; biopsy: -   Tubular adenoma  6.  Sigmoid colon; biopsy: -   Sessile serrate polyp/adenoma  -Notes having no issue with constipation now, goes to the bathroom every couple of days. Take Miralax when needed.  -Notes experiencing midsternal burning occasionally noting reflux which she states doesn't really bother her. Does state though she has troubling swallowing food, nausea/vomiting episodes mainly during her menstrual periods. Feels like she is unable to eat anything during those times. Open to HRM testing if warranted. States she has been off opiods for about 1 year now.   1/9/24: With recent ED visit to [facility] on 1/6/24 with complaints of chest pain x 1 week. Reported symptoms to be most prominent after eating. States in the past she has had these symptoms when she is on her menstrual cycle but usually stop after she is done with her period but this time her symptoms have persisted. Notes shortly after eating feeling chest discomfort and feels like she has to vomit her food afterwards.  Was previously seen by the GI service in May 2022 with complaints of nausea/vomiting. At the time, had been recommended for supportive management as symptoms at the time attributed to ongoing opioid/marijuana use. Continues to smoke marijuana daily. No longer takes opioids.  Recent spinal imaging notable for large hiatal hernia noted on MRI thoracic spine (5/27/23).  Additionally reports a history of constipation. No reported blood in her stools. Reports that she can go for weeks without having a BM, passes flatus. Does not take any laxatives for this as she reports this to be her norm for many years. No associated abdominal pain. Does report losing at least 10 lbs more recently which she attributes to her eating less in the setting of odynophagia.  PMHx - spine tumor capsule-dermoid cyst, depression, spina bifida PSHx - s/p subtotal resection of epidermoid tumor in 2013, s/p thoracolumbar laminectomy for resection of intramedullary spinal cord tumor on 6/20/19, s/p thoracolumbar laminectomy for subtotal resection of intradural, intramedullary and extramedullary tumor, T10-L3 on 3/16/22 Rx - none Supplements/herbs/OTC - denies A/C or NSAIDs? - denies FMHx - no family history of CRC; mother - pancreatic cancer; no IBD Allergies - iodine, shellfish EtOH - denies Smoking - former smoker Drugs - daily marijuana smoker  EGD - no prior Colonoscopy - no prior

## 2024-05-06 NOTE — ED PROVIDER NOTE - PATIENT PORTAL LINK FT
negative You can access the FollowMyHealth Patient Portal offered by [facility] by registering at the following website: [email or web address]. By joining [facility]’s FollowMyHealth portal, you will also be able to view your health information using other applications (apps) compatible with our system. You can access the FollowMyHealth Patient Portal offered by [facility] by registering at the following website: [email or web address]. By joining [facility]’s FollowMyHealth portal, you will also be able to view your health information using other applications (apps) compatible with our system.

## 2024-06-06 ENCOUNTER — APPOINTMENT (OUTPATIENT)
Dept: GASTROENTEROLOGY | Facility: CLINIC | Age: 39
End: 2024-06-06
Payer: MEDICAID

## 2024-06-06 VITALS
OXYGEN SATURATION: 96 % | RESPIRATION RATE: 16 BRPM | SYSTOLIC BLOOD PRESSURE: 105 MMHG | BODY MASS INDEX: 32.02 KG/M2 | HEART RATE: 67 BPM | HEIGHT: 62 IN | TEMPERATURE: 98 F | WEIGHT: 174 LBS | DIASTOLIC BLOOD PRESSURE: 74 MMHG

## 2024-06-06 DIAGNOSIS — K59.09 OTHER CONSTIPATION: ICD-10-CM

## 2024-06-06 DIAGNOSIS — D13.0 BENIGN NEOPLASM OF ESOPHAGUS: ICD-10-CM

## 2024-06-06 DIAGNOSIS — K44.9 DIAPHRAGMATIC HERNIA W/OUT OBSTRUCTION OR GANGRENE: ICD-10-CM

## 2024-06-06 DIAGNOSIS — R13.10 DYSPHAGIA, UNSPECIFIED: ICD-10-CM

## 2024-06-06 PROCEDURE — 99215 OFFICE O/P EST HI 40 MIN: CPT

## 2024-06-06 NOTE — PHYSICAL EXAM
[Alert] : alert [Normal Voice/Communication] : normal voice/communication [Healthy Appearing] : healthy appearing [No Acute Distress] : no acute distress [Sclera] : the sclera and conjunctiva were normal [Hearing Threshold Finger Rub Not [place]] : hearing was normal [Normal Lips/Gums] : the lips and gums were normal [Oropharynx] : the oropharynx was normal [Normal Appearance] : the appearance of the neck was normal [No Neck Mass] : no neck mass was observed [No Respiratory Distress] : no respiratory distress [No Acc Muscle Use] : no accessory muscle use [Respiration, Rhythm And Depth] : normal respiratory rhythm and effort [Abdomen Tenderness] : non-tender [No Masses] : no abdominal mass palpated [Abdomen Soft] : soft [Cervical Lymph Nodes Enlarged Posterior Bilaterally] : no posterior cervical lymphadenopathy [Supraclavicular Lymph Nodes Enlarged Bilaterally] : no supraclavicular lymphadenopathy [Axillary Lymph Nodes Enlarged Bilaterally] : no axillary lymphadenopathy [No CVA Tenderness] : no CVA  tenderness [No Spinal Tenderness] : no spinal tenderness [Abnormal Walk] : normal gait [No Clubbing, Cyanosis] : no clubbing or cyanosis of the fingernails [No Joint Swelling] : no joint swelling seen [Normal Color / Pigmentation] : normal skin color and pigmentation [] : no rash [Cranial Nerves Intact] : cranial nerves 2-12 were intact [Sensation] : the sensory exam was normal to light touch and pinprick [Motor Exam] : the motor exam was normal [Oriented To Time, Place, And Person] : oriented to person, place, and time

## 2024-06-07 NOTE — REVIEW OF SYSTEMS
[As Noted in HPI] : as noted in HPI [Constipation] : constipation [Negative] : Heme/Lymph [Abdominal Pain] : no abdominal pain [Vomiting] : no vomiting [Diarrhea] : no diarrhea [Heartburn] : no heartburn [Melena (black stool)] : no melena [Bleeding] : no bleeding [Fecal Incontinence (soiling)] : no fecal incontinence [Bloating (gassiness)] : no bloating [FreeTextEntry7] : dysphagia

## 2024-06-07 NOTE — END OF VISIT
[FreeTextEntry3] : Agree w/ above - pt w/ hiatal hernia and reports of dysphagia, pending esophagram. Also w/ constipation. Will start Linzess, consider ARM going forward. Repeat EGD in 6 months for squamous papilloma.  [Time Spent: ___ minutes] : I have spent [unfilled] minutes of time on the encounter.

## 2024-06-07 NOTE — ASSESSMENT
[FreeTextEntry1] : 38F PMHx depression, spina bifida, lumbar surgery, s/p subtotal resection of epidermoid tumor in 2013, s/p thoracolumbar laminectomy for resection of intramedullary spinal cord tumor on 6/20/19, s/p thoracolumbar laminectomy for subtotal resection of intradural, intramedullary and extramedullary tumor, T10-L3 on 3/16/22, insomnia, GERD, and chronic urinary incontinence (has been intermittently self-catheterizing for many years) presenting for follow up post EGD and for f/u of odynophagia, nausea/vomiting.  #Hiatal hernia #Nausea/vomiting #Dysphagia With history of prior opioid use however has not been taking for about 1 year however cannot exclude esophageal dysmotility as possible explanation of ongoing symptoms as can see long-term sequelae even with prior opioid use. -Ordered for esophagram to evaluate for esophageal dysmotility, pending results may consider for HRM with CT surgery referral to consider for Hiatal hernia repair  #Squamous papilloma EGD with abnormal whitish plaque noted 16 cm from incisors, tissue sampling c/w squamous papilloma -Given malignancy potential associated with squamous papilloma, recommend repeat EGD in 6 months (Due Oct 2024) with Dr Zayas or myself to re-evaluate papilloma and for resection -Can schedule at next f/u appt   #Chronic constipation #Sessile serrated adenoma #Tubular adenoma Subcm TA and SSA, recommend surveillance colonoscopy in 5 years (due 2029) Failed multiple prior OTC laxatives, provided samples for Linzess 72 mcg daily. Advised to call the clinic reporting response. Will adjust dose PRN  Also noted to have poor rectal tone on colonoscopy, can consider for ARM testing to evaluate for pelvic floor dysfunction if pt non-responsive to pharmacologic therapies   RTC in Sept 2024  Alyx Jenkins, DO Gastroenterology Fellow.

## 2024-06-07 NOTE — HISTORY OF PRESENT ILLNESS
[FreeTextEntry1] : 38F PMHx depression, spina bifida, lumbar surgery, s/p subtotal resection of epidermoid tumor in 2013, s/p thoracolumbar laminectomy for resection of intramedullary spinal cord tumor on 6/20/19, s/p thoracolumbar laminectomy for subtotal resection of intradural, intramedullary and extramedullary tumor, T10-L3 on 3/16/22, insomnia, GERD, and chronic urinary incontinence (has been intermittently self-catheterizing for many years) presenting for follow up post EGD and for f/u of odynophagia, nausea/vomiting.  6/6/24: -Scheduled for esophagram 6/17/24 -Still experiencing midsternal chest discomfort, occurs at rest, while eating or not eating; states undergoing a cardiac evaluation with an outside doctor noting normal findings.  -Hasn't had a BM for a "long time". Passing gas. Not taking any laxatives at this time. Has tried a number of different OTC laxatives including marilax, dulcolax, with no effect. Lactose causes her to have BM. States related to lactose intolerance.    4/25/24: EGD (4/4/24): 4 cm HH (Hill Grade IV), polyp at GEJ, patch of abnormal mucosa near GEJ, abnormal whitish plaque noted 16 cm from incisors, normal stomach, normal duodenum Colonoscopy (4/4/24): solid and liquid stool noted throughout colon. Adequate views achieved with copious irrigation and suctioning. 5-6 mm sessile polyp in DC s/p cold snare, 3-4 mm sessile polyp in AC, s/p cold snare, several hyperplastic polyps in rectosigmoid colon (4-5 mm in size, s/p cold snare x3), poor rectal tone however under sedation  PATH: 1. Gastric polyp; biopsy: - Gastric mucosa showing focal mild chronic inflammation Negative for HP, No IM or dysplasia identified  2. GE junction; biopsy: - Squamocolumnar mucosa showing mild chronic inflammation and focal intestinal metaplasia in a background of reactive changes. No dysplasia identified  3. Stomach; biopsy: - Gastric mucosa without significant histologic abnormality  4. Esophageal lesion, 16 cm; biopsy: - Squamous papilloma  5. Descending colon; biopsy: - Tubular adenoma  6. Sigmoid colon; biopsy: - Sessile serrate polyp/adenoma  -Notes having no issue with constipation now, goes to the bathroom every couple of days. Take Miralax when needed. -Notes experiencing midsternal burning occasionally noting reflux which she states doesn't really bother her. Does state though she has troubling swallowing food, nausea/vomiting episodes mainly during her menstrual periods. Feels like she is unable to eat anything during those times. Open to HRM testing if warranted. States she has been off opiods for about 1 year now.  1/9/24: With recent ED visit to [facility] on 1/6/24 with complaints of chest pain x 1 week. Reported symptoms to be most prominent after eating. States in the past she has had these symptoms when she is on her menstrual cycle but usually stop after she is done with her period but this time her symptoms have persisted. Notes shortly after eating feeling chest discomfort and feels like she has to vomit her food afterwards.  Was previously seen by the GI service in May 2022 with complaints of nausea/vomiting. At the time, had been recommended for supportive management as symptoms at the time attributed to ongoing opioid/marijuana use. Continues to smoke marijuana daily. No longer takes opioids.  Recent spinal imaging notable for large hiatal hernia noted on MRI thoracic spine (5/27/23).  Additionally reports a history of constipation. No reported blood in her stools. Reports that she can go for weeks without having a BM, passes flatus. Does not take any laxatives for this as she reports this to be her norm for many years. No associated abdominal pain. Does report losing at least 10 lbs more recently which she attributes to her eating less in the setting of odynophagia.  PMHx - spine tumor capsule-dermoid cyst, depression, spina bifida PSHx - s/p subtotal resection of epidermoid tumor in 2013, s/p thoracolumbar laminectomy for resection of intramedullary spinal cord tumor on 6/20/19, s/p thoracolumbar laminectomy for subtotal resection of intradural, intramedullary and extramedullary tumor, T10-L3 on 3/16/22 Rx - none Supplements/herbs/OTC - denies A/C or NSAIDs? - denies FMHx - no family history of CRC; mother - pancreatic cancer; no IBD Allergies - iodine, shellfish EtOH - denies Smoking - former smoker Drugs - daily marijuana smoker  EGD - no prior Colonoscopy - no prior

## 2024-06-19 ENCOUNTER — APPOINTMENT (OUTPATIENT)
Dept: RADIOLOGY | Facility: HOSPITAL | Age: 39
End: 2024-06-19
Payer: MEDICAID

## 2024-06-19 ENCOUNTER — OUTPATIENT (OUTPATIENT)
Dept: OUTPATIENT SERVICES | Facility: HOSPITAL | Age: 39
LOS: 1 days | End: 2024-06-19

## 2024-06-19 DIAGNOSIS — D49.7 NEOPLASM OF UNSPECIFIED BEHAVIOR OF ENDOCRINE GLANDS AND OTHER PARTS OF NERVOUS SYSTEM: Chronic | ICD-10-CM

## 2024-06-19 DIAGNOSIS — Z90.49 ACQUIRED ABSENCE OF OTHER SPECIFIED PARTS OF DIGESTIVE TRACT: Chronic | ICD-10-CM

## 2024-06-19 DIAGNOSIS — Z98.890 OTHER SPECIFIED POSTPROCEDURAL STATES: Chronic | ICD-10-CM

## 2024-06-19 PROCEDURE — 74220 X-RAY XM ESOPHAGUS 1CNTRST: CPT | Mod: 26

## 2024-06-19 PROCEDURE — 74220 X-RAY XM ESOPHAGUS 1CNTRST: CPT

## 2024-08-28 ENCOUNTER — NON-APPOINTMENT (OUTPATIENT)
Age: 39
End: 2024-08-28

## 2024-09-03 ENCOUNTER — APPOINTMENT (OUTPATIENT)
Dept: MRI IMAGING | Facility: CLINIC | Age: 39
End: 2024-09-03
Payer: MEDICAID

## 2024-09-03 ENCOUNTER — OUTPATIENT (OUTPATIENT)
Dept: OUTPATIENT SERVICES | Facility: HOSPITAL | Age: 39
LOS: 1 days | End: 2024-09-03

## 2024-09-03 DIAGNOSIS — D49.7 NEOPLASM OF UNSPECIFIED BEHAVIOR OF ENDOCRINE GLANDS AND OTHER PARTS OF NERVOUS SYSTEM: Chronic | ICD-10-CM

## 2024-09-03 DIAGNOSIS — Z90.49 ACQUIRED ABSENCE OF OTHER SPECIFIED PARTS OF DIGESTIVE TRACT: Chronic | ICD-10-CM

## 2024-09-03 DIAGNOSIS — Z98.890 OTHER SPECIFIED POSTPROCEDURAL STATES: Chronic | ICD-10-CM

## 2024-09-03 PROCEDURE — 72157 MRI CHEST SPINE W/O & W/DYE: CPT | Mod: 26

## 2024-09-03 PROCEDURE — 72158 MRI LUMBAR SPINE W/O & W/DYE: CPT | Mod: 26

## 2024-09-11 ENCOUNTER — NON-APPOINTMENT (OUTPATIENT)
Age: 39
End: 2024-09-11

## 2024-09-12 ENCOUNTER — APPOINTMENT (OUTPATIENT)
Dept: GASTROENTEROLOGY | Facility: CLINIC | Age: 39
End: 2024-09-12
Payer: MEDICAID

## 2024-09-12 VITALS
WEIGHT: 180 LBS | HEIGHT: 62 IN | SYSTOLIC BLOOD PRESSURE: 120 MMHG | BODY MASS INDEX: 33.13 KG/M2 | HEART RATE: 53 BPM | DIASTOLIC BLOOD PRESSURE: 76 MMHG | RESPIRATION RATE: 16 BRPM | OXYGEN SATURATION: 96 % | TEMPERATURE: 95.4 F

## 2024-09-12 DIAGNOSIS — K44.9 DIAPHRAGMATIC HERNIA W/OUT OBSTRUCTION OR GANGRENE: ICD-10-CM

## 2024-09-12 DIAGNOSIS — R13.10 DYSPHAGIA, UNSPECIFIED: ICD-10-CM

## 2024-09-12 DIAGNOSIS — K59.09 OTHER CONSTIPATION: ICD-10-CM

## 2024-09-12 DIAGNOSIS — K21.9 GASTRO-ESOPHAGEAL REFLUX DISEASE W/OUT ESOPHAGITIS: ICD-10-CM

## 2024-09-12 PROCEDURE — G2211 COMPLEX E/M VISIT ADD ON: CPT | Mod: NC

## 2024-09-12 PROCEDURE — 99214 OFFICE O/P EST MOD 30 MIN: CPT

## 2024-09-12 RX ORDER — LINACLOTIDE 72 UG/1
72 CAPSULE, GELATIN COATED ORAL
Qty: 60 | Refills: 1 | Status: ACTIVE | COMMUNITY
Start: 2024-09-12 | End: 1900-01-01

## 2024-09-12 NOTE — PHYSICAL EXAM
[Alert] : alert [Normal Voice/Communication] : normal voice/communication [Healthy Appearing] : healthy appearing [No Acute Distress] : no acute distress [Obese (BMI >= 30)] : obese (BMI >= 30) [Sclera] : the sclera and conjunctiva were normal [Hearing Threshold Finger Rub Not [place]] : hearing was normal [Normal Lips/Gums] : the lips and gums were normal [Oropharynx] : the oropharynx was normal [Normal Appearance] : the appearance of the neck was normal [No Neck Mass] : no neck mass was observed [No Respiratory Distress] : no respiratory distress [No Acc Muscle Use] : no accessory muscle use [Respiration, Rhythm And Depth] : normal respiratory rhythm and effort [Abdomen Tenderness] : non-tender [No Masses] : no abdominal mass palpated [Abdomen Soft] : soft [Cervical Lymph Nodes Enlarged Posterior Bilaterally] : no posterior cervical lymphadenopathy [Supraclavicular Lymph Nodes Enlarged Bilaterally] : no supraclavicular lymphadenopathy [Axillary Lymph Nodes Enlarged Bilaterally] : no axillary lymphadenopathy [No CVA Tenderness] : no CVA  tenderness [No Spinal Tenderness] : no spinal tenderness [Abnormal Walk] : normal gait [No Clubbing, Cyanosis] : no clubbing or cyanosis of the fingernails [No Joint Swelling] : no joint swelling seen [Normal Color / Pigmentation] : normal skin color and pigmentation [] : no rash [Cranial Nerves Intact] : cranial nerves 2-12 were intact [Oriented To Time, Place, And Person] : oriented to person, place, and time

## 2024-09-12 NOTE — ASSESSMENT
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  #Hiatal hernia #Reflux #Dysphagia With history of prior opioid use however has not been taking for > 1 year however cannot exclude esophageal dysmotility as possible explanation of ongoing symptoms as can see long-term sequelae even with prior opioid use. -Reviewed results of recent Esophagram (6/19/24) with patient, notable for Moderate size Type 1 hiatal hernia. -Reviewed HRM procedure with patient, would like to proceed to further evaluate hernia size and if with concurrent esophageal dysmotility. Will discussed CT surgery referral to consider for Hiatal hernia repair following HRM - Rx for Omeprazole 40 mg PO daily sent to pharmacy; counseled on how to take medication  - Dietary and lifestyle modifications  #Squamous papilloma EGD with abnormal whitish plaque noted 16 cm from incisors, tissue sampling c/w squamous papilloma -Given malignancy potential associated with squamous papilloma, recommend repeat EGD in 6 months (Due Oct 2024) with Dr Zayas or myself to re-evaluate papilloma and for resection -Scheduled for EGD with Dr Zayas on 9/26/24. At that time, will need close inspection of esophageal lumen to re-evaluate presence of papilloma with plan for resection   #Chronic constipation #Sessile serrated adenoma #Tubular adenoma Subcm TA and SSA, recommend surveillance colonoscopy in 5 years (due 2029) Failed multiple prior OTC laxatives, reported responding to Linzess 72 mcg for the few days she was on it from samples provided. Rx for Linzess 72 mcg daily sent to pharmacy Also noted to have poor rectal tone on colonoscopy, will plan for ARM testing  RTC in 2-3 months for follow up

## 2024-09-12 NOTE — HISTORY OF PRESENT ILLNESS
[FreeTextEntry1] : 39F PMHx depression, spina bifida, lumbar surgery, s/p subtotal resection of epidermoid tumor in 2013, s/p thoracolumbar laminectomy for resection of intramedullary spinal cord tumor on 6/20/19, s/p thoracolumbar laminectomy for subtotal resection of intradural, intramedullary and extramedullary tumor, T10-L3 on 3/16/22, insomnia, GERD, and chronic urinary incontinence (has been intermittently self-catheterizing for many years) presenting for follow up post EGD and for f/u of chronic constipation, reflux, dysphagia.  9/11/24:  -Reports having BMs while on short course of LInzess 71 mcg daily from samples provided at last visit. She's interested in continuing with LInzess going forward in addition to ARM testing - Still reporting dysphagia and intermittent reflux, Esophagram (6/19/24): Moderate size Type 1 hiatal hernia. She would like to go ahead with HRM testing - ROS negative for abdominal pain, unintentional weight loss, nausea/vomiting, Remainder of ROS negative.  6/6/24: -Scheduled for esophagram 6/17/24 -Still experiencing midsternal chest discomfort, occurs at rest, while eating or not eating; states undergoing a cardiac evaluation with an outside doctor noting normal findings. -Hasn't had a BM for a "long time". Passing gas. Not taking any laxatives at this time. Has tried a number of different OTC laxatives including marilax, dulcolax, with no effect. Lactose causes her to have BM. States related to lactose intolerance.   4/25/24: EGD (4/4/24): 4 cm HH (Hill Grade IV), polyp at GEJ, patch of abnormal mucosa near GEJ, abnormal whitish plaque noted 16 cm from incisors, normal stomach, normal duodenum Colonoscopy (4/4/24): solid and liquid stool noted throughout colon. Adequate views achieved with copious irrigation and suctioning. 5-6 mm sessile polyp in DC s/p cold snare, 3-4 mm sessile polyp in AC, s/p cold snare, several hyperplastic polyps in rectosigmoid colon (4-5 mm in size, s/p cold snare x3), poor rectal tone however under sedation  PATH: 1. Gastric polyp; biopsy: - Gastric mucosa showing focal mild chronic inflammation Negative for HP, No IM or dysplasia identified  2. GE junction; biopsy: - Squamocolumnar mucosa showing mild chronic inflammation and focal intestinal metaplasia in a background of reactive changes. No dysplasia identified  3. Stomach; biopsy: - Gastric mucosa without significant histologic abnormality  4. Esophageal lesion, 16 cm; biopsy: - Squamous papilloma  5. Descending colon; biopsy: - Tubular adenoma  6. Sigmoid colon; biopsy: - Sessile serrate polyp/adenoma  -Notes having no issue with constipation now, goes to the bathroom every couple of days. Take Miralax when needed. -Notes experiencing midsternal burning occasionally noting reflux which she states doesn't really bother her. Does state though she has troubling swallowing food, nausea/vomiting episodes mainly during her menstrual periods. Feels like she is unable to eat anything during those times. Open to HRM testing if warranted. States she has been off opiods for about 1 year now.  1/9/24: With recent ED visit to [facility] on 1/6/24 with complaints of chest pain x 1 week. Reported symptoms to be most prominent after eating. States in the past she has had these symptoms when she is on her menstrual cycle but usually stop after she is done with her period but this time her symptoms have persisted. Notes shortly after eating feeling chest discomfort and feels like she has to vomit her food afterwards.  Was previously seen by the GI service in May 2022 with complaints of nausea/vomiting. At the time, had been recommended for supportive management as symptoms at the time attributed to ongoing opioid/marijuana use. Continues to smoke marijuana daily. No longer takes opioids.  Recent spinal imaging notable for large hiatal hernia noted on MRI thoracic spine (5/27/23).  Additionally reports a history of constipation. No reported blood in her stools. Reports that she can go for weeks without having a BM, passes flatus. Does not take any laxatives for this as she reports this to be her norm for many years. No associated abdominal pain. Does report losing at least 10 lbs more recently which she attributes to her eating less in the setting of odynophagia.  PMHx - spine tumor capsule-dermoid cyst, depression, spina bifida PSHx - s/p subtotal resection of epidermoid tumor in 2013, s/p thoracolumbar laminectomy for resection of intramedullary spinal cord tumor on 6/20/19, s/p thoracolumbar laminectomy for subtotal resection of intradural, intramedullary and extramedullary tumor, T10-L3 on 3/16/22 Rx - none Supplements/herbs/OTC - denies A/C or NSAIDs? - denies FMHx - no family history of CRC; mother - pancreatic cancer; no IBD Allergies - iodine, shellfish EtOH - denies Smoking - former smoker Drugs - daily marijuana smoker  EGD - no prior Colonoscopy - no prior

## 2024-09-12 NOTE — REVIEW OF SYSTEMS
[As Noted in HPI] : as noted in HPI [Constipation] : constipation [Heartburn] : heartburn [Negative] : Heme/Lymph [Abdominal Pain] : no abdominal pain [Vomiting] : no vomiting [Diarrhea] : no diarrhea [Melena (black stool)] : no melena [Bleeding] : no bleeding [Fecal Incontinence (soiling)] : no fecal incontinence [Bloating (gassiness)] : no bloating [FreeTextEntry7] : dysphagia

## 2024-09-13 RX ORDER — OMEPRAZOLE 40 MG/1
40 CAPSULE, DELAYED RELEASE ORAL
Qty: 1 | Refills: 0 | Status: COMPLETED | COMMUNITY
Start: 2024-09-12 | End: 2024-09-13

## 2024-09-18 PROBLEM — Z86.59 HISTORY OF DEPRESSION: Status: RESOLVED | Noted: 2019-01-15 | Resolved: 2024-09-18

## 2024-09-20 ENCOUNTER — APPOINTMENT (OUTPATIENT)
Dept: GASTROENTEROLOGY | Facility: CLINIC | Age: 39
End: 2024-09-20
Payer: MEDICAID

## 2024-09-20 DIAGNOSIS — R13.10 DYSPHAGIA, UNSPECIFIED: ICD-10-CM

## 2024-09-20 DIAGNOSIS — K59.09 OTHER CONSTIPATION: ICD-10-CM

## 2024-09-20 DIAGNOSIS — K21.9 GASTRO-ESOPHAGEAL REFLUX DISEASE W/OUT ESOPHAGITIS: ICD-10-CM

## 2024-09-20 PROCEDURE — 99442: CPT

## 2024-09-20 NOTE — PATIENT PROFILE ADULT - FLU SEASON?
[FreeTextEntry1] : LESI with >70% relief pp with improved ROM and ADLS  some pain is returning, paresthesias in Bl leg 
No

## 2024-09-23 NOTE — REASON FOR VISIT
[Home] : at home, [unfilled] , at the time of the visit. [Medical Office: ([facility])___] : at the medical office located in  [Verbal consent obtained from patient] : the patient, [unfilled] [Follow-up] : a follow-up of an existing diagnosis [FreeTextEntry1] : HRM and ARM consult

## 2024-09-23 NOTE — HISTORY OF PRESENT ILLNESS
[FreeTextEntry1] : 39F PMHx depression, spina bifida, lumbar surgery, s/p subtotal resection of epidermoid tumor in 2013, s/p thoracolumbar laminectomy for resection of intramedullary spinal cord tumor on 6/20/19, s/p thoracolumbar laminectomy for subtotal resection of intradural, intramedullary and extramedullary tumor, T10-L3 on 3/16/22, insomnia, GERD, and chronic urinary incontinence (has been intermittently self-catheterizing for many years) referred by Dr. Jenkins for HRM and ARM consultation.   9/20/24 Pt reports all symptoms are same since last visit with Dr. Zayas a few weeks ago. She is aware of procedures in detail.   9/11/24:  -Reports having BMs while on short course of LInzess 71 mcg daily from samples provided at last visit. She's interested in continuing with LInzess going forward in addition to ARM testing - Still reporting dysphagia and intermittent reflux, Esophagram (6/19/24): Moderate size Type 1 hiatal hernia. She would like to go ahead with HRM testing - ROS negative for abdominal pain, unintentional weight loss, nausea/vomiting, Remainder of ROS negative.  6/6/24: -Scheduled for esophagram 6/17/24 -Still experiencing midsternal chest discomfort, occurs at rest, while eating or not eating; states undergoing a cardiac evaluation with an outside doctor noting normal findings. -Hasn't had a BM for a "long time". Passing gas. Not taking any laxatives at this time. Has tried a number of different OTC laxatives including marilax, dulcolax, with no effect. Lactose causes her to have BM. States related to lactose intolerance.   4/25/24: EGD (4/4/24): 4 cm HH (Hill Grade IV), polyp at GEJ, patch of abnormal mucosa near GEJ, abnormal whitish plaque noted 16 cm from incisors, normal stomach, normal duodenum Colonoscopy (4/4/24): solid and liquid stool noted throughout colon. Adequate views achieved with copious irrigation and suctioning. 5-6 mm sessile polyp in DC s/p cold snare, 3-4 mm sessile polyp in AC, s/p cold snare, several hyperplastic polyps in rectosigmoid colon (4-5 mm in size, s/p cold snare x3), poor rectal tone however under sedation  PATH: 1. Gastric polyp; biopsy: - Gastric mucosa showing focal mild chronic inflammation Negative for HP, No IM or dysplasia identified  2. GE junction; biopsy: - Squamocolumnar mucosa showing mild chronic inflammation and focal intestinal metaplasia in a background of reactive changes. No dysplasia identified  3. Stomach; biopsy: - Gastric mucosa without significant histologic abnormality  4. Esophageal lesion, 16 cm; biopsy: - Squamous papilloma  5. Descending colon; biopsy: - Tubular adenoma  6. Sigmoid colon; biopsy: - Sessile serrate polyp/adenoma  -Notes having no issue with constipation now, goes to the bathroom every couple of days. Take Miralax when needed. -Notes experiencing midsternal burning occasionally noting reflux which she states doesn't really bother her. Does state though she has troubling swallowing food, nausea/vomiting episodes mainly during her menstrual periods. Feels like she is unable to eat anything during those times. Open to HRM testing if warranted. States she has been off opiods for about 1 year now.  1/9/24: With recent ED visit to [facility] on 1/6/24 with complaints of chest pain x 1 week. Reported symptoms to be most prominent after eating. States in the past she has had these symptoms when she is on her menstrual cycle but usually stop after she is done with her period but this time her symptoms have persisted. Notes shortly after eating feeling chest discomfort and feels like she has to vomit her food afterwards.  Was previously seen by the GI service in May 2022 with complaints of nausea/vomiting. At the time, had been recommended for supportive management as symptoms at the time attributed to ongoing opioid/marijuana use. Continues to smoke marijuana daily. No longer takes opioids.  Recent spinal imaging notable for large hiatal hernia noted on MRI thoracic spine (5/27/23).  Additionally reports a history of constipation. No reported blood in her stools. Reports that she can go for weeks without having a BM, passes flatus. Does not take any laxatives for this as she reports this to be her norm for many years. No associated abdominal pain. Does report losing at least 10 lbs more recently which she attributes to her eating less in the setting of odynophagia.  PMHx - spine tumor capsule-dermoid cyst, depression, spina bifida PSHx - s/p subtotal resection of epidermoid tumor in 2013, s/p thoracolumbar laminectomy for resection of intramedullary spinal cord tumor on 6/20/19, s/p thoracolumbar laminectomy for subtotal resection of intradural, intramedullary and extramedullary tumor, T10-L3 on 3/16/22 Rx - none Supplements/herbs/OTC - denies A/C or NSAIDs? - denies FMHx - no family history of CRC; mother - pancreatic cancer; no IBD Allergies - iodine, shellfish EtOH - denies Smoking - former smoker Drugs - daily marijuana smoker  EGD - no prior Colonoscopy - no prior

## 2024-09-23 NOTE — ASSESSMENT
[FreeTextEntry1] : 39F PMHx depression, spina bifida, lumbar surgery, s/p subtotal resection of epidermoid tumor in 2013, s/p thoracolumbar laminectomy for resection of intramedullary spinal cord tumor on 6/20/19, s/p thoracolumbar laminectomy for subtotal resection of intradural, intramedullary and extramedullary tumor, T10-L3 on 3/16/22, insomnia, GERD, and chronic urinary incontinence (has been intermittently self-catheterizing for many years) referred by Dr. Jenkins for HRM and ARM consultation.   #Hiatal hernia #Reflux #Dysphagia Risks including bleeding from nostril, sinus infection, sore throat. Instructed that the NP will be performing the procedure and the study is analyzed by the physician.  Patient to be NPO for at least 8 hours prior to the test. Patient instructed to avoid taking any prokinetic meds, muscle relaxants, opiate pain meds, Valium, Xanax, Ativan,etc.  #Chronic constipation - discussed ARM in detail with patient, r/a/i/b discussed and patient agreeable - will need to take 2 fleet enemas within 2 hour prior to test and pt aware   All questions were answered. The patient expressed understanding of these risks and is agreeable to proceed.  The  to confirm appointment with patient.

## 2024-09-23 NOTE — REASON FOR VISIT
[Home] : at home, [unfilled] , at the time of the visit. [Medical Office: ([facility])___] : at the medical office located in  [Verbal consent obtained from patient] : the patient, [unfilled] [Follow-up] : a follow-up of an existing diagnosis [FreeTextEntry1] : HRM and ARM consult

## 2024-09-23 NOTE — REVIEW OF SYSTEMS
[As Noted in HPI] : as noted in HPI [Constipation] : constipation [Heartburn] : heartburn [Negative] : Heme/Lymph [Abdominal Pain] : no abdominal pain [Vomiting] : no vomiting [Diarrhea] : no diarrhea [Melena (black stool)] : no melena [Fecal Incontinence (soiling)] : no fecal incontinence [Swollen Glands] : no swollen glands [FreeTextEntry7] : dysphagia

## 2024-09-23 NOTE — HISTORY OF PRESENT ILLNESS
[FreeTextEntry1] : 39F PMHx depression, spina bifida, lumbar surgery, s/p subtotal resection of epidermoid tumor in 2013, s/p thoracolumbar laminectomy for resection of intramedullary spinal cord tumor on 6/20/19, s/p thoracolumbar laminectomy for subtotal resection of intradural, intramedullary and extramedullary tumor, T10-L3 on 3/16/22, insomnia, GERD, and chronic urinary incontinence (has been intermittently self-catheterizing for many years) referred by Dr. Jenkins for HRM and ARM consultation.   9/20/24 Pt reports all symptoms are same since last visit with Dr. Zayas a few weeks ago. She is aware of procedures in detail.   9/11/24:  -Reports having BMs while on short course of LInzess 71 mcg daily from samples provided at last visit. She's interested in continuing with LInzess going forward in addition to ARM testing - Still reporting dysphagia and intermittent reflux, Esophagram (6/19/24): Moderate size Type 1 hiatal hernia. She would like to go ahead with HRM testing - ROS negative for abdominal pain, unintentional weight loss, nausea/vomiting, Remainder of ROS negative.  6/6/24: -Scheduled for esophagram 6/17/24 -Still experiencing midsternal chest discomfort, occurs at rest, while eating or not eating; states undergoing a cardiac evaluation with an outside doctor noting normal findings. -Hasn't had a BM for a "long time". Passing gas. Not taking any laxatives at this time. Has tried a number of different OTC laxatives including marilax, dulcolax, with no effect. Lactose causes her to have BM. States related to lactose intolerance.   4/25/24: EGD (4/4/24): 4 cm HH (Hill Grade IV), polyp at GEJ, patch of abnormal mucosa near GEJ, abnormal whitish plaque noted 16 cm from incisors, normal stomach, normal duodenum Colonoscopy (4/4/24): solid and liquid stool noted throughout colon. Adequate views achieved with copious irrigation and suctioning. 5-6 mm sessile polyp in DC s/p cold snare, 3-4 mm sessile polyp in AC, s/p cold snare, several hyperplastic polyps in rectosigmoid colon (4-5 mm in size, s/p cold snare x3), poor rectal tone however under sedation  PATH: 1. Gastric polyp; biopsy: - Gastric mucosa showing focal mild chronic inflammation Negative for HP, No IM or dysplasia identified  2. GE junction; biopsy: - Squamocolumnar mucosa showing mild chronic inflammation and focal intestinal metaplasia in a background of reactive changes. No dysplasia identified  3. Stomach; biopsy: - Gastric mucosa without significant histologic abnormality  4. Esophageal lesion, 16 cm; biopsy: - Squamous papilloma  5. Descending colon; biopsy: - Tubular adenoma  6. Sigmoid colon; biopsy: - Sessile serrate polyp/adenoma  -Notes having no issue with constipation now, goes to the bathroom every couple of days. Take Miralax when needed. -Notes experiencing midsternal burning occasionally noting reflux which she states doesn't really bother her. Does state though she has troubling swallowing food, nausea/vomiting episodes mainly during her menstrual periods. Feels like she is unable to eat anything during those times. Open to HRM testing if warranted. States she has been off opiods for about 1 year now.  1/9/24: With recent ED visit to [facility] on 1/6/24 with complaints of chest pain x 1 week. Reported symptoms to be most prominent after eating. States in the past she has had these symptoms when she is on her menstrual cycle but usually stop after she is done with her period but this time her symptoms have persisted. Notes shortly after eating feeling chest discomfort and feels like she has to vomit her food afterwards.  Was previously seen by the GI service in May 2022 with complaints of nausea/vomiting. At the time, had been recommended for supportive management as symptoms at the time attributed to ongoing opioid/marijuana use. Continues to smoke marijuana daily. No longer takes opioids.  Recent spinal imaging notable for large hiatal hernia noted on MRI thoracic spine (5/27/23).  Additionally reports a history of constipation. No reported blood in her stools. Reports that she can go for weeks without having a BM, passes flatus. Does not take any laxatives for this as she reports this to be her norm for many years. No associated abdominal pain. Does report losing at least 10 lbs more recently which she attributes to her eating less in the setting of odynophagia.  PMHx - spine tumor capsule-dermoid cyst, depression, spina bifida PSHx - s/p subtotal resection of epidermoid tumor in 2013, s/p thoracolumbar laminectomy for resection of intramedullary spinal cord tumor on 6/20/19, s/p thoracolumbar laminectomy for subtotal resection of intradural, intramedullary and extramedullary tumor, T10-L3 on 3/16/22 Rx - none Supplements/herbs/OTC - denies A/C or NSAIDs? - denies FMHx - no family history of CRC; mother - pancreatic cancer; no IBD Allergies - iodine, shellfish EtOH - denies Smoking - former smoker Drugs - daily marijuana smoker  EGD - no prior Colonoscopy - no prior

## 2024-09-24 ENCOUNTER — APPOINTMENT (OUTPATIENT)
Dept: NEUROSURGERY | Facility: CLINIC | Age: 39
End: 2024-09-24

## 2024-09-24 VITALS
RESPIRATION RATE: 18 BRPM | WEIGHT: 176 LBS | HEART RATE: 57 BPM | OXYGEN SATURATION: 98 % | TEMPERATURE: 97.6 F | DIASTOLIC BLOOD PRESSURE: 81 MMHG | HEIGHT: 62 IN | BODY MASS INDEX: 32.39 KG/M2 | SYSTOLIC BLOOD PRESSURE: 119 MMHG

## 2024-09-24 DIAGNOSIS — D36.9 BENIGN NEOPLASM, UNSPECIFIED SITE: ICD-10-CM

## 2024-09-24 DIAGNOSIS — G95.9 DISEASE OF SPINAL CORD, UNSPECIFIED: ICD-10-CM

## 2024-09-24 DIAGNOSIS — M54.6 PAIN IN THORACIC SPINE: ICD-10-CM

## 2024-09-24 DIAGNOSIS — Z86.59 PERSONAL HISTORY OF OTHER MENTAL AND BEHAVIORAL DISORDERS: ICD-10-CM

## 2024-09-24 DIAGNOSIS — G89.29 PAIN IN THORACIC SPINE: ICD-10-CM

## 2024-09-24 DIAGNOSIS — D49.7 NEOPLASM OF UNSPECIFIED BEHAVIOR OF ENDOCRINE GLANDS AND OTHER PARTS OF NERVOUS SYSTEM: ICD-10-CM

## 2024-09-24 DIAGNOSIS — Z98.890 OTHER SPECIFIED POSTPROCEDURAL STATES: ICD-10-CM

## 2024-09-24 DIAGNOSIS — Z87.891 PERSONAL HISTORY OF NICOTINE DEPENDENCE: ICD-10-CM

## 2024-09-24 PROCEDURE — 99215 OFFICE O/P EST HI 40 MIN: CPT

## 2024-09-24 NOTE — PHYSICAL EXAM
[General Appearance - Alert] : alert [General Appearance - In No Acute Distress] : in no acute distress [General Appearance - Well Nourished] : well nourished [] : normal voice and communication [Oriented To Time, Place, And Person] : oriented to person, place, and time [Impaired Insight] : insight and judgment were intact [Affect] : the affect was normal [Memory Recent] : recent memory was not impaired [Abnormal Walk] : normal gait [Balance] : balance was intact

## 2024-09-24 NOTE — HISTORY OF PRESENT ILLNESS
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  Initially presented to office 1/14/2019: Reports since her initial surgery in 2013, the patient reports severe back pain radiating to RIGHT leg with associated numbness/tingling. She reports difficulty walking and uses cane to ambulate for assistance. Reports imbalance. She states that these symptoms have become progressively worse over the years. Also reports bowel/bladder incontinence and chronic constipation since initial surgery 6 years ago.   MRI thoracic and lumbar spine done on 2/23/19 demonstrates recurrent tumor.   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 Pathology indicated keratinous debris with no cyst lining present.  Postoperatively, she did well. She continued to report bilateral leg weakness (RIGHT greater than LEFT). Follow up MRI of her spine negative for recurrence.  MRI thoracic and lumbar spine with and without contrast, done on 12/16/2020, which demonstrated possible residual epidermoid cyst.   She returned for follow up on 1/25/21 and Ms. Rowan reported persistent bilateral lower extremity weakness (RIGHT greater than LEFT) as well as muscle spasms in both lower extremities. She states her legs have given out causing her to fall. She uses a walker for assistance with ambulation. Currently reports low back pain radiating to bilateral lower extremities - she is followed by pain management at [facility] and is currently on oxycodone prn, gabapentin and cyclobenzaprine.  She reports urinary incontinence, which she had preoperatively. MRI thoracic and lumbar spine done on 1/3/21 reviewed by Dr. 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 The patient's neurological symptoms are stable. Recommended repeat MRI thoracic and lumbar spine in one year (January 2022)  6/7/21 She came back for routine follow up after recent hospitalization at [facility] due to worsening lower extremity weakness as well as vomiting bloody emesis. She states vomiting has subsided. She did have repeat MRI thoracic and lumbar spine done at [facility], which demonstrates progression of epidermoid tumor. She reported worsening numbness in bilateral lower extremities as well as weakness of bilateral lower extremities. Uses a walker for assistance with walking. Reports occasional falls.   MRI thoracic and lumbar spine done in May 2021 demonstrated slight increase in residual epidermoid tumor. Due to high risk of complications with repeat surgery as well as minimal increase in residual tumor, surgical resection not recommend. Plan made to repeat MRI thoracic and lumbar spine with and without contrast in one year.  Patient presented to office 1/28/22: She went to [facility] ED 1/27/22 for worsening back pain and bilateral leg "shaking". Happens almost everyday, per patient and sister. She currently denies leg numbness but reports numbness when legs are "shaking". She is not able to ambulate far, uses rolling walker. She was given Morphine for pain which helped with pain and aid with ambulation. She is currently on Gabapentin, Flexeril, Oxycontin with moderate pain control. She is wearing diapers for bowel incontinence, which is baseline. She did not get Jan 2022 MRI thoracic.  Recommended MRI lumbar and thoracic spine w/wo contrast; RTC after completion to review.   An MRI thoracic and lumbar spine w/wo contrast 2/12/22 showed recurrent thoraco-lumbar dermoid/epidermoid tumor within the the conus medullaris and extending into the extramedullary space. . She endorsed continued worsening back pain that radiates down bilateral legs, now with numbness/ tingling to feet and toes bilaterally, spastic gait, baseline urinary/fecal incontinence.  On 3/16/22, she underwent thoracolumbar laminectomy for subtotal resection of intradural, intramedullary and extramedullary tumor, T10-L3. A tumor capsule was stuck to nerve root and could not be safely peeled away from functional nerve roots. The tumor capsule was left in place.  Final Path: spine tumor capsule-dermoid cyst Consider RT per [facility].  She was discharged on 3/22/22.  On 4/8//22 follow up: Patient is now home from rehab since April 2. She participates in physical therapy 2x/week. She reports no change in pre-op symptoms after surgery. Pain is under control with medications. She reports b/l leg swelling but denies calf pain when ambulating, or calf tenderness/warmth to touch. Patient and nephew reported redness and pruritus at surgical incision site without drainage, but significantly improved today. She endorses taking daily showers. No radiation planned for minimal residual tumor capsule stuck to nerve per Dr. Bernabe, can consider if there is progression on followup MRI. Will follow up with a repeat MRI thoracic/lumbar w/wo contrast in 6 months (Sept 2022) to assess tumor stability.  On 5/6/22 follow up: She presents for 1 month post-op follow up. She went to [facility] ED last week for nausea then to [facility] ED last Friday for nausea and bloody emesis. Pending GI appointment on 5.24.22. She is on Pantoprazole for GI protection. Overall doing well s/ lami. Gait is improved. She showers daily. Most of surgical scab removed when compared to last visit but there is localized scab on the proximal end of the incision. She denies drainage, fever, chills.  Wound cleaned by Dr. Bernabe with chlorhexidine and betadine solution. Scab removed. There is a 1 inch opening after scab removal. Primitivo placed by Dr. Bernabe. Keflex 500 mg BID x 10 days and RTO in 2 weeks for wound check.  6/17/22 follow up: Wound healing well. Due for MRI in September.  12/2/2022 visit Returns today to review MRI thoracic and lumbar spine with and without contrast from 11/14/22.  She reports low back pain as well as occasional radiating leg pain (LEFT greater than RIGHT).  repeat MRI T/L spine on 11/14/22 reviewed today which showed stable residual dermoid tumor. Plan was made to repeat MRI T/L spine in 6 months  6/5/2023 visit Returns today to review MRI T/L spine w/wo.  She reports chronic persistent lower back pain which has not been controlled with medications (currently following pain management for medication tx) but denies any other new/worsening focal neuro deficits. She ambulates with cane. Patient accompanied by parent. MRI T/L spine was reviewed today which showed stable post op images. Plan was made to repeat MRI T/L spine w/wo in one year

## 2024-09-24 NOTE — ASSESSMENT
[FreeTextEntry1] : stable dermoid cyst without growth or new lesion.   PLAN - PT for pain modality - repeat MRI T/L spine w/wo in one year - f/u after images to review  I, Dr. Patrick Bernabe, personally performed the evaluation and management (E/M) services for this established patient who presents today with (a) new problem(s)/exacerbation of (an) existing condition(s). That E/M includes conducting the clinically appropriate interval history &/or exam, assessing all new/exacerbated conditions, and establishing a new plan of care. Today, my NERY, Hyunchu Aurea-Gold, was here to observe my evaluation and management service for this new problem/exacerbated condition and follow the plan of care established by me going forward.

## 2024-09-24 NOTE — HISTORY OF PRESENT ILLNESS
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  Initially presented to office 1/14/2019: Reports since her initial surgery in 2013, the patient reports severe back pain radiating to RIGHT leg with associated numbness/tingling. She reports difficulty walking and uses cane to ambulate for assistance. Reports imbalance. She states that these symptoms have become progressively worse over the years. Also reports bowel/bladder incontinence and chronic constipation since initial surgery 6 years ago.   MRI thoracic and lumbar spine done on 2/23/19 demonstrates recurrent tumor.   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 Pathology indicated keratinous debris with no cyst lining present.  Postoperatively, she did well. She continued to report bilateral leg weakness (RIGHT greater than LEFT). Follow up MRI of her spine negative for recurrence.  MRI thoracic and lumbar spine with and without contrast, done on 12/16/2020, which demonstrated possible residual epidermoid cyst.   She returned for follow up on 1/25/21 and Ms. Rowan reported persistent bilateral lower extremity weakness (RIGHT greater than LEFT) as well as muscle spasms in both lower extremities. She states her legs have given out causing her to fall. She uses a walker for assistance with ambulation. Currently reports low back pain radiating to bilateral lower extremities - she is followed by pain management at [facility] and is currently on oxycodone prn, gabapentin and cyclobenzaprine.  She reports urinary incontinence, which she had preoperatively. MRI thoracic and lumbar spine done on 1/3/21 reviewed by Dr. 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 The patient's neurological symptoms are stable. Recommended repeat MRI thoracic and lumbar spine in one year (January 2022)  6/7/21 She came back for routine follow up after recent hospitalization at [facility] due to worsening lower extremity weakness as well as vomiting bloody emesis. She states vomiting has subsided. She did have repeat MRI thoracic and lumbar spine done at [facility], which demonstrates progression of epidermoid tumor. She reported worsening numbness in bilateral lower extremities as well as weakness of bilateral lower extremities. Uses a walker for assistance with walking. Reports occasional falls.   MRI thoracic and lumbar spine done in May 2021 demonstrated slight increase in residual epidermoid tumor. Due to high risk of complications with repeat surgery as well as minimal increase in residual tumor, surgical resection not recommend. Plan made to repeat MRI thoracic and lumbar spine with and without contrast in one year.  Patient presented to office 1/28/22: She went to [facility] ED 1/27/22 for worsening back pain and bilateral leg "shaking". Happens almost everyday, per patient and sister. She currently denies leg numbness but reports numbness when legs are "shaking". She is not able to ambulate far, uses rolling walker. She was given Morphine for pain which helped with pain and aid with ambulation. She is currently on Gabapentin, Flexeril, Oxycontin with moderate pain control. She is wearing diapers for bowel incontinence, which is baseline. She did not get Jan 2022 MRI thoracic.  Recommended MRI lumbar and thoracic spine w/wo contrast; RTC after completion to review.   An MRI thoracic and lumbar spine w/wo contrast 2/12/22 showed recurrent thoraco-lumbar dermoid/epidermoid tumor within the the conus medullaris and extending into the extramedullary space. . She endorsed continued worsening back pain that radiates down bilateral legs, now with numbness/ tingling to feet and toes bilaterally, spastic gait, baseline urinary/fecal incontinence.  On 3/16/22, she underwent thoracolumbar laminectomy for subtotal resection of intradural, intramedullary and extramedullary tumor, T10-L3. A tumor capsule was stuck to nerve root and could not be safely peeled away from functional nerve roots. The tumor capsule was left in place.  Final Path: spine tumor capsule-dermoid cyst Consider RT per [facility].  She was discharged on 3/22/22.  On 4/8//22 follow up: Patient is now home from rehab since April 2. She participates in physical therapy 2x/week. She reports no change in pre-op symptoms after surgery. Pain is under control with medications. She reports b/l leg swelling but denies calf pain when ambulating, or calf tenderness/warmth to touch. Patient and nephew reported redness and pruritus at surgical incision site without drainage, but significantly improved today. She endorses taking daily showers. No radiation planned for minimal residual tumor capsule stuck to nerve per Dr. Bernabe, can consider if there is progression on followup MRI. Will follow up with a repeat MRI thoracic/lumbar w/wo contrast in 6 months (Sept 2022) to assess tumor stability.  On 5/6/22 follow up: She presents for 1 month post-op follow up. She went to [facility] ED last week for nausea then to [facility] ED last Friday for nausea and bloody emesis. Pending GI appointment on 5.24.22. She is on Pantoprazole for GI protection. Overall doing well s/ lami. Gait is improved. She showers daily. Most of surgical scab removed when compared to last visit but there is localized scab on the proximal end of the incision. She denies drainage, fever, chills.  Wound cleaned by Dr. Bernabe with chlorhexidine and betadine solution. Scab removed. There is a 1 inch opening after scab removal. Primitivo placed by Dr. Bernabe. Keflex 500 mg BID x 10 days and RTO in 2 weeks for wound check.  6/17/22 follow up: Wound healing well. Due for MRI in September.  12/2/2022 visit Returns today to review MRI thoracic and lumbar spine with and without contrast from 11/14/22.  She reports low back pain as well as occasional radiating leg pain (LEFT greater than RIGHT).  repeat MRI T/L spine on 11/14/22 reviewed today which showed stable residual dermoid tumor. Plan was made to repeat MRI T/L spine in 6 months  6/5/2023 visit Returns today to review MRI T/L spine w/wo.  She reports chronic persistent lower back pain which has not been controlled with medications (currently following pain management for medication tx) but denies any other new/worsening focal neuro deficits. She ambulates with cane. Patient accompanied by parent. MRI T/L spine was reviewed today which showed stable post op images. Plan was made to repeat MRI T/L spine w/wo in one year

## 2024-09-24 NOTE — DATA REVIEWED
[de-identified] : T/L spine w/wo on 9/3/24 in PACS EXAM: [id number] - MR SPINE LUMBAR WAW IC  - ORDERED BY: PRACHI MARI  EXAM: [id number] - MR SPINE THORACIC WAW IC  - ORDERED BY: PRACHI MARI   PROCEDURE DATE:  09/03/2024    INTERPRETATION:  CLINICAL INDICATION: Spinal tumor.  TECHNIQUE: Multi-planar multi-sequential MR imaging of the lumbar and thoracic spine was performed with the administration of intravenous contrast, according to standard protocol.  8 mL of Gadavist was administered intravenously.  COMPARISON: CT from November 2023. MRI from May 2023  FINDINGS:  Redemonstrated post surgical changes of laminectomy from T11 to L3 with stable appearance of granulation tissue and scarring in the laminectomy bed.  Conus tip is near the T12-L1 level. Redemonstration of heterogenous signal nodule inseparable from the conus tip located within the dorsal aspect of the thecal sac measuring about 0.8 x 1.2 x 3.7 cm (AP by transverse by craniocaudal.) Stable minimal peripheral enhancement.  Remains abnormal configuration of the nerve roots of the cauda equina which appear dorsally displaced within the thecal sac and somewhat clumped. No abnormal enhancement.  Large hiatus hernia the stomach. Background marrow signal is normal. No significant canal or foraminal stenosis. Exaggeration of normal thoracic kyphosis. Degenerative endplate changes at T12-L1 appears similar to prior. Simple appearing cysts in the right kidney.  IMPRESSION: 1. Stable size of residual dermoid cyst at the conus with stable appearance of the nerve roots of the cauda equina, which appear somewhat dorsally displaced and clumped.  --- End of Report ---       ELENITA CHAUHAN MD; Attending Radiologist This document has been electronically signed. Sep  5 2024 12:52PM

## 2024-09-24 NOTE — REASON FOR VISIT
[Follow-Up: _____] : a [unfilled] follow-up visit [FreeTextEntry1] : s/p thoracolumbar laminectomy for subtotal resection of intradural, intramedullary and extramedullary tumor, T10-L3 on 3/16/22 returns for one year follow up MRI T/L spine w/wo review (PACS)  Today she reports progressively worsening mid back pain intermittently radiating to upper back worsening by standing/sitting/walking but denies LE weakness or any other focal neuro deficits. currently ambulates without assistive device

## 2024-09-24 NOTE — REASON FOR VISIT
Bill 62992 For Specimen Handling/Conveyance To Laboratory?: no Anesthesia Volume In Cc (Will Not Render If 0): 0.5 Biopsy Type: H and E Biopsy Method: Dermablade Consent: Written consent was obtained and risks were reviewed including but not limited to scarring, infection, bleeding, scabbing, incomplete removal, nerve damage and allergy to anesthesia. X Size Of Lesion In Cm: 0 Wound Care: Bacitracin Electrodesiccation And Curettage Text: The wound bed was treated with electrodesiccation and curettage after the biopsy was performed. [Follow-Up: _____] : a [unfilled] follow-up visit Silver Nitrate Text: The wound bed was treated with silver nitrate after the biopsy was performed. [FreeTextEntry1] : s/p thoracolumbar laminectomy for subtotal resection of intradural, intramedullary and extramedullary tumor, T10-L3 on 3/16/22 returns for one year follow up MRI T/L spine w/wo review (PACS)  Today she reports progressively worsening mid back pain intermittently radiating to upper back worsening by standing/sitting/walking but denies LE weakness or any other focal neuro deficits. currently ambulates without assistive device Anesthesia Type: 1% lidocaine with epinephrine Hemostasis: Drysol Post-Care Instructions: I reviewed with the patient in detail post-care instructions. Patient is to keep the biopsy site dry overnight, and then apply bacitracin twice daily until healed. Patient may apply hydrogen peroxide soaks to remove any crusting. Detail Level: Detailed Electrodesiccation Text: The wound bed was treated with electrodesiccation after the biopsy was performed. Type Of Destruction Used: Curettage Notification Instructions: Patient will be notified of biopsy results. However, patient instructed to call the office if not contacted within 2 weeks. Dressing: bandage Billing Type: Third-Party Bill Cryotherapy Text: The wound bed was treated with cryotherapy after the biopsy was performed. Curettage Text: The wound bed was treated with curettage after the biopsy was performed.

## 2024-09-24 NOTE — DATA REVIEWED
[de-identified] : T/L spine w/wo on 9/3/24 in PACS EXAM: [id number] - MR SPINE LUMBAR WAW IC  - ORDERED BY: PRACHI MARI  EXAM: [id number] - MR SPINE THORACIC WAW IC  - ORDERED BY: PRACHI MARI   PROCEDURE DATE:  09/03/2024    INTERPRETATION:  CLINICAL INDICATION: Spinal tumor.  TECHNIQUE: Multi-planar multi-sequential MR imaging of the lumbar and thoracic spine was performed with the administration of intravenous contrast, according to standard protocol.  8 mL of Gadavist was administered intravenously.  COMPARISON: CT from November 2023. MRI from May 2023  FINDINGS:  Redemonstrated post surgical changes of laminectomy from T11 to L3 with stable appearance of granulation tissue and scarring in the laminectomy bed.  Conus tip is near the T12-L1 level. Redemonstration of heterogenous signal nodule inseparable from the conus tip located within the dorsal aspect of the thecal sac measuring about 0.8 x 1.2 x 3.7 cm (AP by transverse by craniocaudal.) Stable minimal peripheral enhancement.  Remains abnormal configuration of the nerve roots of the cauda equina which appear dorsally displaced within the thecal sac and somewhat clumped. No abnormal enhancement.  Large hiatus hernia the stomach. Background marrow signal is normal. No significant canal or foraminal stenosis. Exaggeration of normal thoracic kyphosis. Degenerative endplate changes at T12-L1 appears similar to prior. Simple appearing cysts in the right kidney.  IMPRESSION: 1. Stable size of residual dermoid cyst at the conus with stable appearance of the nerve roots of the cauda equina, which appear somewhat dorsally displaced and clumped.  --- End of Report ---       ELENITA CHAUHAN MD; Attending Radiologist This document has been electronically signed. Sep  5 2024 12:52PM

## 2024-09-26 ENCOUNTER — RESULT REVIEW (OUTPATIENT)
Age: 39
End: 2024-09-26

## 2024-09-26 ENCOUNTER — APPOINTMENT (OUTPATIENT)
Age: 39
End: 2024-09-26
Payer: MEDICAID

## 2024-09-26 PROCEDURE — 43255 EGD CONTROL BLEEDING ANY: CPT | Mod: 59

## 2024-09-26 PROCEDURE — 43251 EGD REMOVE LESION SNARE: CPT

## 2024-10-03 ENCOUNTER — APPOINTMENT (OUTPATIENT)
Dept: GASTROENTEROLOGY | Facility: HOSPITAL | Age: 39
End: 2024-10-03

## 2024-10-07 NOTE — DISCHARGE NOTE PROVIDER - NSDCQMSTROKE_NEU_ALL_[facility]
----- Message from Elizabet sent at 10/7/2024  2:21 PM CDT -----  Regarding: Sooner Appt  Contact: Patient  Type:  Sooner Apoointment Request    Caller is requesting a sooner appointment.  Caller declined first available appointment listed below.  Caller will not accept being placed on the waitlist and is requesting a message be sent to doctor.  Name of Caller:Patient   When is the first available appointment?11/04/2024   Symptoms:bilateral ear blocked   Would the patient rather a call back or a response via [facility]? Call back   Best Call Back Number:[phone]  Additional Information: Patient stated it feels like his ears are clogged also causing an echo when he speaks Please Assist  
Same day appt with DR. Camarena 10/8/24, pt said his ear hurts and itch   
No

## 2024-10-10 ENCOUNTER — APPOINTMENT (OUTPATIENT)
Dept: GASTROENTEROLOGY | Facility: HOSPITAL | Age: 39
End: 2024-10-10

## 2024-10-11 ENCOUNTER — APPOINTMENT (OUTPATIENT)
Dept: GASTROENTEROLOGY | Facility: CLINIC | Age: 39
End: 2024-10-11

## 2024-11-05 ENCOUNTER — NON-APPOINTMENT (OUTPATIENT)
Age: 39
End: 2024-11-05

## 2024-11-05 RX ORDER — (SALINE) 19; 7 G/133ML; G/133ML
ENEMA RECTAL
Qty: 2 | Refills: 0 | Status: ACTIVE | COMMUNITY
Start: 2024-11-05 | End: 1900-01-01

## 2024-11-07 ENCOUNTER — APPOINTMENT (OUTPATIENT)
Dept: GASTROENTEROLOGY | Facility: HOSPITAL | Age: 39
End: 2024-11-07

## 2025-04-21 NOTE — H&P ADULT - DOES THIS PATIENT HAVE A HISTORY OF OR HAS BEEN DX WITH HEART FAILURE?
[facility] INPATIENT ENCOUNTER  ENDOCRINE/ DIABETES PROGRESS NOTE    ADMISSION DATE:  4/10/2025  DATE:  4/21/2025  CURRENT HOSPITAL DAY:  Hospital Day: 12  CONSULTING PHYSICIAN:  Gris Huizar MD  ATTENDING PHYSICIAN:  Marina Barth DO  CODE STATUS:  Full Resuscitation                            ASSESSMENT/ PLAN:        63 year old male with a PMHx of CHFrEF, ischemic cardiomyopathy, CAD s/p PCI, HTN, HLD, CKD III, BPH, gout presents to the hospital with faitgue/SOB and admitted for acute decompensated HF. Endocrinology was consulted for management of LVAD evaluation.      #Ischemic Cardiomyopathy/LVAD Evaluation             Lab Results   Component Value Date     HGBA1C 6.7 (H) 04/13/2025     VITD25 29.5 (L) 04/11/2025     TSH 3.990 04/10/2025     YADIRA 9.8 04/12/2025      - A1c 6.8% in 12/2024 and 6.7% 4/13/25- ELEVATED, SEE BELOW  - Vit D 29.5, see below   - Cortisol came back at 9.8- adequate   - TFTs wnl  - Plasma Metanephrines wnl  - CT head from 4/12 did not report any pituitary/sella findings  - CT c/a/p from 4/12 reported   Pancreas:  Unremarkable. Adrenals:  Consider outpatient adrenal CT for bilateral nodularity up to 2 cm.   Renin Activity -- 9.2 (normal)  Aldosterone -- 19.2 (normal)  Renin/Aldosterone Ratio -- 1.8 ? Ruled out primary hyperaldosteronism.  -1 mg dex suppression test cortisol drawn this am pending    d/w RN     #New Onset T2DM  #Stress Hyperglycemia  - DM in mother  - A1c 6.8% in 12/2024, repeat 4/13/2025 is 6.7%  - Home regimen: Jardiance 10 mg daily before breakfast    Lab Results   Component Value Date    HGBA1C 6.7 (H) 04/13/2025     Lab Results   Component Value Date    CREATININE 1.93 (H) 04/21/2025     Lab Results   Component Value Date    GFRESTIMATE 38 (L) 04/21/2025       Blood sugar review:  Last 24 hours blood sugars ranged   Recent Labs   Lab 04/20/25  1114 04/20/25  1735 04/20/25 2012 04/21/25  0803   GLUCOSE BEDSIDE 96 126* 134* 218*          Chart, labs/ imaging, and glycemia reviewed independently      4/21: Blood sugars over the last 24 hours reviewed. Glycemia on target.  Continue LDSSI.     We will make insulin adjustments and review glycemic control over next 24 hours.           Discharge recommendations  Patient can be discharged on Jardiance 10 mg daily.           For Jardiance use (NOT currently taking):  - It is generally recommend for Jardiance to be held 3 days prior to any elective surgery. Cardiology to make final recommendation as this being used for heart failure.   - NOTE: Monitor metabolic panel while the patient is on Jardiance, as there can be associated euglycemic DKA.         #Vit D insufficiency      Vitamin D, 25-Hydroxy (ng/mL)   Date Value   04/11/2025 29.5 (L)      Plan  - Continue vit D 1000 IU daily         #Hx of Hypertension Emergency  #Bilateral Nodularity of Adrenals   - Parents had HTN    - Dx with HTN at age 45. Has been on 2 BP medications for a long while.    - Was hospitalized for a few days for hypertensive urgency    - CTA a/p from 1/2022: ADRENAL GLANDS: Nonspecific bilateral adrenal gland enhanced thickening.   - CT c/a/p from 4/12 reported   Pancreas:  Unremarkable. Adrenals:  Consider outpatient adrenal CT for bilateral nodularity up to 2 cm.   Of note, pt on dobutamine in hospital, this affects the accuracy of adrenal hormones investigation     Other medical problems:  #MONICA on CKD3   #Hx of afib and sunus bradycardia- thyroid levels wnl   #Acute on chronic decompensated HF, combined systolic and diastolic  #CAD   #Hx of DVT   #HTN  #DLD  #gout   Per primary team and other consultants.     INTERVAL HISTORY:      Sage Flynn is a 63 year old male patient admitted with Acute HFrEF (heart failure with reduced ejection fraction)  (CMD) [I50.21].    Events over last 24 hours:    Sitting in chair   Eating ok   No vomiting     Patient's current diet is Nutrition Communication  Nutrition  Communication  Cardiac; Yes, Medical Nutrition Management by Rd (Registered Dietitian) Diet  Nutrition Communication  Nutrition Communication.           MEDICATIONS:      The medication list was reviewed today.     MEDICATIONS  Current Facility-Administered Medications   Medication Dose Route Frequency Provider Last Rate Last Admin    [START ON 4/22/2025] AMIODarone (PACERONE) tablet 400 mg  400 mg Oral Daily Janet Aguirre CNP        furosemide (LASIX INJECT) injection 40 mg  40 mg Intravenous BID Kelly Jung MD   40 mg at 04/21/25 0919    sodium chloride 0.9 % flush bag 25 mL  25 mL Intravenous PRN Kelly Jung MD   Completed at 04/19/25 0957    colchicine (COLCRYS) tablet 0.3 mg  0.3 mg Oral Daily Bel Dunbar APNP   0.3 mg at 04/21/25 0845    rosuvastatin (CRESTOR) tablet 40 mg  40 mg Oral Nightly Bel Dunbar APNP   40 mg at 04/20/25 2028    HYDROcodone-acetaminophen (NORCO) 5-325 MG per tablet 1 tablet  1 tablet Oral Q6H PRN Bel Dunbar APNP   1 tablet at 04/20/25 1740    hydrALAZINE (APRESOLINE) tablet 25 mg  25 mg Oral TID Elias Cornejo CNP   25 mg at 04/21/25 0842    heparin (porcine) 25,000 units/250 mL in dextrose 5 % infusion  1-30 Units/kg/hr (Dosing Weight) Intravenous Continuous Kelly Jung MD 13.1 mL/hr at 04/21/25 0759 15 Units/kg/hr at 04/21/25 0759    aspirin (ECOTRIN) enteric coated tablet 81 mg  81 mg Oral Daily Asia Curry CNP   81 mg at 04/21/25 0843    gabapentin (NEURONTIN) capsule 200 mg  200 mg Oral TID ValdizonMarina DO   200 mg at 04/21/25 0843    sodium chloride 0.9 % injection 10 mL  10 mL Injection PRN Austen Evans MD   10 mL at 04/16/25 2056    sodium chloride 0.9 % injection 10 mL  10 mL Injection 2 times per day Austen Evans MD   10 mL at 04/20/25 2029    sodium chloride 0.9 % injection 10 mL  10 mL Injection 2 times per day Austen Evans MD   10 mL at 04/20/25 2028    sodium chloride 0.9 % injection 20 mL  20 mL  Injection PRN Austen Evans MD        dextrose 50 % injection 25 g  25 g Intravenous PRN Valdizon, Marina L, DO        dextrose 50 % injection 12.5 g  12.5 g Intravenous PRN Valdizon, Marina L, DO        glucagon (GLUCAGEN) injection 1 mg  1 mg Intramuscular PRN Valdizon, Marina L, DO        dextrose (GLUTOSE) 40 % gel 15 g  15 g Oral PRN Valdizon, Marina L, DO        dextrose (GLUTOSE) 40 % gel 30 g  30 g Oral PRN Valdizon, Marina L, DO        insulin lispro (ADMELOG,HumaLOG) - Correction Dose   Subcutaneous 4x Daily AC & HS Valdizon, Marina L, DO   2 Units at 04/21/25 0937    fluticasone (FLONASE) 50 MCG/ACT nasal spray 2 spray  2 spray Each Nare Daily Jessica Tomlinson CNP   2 spray at 04/21/25 0925    loratadine (CLARITIN) tablet 10 mg  10 mg Oral QAM AC Jessica Tomlinson CNP   10 mg at 04/21/25 0548    azelastine (ASTELIN) 0.1 % nasal spray 2 spray  2 spray Each Nare Daily Jessica Tomlinson CNP   2 spray at 04/21/25 0925    polyethylene glycol (MIRALAX) packet 17 g  17 g Oral BID Nydia Alatorre MD   17 g at 04/21/25 0939    docusate sodium-sennosides (SENOKOT S) 50-8.6 MG 3 tablet  3 tablet Oral Nightly Nydia Alatorre MD   3 tablet at 04/14/25 2104    spironolactone (ALDACTONE) tablet 25 mg  25 mg Oral Daily Efe Philip MD   25 mg at 04/21/25 0843    digoxin (LANOXIN) tablet 125 mcg  125 mcg Oral Every Other Day Blanca Messer NP   125 mcg at 04/21/25 0842    cholecalciferol (VITAMIN D) tablet 25 mcg  25 mcg Oral Daily Mildred Damon MD   25 mcg at 04/21/25 0844    sodium chloride 0.9% infusion   Intravenous Continuous Blanca Messer NP 3 mL/hr at 04/21/25 0600 Rate Verify at 04/21/25 0600    sodium chloride 0.9 % injection 10 mL  10 mL Intravenous PRN Kelly De La Torre N, NP        sodium chloride 0.9 % injection 2 mL  2 mL Intracatheter 2 times per day Kelly De La Torre, NP   2 mL at 04/20/25 2029    sodium chloride 0.9% infusion   Intravenous Continuous PRN Kelly De La Torre N, NP        sodium chloride 0.9 %  injection 10 mL  10 mL Injection 2 times per day Kelly De La Torre NP   10 mL at 04/20/25 2029    sodium chloride 0.9 % injection 20 mL  20 mL Injection PRN Kelly De La Torre NP        sodium chloride 0.9% infusion   Intravenous Continuous PRN Kelly De La Torre NP        acetaminophen (TYLENOL) tablet 650 mg  650 mg Oral Q4H PRN Kelly De La Torre NP   650 mg at 04/18/25 0846    Or    acetaminophen (TYLENOL) suppository 650 mg  650 mg Rectal Q4H PRN Kelly De La Torre NP        polyethylene glycol (MIRALAX) packet 17 g  17 g Oral Daily PRN Kelly De La Torre NP        docusate sodium-sennosides (SENOKOT S) 50-8.6 MG 2 tablet  2 tablet Oral BID PRN Kelly De La Torre NP   2 tablet at 04/17/25 0851    bisacodyl (DULCOLAX) suppository 10 mg  10 mg Rectal Daily PRN Kelly De La Torre NP        magnesium hydroxide (MILK OF MAGNESIA) 400 MG/5ML suspension 30 mL  30 mL Oral Daily PRN Kelly De La Torre NP        dextrose 5 % infusion   Intravenous PRN Kelly De La Torre NP        DOBUTamine (DOBUTREX) 500 mg/250 mL dextrose 5 % infusion  5 mcg/kg/min (Dosing Weight) Intravenous Continuous Kelly De La Torre NP 13.1 mL/hr at 04/21/25 0759 5 mcg/kg/min at 04/21/25 0759    allopurinol (ZYLOPRIM) tablet 100 mg  100 mg Oral Daily Kelly De La Torre NP   100 mg at 04/21/25 0844    tamsulosin (FLOMAX) capsule 0.4 mg  0.4 mg Oral Daily Kelly De La Torre NP   0.4 mg at 04/21/25 0843    Potassium Standard Replacement Protocol (Levels 3.5 and lower)   Does not apply See Admin Instructions Josuha Riggins MD        Magnesium Standard Replacement Protocol   Does not apply See Admin Instructions Joshua Riggins MD           OBJECTIVE:      VITAL SIGNS:     Vital Last Value 24 Hour Range   Temperature 98.6 °F (37 °C) (04/21/25 0800) Temp  Min: 98.3 °F (36.8 °C)  Max: 99.5 °F (37.5 °C)   Pulse 94 (04/21/25 0800) Pulse  Min: 76  Max: 102   Respiratory (!) 21 (04/21/25 0800) Resp  Min: 12  Max: 30    Non-Invasive  Blood Pressure 122/88 (04/21/25 0800) BP  Min: 99/66  Max: 128/90   Pulse Oximetry 96 % (04/21/25 0800) SpO2  Min: 91 %  Max: 98 %     Vital Today Admitted   Weight 84.2 kg (185 lb 10 oz) (04/21/25 0500) Weight: 87.1 kg (192 lb 0.3 oz) (04/11/25 0000)   Height N/A Height: 5' 7\" (170.2 cm) (04/11/25 0000)   BMI N/A BMI (Calculated): 30.07 (04/11/25 0000)     INTAKE/OUTPUT:      Intake/Output Summary (Last 24 hours) at 4/21/2025 0952  Last data filed at 4/21/2025 0759  Gross per 24 hour   Intake 1439.01 ml   Output 2800 ml   Net -1360.99 ml             PHYSICAL EXAM:    GA: NAD  Normocephalic atraumatic  No proptosis   Resp: Non labored breathing, No resp distress   Neuro: Alert, awake, conversive  Psych: appropriate mood and affect        LABORATORY DATA:          Hemoglobin A1C (%)   Date Value   04/13/2025 6.7 (H)   05/14/2024 5.8 (H)   03/30/2023 6.1 (H)       Creatinine (mg/dL)   Date Value   04/21/2025 1.93 (H)   04/20/2025 2.06 (H)   04/20/2025 1.88 (H)       Lab Results   Component Value Date    TSH 3.990 04/10/2025    TSH 3.267 04/04/2025           The Diabetes Tests flowsheet was reviewed.           Gris Huizar MD, RINA, FACE  Professor of Clinical Medicine, [facility] [place]   Endocrinology Fellowship , [employer]   [facility] [place], [employer]     no

## 2025-06-05 DIAGNOSIS — G83.4 CAUDA EQUINA SYNDROME: ICD-10-CM

## 2025-06-14 ENCOUNTER — INPATIENT (INPATIENT)
Facility: HOSPITAL | Age: 40
LOS: 1 days | Discharge: HOME | End: 2025-06-16
Attending: NEUROLOGICAL SURGERY | Admitting: NEUROLOGICAL SURGERY
Payer: COMMERCIAL

## 2025-06-14 VITALS
HEART RATE: 105 BPM | TEMPERATURE: 98 F | DIASTOLIC BLOOD PRESSURE: 69 MMHG | RESPIRATION RATE: 18 BRPM | OXYGEN SATURATION: 97 % | SYSTOLIC BLOOD PRESSURE: 111 MMHG

## 2025-06-14 DIAGNOSIS — M54.50 LOW BACK PAIN, UNSPECIFIED: ICD-10-CM

## 2025-06-14 DIAGNOSIS — Z98.890 OTHER SPECIFIED POSTPROCEDURAL STATES: Chronic | ICD-10-CM

## 2025-06-14 DIAGNOSIS — F32.9 MAJOR DEPRESSIVE DISORDER, SINGLE EPISODE, UNSPECIFIED: ICD-10-CM

## 2025-06-14 DIAGNOSIS — Z90.49 ACQUIRED ABSENCE OF OTHER SPECIFIED PARTS OF DIGESTIVE TRACT: Chronic | ICD-10-CM

## 2025-06-14 DIAGNOSIS — D49.7 NEOPLASM OF UNSPECIFIED BEHAVIOR OF ENDOCRINE GLANDS AND OTHER PARTS OF NERVOUS SYSTEM: ICD-10-CM

## 2025-06-14 DIAGNOSIS — N32.9 BLADDER DISORDER, UNSPECIFIED: ICD-10-CM

## 2025-06-14 DIAGNOSIS — D49.7 NEOPLASM OF UNSPECIFIED BEHAVIOR OF ENDOCRINE GLANDS AND OTHER PARTS OF NERVOUS SYSTEM: Chronic | ICD-10-CM

## 2025-06-14 LAB
ADD ON TEST-SPECIMEN IN LAB: SIGNIFICANT CHANGE UP
ADD ON TEST-SPECIMEN IN LAB: SIGNIFICANT CHANGE UP
ANION GAP SERPL CALC-SCNC: 11 MMOL/L — SIGNIFICANT CHANGE UP (ref 5–17)
APPEARANCE UR: CLEAR — SIGNIFICANT CHANGE UP
BASOPHILS # BLD AUTO: 0.05 K/UL — SIGNIFICANT CHANGE UP (ref 0–0.2)
BASOPHILS NFR BLD AUTO: 0.4 % — SIGNIFICANT CHANGE UP (ref 0–2)
BILIRUB UR-MCNC: NEGATIVE — SIGNIFICANT CHANGE UP
BUN SERPL-MCNC: 9 MG/DL — SIGNIFICANT CHANGE UP (ref 7–23)
CALCIUM SERPL-MCNC: 8.7 MG/DL — SIGNIFICANT CHANGE UP (ref 8.4–10.5)
CHLORIDE SERPL-SCNC: 107 MMOL/L — SIGNIFICANT CHANGE UP (ref 96–108)
CO2 SERPL-SCNC: 22 MMOL/L — SIGNIFICANT CHANGE UP (ref 22–31)
COLOR SPEC: YELLOW — SIGNIFICANT CHANGE UP
CREAT SERPL-MCNC: 0.72 MG/DL — SIGNIFICANT CHANGE UP (ref 0.5–1.3)
DIFF PNL FLD: NEGATIVE — SIGNIFICANT CHANGE UP
EGFR: 108 ML/MIN/1.73M2 — SIGNIFICANT CHANGE UP
EGFR: 108 ML/MIN/1.73M2 — SIGNIFICANT CHANGE UP
EOSINOPHIL # BLD AUTO: 0.1 K/UL — SIGNIFICANT CHANGE UP (ref 0–0.5)
EOSINOPHIL NFR BLD AUTO: 0.8 % — SIGNIFICANT CHANGE UP (ref 0–6)
GLUCOSE SERPL-MCNC: 106 MG/DL — HIGH (ref 70–99)
GLUCOSE UR QL: NEGATIVE MG/DL — SIGNIFICANT CHANGE UP
HCT VFR BLD CALC: 36.6 % — SIGNIFICANT CHANGE UP (ref 34.5–45)
HGB BLD-MCNC: 12.3 G/DL — SIGNIFICANT CHANGE UP (ref 11.5–15.5)
IMM GRANULOCYTES NFR BLD AUTO: 0.3 % — SIGNIFICANT CHANGE UP (ref 0–0.9)
KETONES UR QL: ABNORMAL MG/DL
LEUKOCYTE ESTERASE UR-ACNC: NEGATIVE — SIGNIFICANT CHANGE UP
LYMPHOCYTES # BLD AUTO: 1.31 K/UL — SIGNIFICANT CHANGE UP (ref 1–3.3)
LYMPHOCYTES # BLD AUTO: 10.5 % — LOW (ref 13–44)
MCHC RBC-ENTMCNC: 32.8 PG — SIGNIFICANT CHANGE UP (ref 27–34)
MCHC RBC-ENTMCNC: 33.6 G/DL — SIGNIFICANT CHANGE UP (ref 32–36)
MCV RBC AUTO: 97.6 FL — SIGNIFICANT CHANGE UP (ref 80–100)
MONOCYTES # BLD AUTO: 0.62 K/UL — SIGNIFICANT CHANGE UP (ref 0–0.9)
MONOCYTES NFR BLD AUTO: 5 % — SIGNIFICANT CHANGE UP (ref 2–14)
NEUTROPHILS # BLD AUTO: 10.36 K/UL — HIGH (ref 1.8–7.4)
NEUTROPHILS NFR BLD AUTO: 83 % — HIGH (ref 43–77)
NITRITE UR-MCNC: NEGATIVE — SIGNIFICANT CHANGE UP
NRBC BLD AUTO-RTO: 0 /100 WBCS — SIGNIFICANT CHANGE UP (ref 0–0)
PCP SPEC-MCNC: SIGNIFICANT CHANGE UP
PH UR: 7.5 — SIGNIFICANT CHANGE UP (ref 5–8)
PLATELET # BLD AUTO: 316 K/UL — SIGNIFICANT CHANGE UP (ref 150–400)
POTASSIUM SERPL-MCNC: 3.9 MMOL/L — SIGNIFICANT CHANGE UP (ref 3.5–5.3)
POTASSIUM SERPL-SCNC: 3.9 MMOL/L — SIGNIFICANT CHANGE UP (ref 3.5–5.3)
PROT UR-MCNC: 30 MG/DL
RBC # BLD: 3.75 M/UL — LOW (ref 3.8–5.2)
RBC # FLD: 13.8 % — SIGNIFICANT CHANGE UP (ref 10.3–14.5)
SODIUM SERPL-SCNC: 140 MMOL/L — SIGNIFICANT CHANGE UP (ref 135–145)
SP GR SPEC: 1.03 — SIGNIFICANT CHANGE UP (ref 1–1.03)
UROBILINOGEN FLD QL: 1 MG/DL — SIGNIFICANT CHANGE UP (ref 0.2–1)
WBC # BLD: 12.48 K/UL — HIGH (ref 3.8–10.5)
WBC # FLD AUTO: 12.48 K/UL — HIGH (ref 3.8–10.5)

## 2025-06-14 PROCEDURE — 72131 CT LUMBAR SPINE W/O DYE: CPT | Mod: 26

## 2025-06-14 PROCEDURE — 99285 EMERGENCY DEPT VISIT HI MDM: CPT

## 2025-06-14 PROCEDURE — 99223 1ST HOSP IP/OBS HIGH 75: CPT

## 2025-06-14 PROCEDURE — 72158 MRI LUMBAR SPINE W/O & W/DYE: CPT | Mod: 26

## 2025-06-14 PROCEDURE — 72157 MRI CHEST SPINE W/O & W/DYE: CPT | Mod: 26

## 2025-06-14 PROCEDURE — 76770 US EXAM ABDO BACK WALL COMP: CPT | Mod: 26

## 2025-06-14 RX ORDER — ONDANSETRON HCL/PF 4 MG/2 ML
4 VIAL (ML) INJECTION EVERY 6 HOURS
Refills: 0 | Status: DISCONTINUED | OUTPATIENT
Start: 2025-06-14 | End: 2025-06-16

## 2025-06-14 RX ORDER — ACETAMINOPHEN 500 MG/5ML
650 LIQUID (ML) ORAL EVERY 6 HOURS
Refills: 0 | Status: DISCONTINUED | OUTPATIENT
Start: 2025-06-14 | End: 2025-06-14

## 2025-06-14 RX ORDER — BISACODYL 5 MG
5 TABLET, DELAYED RELEASE (ENTERIC COATED) ORAL DAILY
Refills: 0 | Status: DISCONTINUED | OUTPATIENT
Start: 2025-06-14 | End: 2025-06-16

## 2025-06-14 RX ORDER — METHOCARBAMOL 500 MG/1
750 TABLET, FILM COATED ORAL ONCE
Refills: 0 | Status: COMPLETED | OUTPATIENT
Start: 2025-06-14 | End: 2025-06-14

## 2025-06-14 RX ORDER — SENNA 187 MG
2 TABLET ORAL AT BEDTIME
Refills: 0 | Status: DISCONTINUED | OUTPATIENT
Start: 2025-06-14 | End: 2025-06-16

## 2025-06-14 RX ORDER — DIAZEPAM 5 MG/1
2 TABLET ORAL EVERY 8 HOURS
Refills: 0 | Status: DISCONTINUED | OUTPATIENT
Start: 2025-06-14 | End: 2025-06-15

## 2025-06-14 RX ORDER — ACETAMINOPHEN 500 MG/5ML
1000 LIQUID (ML) ORAL EVERY 8 HOURS
Refills: 0 | Status: DISCONTINUED | OUTPATIENT
Start: 2025-06-14 | End: 2025-06-16

## 2025-06-14 RX ORDER — BACLOFEN 10 MG/20ML
10 INJECTION INTRATHECAL ONCE
Refills: 0 | Status: DISCONTINUED | OUTPATIENT
Start: 2025-06-14 | End: 2025-06-14

## 2025-06-14 RX ORDER — OXYCODONE HYDROCHLORIDE 30 MG/1
10 TABLET ORAL EVERY 4 HOURS
Refills: 0 | Status: DISCONTINUED | OUTPATIENT
Start: 2025-06-14 | End: 2025-06-15

## 2025-06-14 RX ORDER — KETOROLAC TROMETHAMINE 30 MG/ML
15 INJECTION, SOLUTION INTRAMUSCULAR; INTRAVENOUS ONCE
Refills: 0 | Status: DISCONTINUED | OUTPATIENT
Start: 2025-06-14 | End: 2025-06-14

## 2025-06-14 RX ORDER — ACETAMINOPHEN 500 MG/5ML
1000 LIQUID (ML) ORAL ONCE
Refills: 0 | Status: COMPLETED | OUTPATIENT
Start: 2025-06-14 | End: 2025-06-14

## 2025-06-14 RX ORDER — ENOXAPARIN SODIUM 100 MG/ML
40 INJECTION SUBCUTANEOUS
Refills: 0 | Status: DISCONTINUED | OUTPATIENT
Start: 2025-06-14 | End: 2025-06-16

## 2025-06-14 RX ORDER — OXYCODONE HYDROCHLORIDE 30 MG/1
5 TABLET ORAL EVERY 4 HOURS
Refills: 0 | Status: DISCONTINUED | OUTPATIENT
Start: 2025-06-14 | End: 2025-06-15

## 2025-06-14 RX ADMIN — Medication 1000 MILLILITER(S): at 04:00

## 2025-06-14 RX ADMIN — OXYCODONE HYDROCHLORIDE 10 MILLIGRAM(S): 30 TABLET ORAL at 14:42

## 2025-06-14 RX ADMIN — OXYCODONE HYDROCHLORIDE 5 MILLIGRAM(S): 30 TABLET ORAL at 08:18

## 2025-06-14 RX ADMIN — KETOROLAC TROMETHAMINE 15 MILLIGRAM(S): 30 INJECTION, SOLUTION INTRAMUSCULAR; INTRAVENOUS at 04:00

## 2025-06-14 RX ADMIN — OXYCODONE HYDROCHLORIDE 10 MILLIGRAM(S): 30 TABLET ORAL at 13:42

## 2025-06-14 RX ADMIN — ENOXAPARIN SODIUM 40 MILLIGRAM(S): 100 INJECTION SUBCUTANEOUS at 21:03

## 2025-06-14 RX ADMIN — Medication 400 MILLIGRAM(S): at 04:01

## 2025-06-14 RX ADMIN — Medication 1000 MILLIGRAM(S): at 04:16

## 2025-06-14 RX ADMIN — OXYCODONE HYDROCHLORIDE 5 MILLIGRAM(S): 30 TABLET ORAL at 07:18

## 2025-06-14 RX ADMIN — Medication 1000 MILLILITER(S): at 23:39

## 2025-06-14 RX ADMIN — KETOROLAC TROMETHAMINE 15 MILLIGRAM(S): 30 INJECTION, SOLUTION INTRAMUSCULAR; INTRAVENOUS at 04:16

## 2025-06-14 RX ADMIN — METHOCARBAMOL 750 MILLIGRAM(S): 500 TABLET, FILM COATED ORAL at 04:00

## 2025-06-14 NOTE — H&P ADULT - ASSESSMENT
39F PMH of spina bifida, urinary incontinence (self catheterizes), depression, and spinal epidermoid tumor s/p multiple resections 2019, 2022 presenting with severe LBP with radiation to right flank and right leg and weakness x3 days. 39F PMH of spina bifida, urinary incontinence (self catheterizes), depression, and spinal epidermoid tumor s/p multiple resections 2019, 2022 presenting with severe LBP with radiation to right flank and right leg and weakness x3 days. Admit for pain control and imaging workup.

## 2025-06-14 NOTE — ED PROVIDER NOTE - CLINICAL SUMMARY MEDICAL DECISION MAKING FREE TEXT BOX
39F PMH of spina bifida, urinary incontinence (self catheterizes), depression, and spinal epidermoid tumor s/p multiple resections 2019, 2022 presenting with severe LBP with radiation to right flank and right leg and weakness x3 days.  pt afebrile, has significant TTP to lumbar region and right flank region., no focal neuro deficits on exam. plan for labs, pain control, CT lumbar sine, neurosurgery f/u

## 2025-06-14 NOTE — ED PROVIDER NOTE - ATTENDING APP SHARED VISIT CONTRIBUTION OF CARE
39F hx spina bifida, depression, spinal epidermoid tumor s/p multiple resections, T12-L3 laminectomies, depression, gerd, c/o worsening lower back pain. ongoing for past week. also c/o pain to right flank. no vomiting. no SOB. no fevers.   gen- nad  heent- ncat  cv -rrr  lungs -ctab  abd - soft, R CVAT  ext -wwp  neuro -aox3  pending US, given toradol, baclofen, tylenol, will also get CT spine

## 2025-06-14 NOTE — ED PROVIDER NOTE - OBJECTIVE STATEMENT
39 y/o female with PMHx of spina bifida, depression, and spinal epidermoid tumor s/p multiple resections, s/p T12-L3 laminectomies, depression/anxiety, insomnia, GERD, and chronic urinary incontinence (has been intermittently self-catheterizing for many years), c/o worsening of her chronic lower back pain for the past week. Pt also c/o pain to her right flank region. States pain is worse with movement. Pt  denies n/v, diarrhea, constipations, cough, chills, SOB

## 2025-06-14 NOTE — H&P ADULT - NSHPLABSRESULTS_GEN_ALL_CORE
12.3   12.48 )-----------( 316      ( 14 Jun 2025 03:30 )             36.6       140  |  107  |  9   ----------------------------<  106[H]  3.9   |  22  |  0.72    Ca    8.7      14 Jun 2025 03:30    TPro  6.6  /  Alb  3.6  /  TBili  0.4  /  DBili  <0.1  /  AST  15  /  ALT  7[L]  /  AlkPhos  126[H]  06-14        < from: CT Lumbar Spine No Cont (06.14.25 @ 05:02) >    FINDINGS:  Bones/joints: The patient is post laminectomy decompressing the lower   thoracic  and upper lumbar canal. Dystrophic calcification is noted within the  laminectomy bed. Vertebral body heights are preserved. Alignment is within  normal limits. There is no significant canal narrowing. Neural foraminal  narrowing due to osteophyte and disc bulging is noted at multiple levels,   worst  at T12-L1, L4-L5 and L5-S1.    Soft tissues: There is edema, scarring and amorphous fluid within the  subcutaneous tissues at the level of the prior surgery.    IMPRESSION:  Postsurgical and spondylotic change as noted.  No significant bony canal  narrowing.

## 2025-06-14 NOTE — ED PROVIDER NOTE - PROGRESS NOTE DETAILS
pt seen and evaluated by neurosurgery PA on call. admission for further management recommended. no UTI, no right hydronephrosis, unlikely pyelonephritis.   pending CT . neurosurgery called for consult

## 2025-06-14 NOTE — H&P ADULT - HISTORY OF PRESENT ILLNESS
39 female PMH of spina bifida, urinary incontinence (self catheterizes), depression, and spinal epidermoid tumor s/p multiple resections, most recently in 2019 (thoracolumbar lami, resection of intramedullary spinal tumor) and 2022 (T2-L3 lami for resection of recurrent intradural spinal tumor) with Dr. Bernabe presenting to ED this morning with severe LBP with radiation to right flank and right leg terminating at the ankle, as well as paresthesias throughout the leg, as well as subjective right leg weakness x3 days. She reports chronic back and right leg pain, but noted needing to use a cane to ambulate in the past few days and then last night unable to get out of bed at all. She denies any arm or left leg symptoms, changes to bowel/bladder habits, recent falls or injury. She states Tylenol offered no pain relief and otherwise takes no other pain medications.

## 2025-06-14 NOTE — ED ADULT NURSE NOTE - OBJECTIVE STATEMENT
Pt presents to the ED with complaints of right lower back pain radiating to the right leg for several weeks. Per pt, she has a history of spinal tumor, was scheduled for a MRI in July but states pain, numbness, and tingling has worsen prompting her to come to the ED. On arrival, pt is alert and oriented, ambulatory with a cane, denies fever, nausea, vomiting, or diarrhea.

## 2025-06-14 NOTE — PATIENT PROFILE ADULT - FALL HARM RISK - RISK INTERVENTIONS

## 2025-06-14 NOTE — H&P ADULT - NSHPPHYSICALEXAM_GEN_ALL_CORE
Gen: NAD, AAOx3,  Skin: No rashes, sores or lesions. Good turgor  HEENT: NC/AT, PERRL, EOMI, VF grossly intact.  Neck: Nontender, FROM  Lungs: Clear b/l, no W/R/R.  Heart: S1, S2. NSR.  Abd: Soft, NT/ND. +BS  Back: Palpable tenderness throughout lumbar paraspinal muscles and b/l flank.  Exts: Pulses 2+ throughout, no cyanosis or edema  Neuro: CNs II-XII intact. 5/5 str in UEs b/l. 5/5 str in LEs b/l with some pain limited effort to RLE. Sensation to LT intact throughout. +SLR. Hyporeflexic to b/l LEs. Speech clear. Following commands. Unable to assess gait secondary to pain.

## 2025-06-14 NOTE — H&P ADULT - PROBLEM SELECTOR PLAN 1
Case and films (CT Lumbar spine) d/w Dr. Bernabe,  Admit to neurosurgery, regional floor,  Plan for MRI T&L spine with contrast for further evaluation,  Pain medication for pain PRN,  Patient refuses need for nicotine patch

## 2025-06-14 NOTE — ED ADULT NURSE NOTE - NSFALLUNIVINTERV_ED_ALL_ED
Bed/Stretcher in lowest position, wheels locked, appropriate side rails in place/Call bell, personal items and telephone in reach/Instruct patient to call for assistance before getting out of bed/chair/stretcher/Non-slip footwear applied when patient is off stretcher/[place] to call system/Physically safe environment - no spills, clutter or unnecessary equipment/Purposeful proactive rounding/Room/bathroom lighting operational, light cord in reach

## 2025-06-14 NOTE — ED ADULT TRIAGE NOTE - CHIEF COMPLAINT QUOTE
pt has hx of tumor on her spine and for several weeks has had pain in her right side radiating down her right thigh.   scheduled for an MRI 7/6 but says the pain is much worse now

## 2025-06-14 NOTE — H&P ADULT - PROBLEM SELECTOR PROBLEM 2
Depression Double Z Plasty Text: The lesion was extirpated to the level of the fat with a #15 scalpel blade. Given the location of the defect, shape of the defect and the proximity to free margins a double Z-plasty was deemed most appropriate for repair. Using a sterile surgical marker, the appropriate transposition arms of the double Z-plasty were drawn incorporating the defect and placing the expected incisions within the relaxed skin tension lines where possible. The area thus outlined was incised deep to adipose tissue with a #15 scalpel blade. The skin margins were undermined to an appropriate distance in all directions utilizing iris scissors. The opposing transposition arms were then transposed and carried over into place in opposite direction and anchored with interrupted buried subcutaneous sutures.

## 2025-06-14 NOTE — PATIENT PROFILE ADULT - NSTOBACCO TYPE_GEN_A_CORE_RD
Detail Level: Detailed
Quality 130: Documentation Of Current Medications In The Medical Record: Current Medications Documented
Cigarettes

## 2025-06-15 LAB
ANION GAP SERPL CALC-SCNC: 9 MMOL/L — SIGNIFICANT CHANGE UP (ref 5–17)
APPEARANCE UR: ABNORMAL
BILIRUB UR-MCNC: ABNORMAL
BUN SERPL-MCNC: 9 MG/DL — SIGNIFICANT CHANGE UP (ref 7–23)
CALCIUM SERPL-MCNC: 8.5 MG/DL — SIGNIFICANT CHANGE UP (ref 8.4–10.5)
CHLORIDE SERPL-SCNC: 109 MMOL/L — HIGH (ref 96–108)
CO2 SERPL-SCNC: 21 MMOL/L — LOW (ref 22–31)
COLOR SPEC: ABNORMAL
CREAT SERPL-MCNC: 0.66 MG/DL — SIGNIFICANT CHANGE UP (ref 0.5–1.3)
DIFF PNL FLD: ABNORMAL
EGFR: 114 ML/MIN/1.73M2 — SIGNIFICANT CHANGE UP
EGFR: 114 ML/MIN/1.73M2 — SIGNIFICANT CHANGE UP
GLUCOSE SERPL-MCNC: 89 MG/DL — SIGNIFICANT CHANGE UP (ref 70–99)
GLUCOSE UR QL: NEGATIVE MG/DL — SIGNIFICANT CHANGE UP
HCT VFR BLD CALC: 35.4 % — SIGNIFICANT CHANGE UP (ref 34.5–45)
HGB BLD-MCNC: 11.4 G/DL — LOW (ref 11.5–15.5)
KETONES UR QL: NEGATIVE MG/DL — SIGNIFICANT CHANGE UP
LEUKOCYTE ESTERASE UR-ACNC: ABNORMAL
MAGNESIUM SERPL-MCNC: 1.9 MG/DL — SIGNIFICANT CHANGE UP (ref 1.6–2.6)
MCHC RBC-ENTMCNC: 32.2 G/DL — SIGNIFICANT CHANGE UP (ref 32–36)
MCHC RBC-ENTMCNC: 32.4 PG — SIGNIFICANT CHANGE UP (ref 27–34)
MCV RBC AUTO: 100.6 FL — HIGH (ref 80–100)
NITRITE UR-MCNC: POSITIVE
NRBC BLD AUTO-RTO: 0 /100 WBCS — SIGNIFICANT CHANGE UP (ref 0–0)
PH UR: 7 — SIGNIFICANT CHANGE UP (ref 5–8)
PHOSPHATE SERPL-MCNC: 2 MG/DL — LOW (ref 2.5–4.5)
PLATELET # BLD AUTO: 287 K/UL — SIGNIFICANT CHANGE UP (ref 150–400)
POTASSIUM SERPL-MCNC: 4.1 MMOL/L — SIGNIFICANT CHANGE UP (ref 3.5–5.3)
POTASSIUM SERPL-SCNC: 4.1 MMOL/L — SIGNIFICANT CHANGE UP (ref 3.5–5.3)
PROT UR-MCNC: 300 MG/DL
RBC # BLD: 3.52 M/UL — LOW (ref 3.8–5.2)
RBC # FLD: 14 % — SIGNIFICANT CHANGE UP (ref 10.3–14.5)
SODIUM SERPL-SCNC: 139 MMOL/L — SIGNIFICANT CHANGE UP (ref 135–145)
SP GR SPEC: 1.03 — SIGNIFICANT CHANGE UP (ref 1–1.03)
UROBILINOGEN FLD QL: 1 MG/DL — SIGNIFICANT CHANGE UP (ref 0.2–1)
WBC # BLD: 8.44 K/UL — SIGNIFICANT CHANGE UP (ref 3.8–10.5)
WBC # FLD AUTO: 8.44 K/UL — SIGNIFICANT CHANGE UP (ref 3.8–10.5)

## 2025-06-15 PROCEDURE — 99233 SBSQ HOSP IP/OBS HIGH 50: CPT

## 2025-06-15 PROCEDURE — 99231 SBSQ HOSP IP/OBS SF/LOW 25: CPT

## 2025-06-15 RX ORDER — TRAMADOL HYDROCHLORIDE 50 MG/1
25 TABLET, FILM COATED ORAL EVERY 4 HOURS
Refills: 0 | Status: DISCONTINUED | OUTPATIENT
Start: 2025-06-15 | End: 2025-06-16

## 2025-06-15 RX ORDER — CEFTRIAXONE 500 MG/1
1000 INJECTION, POWDER, FOR SOLUTION INTRAMUSCULAR; INTRAVENOUS ONCE
Refills: 0 | Status: COMPLETED | OUTPATIENT
Start: 2025-06-15 | End: 2025-06-15

## 2025-06-15 RX ORDER — CEFTRIAXONE 500 MG/1
INJECTION, POWDER, FOR SOLUTION INTRAMUSCULAR; INTRAVENOUS
Refills: 0 | Status: DISCONTINUED | OUTPATIENT
Start: 2025-06-15 | End: 2025-06-16

## 2025-06-15 RX ORDER — TRAMADOL HYDROCHLORIDE 50 MG/1
50 TABLET, FILM COATED ORAL EVERY 4 HOURS
Refills: 0 | Status: DISCONTINUED | OUTPATIENT
Start: 2025-06-15 | End: 2025-06-16

## 2025-06-15 RX ORDER — POLYETHYLENE GLYCOL 3350 17 G/17G
17 POWDER, FOR SOLUTION ORAL DAILY
Refills: 0 | Status: DISCONTINUED | OUTPATIENT
Start: 2025-06-15 | End: 2025-06-16

## 2025-06-15 RX ORDER — DIAZEPAM 5 MG/1
2 TABLET ORAL EVERY 8 HOURS
Refills: 0 | Status: DISCONTINUED | OUTPATIENT
Start: 2025-06-15 | End: 2025-06-16

## 2025-06-15 RX ORDER — CEFTRIAXONE 500 MG/1
1000 INJECTION, POWDER, FOR SOLUTION INTRAMUSCULAR; INTRAVENOUS EVERY 24 HOURS
Refills: 0 | Status: DISCONTINUED | OUTPATIENT
Start: 2025-06-16 | End: 2025-06-16

## 2025-06-15 RX ORDER — MAGNESIUM OXIDE 400 MG
400 TABLET ORAL ONCE
Refills: 0 | Status: COMPLETED | OUTPATIENT
Start: 2025-06-15 | End: 2025-06-15

## 2025-06-15 RX ORDER — SOD PHOS DI, MONO/K PHOS MONO 250 MG
1 TABLET ORAL EVERY 6 HOURS
Refills: 0 | Status: COMPLETED | OUTPATIENT
Start: 2025-06-15 | End: 2025-06-15

## 2025-06-15 RX ADMIN — Medication 1 PACKET(S): at 18:05

## 2025-06-15 RX ADMIN — TRAMADOL HYDROCHLORIDE 50 MILLIGRAM(S): 50 TABLET, FILM COATED ORAL at 09:40

## 2025-06-15 RX ADMIN — Medication 1 PACKET(S): at 09:33

## 2025-06-15 RX ADMIN — Medication 400 MILLIGRAM(S): at 09:33

## 2025-06-15 RX ADMIN — TRAMADOL HYDROCHLORIDE 50 MILLIGRAM(S): 50 TABLET, FILM COATED ORAL at 10:40

## 2025-06-15 RX ADMIN — CEFTRIAXONE 100 MILLIGRAM(S): 500 INJECTION, POWDER, FOR SOLUTION INTRAMUSCULAR; INTRAVENOUS at 22:28

## 2025-06-15 RX ADMIN — ENOXAPARIN SODIUM 40 MILLIGRAM(S): 100 INJECTION SUBCUTANEOUS at 22:28

## 2025-06-15 NOTE — DISCHARGE NOTE PROVIDER - NSDCMRMEDTOKEN_GEN_ALL_CORE_FT
acetaminophen 500 mg oral tablet: 2 tab(s) orally every 8 hours As needed Temp greater or equal to 38C (100.4F), Mild Pain (1 - 3)  gabapentin 100 mg oral tablet: 1 tab(s) orally 2 times a day MDD: 2 tabs  Narcan 4 mg/0.1 mL nasal spray: 1 spray(s) intranasally once a day as needed for opioid overdose  polyethylene glycol 3350 oral powder for reconstitution: 17 gram(s) orally once a day as needed for Constipation  traMADol 50 mg oral tablet: 1 tab(s) orally every 6 hours as needed for Severe Pain (7 - 10) MDD: 4 tabs

## 2025-06-15 NOTE — DISCHARGE NOTE PROVIDER - CARE PROVIDER_API CALL
Patrick Bernabe.  Neurosurgery  [address] [facility] [place] [address]  Phone: [phone]  Fax: [phone]  Follow Up Time:

## 2025-06-15 NOTE — DISCHARGE NOTE PROVIDER - NSDCFUSCHEDAPPT_GEN_ALL_CORE_FT
[facility]  MRI  [address]  Scheduled Appointment: 07/06/2025     Doctor, Unknown  [facility] PreAdmits  Scheduled Appointment: 07/06/2025    [facility]  MRI  [address]  Scheduled Appointment: 07/06/2025

## 2025-06-15 NOTE — PROGRESS NOTE ADULT - ASSESSMENT
39F PMH of spina bifida, urinary incontinence (self catheterizes), depression, and spinal epidermoid tumor s/p multiple resections 2019, 2022 presenting with severe LBP with radiation to right flank and right leg and weakness x3 days. Pt is s/p CT which showed foraminal narrowing due to osteophyte and disc bulging is noted at multiple levels, worst at T12-L1, L4-L5 and L5-S1. Pt was admitted  to NSGY for further evaluation.     # Right leg and weakness 2/2 foraminal narrowing and herniated disc T12-S1  -Continue management as per NSGY  -Pt s/p MRI T&L spine on 6/14 awaiting official result   -Will continue pain medication and bowel regimen as per primary   -Fall precaution; PT evaluation     #Spinal bifida   # Neurogenic bladder  -Pt does Self-catheterization  at home   -Will continue straight cath prn     #Hypotension   -Pt with episode of hypotension this morning. Pt's BP improved following an 1L fluid bolus  -Will continue to monitor BP  -Will check U/A with reflex cx to r/o UTI     #Depression   -Pt denied use of any antidepressive medication at this time.    #DVT prop  -As per primary team     #DISPO  -Pending PT evaluation pending primary team recommendation     55 minutes spent on total encounter. The necessity of the time spent during the encounter on this date of service was due to:    Review of hospital course, labs, vitals, radiology and medical records.  Direct patient encounter including bedside exam and interview.   Discussed plan of care with primary team.    Documenting the encounter.

## 2025-06-15 NOTE — DISCHARGE NOTE PROVIDER - HOSPITAL COURSE
HPI: 39F PMH of spina bifida, urinary incontinence (self catheterizes), depression, and spinal epidermoid tumor s/p multiple resections 2019, 2022 presenting with severe LBP with radiation to right flank and right leg and weakness x3 days.    Hospital Course:  6/14: Admitted to [facility]. MRI completed.  6/15: JO overnight, given 1L bolus for low BP, neurologically stable, rest of vitals stable. Tylenol PRN for back pain overnight, changed Oxycodone to  Tramadol PRN (hypotension).     Patient evaluated by PT/OT who recommended:  Patient is going home? rehab? hospice? Facility Name:     Hospital course c/b: n/a    Exam on day of discharge:  General: NAD, pt is comfortably sitting up in bed, A&O x3, on RA  HEENT: CN II-XII grossly intact, PERRL 3mm, EOMI b/l, face symmetric, tongue midline, neck FROM  Cardiovascular: RRR, normal S1 and S2   Respiratory: lungs CTAB, no wheezing, rhonchi, or crackles   GI: normoactive BS to auscultation, abd soft, NTND   Neuro: no aphasia, speech clear, no dysmetria, no pronator drift  strength 5/5 throughout all 4 extremities, +SLT RLE   sensation intact to light touch throughout   Extremities: distal pulses 2+ x4       Checklist:   - Obtained follow up appointment from NP  - Reviewed final recommendations of inpatient consults  - review discharge planning on provider handoff  - post op imaging completed  - Neurologically stable for discharge  - Vitals stable for discharge   - Afebrile for discharge  - WBC is stable  - Sodium level is normal  - Pain is adequately controlled  - Pt has PICC/walker/brace/collar   - LACE score (10 or > needs PCP apt)   - Needs PCP Follow up?   - stroke patient? Discharge NIHSS score     Given the ongoing treatment plan, please note that the patient has a high potential for further admissions to deliver ongoing treatment and/or procedures as part of the normal course of neurosurgical care    HPI: 39F PMH of spina bifida, urinary incontinence (self catheterizes), depression, and spinal epidermoid tumor s/p multiple resections 2019, 2022 presenting with severe LBP with radiation to right flank and right leg and weakness x3 days.    Hospital Course:  6/14: Admitted to [facility]. MRI completed.  6/15: JO overnight, given 1L bolus for low BP, neurologically stable, rest of vitals stable. Tylenol PRN for back pain overnight, changed Oxycodone to  Tramadol PRN (hypotension).     Patient evaluated by PT/OT who recommended:  Patient is going home.    Hospital course c/b: n/a    Exam on day of discharge:  General: NAD, pt is comfortably sitting up in bed, A&O x3, on RA  HEENT: CN II-XII grossly intact, PERRL 3mm, EOMI b/l, face symmetric, tongue midline, neck FROM  Cardiovascular: RRR, normal S1 and S2   Respiratory: lungs CTAB, no wheezing, rhonchi, or crackles   GI: normoactive BS to auscultation, abd soft, NTND   Neuro: no aphasia, speech clear, no dysmetria, no pronator drift  strength 5/5 throughout all 4 extremities, +SLT RLE   sensation intact to light touch throughout   Extremities: distal pulses 2+ x4       Checklist:   - Obtained follow up appointment from NP  - Reviewed final recommendations of inpatient consults  - review discharge planning on provider handoff  - post op imaging completed  - Neurologically stable for discharge  - Vitals stable for discharge   - Afebrile for discharge  - WBC is stable  - Sodium level is normal  - Pain is adequately controlled  - Pt has PICC/walker/brace/collar   - LACE score (10 or > needs PCP apt)   - Needs PCP Follow up?   - stroke patient? Discharge NIHSS score     Given the ongoing treatment plan, please note that the patient has a high potential for further admissions to deliver ongoing treatment and/or procedures as part of the normal course of neurosurgical care     Patient is neurologically stable for discharge. Labs and vitals are within normal limits, pain is well controlled, imaging is complete. HPI: 39F PMH of spina bifida, urinary incontinence (self catheterizes), depression, and spinal epidermoid tumor s/p multiple resections 2019, 2022 presenting with severe LBP with radiation to right flank and right leg and weakness x3 days.    Hospital Course:  6/14: Admitted to [facility]. MRI completed.  6/15: JO overnight, given 1L bolus for low BP, neurologically stable, rest of vitals stable. Tylenol PRN for back pain overnight, changed Oxycodone to  Tramadol PRN (hypotension).     Patient evaluated by PT/OT who recommended:  Patient is going home.    Hospital course uncomplicated    Exam on day of discharge:  General: NAD, pt is comfortably sitting up in bed, A&O x3, on RA  HEENT: CN II-XII grossly intact, PERRL 3mm, EOMI b/l, face symmetric, tongue midline, neck FROM  Cardiovascular: RRR, normal S1 and S2   Respiratory: lungs CTAB, no wheezing, rhonchi, or crackles   GI: normoactive BS to auscultation, abd soft, NTND   Neuro: no aphasia, speech clear, no dysmetria, no pronator drift  strength 5/5 throughout all 4 extremities, +SLT RLE   sensation intact to light touch throughout   Extremities: distal pulses 2+ x4       Given the ongoing treatment plan, please note that the patient has a high potential for further admissions to deliver ongoing treatment and/or procedures as part of the normal course of neurosurgical care     Patient is neurologically stable for discharge. Labs and vitals are within normal limits, pain is well controlled, imaging is complete.    HPI: 39F PMH of spina bifida, urinary incontinence (self catheterizes), depression, and spinal epidermoid tumor s/p multiple resections 2019, 2022 presenting with severe LBP with radiation to right flank and right leg and weakness x3 days.    Hospital Course:  6/14: Admitted to [facility]. MRI completed.  6/15: JO overnight, given 1L bolus for low BP, neurologically stable, rest of vitals stable. Tylenol PRN for back pain overnight, changed Oxycodone to  Tramadol PRN (hypotension). Discharge home.     Patient evaluated by PT/OT who recommended: home PT/OT   Patient is going home.    Hospital course uncomplicated    Exam on day of discharge:  General: NAD, pt is comfortably sitting up in bed, A&O x3, on RA  HEENT: CN II-XII grossly intact, PERRL 3mm, EOMI b/l, face symmetric, tongue midline, neck FROM  Cardiovascular: RRR, normal S1 and S2   Respiratory: lungs CTAB, no wheezing, rhonchi, or crackles   GI: normoactive BS to auscultation, abd soft, NTND   Neuro: no aphasia, speech clear, no dysmetria, no pronator drift  strength 5/5 throughout all 4 extremities, +SLT RLE   sensation intact to light touch throughout   Extremities: distal pulses 2+ x4       Given the ongoing treatment plan, please note that the patient has a high potential for further admissions to deliver ongoing treatment and/or procedures as part of the normal course of neurosurgical care     Patient is neurologically stable for discharge. Labs and vitals are within normal limits, pain is well controlled, imaging is complete.

## 2025-06-15 NOTE — DISCHARGE NOTE PROVIDER - NSDCFUADDINST_GEN_ALL_CORE_FT
Neurosurgery follow up appointment date/time:  - are staples/sutures in place?  - what day should staples/sutures be removed (POD 10-14)?  - please call the office to confirm appointment:     Wound Care:  - can patient shower?  - does dressing need to be changed/removed?  - no picking at incision  - wears glasses?   - pressure ulcer?     Devices:  - does patient need collar or brace or helmet?   - does collar/brace need to be worn at all times or just when OOB?  - RW or cane for ambulation?    Drains/Lines:  - PICC in place? ID follow up? (Paper Rx for: antibiotics, heparin flush, weekly lab draws)  - WALI in place? Management?  - eli in place? Management/Urology follow up?  - Tracheostomy?  - PEG tube?      Activity:  - fatigue is common after surgery, rest if you feel tired   - no bending, lifting, twisting or heavy lifting   - walking is recommended, ambulate as tolerated  - you may shower when you get home, keep your incision dry  - no soaking in a tub/pool/hot tub   - no driving within 24 hours of anesthesia or while taking prescription pain medications   - keep hydrated, drink plenty of water   - skullbase precautions: no nose blowing, sneeze with mouth open, no drinking out of a straw, no straining      Inpatient consults:  - final recommendations  - you will need follow up with....    Please also follow up with your primary care doctor.     Pain Expectations:  - pain after surgery is expected  - please take pain meds as prescribed     Medications:  - changes to home meds (ex. AED's)?  - new meds?  - pain meds?  - when can antiplatelets or anticoagulants be restarted?  - were adverse affects of meds discussed with patients?   - pain medications can cause constipation, you should eat a high fiber diet and may take a stool softener while on pain meds   - Avoid taking Advil (ibuprofen), Motrin (naproxen), or Aspirin for pain as they can cause bleeding     Call the office or come to ED if:  - wound has drainage or bleeding, increased redness or pain at incision site, neurological change, fever (>101), chills, night sweats, syncope, nausea/vomiting, chest pain, shortness of breath      Playback:  - There is a copy of your discharge instructions on the [facility].   - is a picture of the incision on playback?     WITHIN 24 HOURS OF DISCHARGE, PLEASE CONTACT NEURO PA  WITH ANY QUESTIONS OR CONCERNS: [phone]   OTHERWISE, PLEASE CALL THE OFFICE WITH ANY QUESTIONS OR CONCERNS: [phone] Neurosurgery follow up appointment date/time:  - please call the office to confirm appointment: [phone]    Activity:  - fatigue is common, rest if you feel tired   - no bending, lifting, twisting or heavy lifting   - walking is recommended, ambulate as tolerated  - you may shower when you get home  - no driving within 24 hours of anesthesia or while taking prescription pain medications   - keep hydrated, drink plenty of water   Please also follow up with your primary care doctor.     Pain Expectations:  - pain after surgery is expected  - please take pain meds as prescribed     Medications:  - Take tylenol 500mg-1000mg every 8 hours as needed for pain.  - new meds?  - pain meds?  - when can antiplatelets or anticoagulants be restarted?  - were adverse affects of meds discussed with patients?   - pain medications can cause constipation, you should eat a high fiber diet and may take a stool softener while on pain meds   - Avoid taking Advil (ibuprofen), Motrin (naproxen), or Aspirin for pain as they can cause bleeding     Call the office or come to ED if:  - wound has drainage or bleeding, increased redness or pain at incision site, neurological change, fever (>101), chills, night sweats, syncope, nausea/vomiting, chest pain, shortness of breath      Playback:  - There is a copy of your discharge instructions on the [facility] [employer] [facility].     WITHIN 24 HOURS OF DISCHARGE, PLEASE CONTACT NEURO PA  WITH ANY QUESTIONS OR CONCERNS: [phone]   OTHERWISE, PLEASE CALL THE OFFICE WITH ANY QUESTIONS OR CONCERNS: [phone] Neurosurgery follow up appointment date/time:  - please call the office to confirm appointment: [phone]    Activity:  - fatigue is common, rest if you feel tired   - no bending, lifting, twisting or heavy lifting   - walking is recommended, ambulate as tolerated  - you may shower when you get home  - no driving within 24 hours of anesthesia or while taking prescription pain medications   - keep hydrated, drink plenty of water     Please also follow up with your primary care doctor.     Medications:  - new meds:   - pain meds:  Tylenol 650mg every 6 hours as needed for mild to moderate pain (over the counter)  Motrin 4000mg every 6 hours as needed for mild to moderate pain (over the counter)  Tramadol 50mg every 6 hours as needed for severe pain (can cause sleepiness, dizziness, constipation)   Gabapentin 100mg every 12 hours for nerve pain (can cause sleepiness, dizziness)   - adverse affects of meds discussed with patient  - pain medications can cause constipation, you should eat a high fiber diet and may take a stool softener while on pain meds     Call the office or come to ED if:  - wound has drainage or bleeding, increased redness or pain at incision site, neurological change, fever (>101), chills, night sweats, syncope, nausea/vomiting, chest pain, shortness of breath      Playback:  - There is a copy of your discharge instructions on the [facility].     WITHIN 24 HOURS OF DISCHARGE, PLEASE CONTACT NEURO PA  WITH ANY QUESTIONS OR CONCERNS: [phone]   OTHERWISE, PLEASE CALL THE OFFICE WITH ANY QUESTIONS OR CONCERNS: [phone]

## 2025-06-15 NOTE — DISCHARGE NOTE PROVIDER - NSDCFUADDAPPT_GEN_ALL_CORE_FT
Please follow up with Dr. Bernabe (neurosurgery). Call [phone] to confirm appointment date and time.    Please follow up with your primary care provider.  Please follow up with Dr. Bernabe, call [phone] to confirm appointment date and time.    Please follow up with your primary care provider.

## 2025-06-15 NOTE — DISCHARGE NOTE PROVIDER - NSDCCPCAREPLAN_GEN_ALL_CORE_FT
PRINCIPAL DISCHARGE DIAGNOSIS  Diagnosis: Pain, radicular, lumbar  Assessment and Plan of Treatment:       SECONDARY DISCHARGE DIAGNOSES  Diagnosis: Depression  Assessment and Plan of Treatment:     Diagnosis: Spina bifida, unspecified hydrocephalus presence, unspecified spinal region  Assessment and Plan of Treatment:     Diagnosis: Hypotension  Assessment and Plan of Treatment:

## 2025-06-16 ENCOUNTER — TRANSCRIPTION ENCOUNTER (OUTPATIENT)
Age: 40
End: 2025-06-16

## 2025-06-16 VITALS
RESPIRATION RATE: 17 BRPM | OXYGEN SATURATION: 98 % | SYSTOLIC BLOOD PRESSURE: 98 MMHG | HEART RATE: 55 BPM | TEMPERATURE: 99 F | DIASTOLIC BLOOD PRESSURE: 63 MMHG

## 2025-06-16 PROCEDURE — 85025 COMPLETE CBC W/AUTO DIFF WBC: CPT

## 2025-06-16 PROCEDURE — 99285 EMERGENCY DEPT VISIT HI MDM: CPT

## 2025-06-16 PROCEDURE — 80307 DRUG TEST PRSMV CHEM ANLYZR: CPT

## 2025-06-16 PROCEDURE — 72158 MRI LUMBAR SPINE W/O & W/DYE: CPT

## 2025-06-16 PROCEDURE — 87086 URINE CULTURE/COLONY COUNT: CPT

## 2025-06-16 PROCEDURE — 85027 COMPLETE CBC AUTOMATED: CPT

## 2025-06-16 PROCEDURE — 99222 1ST HOSP IP/OBS MODERATE 55: CPT

## 2025-06-16 PROCEDURE — 87186 SC STD MICRODIL/AGAR DIL: CPT

## 2025-06-16 PROCEDURE — 72157 MRI CHEST SPINE W/O & W/DYE: CPT

## 2025-06-16 PROCEDURE — 96375 TX/PRO/DX INJ NEW DRUG ADDON: CPT

## 2025-06-16 PROCEDURE — 97165 OT EVAL LOW COMPLEX 30 MIN: CPT

## 2025-06-16 PROCEDURE — 97161 PT EVAL LOW COMPLEX 20 MIN: CPT

## 2025-06-16 PROCEDURE — A9585: CPT

## 2025-06-16 PROCEDURE — 99231 SBSQ HOSP IP/OBS SF/LOW 25: CPT

## 2025-06-16 PROCEDURE — 76770 US EXAM ABDO BACK WALL COMP: CPT

## 2025-06-16 PROCEDURE — 80048 BASIC METABOLIC PNL TOTAL CA: CPT

## 2025-06-16 PROCEDURE — 36415 COLL VENOUS BLD VENIPUNCTURE: CPT

## 2025-06-16 PROCEDURE — 72131 CT LUMBAR SPINE W/O DYE: CPT

## 2025-06-16 PROCEDURE — 84702 CHORIONIC GONADOTROPIN TEST: CPT

## 2025-06-16 PROCEDURE — 83735 ASSAY OF MAGNESIUM: CPT

## 2025-06-16 PROCEDURE — 80076 HEPATIC FUNCTION PANEL: CPT

## 2025-06-16 PROCEDURE — 96374 THER/PROPH/DIAG INJ IV PUSH: CPT

## 2025-06-16 PROCEDURE — 81001 URINALYSIS AUTO W/SCOPE: CPT

## 2025-06-16 PROCEDURE — 99233 SBSQ HOSP IP/OBS HIGH 50: CPT

## 2025-06-16 PROCEDURE — 84100 ASSAY OF PHOSPHORUS: CPT

## 2025-06-16 RX ORDER — ACETAMINOPHEN 500 MG/5ML
2 LIQUID (ML) ORAL
Qty: 0 | Refills: 0 | DISCHARGE
Start: 2025-06-16

## 2025-06-16 RX ORDER — POLYETHYLENE GLYCOL 3350 17 G/17G
17 POWDER, FOR SOLUTION ORAL
Qty: 119 | Refills: 0
Start: 2025-06-16 | End: 2025-06-22

## 2025-06-16 RX ORDER — NALOXONE HYDROCHLORIDE 0.4 MG/ML
1 INJECTION, SOLUTION INTRAMUSCULAR; INTRAVENOUS; SUBCUTANEOUS
Qty: 1 | Refills: 0
Start: 2025-06-16 | End: 2025-06-16

## 2025-06-16 RX ORDER — TRAMADOL HYDROCHLORIDE 50 MG/1
1 TABLET, FILM COATED ORAL
Qty: 12 | Refills: 0
Start: 2025-06-16 | End: 2025-06-18

## 2025-06-16 RX ORDER — GABAPENTIN 400 MG/1
1 CAPSULE ORAL
Qty: 28 | Refills: 0
Start: 2025-06-16 | End: 2025-06-29

## 2025-06-16 NOTE — OCCUPATIONAL THERAPY INITIAL EVALUATION ADULT - DIAGNOSIS, OT EVAL
Pt. admitted to [facility] for pain management. Upon assessment, pt. demo increased pain and reduced balance impacting engagement in ADLs and functional mob/transfers.

## 2025-06-16 NOTE — DISCHARGE NOTE NURSING/CASE MANAGEMENT/SOCIAL WORK - PATIENT PORTAL LINK FT
You can access the FollowMyHealth Patient Portal offered by [facility] by registering at the following website: [email or web address]. By joining [facility]’s FollowMyHealth portal, you will also be able to view your health information using other applications (apps) compatible with our system.

## 2025-06-16 NOTE — PROGRESS NOTE ADULT - SUBJECTIVE AND OBJECTIVE BOX
Patient was seen and examined at bedside.  Case discuss with the primary team.   resting in bed , no new complaints   denies dysuria , burning with urination, denies increased frequency of urination , denies suprapubic or flank pain     Currently Menstruating     OBJECTIVE:  Vital Signs Last 24 Hrs  T(C): 37 (16 Jun 2025 09:06), Max: 37.2 (15 Irwin 2025 16:22)  T(F): 98.6 (16 Jun 2025 09:06), Max: 98.9 (15 Irwin 2025 16:22)  HR: 55 (16 Jun 2025 09:06) (55 - 74)  BP: 98/63 (16 Jun 2025 09:06) (96/65 - 139/76)  BP(mean): --  RR: 17 (16 Jun 2025 09:06) (17 - 20)  SpO2: 98% (16 Jun 2025 09:06) (95% - 98%)    Parameters below as of 16 Jun 2025 09:06  Patient On (Oxygen Delivery Method): room air          PHYSICAL EXAM:  NAD laying in bed  CTA b/l no wheezing or crackles  NL S1,S2 no mumurs   soft NT/ND + BS no rebound or guarding   AAOx3; sensation intact; strength 5/5 b/l UE and 4/5 LE limited 2/2 pain       LABS:                                   11.4   8.44  )-----------( 287      ( 15 Irwin 2025 08:33 )             35.4     06-15    139  |  109[H]  |  9   ----------------------------<  89  4.1   |  21[L]  |  0.66    Ca    8.5      15 Irwin 2025 08:33  Phos  2.0     06-15  Mg     1.9     06-15      MEDICATIONS  (STANDING):  cefTRIAXone   IVPB 1000 milliGRAM(s) IV Intermittent every 24 hours  cefTRIAXone   IVPB      enoxaparin Injectable 40 milliGRAM(s) SubCutaneous <User Schedule>  senna 2 Tablet(s) Oral at bedtime    MEDICATIONS  (PRN):  acetaminophen     Tablet .. 1000 milliGRAM(s) Oral every 8 hours PRN Temp greater or equal to 38C (100.4F), Mild Pain (1 - 3)  bisacodyl 5 milliGRAM(s) Oral daily PRN Constipation  diazepam    Tablet 2 milliGRAM(s) Oral every 8 hours PRN muscle spasm  ondansetron Injectable 4 milliGRAM(s) IV Push every 6 hours PRN Nausea and/or Vomiting  polyethylene glycol 3350 17 Gram(s) Oral daily PRN Constipation  traMADol 25 milliGRAM(s) Oral every 4 hours PRN Moderate Pain (4 - 6)  traMADol 50 milliGRAM(s) Oral every 4 hours PRN Severe Pain (7 - 10)                      
Patient was seen and examined at bedside. Case discuss with the primary team. Pt with an episode of hypotension this morning  with a SBP in the 80s. Pt received a liter NS bolus and the pt's BP improved to low 100s. Pt continues to have right sided leg pain     OBJECTIVE:  Vital Signs Last 24 Hrs  T(C): 36.7 (15 Irwin 2025 09:01), Max: 37 (15 Irwin 2025 05:38)  T(F): 98 (15 Irwin 2025 09:01), Max: 98.6 (15 Irwin 2025 05:38)  HR: 66 (15 Irwin 2025 09:01) (61 - 69)  BP: 159/100 (15 Irwin 2025 09:01) (84/54 - 159/100)  BP(mean): 64 (14 Jun 2025 23:09) (64 - 73)  RR: 18 (15 Irwin 2025 09:01) (16 - 18)  SpO2: 96% (15 Irwin 2025 09:01) (95% - 97%)    Parameters below as of 15 Irwin 2025 09:01  Patient On (Oxygen Delivery Method): room air      PHYSICAL EXAM:  NAD laying in bed  CTA b/l no wheezing or crackles  NL S1,S2 no mumurs   soft NT/ND + BS no rebound or guarding   AAOx3; sensation intact; strength 5/5 b/l UE and 4/5 LE limited 2/2 pain       LABS:                        11.4   8.44  )-----------( 287      ( 15 Irwin 2025 08:33 )             35.4     06-15    139  |  109[H]  |  9   ----------------------------<  89  4.1   |  21[L]  |  0.66    Ca    8.5      15 Irwin 2025 08:33  Phos  2.0     06-15  Mg     1.9     06-15    TPro  6.6  /  Alb  3.6  /  TBili  0.4  /  DBili  <0.1  /  AST  15  /  ALT  7[L]  /  AlkPhos  126[H]  06-14    LIVER FUNCTIONS - ( 14 Jun 2025 03:30 )  Alb: 3.6 g/dL / Pro: 6.6 g/dL / ALK PHOS: 126 U/L / ALT: 7 U/L / AST: 15 U/L / GGT: x           acetaminophen     Tablet .. 1000 milliGRAM(s) Oral every 8 hours PRN  bisacodyl 5 milliGRAM(s) Oral daily PRN  diazepam    Tablet 2 milliGRAM(s) Oral every 8 hours PRN  enoxaparin Injectable 40 milliGRAM(s) SubCutaneous <User Schedule>  ondansetron Injectable 4 milliGRAM(s) IV Push every 6 hours PRN  polyethylene glycol 3350 17 Gram(s) Oral daily PRN  potassium phosphate / sodium phosphate Powder (PHOS-NaK) 1 Packet(s) Oral every 6 hours  senna 2 Tablet(s) Oral at bedtime  traMADol 25 milliGRAM(s) Oral every 4 hours PRN  traMADol 50 milliGRAM(s) Oral every 4 hours PRN                    
HPI:  39 female PMH of spina bifida, urinary incontinence (self catheterizes), depression, and spinal epidermoid tumor s/p multiple resections, most recently in 2019 (thoracolumbar lami, resection of intramedullary spinal tumor) and  (T2-L3 lami for resection of recurrent intradural spinal tumor) with Dr. Bernabe presenting to ED this morning with severe LBP with radiation to right flank and right leg terminating at the ankle, as well as paresthesias throughout the leg, as well as subjective right leg weakness x3 days. She reports chronic back and right leg pain, but noted needing to use a cane to ambulate in the past few days and then last night unable to get out of bed at all. She denies any arm or left leg symptoms, changes to bowel/bladder habits, recent falls or injury. She states Tylenol offered no pain relief and otherwise takes no other pain medications.  (2025 05:52)    HOSPITAL COURSE:   : Admitted to 8. MRI completed.  6/15: JO overnight, given 1L bolus for low BP, neurologically stable, rest of vitals stable. Tylenol PRN for back pain overnight.       OVERNIGHT EVENTS:  Vital Signs Last 24 Hrs  T(C): 36.8 (2025 20:11), Max: 37.2 (2025 04:22)  T(F): 98.3 (2025 20:11), Max: 99 (2025 04:22)  HR: 69 (15 Irwin 2025 00:47) (61 - 105)  BP: 95/60 (15 Irwin 2025 00:47) (84/54 - 111/69)  BP(mean): 64 (2025 23:09) (64 - 73)  RR: 16 (2025 20:11) (16 - 18)  SpO2: 97% (2025 20:11) (95% - 97%)    Parameters below as of 2025 20:11  Patient On (Oxygen Delivery Method): room air        I&O's Summary      PHYSICAL EXAM:  General: NAD, pt is comfortably sitting up in bed, A&O x3, on RA  HEENT: CN II-XII grossly intact, PERRL 3mm, EOMI b/l, face symmetric, tongue midline, neck FROM  Cardiovascular: RRR, normal S1 and S2   Respiratory: lungs CTAB, no wheezing, rhonchi, or crackles   GI: normoactive BS to auscultation, abd soft, NTND   Neuro: no aphasia, speech clear, no dysmetria, no pronator drift  strength 5/5 throughout all 4 extremities, +SLT RLE   sensation intact to light touch throughout   Extremities: distal pulses 2+ x4     TUBES/LINES:  [] Michel  [] Lumbar Drain  [] Wound Drains  [] Others    DIET:  [] NPO  [X] Mechanical  [] Tube feeds    LABS:                        12.3   12.48 )-----------( 316      ( 2025 03:30 )             36.6     06-14    140  |  107  |  9   ----------------------------<  106[H]  3.9   |  22  |  0.72    Ca    8.7      2025 03:30    TPro  6.6  /  Alb  3.6  /  TBili  0.4  /  DBili  <0.1  /  AST  15  /  ALT  7[L]  /  AlkPhos  126[H]  06-14      Urinalysis Basic - ( 2025 03:30 )    Color: Yellow / Appearance: Clear / S.030 / pH: x  Gluc: 106 mg/dL / Ketone: x  / Bili: Negative / Urobili: 1.0 mg/dL   Blood: x / Protein: 30 mg/dL / Nitrite: Negative   Leuk Esterase: Negative / RBC: 3 /HPF / WBC 1 /HPF   Sq Epi: x / Non Sq Epi: 5 /HPF / Bacteria: Negative /HPF          CAPILLARY BLOOD GLUCOSE          Drug Levels: [] N/A    CSF Analysis: [] N/A      Allergies    shellfish (Rash)  No Known Drug Allergies    Intolerances      MEDICATIONS:  Antibiotics:    Neuro:  acetaminophen     Tablet .. 1000 milliGRAM(s) Oral every 8 hours PRN  diazepam    Tablet 2 milliGRAM(s) Oral every 8 hours PRN  ondansetron Injectable 4 milliGRAM(s) IV Push every 6 hours PRN  oxyCODONE    IR 5 milliGRAM(s) Oral every 4 hours PRN  oxyCODONE    IR 10 milliGRAM(s) Oral every 4 hours PRN    Anticoagulation:  enoxaparin Injectable 40 milliGRAM(s) SubCutaneous <User Schedule>    OTHER:  bisacodyl 5 milliGRAM(s) Oral daily PRN  senna 2 Tablet(s) Oral at bedtime    IVF:    CULTURES:    RADIOLOGY & ADDITIONAL TESTS:      ASSESSMENT:  39F PMH of spina bifida, urinary incontinence (self catheterizes), depression, and spinal epidermoid tumor s/p multiple resections ,  presenting with severe LBP with radiation to right flank and right leg and weakness x3 days.        PAIN RADICULAR LUMBER    No pertinent family history in first degree relatives    Handoff    MEWS Score    Tumor    Insomnia    Depression    Bladder disorder    Pain, radicular, lumbar    Lumbar spine pain    Depression    Bladder disorder    Spinal cord tumor    Spinal cord tumor    History of cholecystectomy    History of eye surgery    LEG PAIN    Depression    90+    SysAdmin_VstLnk        PLAN:  Neuro:  - neuro/vital checks q8hr   - pain control: Oxy 5/10mg, Valium 2mg, Tylenol  - MRI T&L Spine w/ contrast completed   - Depression: denies any medication at this time    Cardiac:  - Normotensive BP goals    Pulmonary:  - RA    GI:  - PO diet as tolerated  - Stool softeners PRN    Renal:  - IVL  - Straight cath for urine, does not void spontaneously    Heme:  - SCDs, SQL for DVT prophylaxis    Endo:  - no active issues    ID:  - afebrile    Disposition: regional status, full code, pend plan and MRI read     D/w Dr. Bernabe  
HPI:  39 female PMH of spina bifida, urinary incontinence (self catheterizes), depression, and spinal epidermoid tumor s/p multiple resections, most recently in 2019 (thoracolumbar lami, resection of intramedullary spinal tumor) and  (T2-L3 lami for resection of recurrent intradural spinal tumor) with Dr. Bernabe presenting to ED this morning with severe LBP with radiation to right flank and right leg terminating at the ankle, as well as paresthesias throughout the leg, as well as subjective right leg weakness x3 days. She reports chronic back and right leg pain, but noted needing to use a cane to ambulate in the past few days and then last night unable to get out of bed at all. She denies any arm or left leg symptoms, changes to bowel/bladder habits, recent falls or injury. She states Tylenol offered no pain relief and otherwise takes no other pain medications.  (2025 05:52)    HOSPITAL COURSE:   : Admitted to [facility]. MRI completed.  6/15: JO overnight, given 1L bolus for low BP, neurologically stable, rest of vitals stable. Tylenol PRN for back pain overnight, changed Oxycodone to  Tramadol PRN (hypotension). Pending PT/OT eval.   : JO overnight, US sent yesterday +leukes and nitrites with numerous bacteria, discussed with hospitalist started on IV Ceftriaxone for UTI tx. Pend PT/OT assessment.       OVERNIGHT EVENTS:  Vital Signs Last 24 Hrs  T(C): 37 (15 Irwin 2025 20:38), Max: 37.2 (15 Irwin 2025 16:22)  T(F): 98.6 (15 Irwin 2025 20:38), Max: 98.9 (15 Irwin 2025 16:22)  HR: 56 (15 Irwin 2025 20:38) (56 - 74)  BP: 104/72 (15 Irwin 2025 20:38) (96/65 - 159/100)  BP(mean): --  RR: 17 (15 Irwin 2025 20:38) (17 - 20)  SpO2: 97% (15 Irwin 2025 20:38) (95% - 97%)    Parameters below as of 15 Irwin 2025 20:38  Patient On (Oxygen Delivery Method): room air        I&O's Summary    15 Irwin 2025 07:01  -  2025 00:50  --------------------------------------------------------  IN: 360 mL / OUT: 0 mL / NET: 360 mL        PHYSICAL EXAM:  General: NAD, pt is comfortably sitting up in bed, A&O x3, on RA  HEENT: CN II-XII grossly intact, PERRL 3mm, EOMI b/l, face symmetric, tongue midline, neck FROM  Cardiovascular: RRR, normal S1 and S2   Respiratory: lungs CTAB, no wheezing, rhonchi, or crackles   GI: normoactive BS to auscultation, abd soft, NTND   Neuro: no aphasia, speech clear, no dysmetria, no pronator drift  strength 5/5 throughout all 4 extremities, +SLT RLE   sensation intact to light touch throughout   Extremities: distal pulses 2+ x4     TUBES/LINES:  [] Michel  [] Lumbar Drain  [] Wound Drains  [] Others    DIET:  [] NPO  [X] Mechanical  [] Tube feeds        LABS:                        11.4   8.44  )-----------( 287      ( 15 Irwin 2025 08:33 )             35.4     06-15    139  |  109[H]  |  9   ----------------------------<  89  4.1   |  21[L]  |  0.66    Ca    8.5      15 Irwin 2025 08:33  Phos  2.0     06-15  Mg     1.9     06-15    TPro  6.6  /  Alb  3.6  /  TBili  0.4  /  DBili  <0.1  /  AST  15  /  ALT  7[L]  /  AlkPhos  126[H]  06-14      Urinalysis Basic - ( 15 Irwin 2025 18:12 )    Color: Red / Appearance: Turbid / S.030 / pH: x  Gluc: x / Ketone: x  / Bili: Small / Urobili: 1.0 mg/dL   Blood: x / Protein: 300 mg/dL / Nitrite: Positive   Leuk Esterase: Small / RBC: >1900 /HPF / WBC 24 /HPF   Sq Epi: x / Non Sq Epi: 2 /HPF / Bacteria: Too Numerous to count /HPF      CAPILLARY BLOOD GLUCOSE      Drug Levels: [] N/A    CSF Analysis: [] N/A      Allergies    shellfish (Rash)  No Known Drug Allergies    Intolerances      MEDICATIONS:  Antibiotics:  cefTRIAXone   IVPB 1000 milliGRAM(s) IV Intermittent every 24 hours  cefTRIAXone   IVPB        Neuro:  acetaminophen     Tablet .. 1000 milliGRAM(s) Oral every 8 hours PRN  diazepam    Tablet 2 milliGRAM(s) Oral every 8 hours PRN  ondansetron Injectable 4 milliGRAM(s) IV Push every 6 hours PRN  traMADol 25 milliGRAM(s) Oral every 4 hours PRN  traMADol 50 milliGRAM(s) Oral every 4 hours PRN    Anticoagulation:  enoxaparin Injectable 40 milliGRAM(s) SubCutaneous <User Schedule>    OTHER:  bisacodyl 5 milliGRAM(s) Oral daily PRN  polyethylene glycol 3350 17 Gram(s) Oral daily PRN  senna 2 Tablet(s) Oral at bedtime    IVF:    CULTURES:    RADIOLOGY & ADDITIONAL TESTS:      ASSESSMENT:  39F PMH of spina bifida, urinary incontinence (self catheterizes), depression, and spinal epidermoid tumor s/p multiple resections ,  presenting with severe LBP with radiation to right flank and right leg and weakness x3 days.      PAIN RADICULAR LUMBER    No pertinent family history in first degree relatives    Handoff    MEWS Score    Tumor    Insomnia    Depression    Bladder disorder    Pain, radicular, lumbar    Lumbar spine pain    Depression    Bladder disorder    Spinal cord tumor    Spinal cord tumor    History of cholecystectomy    History of eye surgery    LEG PAIN    Depression    90+    SysAdmin_VstLnk        PLAN:  Neuro:  - neuro/vital checks q8hr   - pain control: Oxy 5/10mg, Valium 2mg, Tylenol  - MRI T&L Spine w/ contrast completed   - Depression: denies any medication at this time    Cardiac:  - Normotensive BP goals    Pulmonary:  - RA    GI:  - PO diet as tolerated  - Stool softeners PRN    Renal:  - IVL  - intermittently urinates vs straight cath (atg baseline)     Heme:  - SCDs, SQL for DVT prophylaxis    Endo:  - no active issues    ID:  - afebrile  - UA w/ reflex sent 6/15: UA + leuks/nitrites/numerous bacteria, pend reflex cx, started on Ceftriaxone 1g q24hrs (6/15 - )     Disposition: regional status, full code, pending PT/OT     D/w Dr. Bernabe

## 2025-06-16 NOTE — DISCHARGE NOTE NURSING/CASE MANAGEMENT/SOCIAL WORK - NSDCPEFALRISK_GEN_ALL_CORE
For information on Fall & Injury Prevention, visit: [email or web address] OR  [email or web address] OR  https://www.cdc.gov/steadi/patient.html

## 2025-06-16 NOTE — OCCUPATIONAL THERAPY INITIAL EVALUATION ADULT - GENERAL OBSERVATIONS, REHAB EVAL
OT IE complete. BRYANT downey pt. for session. Pt. received semi-supine in bed, +primafit, +heplock in NAD, agreeable to session.

## 2025-06-16 NOTE — OCCUPATIONAL THERAPY INITIAL EVALUATION ADULT - ADDITIONAL COMMENTS
resides with family in an apartment with 1 FOS to enter where pt. was I prior in ADLs and used a SC And RW for mobility. Pt. denies AE in bathroom resides with family in an apartment with 1 FOS to enter where pt. was primarily I prior in ADLs and used a SC And RW for mobility. Pt. denies AE in bathroom. Pt. has a home attendant 5 hours a day mon- Fri that aides in ADLs/IADLs. Pt. was able to self-cath

## 2025-06-16 NOTE — PROGRESS NOTE ADULT - ASSESSMENT
39F PMH of spina bifida, urinary incontinence (self catheterizes), depression, and spinal epidermoid tumor s/p multiple resections 2019, 2022 presenting with severe LBP with radiation to right flank and right leg and weakness x3 days. Pt is s/p CT which showed foraminal narrowing due to osteophyte and disc bulging is noted at multiple levels, worst at T12-L1, L4-L5 and L5-S1. Pt was admitted  to NSGY for further evaluation.     # Right leg and weakness 2/2 foraminal narrowing and herniated disc T12-S1  -no surgical plans per NSGY   -Will continue pain medication and bowel regimen as per primary   -Fall precaution; PT evaluation     #Spinal bifida   # Neurogenic bladder  - Pt does Self-catheterization  at home   - Will continue straight cath prn     #Hypotension   -Pt with episode of hypotension this morning. Pt's BP improved following an 1L fluid bolus  -Will continue to monitor BP    # Asymptomatic bacteruria   - She does not have dysuria , hematuria , suprapubic pain or tenderness ,flank pain or tenderness , the blood in the UA is because she is menstruating , no recent urological procedure , she does not have any urological hardware    - she does not need to be on antibiotics , please discontinue Ceftriaxone     # Depression   - pt denied use of any antidepressive medication at this time.    #DVT prop  -As per primary team     #DISPO  -Pending PT evaluation pending primary team recommendation     55 minutes spent on total encounter. The necessity of the time spent during the encounter on this date of service was due to:    Review of hospital course, labs, vitals, radiology and medical records.  Direct patient encounter including bedside exam and interview.   Discussed plan of care with primary team.    Documenting the encounter.

## 2025-06-16 NOTE — PHYSICAL THERAPY INITIAL EVALUATION ADULT - MANUAL MUSCLE TESTING RESULTS, REHAB EVAL
functional observation against gravity > 3/5 MMT/grossly assessed due to BLE 5/5 MMT except R hip flexion 4/5 MMT/no strength deficits were identified

## 2025-06-16 NOTE — PHYSICAL THERAPY INITIAL EVALUATION ADULT - PLANNED THERAPY INTERVENTIONS, PT EVAL
career/technical training
balance training/bed mobility training/gait training/strengthening/transfer training

## 2025-06-16 NOTE — OCCUPATIONAL THERAPY INITIAL EVALUATION ADULT - PERTINENT HX OF CURRENT PROBLEM, REHAB EVAL
39 female PMH of spina bifida, urinary incontinence (self catheterizes), depression, and spinal epidermoid tumor s/p multiple resections, most recently in 2019 (thoracolumbar lami, resection of intramedullary spinal tumor) and 2022 (T2-L3 lami for resection of recurrent intradural spinal tumor) with Dr. Bernabe presenting to ED this morning with severe LBP with radiation to right flank and right leg terminating at the ankle, as well as paresthesias throughout the leg, as well as subjective right leg weakness x3 days. She reports chronic back and right leg pain, but noted needing to use a cane to ambulate in the past few days and then last night unable to get out of bed at all. She denies any arm or left leg symptoms, changes to bowel/bladder habits, recent falls or injury. She states Tylenol offered no pain relief and otherwise takes no other pain medications

## 2025-06-16 NOTE — CONSULT NOTE ADULT - NS ATTEND AMEND GEN_ALL_CORE FT
39F w pmhx spina bifida, urinary incontinence (self catheterizes (5-6 times per day), depression, and spinal epidermoid tumor s/p multiple resections, (thoracolumbar laminectomy, resection of intramedullary spinal tumor 2019, 2022) now admitted after she c/o LBP and R flank pain.   Says she forgets to self cath sometimes which causes urinary retention.  Has had multiple Rx for UTI, does not remember specific details.   She was scheduled for follow up imaging of low back in July but decided to come to ED as she was unable to get out of bed 2/2 pain.  Denied fevers, chills, N/V/D. Afebrile since admission. Had WBK 12k on initial presentation which normalized (preAbx).  Initial UA negative and culture grew 50-99k CFU/ml E. coli. susceptibility pending.   Then pt got her menstrual cycle and UA was repeated due to malodor, UA now w WBC and blood, cx awaited.   She was given IV CTX 1gm on 6/15, renal US w/o c/f pyelo/hydronephrosis.   ID consulted for DC Abx recs  - Asymptomatic bacteruria - recc DC Abx, monitor off Abx  - Counseled on UTI signs and symptoms and encouarged to call should any occur  ID Team 2 will sign off, please call us back as needed or with questions.

## 2025-06-16 NOTE — DISCHARGE NOTE NURSING/CASE MANAGEMENT/SOCIAL WORK - FINANCIAL ASSISTANCE
[facility] provides services at a reduced cost to those who are determined to be eligible through [facility]’s financial assistance program. Information regarding [facility]’s financial assistance program can be found by going to [email or web address] or by calling [phone].

## 2025-06-16 NOTE — CONSULT NOTE ADULT - SUBJECTIVE AND OBJECTIVE BOX
INFECTIOUS DISEASES INITIAL CONSULT NOTE    HPI:  39 female PMH of spina bifida, urinary incontinence (self catheterizes (5-6 times per day), depression, and spinal epidermoid tumor s/p multiple resections, most recently in  (thoracolumbar lami, resection of intramedullary spinal tumor) and  (T2-L3 lami for resection of recurrent intradural spinal tumor) with Dr. Bernabe presented with severe LBP with radiation to right flank and right leg terminating at the ankle, as well as paresthesias throughout the leg, as well as subjective right leg weakness. ID consulted for c/f UTI  She reports chronic back and right leg pain (worsening over the last 1.5 weeks), and noted needing to use a cane to ambulate in the past few days. She was scheduled for follow up imaging of low back in July but decided to come to ED as she was unable to get out of bed.  Denies fevers, chills, N/V/D. Denies changes to bowel/bladder habits, recent falls or injury. On arrival, afebrile with mild leukocytosis 12k. UA negative and culture grew 50-99k CFU/ml E. coli. Patient states she has been treated for many UTIs in the past since she has had to straight cath since she was young child. She reports never having any urinary symptoms during these circumstances. She denies fevers, chills, dysuria, hematuria, suprapubic pain, flank pain. UA was repeated due to malodor and UA positive, however there is blood in urine as patient is now menstruating. She is s/p CTX 1g IV x 1 last night. Noted that leukocytosis resolved prior to any antibiotics. Had normal renal US.     PAST MEDICAL & SURGICAL HISTORY:  Tumor  spinal      Insomnia      Depression      Bladder disorder  pt self catheterizes      Spinal cord tumor      History of cholecystectomy      History of eye surgery  muscle repair            Review of Systems:   Constitutional, eyes, ENT, cardiovascular, respiratory, gastrointestinal, genitourinary, integumentary, neurological, psychiatric and heme/lymph are otherwise negative other than noted above       ANTIBIOTICS:  MEDICATIONS  (STANDING):  cefTRIAXone   IVPB 1000 milliGRAM(s) IV Intermittent every 24 hours  cefTRIAXone   IVPB      enoxaparin Injectable 40 milliGRAM(s) SubCutaneous <User Schedule>  senna 2 Tablet(s) Oral at bedtime    MEDICATIONS  (PRN):  acetaminophen     Tablet .. 1000 milliGRAM(s) Oral every 8 hours PRN Temp greater or equal to 38C (100.4F), Mild Pain (1 - 3)  bisacodyl 5 milliGRAM(s) Oral daily PRN Constipation  diazepam    Tablet 2 milliGRAM(s) Oral every 8 hours PRN muscle spasm  ondansetron Injectable 4 milliGRAM(s) IV Push every 6 hours PRN Nausea and/or Vomiting  polyethylene glycol 3350 17 Gram(s) Oral daily PRN Constipation  traMADol 25 milliGRAM(s) Oral every 4 hours PRN Moderate Pain (4 - 6)  traMADol 50 milliGRAM(s) Oral every 4 hours PRN Severe Pain (7 - 10)      Allergies    shellfish (Rash)  No Known Drug Allergies    Intolerances        SOCIAL HISTORY:  -smokes marijuana, 3-4 joints per day  -smokes 1 pack per week  -denies etoh use  -Lives with family, on disability.      FAMILY HISTORY:  No pertinent family history in first degree relatives     no FH leading to current infection    Vital Signs Last 24 Hrs  T(C): 37 (2025 09:06), Max: 37.2 (15 Irwin 2025 16:22)  T(F): 98.6 (2025 09:06), Max: 98.9 (15 Irwin 2025 16:22)  HR: 55 (2025 09:06) (55 - 74)  BP: 98/63 (2025 09:06) (96/65 - 139/76)  BP(mean): --  RR: 17 (:06) (17 - 20)  SpO2: 98% (2025 09:06) (95% - 98%)    Parameters below as of 2025 09:06  Patient On (Oxygen Delivery Method): room air        06-15-25 @ 07:01  -  25 @ 07:00  --------------------------------------------------------  IN: 360 mL / OUT: 400 mL / NET: -40 mL    25 @ 07:01  -  25 @ 14:54  --------------------------------------------------------  IN: 720 mL / OUT: 0 mL / NET: 720 mL        PHYSICAL EXAM:  Constitutional: alert, NAD  Eyes: the sclera and conjunctiva were normal.   ENT: the ears and nose were normal in appearance.   Neck: the appearance of the neck was normal and the neck was supple.   Pulmonary: no respiratory distress and lungs were clear to auscultation bilaterally.   Heart: heart rate was normal and rhythm regular, normal S1 and S2  Vascular: there was no peripheral edema  Abdomen: normal bowel sounds, soft, no suprapubic tenderness upon palpation (does have sensation intact), no L CVA tenderness. Has some tenderness to R flank/lower back although reports this is chronic.   Neurological: no focal deficits.   Psychiatric: the affect was normal      LABS:                        11.4   8.44  )-----------( 287      ( 15 Irwin 2025 08:33 )             35.4     06-15    139  |  109[H]  |  9   ----------------------------<  89  4.1   |  21[L]  |  0.66    Ca    8.5      15 Irwin 2025 08:33  Phos  2.0     06-15  Mg     1.9     06-15        Urinalysis Basic - ( 15 Irwin 2025 18:12 )    Color: Red / Appearance: Turbid / S.030 / pH: x  Gluc: x / Ketone: x  / Bili: Small / Urobili: 1.0 mg/dL   Blood: x / Protein: 300 mg/dL / Nitrite: Positive   Leuk Esterase: Small / RBC: >1900 /HPF / WBC 24 /HPF   Sq Epi: x / Non Sq Epi: 2 /HPF / Bacteria: Too Numerous to count /HPF        MICROBIOLOGY:    Urinalysis with Rflx Culture (collected 06-15-25 @ 18:12)    Culture - Urine (collected 25 @ 05:10)  Source: Catheterized Catheterized  Preliminary Report (25 @ 07:28):    50,000 - 99,000 CFU/mL Escherichia coli      RADIOLOGY & ADDITIONAL STUDIES:  reviewed 
  Pt seen and evaluated at bedside. In summary the pt is a 39F PMH of spina bifida, urinary incontinence (self catheterizes), depression, and spinal epidermoid tumor s/p multiple resections 2019, 2022 presenting with severe LBP with radiation to right flank and right leg and weakness x3 days. Pt is s/p CT which showed foraminal narrowing due to osteophyte and disc bulging is noted at multiple levels, worstat T12-L1, L4-L5 and L5-S1. [place] [facility] [place]       PAST MEDICAL & SURGICAL HISTORY:  Spinal bifida   Insomnia  Depression  Bladder disorder pt self catheterizes  History of cholecystectomy  History of eye surgery muscle repair    Home Medications:    Allergies: shellfish (Rash)  No Known Drug Allergies      Social History:  Lives with family, on disability. (14 Jun 2025 05:52)      VITAL SIGNS, INS/OUTS (last 24 hours):  Vital Signs Last 24 Hrs  T(C): 36.8 (14 Jun 2025 06:33), Max: 37.2 (14 Jun 2025 04:22)  T(F): 98.3 (14 Jun 2025 06:33), Max: 99 (14 Jun 2025 04:22)  HR: 64 (14 Jun 2025 06:33) (64 - 105)  BP: 102/67 (14 Jun 2025 06:33) (95/65 - 111/69)  RR: 17 (14 Jun 2025 06:33) (16 - 18)  SpO2: 95% (14 Jun 2025 06:33) (95% - 97%)    Parameters below as of 14 Jun 2025 06:33  Patient On (Oxygen Delivery Method): room air    PHYSICAL EXAM:  NAD laying in bed  CTA b/l no wheezing or crackles  NL S1,S2 no mumurs   soft NT/ND + BS no rebound or guarding   AAOx3; sensation intact; strength 5/5 b/l UE and 4/5 LE limited 2/2 pain     BASIC LABS:                        12.3   12.48 )-----------( 316      ( 14 Jun 2025 03:30 )             36.6     06-14    140  |  107  |  9   ----------------------------<  106[H]  3.9   |  22  |  0.72    Ca    8.7      14 Jun 2025 03:30    TPro  6.6  /  Alb  3.6  /  TBili  0.4  /  DBili  <0.1  /  AST  15  /  ALT  7[L]  /  AlkPhos  126[H]  06-14      CURRENT MEDICATIONS:   acetaminophen     Tablet .. 1000 milliGRAM(s) Oral every 8 hours PRN  bisacodyl 5 milliGRAM(s) Oral daily PRN  diazepam    Tablet 2 milliGRAM(s) Oral every 8 hours PRN  ondansetron Injectable 4 milliGRAM(s) IV Push every 6 hours PRN  oxyCODONE    IR 5 milliGRAM(s) Oral every 4 hours PRN  oxyCODONE    IR 10 milliGRAM(s) Oral every 4 hours PRN  senna 2 Tablet(s) Oral at bedtime

## 2025-06-16 NOTE — DISCHARGE NOTE NURSING/CASE MANAGEMENT/SOCIAL WORK - NSDCFUADDAPPT_GEN_ALL_CORE_FT
Please follow up with Dr. Bernabe, call [phone] to confirm appointment date and time.    Please follow up with your primary care provider.

## 2025-06-16 NOTE — CONSULT NOTE ADULT - ASSESSMENT
39F PMH of spina bifida, urinary incontinence (self catheterizes), depression, and spinal epidermoid tumor s/p multiple resections 2019, 2022 presenting with severe LBP with radiation to right flank and right leg and weakness x3 days. Pt is s/p CT which showed foraminal narrowing due to osteophyte and disc bulging is noted at multiple levels, worst at T12-L1, L4-L5 and L5-S1. Pt was admitted  to [facility] for further evaluation.     # Right leg and weakness 2/2 foraminal narrowing and herniated disc T12-S1  -Continue management as per NSGY  -Pt s/p MRI T&L spine will f/u official read  -Will continue pain medication and bowel regimen as per primary   -Fall precaution; PT evaluation     #Spinal bifida   # Neurogenic bladder  -Pt does Self-catheterization  at home   -Will continue straight cath prn     #Depression   -Pt denied use of any antidepressive medication at this time.    #DVT prop  -As per primary team     #DISPO  -Pending primary team recommendation and PT evaluation pending primary team recommendation     76 minutes spent on total encounter. The necessity of the time spent during the encounter on this date of service was due to:    Review of hospital course, labs, vitals, radiology and medical records.  Direct patient encounter including bedside exam and interview.   Discussed plan of care with primary team.    Documenting the encounter.  
39 female PMH of spina bifida, urinary incontinence (self catheterizes (5-6 times per day), depression, and spinal epidermoid tumor s/p multiple resections, most recently in 2019 (thoracolumbar lami, resection of intramedullary spinal tumor) and 2022 (T2-L3 lami for resection of recurrent intradural spinal tumor) with Dr. Bernabe presented with severe LBP (now improved) found to have positive UA. Patient denies any urinary symptoms. Has blood in urine due to menstruation. This is likely asymptomatic bacteruria. Discussed with patient that changes in urine's odor is not indicative of infection. Advised her to seek medical attention if she develops fevers, chills, dysuria, suprapubic pain or worsening flank pain, etc. She expressed understanding.    Suggest:  -discontinue antibiotics     Team 2 will sign off. Thank you for you consultation   please recall if further ID input is desired   Case d/w primary team.  Final recommendation pending attending note.    Beti Beck, Infectious Diseases PA  Please reach out for any questions 9 am-5pm.   For evenings and weekends, please call the ID physician on call.

## 2025-06-17 LAB
-  AMOXICILLIN/CLAVULANIC ACID: SIGNIFICANT CHANGE UP
-  AMPICILLIN/SULBACTAM: SIGNIFICANT CHANGE UP
-  AMPICILLIN: SIGNIFICANT CHANGE UP
-  CEFAZOLIN: SIGNIFICANT CHANGE UP
-  CEFEPIME: SIGNIFICANT CHANGE UP
-  CEFOXITIN: SIGNIFICANT CHANGE UP
-  CEFTRIAXONE: SIGNIFICANT CHANGE UP
-  CEFUROXIME: SIGNIFICANT CHANGE UP
-  CIPROFLOXACIN: SIGNIFICANT CHANGE UP
-  GENTAMICIN: SIGNIFICANT CHANGE UP
-  LEVOFLOXACIN: SIGNIFICANT CHANGE UP
-  NITROFURANTOIN: SIGNIFICANT CHANGE UP
-  PIPERACILLIN/TAZOBACTAM: SIGNIFICANT CHANGE UP
-  TIGECYCLINE: SIGNIFICANT CHANGE UP
-  TOBRAMYCIN: SIGNIFICANT CHANGE UP
-  TRIMETHOPRIM/SULFAMETHOXAZOLE: SIGNIFICANT CHANGE UP
CULTURE RESULTS: ABNORMAL
METHOD TYPE: SIGNIFICANT CHANGE UP
ORGANISM # SPEC MICROSCOPIC CNT: ABNORMAL
ORGANISM # SPEC MICROSCOPIC CNT: SIGNIFICANT CHANGE UP
SPECIMEN SOURCE: SIGNIFICANT CHANGE UP

## 2025-06-19 NOTE — CHART NOTE - NSCHARTNOTEFT_GEN_A_CORE
Post-Discharge Medication Review: Completed	  	  Patient's preferred pharmacy was updated in [facility]: [facility] 	  	  Patient contacted to offer medication counseling post-discharge. Medication reconciliation completed. Per patient, medications include:	  	  1.	acetaminophen 500 mg oral tablet 2 tab(s) orally every 8 hours As needed Temp greater or equal to 38C (100.4F), Mild Pain (1 - 3)  2.	gabapentin 100 mg oral tablet 1 tab(s) orally 2 times a day MDD: 2 tabs  3.	Narcan 4 mg/0.1 mL nasal spray 1 spray(s) intranasally once a day as needed for opioid overdose  4.	polyethylene glycol 3350 oral powder for reconstitution 17 gram(s) orally once a day as needed for Constipation  5.	traMADol 50 mg oral tablet 1 tab(s) orally every 6 hours as needed for Severe Pain (7 - 10) MDD: 4 tabs     	  Medication name, indication, administration, side effect, and monitoring reviewed for new medications during post discharge counseling visit with patient. Patient demonstrated understanding. Counseling offered for all medications.	  		  	  Jett Benavides, PharmD	  Clinical Pharmacy Specialist, Pharmacy Telehealth Team	  Can be reached via MS Teams or [phone]

## 2025-06-19 NOTE — CHART NOTE - [facility] PATIENT INTERVIEW PRIVATE SPACE
Patient interviewed in a private space. OVERNIGHT EVENTS: ARA    SUBJECTIVE / INTERVAL HPI: Patient in bed responsive to her names, but could not answer questions     VITAL SIGNS:  Vital Signs Last 24 Hrs  T(C): 36.8 (16 Feb 2023 14:09), Max: 36.8 (15 Feb 2023 21:46)  T(F): 98.3 (16 Feb 2023 14:09), Max: 98.3 (16 Feb 2023 14:09)  HR: 68 (16 Feb 2023 15:48) (68 - 88)  BP: 145/70 (16 Feb 2023 15:48) (145/70 - 185/77)  BP(mean): 101 (16 Feb 2023 15:48) (92 - 120)  RR: 15 (16 Feb 2023 15:48) (15 - 18)  SpO2: 97% (16 Feb 2023 15:48) (96% - 100%)    Parameters below as of 16 Feb 2023 15:48  Patient On (Oxygen Delivery Method): room air        PHYSICAL EXAM:    General: In bed opens eyes but does not follow commands  HEENT: NCAT; PERRL, anicteric sclera; MMM  Neck: supple, trachea midline  Cardiovascular: S1, S2 normal; RRR, no M/G/R  Respiratory: CTABL; no W/R/R  Gastrointestinal: soft, nontender, nondistended. bowel sounds present.  Skin: Diffuse rash of the upper body   Extremities: no edema, pulses present in all extremities, Eschar with erythema on the lateral aspect of the leg, non tender  Vascular: 2+ radial, DP/PT pulses B/L  Neurological: AAOx0, unable to assess neurological exam as patient not following commands but no nuchal rigidity present     MEDICATIONS:  MEDICATIONS  (STANDING):  atorvastatin 20 milliGRAM(s) Oral at bedtime  dextrose 5%. 1000 milliLiter(s) (50 mL/Hr) IV Continuous <Continuous>  dextrose 5%. 1000 milliLiter(s) (100 mL/Hr) IV Continuous <Continuous>  dextrose 50% Injectable 25 Gram(s) IV Push once  dextrose 50% Injectable 12.5 Gram(s) IV Push once  dextrose 50% Injectable 25 Gram(s) IV Push once  doxycycline IVPB      doxycycline IVPB 100 milliGRAM(s) IV Intermittent every 12 hours  enoxaparin Injectable 40 milliGRAM(s) SubCutaneous every 24 hours  glucagon  Injectable 1 milliGRAM(s) IntraMuscular once  hydrocortisone 2.5% Ointment 1 Application(s) Topical two times a day  insulin glargine Injectable (LANTUS) 5 Unit(s) SubCutaneous at bedtime  insulin lispro (ADMELOG) corrective regimen sliding scale   SubCutaneous Before meals and at bedtime  lactated ringers. 1000 milliLiter(s) (60 mL/Hr) IV Continuous <Continuous>  lisinopril 20 milliGRAM(s) Oral every 24 hours    MEDICATIONS  (PRN):  acetaminophen     Tablet .. 650 milliGRAM(s) Oral every 6 hours PRN Moderate Pain (4 - 6)  dextrose Oral Gel 15 Gram(s) Oral once PRN Blood Glucose LESS THAN 70 milliGRAM(s)/deciliter  melatonin 5 milliGRAM(s) Oral at bedtime PRN Insomnia      ALLERGIES:  Allergies    No Known Allergies    Intolerances        LABS:                        10.3   4.05  )-----------( 108      ( 16 Feb 2023 05:30 )             31.3     02-16    137  |  100  |  8   ----------------------------<  297<H>  4.1   |  24  |  0.31<L>    Ca    8.3<L>      16 Feb 2023 05:30  Phos  3.2     02-16  Mg     1.6     02-16    TPro  5.7<L>  /  Alb  2.8<L>  /  TBili  0.6  /  DBili  x   /  AST  36  /  ALT  72<H>  /  AlkPhos  82  02-16    PT/INR - ( 15 Feb 2023 17:40 )   PT: 12.1 sec;   INR: 1.02          PTT - ( 15 Feb 2023 17:40 )  PTT:26.2 sec    CAPILLARY BLOOD GLUCOSE      POCT Blood Glucose.: 178 mg/dL (16 Feb 2023 11:12)      RADIOLOGY & ADDITIONAL TESTS: Reviewed.   OVERNIGHT EVENTS: Patient was confused overnight and has tried to get off bed multiple times to use the bathroom. Otherwise no acute events     SUBJECTIVE / INTERVAL HPI: Patient in bed responsive to her names, but could not answer questions     VITAL SIGNS:  Vital Signs Last 24 Hrs  T(C): 36.8 (16 Feb 2023 14:09), Max: 36.8 (15 Feb 2023 21:46)  T(F): 98.3 (16 Feb 2023 14:09), Max: 98.3 (16 Feb 2023 14:09)  HR: 68 (16 Feb 2023 15:48) (68 - 88)  BP: 145/70 (16 Feb 2023 15:48) (145/70 - 185/77)  BP(mean): 101 (16 Feb 2023 15:48) (92 - 120)  RR: 15 (16 Feb 2023 15:48) (15 - 18)  SpO2: 97% (16 Feb 2023 15:48) (96% - 100%)    Parameters below as of 16 Feb 2023 15:48  Patient On (Oxygen Delivery Method): room air        PHYSICAL EXAM:    General: In bed opens eyes but does not follow commands  HEENT: NCAT; PERRL, anicteric sclera; MMM  Neck: supple, trachea midline  Cardiovascular: S1, S2 normal; RRR, no M/G/R  Respiratory: CTABL; no W/R/R  Gastrointestinal: soft, nontender, nondistended. bowel sounds present.  Skin: Diffuse rash of the upper body   Extremities: no edema, pulses present in all extremities, Eschar with erythema on the lateral aspect of the leg, non tender  Vascular: 2+ radial, DP/PT pulses B/L  Neurological: AAOx0, unable to assess neurological exam as patient not following commands but no nuchal rigidity present     MEDICATIONS:  MEDICATIONS  (STANDING):  atorvastatin 20 milliGRAM(s) Oral at bedtime  dextrose 5%. 1000 milliLiter(s) (50 mL/Hr) IV Continuous <Continuous>  dextrose 5%. 1000 milliLiter(s) (100 mL/Hr) IV Continuous <Continuous>  dextrose 50% Injectable 25 Gram(s) IV Push once  dextrose 50% Injectable 12.5 Gram(s) IV Push once  dextrose 50% Injectable 25 Gram(s) IV Push once  doxycycline IVPB      doxycycline IVPB 100 milliGRAM(s) IV Intermittent every 12 hours  enoxaparin Injectable 40 milliGRAM(s) SubCutaneous every 24 hours  glucagon  Injectable 1 milliGRAM(s) IntraMuscular once  hydrocortisone 2.5% Ointment 1 Application(s) Topical two times a day  insulin glargine Injectable (LANTUS) 5 Unit(s) SubCutaneous at bedtime  insulin lispro (ADMELOG) corrective regimen sliding scale   SubCutaneous Before meals and at bedtime  lactated ringers. 1000 milliLiter(s) (60 mL/Hr) IV Continuous <Continuous>  lisinopril 20 milliGRAM(s) Oral every 24 hours    MEDICATIONS  (PRN):  acetaminophen     Tablet .. 650 milliGRAM(s) Oral every 6 hours PRN Moderate Pain (4 - 6)  dextrose Oral Gel 15 Gram(s) Oral once PRN Blood Glucose LESS THAN 70 milliGRAM(s)/deciliter  melatonin 5 milliGRAM(s) Oral at bedtime PRN Insomnia      ALLERGIES:  Allergies    No Known Allergies    Intolerances        LABS:                        10.3   4.05  )-----------( 108      ( 16 Feb 2023 05:30 )             31.3     02-16    137  |  100  |  8   ----------------------------<  297<H>  4.1   |  24  |  0.31<L>    Ca    8.3<L>      16 Feb 2023 05:30  Phos  3.2     02-16  Mg     1.6     02-16    TPro  5.7<L>  /  Alb  2.8<L>  /  TBili  0.6  /  DBili  x   /  AST  36  /  ALT  72<H>  /  AlkPhos  82  02-16    PT/INR - ( 15 Feb 2023 17:40 )   PT: 12.1 sec;   INR: 1.02          PTT - ( 15 Feb 2023 17:40 )  PTT:26.2 sec    CAPILLARY BLOOD GLUCOSE      POCT Blood Glucose.: 178 mg/dL (16 Feb 2023 11:12)      RADIOLOGY & ADDITIONAL TESTS: Reviewed.

## 2025-06-23 DIAGNOSIS — R32 UNSPECIFIED URINARY INCONTINENCE: ICD-10-CM

## 2025-06-23 DIAGNOSIS — M51.15 INTERVERTEBRAL DISC DISORDERS WITH RADICULOPATHY, THORACOLUMBAR REGION: ICD-10-CM

## 2025-06-23 DIAGNOSIS — G47.00 INSOMNIA, UNSPECIFIED: ICD-10-CM

## 2025-06-23 DIAGNOSIS — I95.9 HYPOTENSION, UNSPECIFIED: ICD-10-CM

## 2025-06-23 DIAGNOSIS — M25.78 OSTEOPHYTE, VERTEBRAE: ICD-10-CM

## 2025-06-23 DIAGNOSIS — Q05.4 UNSPECIFIED SPINA BIFIDA WITH HYDROCEPHALUS: ICD-10-CM

## 2025-06-23 DIAGNOSIS — N31.9 NEUROMUSCULAR DYSFUNCTION OF BLADDER, UNSPECIFIED: ICD-10-CM

## 2025-06-23 DIAGNOSIS — F32.A DEPRESSION, UNSPECIFIED: ICD-10-CM

## 2025-06-23 DIAGNOSIS — F17.210 NICOTINE DEPENDENCE, CIGARETTES, UNCOMPLICATED: ICD-10-CM

## 2025-06-23 DIAGNOSIS — R82.71 BACTERIURIA: ICD-10-CM

## 2025-06-23 DIAGNOSIS — M51.16 INTERVERTEBRAL DISC DISORDERS WITH RADICULOPATHY, LUMBAR REGION: ICD-10-CM

## 2025-06-23 DIAGNOSIS — F12.90 CANNABIS USE, UNSPECIFIED, UNCOMPLICATED: ICD-10-CM

## 2025-06-23 DIAGNOSIS — F41.9 ANXIETY DISORDER, UNSPECIFIED: ICD-10-CM

## 2025-06-23 DIAGNOSIS — M54.16 RADICULOPATHY, LUMBAR REGION: ICD-10-CM

## 2025-06-23 DIAGNOSIS — K21.9 GASTRO-ESOPHAGEAL REFLUX DISEASE WITHOUT ESOPHAGITIS: ICD-10-CM

## 2025-06-23 DIAGNOSIS — Z91.013 ALLERGY TO SEAFOOD: ICD-10-CM

## 2025-06-23 DIAGNOSIS — M51.17 INTERVERTEBRAL DISC DISORDERS WITH RADICULOPATHY, LUMBOSACRAL REGION: ICD-10-CM

## 2025-06-30 ENCOUNTER — APPOINTMENT (OUTPATIENT)
Dept: NEUROSURGERY | Facility: CLINIC | Age: 40
End: 2025-06-30
Payer: MEDICAID

## 2025-06-30 PROCEDURE — 99215 OFFICE O/P EST HI 40 MIN: CPT

## 2025-06-30 RX ORDER — GABAPENTIN 100 MG/1
100 CAPSULE ORAL TWICE DAILY
Qty: 60 | Refills: 2 | Status: ACTIVE | COMMUNITY
Start: 2025-06-30 | End: 1900-01-01

## 2025-09-12 ENCOUNTER — APPOINTMENT (OUTPATIENT)
Dept: NEUROSURGERY | Facility: CLINIC | Age: 40
End: 2025-09-12

## 2025-09-12 VITALS
RESPIRATION RATE: 18 BRPM | BODY MASS INDEX: 32.39 KG/M2 | HEIGHT: 62 IN | HEART RATE: 76 BPM | SYSTOLIC BLOOD PRESSURE: 115 MMHG | OXYGEN SATURATION: 98 % | DIASTOLIC BLOOD PRESSURE: 76 MMHG | WEIGHT: 176 LBS

## 2025-09-12 DIAGNOSIS — G95.89 OTHER SPECIFIED DISEASES OF SPINAL CORD: ICD-10-CM

## 2025-09-12 DIAGNOSIS — Z98.890 OTHER SPECIFIED POSTPROCEDURAL STATES: ICD-10-CM

## 2025-09-12 PROCEDURE — 99215 OFFICE O/P EST HI 40 MIN: CPT

## (undated) DEVICE — WARMING BLANKET UPPER ADULT

## (undated) DEVICE — DRAPE INSTRUMENT POUCH 6.75" X 11"

## (undated) DEVICE — SUT VICRYL 0 18" CT-1 UNDYED (POP-OFF)

## (undated) DEVICE — DRAPE MICROSCOPE EXOSCOPE 12" X 79"

## (undated) DEVICE — FOLEY TRAY 16FR 5CC LF UMETER CLOSED

## (undated) DEVICE — DRAIN JACKSON PRATT 3 SPRING RESERVOIR W 10FR PVC DRAIN

## (undated) DEVICE — DRAPE C ARM BANDED SHOWER BAG

## (undated) DEVICE — BIPOLAR FORCEP KOGENT IRRIGATING STRAIGHT 0.5MM X 7" DISP

## (undated) DEVICE — GLV 8 PROTEXIS (WHITE)

## (undated) DEVICE — VENODYNE/SCD SLEEVE CALF MEDIUM

## (undated) DEVICE — PACK SPINE

## (undated) DEVICE — STRYKER BONE MILL BLADE FINE 3.2MM

## (undated) DEVICE — SPONGE SURGICAL STRIP 1/2 X 6"

## (undated) DEVICE — MIDAS REX LEGEND BALL FLUTED SM BORE 3.0MM X 10CM

## (undated) DEVICE — DRAPE C ARM 41X74"

## (undated) DEVICE — ELCTR AQUAMANTYS BIPOLAR SEALER 6.0

## (undated) DEVICE — MIDAS REX LEGEND METAL CUTTER 3.0MM X 18.3MM

## (undated) DEVICE — DRSG DERMABOND 0.7ML

## (undated) DEVICE — NDL HYPO SAFE 22G X 1.5" (BLACK)

## (undated) DEVICE — DRSG 4 X 8

## (undated) DEVICE — DRSG BIOPATCH DISK W CHG 1" W 4.0MM HOLE

## (undated) DEVICE — SUT MONOCRYL 3-0 27" PS-2 UNDYED

## (undated) DEVICE — MARKING PEN W RULER

## (undated) DEVICE — DRAPE MICROSCOPE LEICA MINI

## (undated) DEVICE — DRSG TEGADERM 4X4.75"

## (undated) DEVICE — POSITIONER FOAM EGG CRATE ULNAR 2PCS (PINK)

## (undated) DEVICE — SUT VICRYL PLUS 2-0 18" CT-2 (POP-OFF)

## (undated) DEVICE — MIDAS REX LEGEND MATCH HEAD FLUTED LG BORE 3.0MM X 14CM

## (undated) DEVICE — SUT SILK 2-0 30" PSL